# Patient Record
Sex: FEMALE | Race: WHITE | Employment: FULL TIME | URBAN - METROPOLITAN AREA
[De-identification: names, ages, dates, MRNs, and addresses within clinical notes are randomized per-mention and may not be internally consistent; named-entity substitution may affect disease eponyms.]

---

## 2017-01-01 DIAGNOSIS — I10 ESSENTIAL HYPERTENSION: ICD-10-CM

## 2017-01-03 DIAGNOSIS — F41.1 GENERALIZED ANXIETY DISORDER: ICD-10-CM

## 2017-01-03 DIAGNOSIS — F41.8 DEPRESSION WITH ANXIETY: ICD-10-CM

## 2017-01-03 RX ORDER — ALPRAZOLAM 1 MG/1
TABLET ORAL
Qty: 60 TABLET | Refills: 0 | Status: SHIPPED | OUTPATIENT
Start: 2017-01-03 | End: 2017-02-27

## 2017-01-03 RX ORDER — LISINOPRIL 5 MG/1
TABLET ORAL
Qty: 30 TABLET | Refills: 5 | Status: SHIPPED | OUTPATIENT
Start: 2017-01-03 | End: 2017-02-27 | Stop reason: SDUPTHER

## 2017-02-24 ENCOUNTER — TELEPHONE (OUTPATIENT)
Dept: INTERNAL MEDICINE CLINIC | Age: 52
End: 2017-02-24

## 2017-02-27 ENCOUNTER — OFFICE VISIT (OUTPATIENT)
Dept: INTERNAL MEDICINE CLINIC | Age: 52
End: 2017-02-27

## 2017-02-27 VITALS
WEIGHT: 180.4 LBS | HEART RATE: 81 BPM | HEIGHT: 68 IN | DIASTOLIC BLOOD PRESSURE: 84 MMHG | BODY MASS INDEX: 27.34 KG/M2 | SYSTOLIC BLOOD PRESSURE: 138 MMHG

## 2017-02-27 DIAGNOSIS — Z11.59 NEED FOR HEPATITIS C SCREENING TEST: ICD-10-CM

## 2017-02-27 DIAGNOSIS — Z11.4 SCREENING FOR HIV (HUMAN IMMUNODEFICIENCY VIRUS): ICD-10-CM

## 2017-02-27 DIAGNOSIS — E78.5 HYPERLIPIDEMIA, UNSPECIFIED HYPERLIPIDEMIA TYPE: Chronic | ICD-10-CM

## 2017-02-27 DIAGNOSIS — I10 ESSENTIAL HYPERTENSION: ICD-10-CM

## 2017-02-27 DIAGNOSIS — F33.9 MAJOR DEPRESSIVE DISORDER, RECURRENT EPISODE WITH ANXIOUS DISTRESS (HCC): ICD-10-CM

## 2017-02-27 DIAGNOSIS — E11.49 DIABETES MELLITUS TYPE 2 WITH NEUROLOGICAL MANIFESTATIONS (HCC): Primary | ICD-10-CM

## 2017-02-27 DIAGNOSIS — F17.200 TOBACCO DEPENDENCE: ICD-10-CM

## 2017-02-27 DIAGNOSIS — F41.1 GENERALIZED ANXIETY DISORDER: ICD-10-CM

## 2017-02-27 DIAGNOSIS — K21.9 GASTROESOPHAGEAL REFLUX DISEASE, ESOPHAGITIS PRESENCE NOT SPECIFIED: Chronic | ICD-10-CM

## 2017-02-27 DIAGNOSIS — E78.2 MIXED HYPERLIPIDEMIA: ICD-10-CM

## 2017-02-27 DIAGNOSIS — I25.709 CORONARY ARTERY DISEASE INVOLVING CORONARY BYPASS GRAFT OF NATIVE HEART WITH ANGINA PECTORIS (HCC): ICD-10-CM

## 2017-02-27 DIAGNOSIS — E66.3 OVERWEIGHT (BMI 25.0-29.9): ICD-10-CM

## 2017-02-27 DIAGNOSIS — I25.10 CORONARY ARTERY DISEASE INVOLVING NATIVE CORONARY ARTERY OF NATIVE HEART WITHOUT ANGINA PECTORIS: ICD-10-CM

## 2017-02-27 LAB — HBA1C MFR BLD: 6.3 %

## 2017-02-27 PROCEDURE — 83036 HEMOGLOBIN GLYCOSYLATED A1C: CPT | Performed by: INTERNAL MEDICINE

## 2017-02-27 PROCEDURE — 99215 OFFICE O/P EST HI 40 MIN: CPT | Performed by: INTERNAL MEDICINE

## 2017-02-27 RX ORDER — ATORVASTATIN CALCIUM 40 MG/1
40 TABLET, FILM COATED ORAL DAILY
Qty: 90 TABLET | Refills: 1 | Status: SHIPPED | OUTPATIENT
Start: 2017-02-27 | End: 2017-08-10 | Stop reason: SDUPTHER

## 2017-02-27 RX ORDER — OXYCODONE HYDROCHLORIDE AND ACETAMINOPHEN 5; 325 MG/1; MG/1
1 TABLET ORAL EVERY 4 HOURS PRN
Status: CANCELLED | OUTPATIENT
Start: 2017-02-27

## 2017-02-27 RX ORDER — RANOLAZINE 1000 MG/1
1000 TABLET, EXTENDED RELEASE ORAL 2 TIMES DAILY
Qty: 180 TABLET | Refills: 0 | Status: CANCELLED | OUTPATIENT
Start: 2017-02-27

## 2017-02-27 RX ORDER — RANITIDINE 150 MG/1
150 TABLET ORAL 2 TIMES DAILY
Qty: 180 TABLET | Refills: 1 | Status: SHIPPED | OUTPATIENT
Start: 2017-02-27 | End: 2017-09-09 | Stop reason: SDUPTHER

## 2017-02-27 RX ORDER — VENLAFAXINE HYDROCHLORIDE 150 MG/1
150 CAPSULE, EXTENDED RELEASE ORAL DAILY
Qty: 90 CAPSULE | Refills: 1 | Status: SHIPPED | OUTPATIENT
Start: 2017-02-27 | End: 2017-08-10 | Stop reason: SDUPTHER

## 2017-02-27 RX ORDER — LISINOPRIL 5 MG/1
TABLET ORAL
Qty: 90 TABLET | Refills: 1 | Status: SHIPPED | OUTPATIENT
Start: 2017-02-27 | End: 2017-08-10 | Stop reason: SDUPTHER

## 2017-02-27 RX ORDER — VENLAFAXINE HYDROCHLORIDE 75 MG/1
CAPSULE, EXTENDED RELEASE ORAL
Qty: 90 CAPSULE | Refills: 0 | Status: CANCELLED | OUTPATIENT
Start: 2017-02-27

## 2017-02-27 RX ORDER — CARVEDILOL 25 MG/1
25 TABLET ORAL 2 TIMES DAILY WITH MEALS
COMMUNITY
End: 2017-12-12 | Stop reason: SDUPTHER

## 2017-02-27 RX ORDER — CARVEDILOL 12.5 MG/1
12.5 TABLET ORAL 2 TIMES DAILY WITH MEALS
Qty: 180 TABLET | Refills: 3 | Status: CANCELLED | OUTPATIENT
Start: 2017-02-27

## 2017-02-27 RX ORDER — ALPRAZOLAM 1 MG/1
TABLET ORAL
Qty: 60 TABLET | Refills: 0 | Status: CANCELLED | OUTPATIENT
Start: 2017-02-27

## 2017-02-27 RX ORDER — HYDROXYZINE HYDROCHLORIDE 25 MG/1
25 TABLET, FILM COATED ORAL 3 TIMES DAILY PRN
Qty: 90 TABLET | Refills: 1 | Status: SHIPPED | OUTPATIENT
Start: 2017-02-27 | End: 2017-03-29

## 2017-02-27 RX ORDER — ASPIRIN 81 MG/1
81 TABLET, CHEWABLE ORAL DAILY
Qty: 30 TABLET | Refills: 1 | Status: CANCELLED | OUTPATIENT
Start: 2017-02-27

## 2017-02-27 ASSESSMENT — ENCOUNTER SYMPTOMS
BACK PAIN: 1
NAUSEA: 1
EYE PAIN: 0
VOMITING: 1
WHEEZING: 0
ABDOMINAL PAIN: 1
CONSTIPATION: 1
SHORTNESS OF BREATH: 0
DIARRHEA: 1
SORE THROAT: 0
COUGH: 0

## 2017-03-06 ENCOUNTER — HOSPITAL ENCOUNTER (OUTPATIENT)
Dept: GENERAL RADIOLOGY | Age: 52
Discharge: HOME | End: 2017-03-06
Attending: PAIN MEDICINE | Admitting: PAIN MEDICINE

## 2017-03-06 DIAGNOSIS — M54.16 LUMBAR RADICULOPATHY: ICD-10-CM

## 2017-03-14 ENCOUNTER — TELEPHONE (OUTPATIENT)
Dept: PSYCHOLOGY | Age: 52
End: 2017-03-14

## 2017-03-15 ENCOUNTER — TELEPHONE (OUTPATIENT)
Dept: INTERNAL MEDICINE CLINIC | Age: 52
End: 2017-03-15

## 2017-03-20 ENCOUNTER — TELEPHONE (OUTPATIENT)
Dept: INTERNAL MEDICINE CLINIC | Age: 52
End: 2017-03-20

## 2017-03-20 DIAGNOSIS — I25.10 CORONARY ARTERY DISEASE INVOLVING NATIVE CORONARY ARTERY OF NATIVE HEART WITHOUT ANGINA PECTORIS: ICD-10-CM

## 2017-03-20 RX ORDER — RANOLAZINE 1000 MG/1
1000 TABLET, EXTENDED RELEASE ORAL 2 TIMES DAILY
Qty: 180 TABLET | Refills: 0 | Status: SHIPPED | OUTPATIENT
Start: 2017-03-20 | End: 2017-03-29 | Stop reason: SDUPTHER

## 2017-03-23 ENCOUNTER — HOSPITAL ENCOUNTER (OUTPATIENT)
Dept: GENERAL RADIOLOGY | Age: 52
Discharge: HOME | End: 2017-03-31
Attending: INTERNAL MEDICINE | Admitting: INTERNAL MEDICINE

## 2017-03-24 LAB
ALBUMIN SERPL-MCNC: 4.6 GM/DL (ref 3.4–5)
ALP BLD-CCNC: 73 IU/L (ref 40–128)
ALT SERPL-CCNC: 22 U/L (ref 10–40)
ANION GAP SERPL CALCULATED.3IONS-SCNC: 18 MMOL/L (ref 4–16)
AST SERPL-CCNC: 18 IU/L (ref 15–37)
BASOPHILS ABSOLUTE: 0.1 K/CU MM
BASOPHILS RELATIVE PERCENT: 0.8 % (ref 0–1)
BILIRUB SERPL-MCNC: 0.3 MG/DL (ref 0–1)
BUN BLDV-MCNC: 13 MG/DL (ref 6–23)
CALCIUM SERPL-MCNC: 9.7 MG/DL (ref 8.3–10.6)
CHLORIDE BLD-SCNC: 100 MMOL/L (ref 99–110)
CHOLESTEROL: 159 MG/DL
CO2: 19 MMOL/L (ref 21–32)
CREAT SERPL-MCNC: 0.7 MG/DL (ref 0.6–1.1)
CREATININE URINE: 134.4 MG/DL (ref 28–217)
DIFFERENTIAL TYPE: ABNORMAL
EOSINOPHILS ABSOLUTE: 0.1 K/CU MM
EOSINOPHILS RELATIVE PERCENT: 0.8 % (ref 0–3)
ESTIMATED AVERAGE GLUCOSE: 134 MG/DL
GFR AFRICAN AMERICAN: >60 ML/MIN/1.73M2
GFR NON-AFRICAN AMERICAN: >60 ML/MIN/1.73M2
GLUCOSE BLD-MCNC: 138 MG/DL (ref 70–140)
HBA1C MFR BLD: 6.3 % (ref 4.2–6.3)
HCT VFR BLD CALC: 41.4 % (ref 37–47)
HDLC SERPL-MCNC: 50 MG/DL
HEMOGLOBIN: 13.5 GM/DL (ref 12.5–16)
HEPATITIS C ANTIBODY: NON REACTIVE
IMMATURE NEUTROPHIL %: 0.7 % (ref 0–0.43)
LDL CHOLESTEROL CALCULATED: 58 MG/DL
LYMPHOCYTES ABSOLUTE: 2.3 K/CU MM
LYMPHOCYTES RELATIVE PERCENT: 27.1 % (ref 24–44)
MCH RBC QN AUTO: 32.4 PG (ref 27–31)
MCHC RBC AUTO-ENTMCNC: 32.6 % (ref 32–36)
MCV RBC AUTO: 99.3 FL (ref 78–100)
MICROALBUMIN/CREAT 24H UR: 1.3 MG/DL
MICROALBUMIN/CREAT UR-RTO: 9.7 MG/G CREAT (ref 0–30)
MONOCYTES ABSOLUTE: 0.7 K/CU MM
MONOCYTES RELATIVE PERCENT: 8.4 % (ref 0–4)
NUCLEATED RBC %: 0 %
PDW BLD-RTO: 12 % (ref 11.7–14.9)
PLATELET # BLD: 257 K/CU MM (ref 140–440)
PMV BLD AUTO: 9.9 FL (ref 7.5–11.1)
POTASSIUM SERPL-SCNC: 5.1 MMOL/L (ref 3.5–5.1)
RBC # BLD: 4.17 M/CU MM (ref 4.2–5.4)
SEGMENTED NEUTROPHILS ABSOLUTE COUNT: 5.2 K/CU MM
SEGMENTED NEUTROPHILS RELATIVE PERCENT: 62.2 % (ref 36–66)
SODIUM BLD-SCNC: 137 MMOL/L (ref 135–145)
TOTAL IMMATURE NEUTOROPHIL: 0.06 K/CU MM
TOTAL NUCLEATED RBC: 0 K/CU MM
TOTAL PROTEIN: 7.1 GM/DL (ref 6.4–8.2)
TRIGL SERPL-MCNC: 254 MG/DL
TSH HIGH SENSITIVITY: 0.84 UIU/ML (ref 0.27–4.2)
WBC # BLD: 8.4 K/CU MM (ref 4–10.5)

## 2017-03-25 LAB — HIV SCREEN: NON REACTIVE

## 2017-03-27 ENCOUNTER — TELEPHONE (OUTPATIENT)
Dept: INTERNAL MEDICINE CLINIC | Age: 52
End: 2017-03-27

## 2017-03-29 ENCOUNTER — OFFICE VISIT (OUTPATIENT)
Dept: INTERNAL MEDICINE CLINIC | Age: 52
End: 2017-03-29

## 2017-03-29 VITALS
SYSTOLIC BLOOD PRESSURE: 110 MMHG | BODY MASS INDEX: 29.44 KG/M2 | HEART RATE: 90 BPM | DIASTOLIC BLOOD PRESSURE: 80 MMHG | WEIGHT: 182.4 LBS

## 2017-03-29 DIAGNOSIS — I10 ESSENTIAL HYPERTENSION: Chronic | ICD-10-CM

## 2017-03-29 DIAGNOSIS — I25.10 CORONARY ARTERY DISEASE INVOLVING NATIVE CORONARY ARTERY OF NATIVE HEART WITHOUT ANGINA PECTORIS: ICD-10-CM

## 2017-03-29 DIAGNOSIS — M54.16 LUMBAR RADICULOPATHY, CHRONIC: ICD-10-CM

## 2017-03-29 DIAGNOSIS — E78.5 HYPERLIPIDEMIA, UNSPECIFIED HYPERLIPIDEMIA TYPE: Chronic | ICD-10-CM

## 2017-03-29 DIAGNOSIS — E11.49 DIABETES MELLITUS TYPE 2 WITH NEUROLOGICAL MANIFESTATIONS (HCC): Primary | ICD-10-CM

## 2017-03-29 DIAGNOSIS — F17.200 TOBACCO DEPENDENCE: ICD-10-CM

## 2017-03-29 DIAGNOSIS — Z95.1 S/P CABG X 3: ICD-10-CM

## 2017-03-29 PROCEDURE — 99214 OFFICE O/P EST MOD 30 MIN: CPT | Performed by: INTERNAL MEDICINE

## 2017-03-29 RX ORDER — RANOLAZINE 1000 MG/1
1000 TABLET, EXTENDED RELEASE ORAL 2 TIMES DAILY
Qty: 180 TABLET | Refills: 1 | Status: SHIPPED | OUTPATIENT
Start: 2017-03-29 | End: 2017-12-12 | Stop reason: SDUPTHER

## 2017-03-29 RX ORDER — BUSPIRONE HYDROCHLORIDE 5 MG/1
5 TABLET ORAL 3 TIMES DAILY
Qty: 90 TABLET | Refills: 1 | Status: SHIPPED | OUTPATIENT
Start: 2017-03-29 | End: 2017-05-10

## 2017-03-29 ASSESSMENT — ENCOUNTER SYMPTOMS
WHEEZING: 0
CONSTIPATION: 0
SHORTNESS OF BREATH: 0
SORE THROAT: 0
ABDOMINAL PAIN: 0
EYE PAIN: 0
DIARRHEA: 0
BACK PAIN: 0
COUGH: 0

## 2017-03-30 DIAGNOSIS — E11.49 DIABETES MELLITUS TYPE 2 WITH NEUROLOGICAL MANIFESTATIONS (HCC): ICD-10-CM

## 2017-04-01 ENCOUNTER — HOSPITAL ENCOUNTER (OUTPATIENT)
Dept: GENERAL RADIOLOGY | Age: 52
Discharge: HOME | End: 2017-04-30
Attending: INTERNAL MEDICINE | Admitting: INTERNAL MEDICINE

## 2017-04-07 ENCOUNTER — OFFICE VISIT (OUTPATIENT)
Dept: PHYSICAL MEDICINE AND REHAB | Age: 52
End: 2017-04-07

## 2017-04-07 DIAGNOSIS — M54.17 LUMBOSACRAL RADICULOPATHY AT S1: ICD-10-CM

## 2017-04-07 DIAGNOSIS — G57.31 PERONEAL NEUROPATHY, RIGHT: Primary | ICD-10-CM

## 2017-04-07 DIAGNOSIS — R20.2 PARESTHESIA OF BILATERAL LEGS: ICD-10-CM

## 2017-04-07 PROCEDURE — 95886 MUSC TEST DONE W/N TEST COMP: CPT | Performed by: PHYSICAL MEDICINE & REHABILITATION

## 2017-04-07 PROCEDURE — 95910 NRV CNDJ TEST 7-8 STUDIES: CPT | Performed by: PHYSICAL MEDICINE & REHABILITATION

## 2017-04-10 ENCOUNTER — OFFICE VISIT (OUTPATIENT)
Dept: INTERNAL MEDICINE CLINIC | Age: 52
End: 2017-04-10

## 2017-04-10 VITALS
SYSTOLIC BLOOD PRESSURE: 100 MMHG | BODY MASS INDEX: 28.7 KG/M2 | WEIGHT: 177.8 LBS | HEART RATE: 83 BPM | DIASTOLIC BLOOD PRESSURE: 70 MMHG

## 2017-04-10 DIAGNOSIS — F33.9 MAJOR DEPRESSIVE DISORDER, RECURRENT EPISODE WITH ANXIOUS DISTRESS (HCC): ICD-10-CM

## 2017-04-10 DIAGNOSIS — R42 DIZZINESS: ICD-10-CM

## 2017-04-10 DIAGNOSIS — E11.49 DIABETES MELLITUS TYPE 2 WITH NEUROLOGICAL MANIFESTATIONS (HCC): ICD-10-CM

## 2017-04-10 DIAGNOSIS — F17.200 TOBACCO DEPENDENCE: ICD-10-CM

## 2017-04-10 DIAGNOSIS — M54.16 LUMBAR RADICULOPATHY, CHRONIC: Primary | ICD-10-CM

## 2017-04-10 PROCEDURE — 99213 OFFICE O/P EST LOW 20 MIN: CPT | Performed by: INTERNAL MEDICINE

## 2017-04-10 ASSESSMENT — ENCOUNTER SYMPTOMS
ABDOMINAL PAIN: 0
COUGH: 0
SORE THROAT: 0
BACK PAIN: 0
DIARRHEA: 0
SHORTNESS OF BREATH: 0
CONSTIPATION: 0
EYE PAIN: 0
WHEEZING: 0

## 2017-04-20 ENCOUNTER — HOSPITAL ENCOUNTER (OUTPATIENT)
Dept: MRI IMAGING | Age: 52
Discharge: HOME | End: 2017-05-19
Attending: INTERNAL MEDICINE | Admitting: INTERNAL MEDICINE

## 2017-04-26 ENCOUNTER — OFFICE VISIT (OUTPATIENT)
Dept: PSYCHOLOGY | Age: 52
End: 2017-04-26

## 2017-04-26 DIAGNOSIS — F43.21 COMPLICATED BEREAVEMENT: ICD-10-CM

## 2017-04-26 DIAGNOSIS — F32.A DEPRESSIVE DISORDER: Primary | ICD-10-CM

## 2017-04-26 PROCEDURE — 90791 PSYCH DIAGNOSTIC EVALUATION: CPT | Performed by: PSYCHOLOGIST

## 2017-04-26 ASSESSMENT — PATIENT HEALTH QUESTIONNAIRE - PHQ9
1. LITTLE INTEREST OR PLEASURE IN DOING THINGS: 2
3. TROUBLE FALLING OR STAYING ASLEEP: 2
10. IF YOU CHECKED OFF ANY PROBLEMS, HOW DIFFICULT HAVE THESE PROBLEMS MADE IT FOR YOU TO DO YOUR WORK, TAKE CARE OF THINGS AT HOME, OR GET ALONG WITH OTHER PEOPLE: 1
7. TROUBLE CONCENTRATING ON THINGS, SUCH AS READING THE NEWSPAPER OR WATCHING TELEVISION: 1
9. THOUGHTS THAT YOU WOULD BE BETTER OFF DEAD, OR OF HURTING YOURSELF: 0
5. POOR APPETITE OR OVEREATING: 3
8. MOVING OR SPEAKING SO SLOWLY THAT OTHER PEOPLE COULD HAVE NOTICED. OR THE OPPOSITE, BEING SO FIGETY OR RESTLESS THAT YOU HAVE BEEN MOVING AROUND A LOT MORE THAN USUAL: 2
6. FEELING BAD ABOUT YOURSELF - OR THAT YOU ARE A FAILURE OR HAVE LET YOURSELF OR YOUR FAMILY DOWN: 3
2. FEELING DOWN, DEPRESSED OR HOPELESS: 3
SUM OF ALL RESPONSES TO PHQ QUESTIONS 1-9: 18
4. FEELING TIRED OR HAVING LITTLE ENERGY: 2
SUM OF ALL RESPONSES TO PHQ9 QUESTIONS 1 & 2: 5

## 2017-05-05 ENCOUNTER — TELEPHONE (OUTPATIENT)
Dept: INTERNAL MEDICINE CLINIC | Age: 52
End: 2017-05-05

## 2017-05-09 ENCOUNTER — TELEPHONE (OUTPATIENT)
Dept: INTERNAL MEDICINE CLINIC | Age: 52
End: 2017-05-09

## 2017-05-09 ENCOUNTER — TELEPHONE (OUTPATIENT)
Dept: PSYCHOLOGY | Age: 52
End: 2017-05-09

## 2017-05-10 ENCOUNTER — OFFICE VISIT (OUTPATIENT)
Dept: PSYCHOLOGY | Age: 52
End: 2017-05-10

## 2017-05-10 ENCOUNTER — OFFICE VISIT (OUTPATIENT)
Dept: INTERNAL MEDICINE CLINIC | Age: 52
End: 2017-05-10

## 2017-05-10 VITALS
SYSTOLIC BLOOD PRESSURE: 114 MMHG | DIASTOLIC BLOOD PRESSURE: 68 MMHG | WEIGHT: 183 LBS | HEART RATE: 85 BPM | BODY MASS INDEX: 29.54 KG/M2

## 2017-05-10 DIAGNOSIS — F33.9 MAJOR DEPRESSIVE DISORDER, RECURRENT EPISODE WITH ANXIOUS DISTRESS (HCC): ICD-10-CM

## 2017-05-10 DIAGNOSIS — M54.16 LUMBAR RADICULOPATHY: ICD-10-CM

## 2017-05-10 DIAGNOSIS — E11.42 TYPE 2 DIABETES MELLITUS WITH DIABETIC POLYNEUROPATHY, WITHOUT LONG-TERM CURRENT USE OF INSULIN (HCC): Primary | Chronic | ICD-10-CM

## 2017-05-10 DIAGNOSIS — F43.21 COMPLICATED BEREAVEMENT: ICD-10-CM

## 2017-05-10 DIAGNOSIS — I10 ESSENTIAL HYPERTENSION: Chronic | ICD-10-CM

## 2017-05-10 DIAGNOSIS — F32.A DEPRESSIVE DISORDER: Primary | ICD-10-CM

## 2017-05-10 DIAGNOSIS — I25.10 CORONARY ARTERY DISEASE INVOLVING NATIVE CORONARY ARTERY OF NATIVE HEART WITHOUT ANGINA PECTORIS: Chronic | ICD-10-CM

## 2017-05-10 LAB — HBA1C MFR BLD: 7.1 %

## 2017-05-10 PROCEDURE — 83036 HEMOGLOBIN GLYCOSYLATED A1C: CPT | Performed by: INTERNAL MEDICINE

## 2017-05-10 PROCEDURE — 99213 OFFICE O/P EST LOW 20 MIN: CPT | Performed by: INTERNAL MEDICINE

## 2017-05-10 PROCEDURE — 90832 PSYTX W PT 30 MINUTES: CPT | Performed by: PSYCHOLOGIST

## 2017-05-10 RX ORDER — BUSPIRONE HYDROCHLORIDE 5 MG/1
5 TABLET ORAL 3 TIMES DAILY
Qty: 90 TABLET | Refills: 1 | Status: SHIPPED | OUTPATIENT
Start: 2017-05-10 | End: 2017-08-10 | Stop reason: SDUPTHER

## 2017-05-10 RX ORDER — PREGABALIN 50 MG/1
50 CAPSULE ORAL DAILY
COMMUNITY
End: 2017-08-10

## 2017-05-10 ASSESSMENT — PATIENT HEALTH QUESTIONNAIRE - PHQ9
8. MOVING OR SPEAKING SO SLOWLY THAT OTHER PEOPLE COULD HAVE NOTICED. OR THE OPPOSITE, BEING SO FIGETY OR RESTLESS THAT YOU HAVE BEEN MOVING AROUND A LOT MORE THAN USUAL: 2
5. POOR APPETITE OR OVEREATING: 3
SUM OF ALL RESPONSES TO PHQ QUESTIONS 1-9: 12
SUM OF ALL RESPONSES TO PHQ9 QUESTIONS 1 & 2: 2
10. IF YOU CHECKED OFF ANY PROBLEMS, HOW DIFFICULT HAVE THESE PROBLEMS MADE IT FOR YOU TO DO YOUR WORK, TAKE CARE OF THINGS AT HOME, OR GET ALONG WITH OTHER PEOPLE: 1
3. TROUBLE FALLING OR STAYING ASLEEP: 1
4. FEELING TIRED OR HAVING LITTLE ENERGY: 1
1. LITTLE INTEREST OR PLEASURE IN DOING THINGS: 1
2. FEELING DOWN, DEPRESSED OR HOPELESS: 1
6. FEELING BAD ABOUT YOURSELF - OR THAT YOU ARE A FAILURE OR HAVE LET YOURSELF OR YOUR FAMILY DOWN: 2
9. THOUGHTS THAT YOU WOULD BE BETTER OFF DEAD, OR OF HURTING YOURSELF: 0
7. TROUBLE CONCENTRATING ON THINGS, SUCH AS READING THE NEWSPAPER OR WATCHING TELEVISION: 1

## 2017-05-10 ASSESSMENT — ENCOUNTER SYMPTOMS
CONSTIPATION: 0
SHORTNESS OF BREATH: 0
WHEEZING: 0
BACK PAIN: 0
COUGH: 0
EYE PAIN: 0
SORE THROAT: 0
ABDOMINAL PAIN: 0
DIARRHEA: 0

## 2017-05-15 DIAGNOSIS — M54.16 LUMBAR RADICULOPATHY, CHRONIC: ICD-10-CM

## 2017-05-16 ENCOUNTER — TELEPHONE (OUTPATIENT)
Dept: INTERNAL MEDICINE CLINIC | Age: 52
End: 2017-05-16

## 2017-05-16 RX ORDER — ALBUTEROL SULFATE 90 UG/1
2 AEROSOL, METERED RESPIRATORY (INHALATION) EVERY 6 HOURS PRN
Qty: 1 INHALER | Refills: 3 | Status: SHIPPED | OUTPATIENT
Start: 2017-05-16 | End: 2017-05-19 | Stop reason: SDUPTHER

## 2017-05-18 ENCOUNTER — TELEPHONE (OUTPATIENT)
Dept: INTERNAL MEDICINE CLINIC | Age: 52
End: 2017-05-18

## 2017-05-19 RX ORDER — ALBUTEROL SULFATE 90 UG/1
2 AEROSOL, METERED RESPIRATORY (INHALATION) EVERY 6 HOURS PRN
Qty: 1 INHALER | Refills: 5 | Status: SHIPPED | OUTPATIENT
Start: 2017-05-19 | End: 2018-01-29 | Stop reason: SDUPTHER

## 2017-05-30 ENCOUNTER — TELEPHONE (OUTPATIENT)
Dept: PSYCHOLOGY | Age: 52
End: 2017-05-30

## 2017-06-27 ENCOUNTER — TELEPHONE (OUTPATIENT)
Dept: INTERNAL MEDICINE CLINIC | Age: 52
End: 2017-06-27

## 2017-08-09 ENCOUNTER — TELEPHONE (OUTPATIENT)
Dept: INTERNAL MEDICINE CLINIC | Age: 52
End: 2017-08-09

## 2017-08-10 ENCOUNTER — OFFICE VISIT (OUTPATIENT)
Dept: INTERNAL MEDICINE CLINIC | Age: 52
End: 2017-08-10

## 2017-08-10 VITALS
WEIGHT: 187 LBS | DIASTOLIC BLOOD PRESSURE: 68 MMHG | HEART RATE: 80 BPM | SYSTOLIC BLOOD PRESSURE: 110 MMHG | BODY MASS INDEX: 30.18 KG/M2

## 2017-08-10 DIAGNOSIS — E11.49 DIABETES MELLITUS TYPE 2 WITH NEUROLOGICAL MANIFESTATIONS (HCC): Primary | ICD-10-CM

## 2017-08-10 DIAGNOSIS — I10 ESSENTIAL HYPERTENSION: ICD-10-CM

## 2017-08-10 DIAGNOSIS — E78.5 HYPERLIPIDEMIA, UNSPECIFIED HYPERLIPIDEMIA TYPE: Chronic | ICD-10-CM

## 2017-08-10 DIAGNOSIS — K21.9 GASTROESOPHAGEAL REFLUX DISEASE, ESOPHAGITIS PRESENCE NOT SPECIFIED: Chronic | ICD-10-CM

## 2017-08-10 DIAGNOSIS — F17.200 TOBACCO DEPENDENCE: ICD-10-CM

## 2017-08-10 DIAGNOSIS — F33.9 MAJOR DEPRESSIVE DISORDER, RECURRENT EPISODE WITH ANXIOUS DISTRESS (HCC): ICD-10-CM

## 2017-08-10 LAB — HBA1C MFR BLD: 6.9 %

## 2017-08-10 PROCEDURE — 99214 OFFICE O/P EST MOD 30 MIN: CPT | Performed by: INTERNAL MEDICINE

## 2017-08-10 PROCEDURE — 83036 HEMOGLOBIN GLYCOSYLATED A1C: CPT | Performed by: INTERNAL MEDICINE

## 2017-08-10 RX ORDER — LISINOPRIL 5 MG/1
TABLET ORAL
Qty: 90 TABLET | Refills: 1 | Status: SHIPPED | OUTPATIENT
Start: 2017-08-10 | End: 2017-12-12 | Stop reason: SDUPTHER

## 2017-08-10 RX ORDER — ATORVASTATIN CALCIUM 40 MG/1
40 TABLET, FILM COATED ORAL DAILY
Qty: 90 TABLET | Refills: 1 | Status: SHIPPED | OUTPATIENT
Start: 2017-08-10 | End: 2017-08-14 | Stop reason: SDUPTHER

## 2017-08-10 RX ORDER — VENLAFAXINE HYDROCHLORIDE 150 MG/1
150 CAPSULE, EXTENDED RELEASE ORAL DAILY
Qty: 90 CAPSULE | Refills: 1 | Status: SHIPPED | OUTPATIENT
Start: 2017-08-10 | End: 2017-12-12 | Stop reason: SDUPTHER

## 2017-08-10 RX ORDER — BUSPIRONE HYDROCHLORIDE 5 MG/1
5 TABLET ORAL 3 TIMES DAILY
Qty: 90 TABLET | Refills: 3 | Status: SHIPPED | OUTPATIENT
Start: 2017-08-10 | End: 2017-09-09 | Stop reason: SDUPTHER

## 2017-08-10 ASSESSMENT — ENCOUNTER SYMPTOMS
CONSTIPATION: 0
BACK PAIN: 0
WHEEZING: 0
SHORTNESS OF BREATH: 0
COUGH: 0
DIARRHEA: 0
EYE PAIN: 0
SORE THROAT: 0
ABDOMINAL PAIN: 0

## 2017-08-12 ENCOUNTER — PATIENT MESSAGE (OUTPATIENT)
Dept: INTERNAL MEDICINE CLINIC | Age: 52
End: 2017-08-12

## 2017-08-15 RX ORDER — ATORVASTATIN CALCIUM 40 MG/1
40 TABLET, FILM COATED ORAL DAILY
Qty: 90 TABLET | Refills: 1 | Status: SHIPPED | OUTPATIENT
Start: 2017-08-15 | End: 2017-08-16

## 2017-08-16 RX ORDER — ATORVASTATIN CALCIUM 80 MG/1
80 TABLET, FILM COATED ORAL DAILY
Qty: 90 TABLET | Refills: 1 | Status: SHIPPED | OUTPATIENT
Start: 2017-08-16 | End: 2017-12-12 | Stop reason: SDUPTHER

## 2017-09-05 ENCOUNTER — OFFICE VISIT (OUTPATIENT)
Dept: INTERNAL MEDICINE CLINIC | Age: 52
End: 2017-09-05

## 2017-09-05 VITALS
DIASTOLIC BLOOD PRESSURE: 72 MMHG | RESPIRATION RATE: 16 BRPM | OXYGEN SATURATION: 98 % | SYSTOLIC BLOOD PRESSURE: 132 MMHG | HEART RATE: 84 BPM | WEIGHT: 187 LBS | BODY MASS INDEX: 30.18 KG/M2

## 2017-09-05 DIAGNOSIS — E11.42 TYPE 2 DIABETES MELLITUS WITH DIABETIC POLYNEUROPATHY, WITHOUT LONG-TERM CURRENT USE OF INSULIN (HCC): Chronic | ICD-10-CM

## 2017-09-05 DIAGNOSIS — F17.200 TOBACCO DEPENDENCE: ICD-10-CM

## 2017-09-05 DIAGNOSIS — I10 ESSENTIAL HYPERTENSION: Chronic | ICD-10-CM

## 2017-09-05 DIAGNOSIS — Z01.818 PRE-OP TESTING: Primary | ICD-10-CM

## 2017-09-05 DIAGNOSIS — F33.9 MAJOR DEPRESSIVE DISORDER, RECURRENT EPISODE WITH ANXIOUS DISTRESS (HCC): ICD-10-CM

## 2017-09-05 DIAGNOSIS — I25.10 CORONARY ARTERY DISEASE INVOLVING NATIVE CORONARY ARTERY OF NATIVE HEART WITHOUT ANGINA PECTORIS: Chronic | ICD-10-CM

## 2017-09-05 PROCEDURE — 99244 OFF/OP CNSLTJ NEW/EST MOD 40: CPT | Performed by: INTERNAL MEDICINE

## 2017-09-05 PROCEDURE — 93000 ELECTROCARDIOGRAM COMPLETE: CPT | Performed by: INTERNAL MEDICINE

## 2017-09-05 RX ORDER — CLOTRIMAZOLE 1 %
CREAM (GRAM) TOPICAL
Qty: 24 G | Refills: 1 | Status: SHIPPED | OUTPATIENT
Start: 2017-09-05 | End: 2017-09-12

## 2017-09-05 RX ORDER — CYCLOBENZAPRINE HCL 10 MG
10 TABLET ORAL 3 TIMES DAILY PRN
COMMUNITY
End: 2017-12-12 | Stop reason: SDUPTHER

## 2017-09-09 DIAGNOSIS — K21.9 GASTROESOPHAGEAL REFLUX DISEASE, ESOPHAGITIS PRESENCE NOT SPECIFIED: Chronic | ICD-10-CM

## 2017-09-11 RX ORDER — BUSPIRONE HYDROCHLORIDE 5 MG/1
TABLET ORAL
Qty: 90 TABLET | Refills: 1 | Status: SHIPPED | OUTPATIENT
Start: 2017-09-11 | End: 2017-11-27 | Stop reason: SDUPTHER

## 2017-09-11 RX ORDER — HYDROXYZINE HYDROCHLORIDE 25 MG/1
TABLET, FILM COATED ORAL
Qty: 90 TABLET | Refills: 1 | Status: SHIPPED | OUTPATIENT
Start: 2017-09-11 | End: 2017-12-12 | Stop reason: SDUPTHER

## 2017-09-11 RX ORDER — RANITIDINE 150 MG/1
TABLET ORAL
Qty: 180 TABLET | Refills: 1 | Status: SHIPPED | OUTPATIENT
Start: 2017-09-11 | End: 2017-12-12 | Stop reason: SDUPTHER

## 2017-09-18 ENCOUNTER — NURSE ONLY (OUTPATIENT)
Dept: INTERNAL MEDICINE CLINIC | Age: 52
End: 2017-09-18

## 2017-09-18 ENCOUNTER — TELEPHONE (OUTPATIENT)
Dept: INTERNAL MEDICINE CLINIC | Age: 52
End: 2017-09-18

## 2017-09-18 DIAGNOSIS — Z01.818 PRE-OP TESTING: ICD-10-CM

## 2017-09-18 DIAGNOSIS — Z01.818 PREOP EXAMINATION: Primary | ICD-10-CM

## 2017-09-18 LAB
ANION GAP SERPL CALCULATED.3IONS-SCNC: 16 MMOL/L (ref 3–16)
BASOPHILS ABSOLUTE: 0 K/UL (ref 0–0.2)
BASOPHILS RELATIVE PERCENT: 0.6 %
BUN BLDV-MCNC: 10 MG/DL (ref 7–20)
CALCIUM SERPL-MCNC: 9.7 MG/DL (ref 8.3–10.6)
CHLORIDE BLD-SCNC: 98 MMOL/L (ref 99–110)
CO2: 24 MMOL/L (ref 21–32)
CREAT SERPL-MCNC: 0.7 MG/DL (ref 0.6–1.1)
EOSINOPHILS ABSOLUTE: 0.1 K/UL (ref 0–0.6)
EOSINOPHILS RELATIVE PERCENT: 1.6 %
GFR AFRICAN AMERICAN: >60
GFR NON-AFRICAN AMERICAN: >60
GLUCOSE BLD-MCNC: 177 MG/DL (ref 70–99)
HCT VFR BLD CALC: 38.9 % (ref 36–48)
HEMOGLOBIN: 13.2 G/DL (ref 12–16)
LYMPHOCYTES ABSOLUTE: 2 K/UL (ref 1–5.1)
LYMPHOCYTES RELATIVE PERCENT: 25.3 %
MCH RBC QN AUTO: 32.7 PG (ref 26–34)
MCHC RBC AUTO-ENTMCNC: 33.9 G/DL (ref 31–36)
MCV RBC AUTO: 96.4 FL (ref 80–100)
MONOCYTES ABSOLUTE: 0.6 K/UL (ref 0–1.3)
MONOCYTES RELATIVE PERCENT: 7.9 %
NEUTROPHILS ABSOLUTE: 5.2 K/UL (ref 1.7–7.7)
NEUTROPHILS RELATIVE PERCENT: 64.6 %
PDW BLD-RTO: 13.3 % (ref 12.4–15.4)
PLATELET # BLD: 239 K/UL (ref 135–450)
PMV BLD AUTO: 8.6 FL (ref 5–10.5)
POTASSIUM SERPL-SCNC: 4.8 MMOL/L (ref 3.5–5.1)
RBC # BLD: 4.03 M/UL (ref 4–5.2)
SODIUM BLD-SCNC: 138 MMOL/L (ref 136–145)
WBC # BLD: 8 K/UL (ref 4–11)

## 2017-09-19 ENCOUNTER — HOSPITAL ENCOUNTER (OUTPATIENT)
Dept: GENERAL RADIOLOGY | Age: 52
Discharge: HOME | End: 2017-09-19
Attending: INTERNAL MEDICINE | Admitting: INTERNAL MEDICINE

## 2017-09-19 ENCOUNTER — TELEPHONE (OUTPATIENT)
Dept: INTERNAL MEDICINE CLINIC | Age: 52
End: 2017-09-19

## 2017-09-19 DIAGNOSIS — Z01.818 PREOP EXAMINATION: Primary | ICD-10-CM

## 2017-09-19 DIAGNOSIS — Z01.818 PREOP EXAMINATION: ICD-10-CM

## 2017-09-19 LAB — MRSA SCREEN RT-PCR: NORMAL

## 2017-10-05 ENCOUNTER — TELEPHONE (OUTPATIENT)
Dept: INTERNAL MEDICINE CLINIC | Age: 52
End: 2017-10-05

## 2017-10-06 ENCOUNTER — CARE COORDINATION (OUTPATIENT)
Dept: CARE COORDINATION | Age: 52
End: 2017-10-06

## 2017-10-06 ENCOUNTER — OFFICE VISIT (OUTPATIENT)
Dept: INTERNAL MEDICINE CLINIC | Age: 52
End: 2017-10-06

## 2017-10-06 VITALS
DIASTOLIC BLOOD PRESSURE: 70 MMHG | HEART RATE: 87 BPM | BODY MASS INDEX: 30.96 KG/M2 | SYSTOLIC BLOOD PRESSURE: 120 MMHG | WEIGHT: 191.8 LBS

## 2017-10-06 DIAGNOSIS — R21 RASH OF HANDS: ICD-10-CM

## 2017-10-06 DIAGNOSIS — F17.200 TOBACCO DEPENDENCE: ICD-10-CM

## 2017-10-06 DIAGNOSIS — Z13.31 POSITIVE DEPRESSION SCREENING: ICD-10-CM

## 2017-10-06 DIAGNOSIS — I10 ESSENTIAL HYPERTENSION: Chronic | ICD-10-CM

## 2017-10-06 DIAGNOSIS — E78.5 HYPERLIPIDEMIA, UNSPECIFIED HYPERLIPIDEMIA TYPE: Chronic | ICD-10-CM

## 2017-10-06 DIAGNOSIS — F33.9 MAJOR DEPRESSIVE DISORDER, RECURRENT EPISODE WITH ANXIOUS DISTRESS (HCC): ICD-10-CM

## 2017-10-06 DIAGNOSIS — G89.29 CHRONIC MIDLINE LOW BACK PAIN WITH RIGHT-SIDED SCIATICA: Primary | ICD-10-CM

## 2017-10-06 DIAGNOSIS — M54.41 CHRONIC MIDLINE LOW BACK PAIN WITH RIGHT-SIDED SCIATICA: Primary | ICD-10-CM

## 2017-10-06 PROCEDURE — G8431 POS CLIN DEPRES SCRN F/U DOC: HCPCS | Performed by: INTERNAL MEDICINE

## 2017-10-06 PROCEDURE — 99214 OFFICE O/P EST MOD 30 MIN: CPT | Performed by: INTERNAL MEDICINE

## 2017-10-06 ASSESSMENT — ENCOUNTER SYMPTOMS
SORE THROAT: 0
ABDOMINAL PAIN: 0
SHORTNESS OF BREATH: 0
BACK PAIN: 1
DIARRHEA: 0
CONSTIPATION: 0
COUGH: 0
WHEEZING: 0
EYE PAIN: 0

## 2017-10-06 NOTE — PROGRESS NOTES
Jw Pore   45 y.o.  female  [id number]      Chief Complaint   Patient presents with    Follow-up    Back Pain     remove staples    Depression    Rash        Subjective:  45 y. o.female is here for a follow up. She has the following chronic/acute medical problems:    TOS (thoracic outlet syndrome)    Coronary artery disease    Major depressive disorder, recurrent episode with anxious distress ([address]  She says her mood is stable. There are ups and downs. Especially with the recent procedure and no improvement in pain.  Diabetes mellitus ([address]    GERD (gastroesophageal reflux disease)    Hyperlipidemia  She has been taking her statin cholesterol medication regularly without side effects such as myalgias or upper abdominal pain, nausea or jaundice.  Essential hypertension  The patient is taking hypertensive medications compliantly without side effects. Denies chest pain, dyspnea, edema, or TIA's.  Tobacco dependence  Says she quit smoking about 1 week before the surgery.  Chronic midline low back pain with right-sided sciatica     Patient is status post L4 5 laminectomy, facetectomy, posterior lateral and interbody fusion, placement of intravertebral biomechanical device at L4 5, segmental pedicle screw instrumentation. She also had L5-S1 laminectomy and facetectomy redone. Here to have staples removed. Patient says she does not feel any improvement in her pain yet. She has an appointment with neurosurgery in 2 weeks. Patient would also like a dermatology referral for the rash in her hand. She says it's improved somewhat but she would like it evaluated by dermatologist.   says she hasn't been compliant with the steroid cream.      Review of Systems   Constitutional: Negative for chills and fever. HENT: Negative for congestion and sore throat. Eyes: Negative for pain and visual disturbance. Respiratory: Negative for cough, shortness of breath and wheezing. 09/18/2017    GFRAA >60 09/18/2017     Lab Results   Component Value Date    CHOL 159 03/24/2017    CHOL 268 (H) 08/29/2015    CHOL 162 01/12/2015     Lab Results   Component Value Date    TRIG 254 (H) 03/24/2017    TRIG 614 (H) 08/29/2015    TRIG 203 (H) 01/12/2015     Lab Results   Component Value Date    HDL 50 03/24/2017    HDL 26 (L) 08/29/2015    HDL 46 01/12/2015     Lab Results   Component Value Date    LDLCALC 58 03/24/2017    LDLCALC 70 05/05/2014     Lab Results   Component Value Date    LABA1C 6.9 08/10/2017     Lab Results   Component Value Date    TSHHS 0.836 03/24/2017         ASSESSMENT:      1. Chronic midline low back pain with right-sided sciatica    2. Rash of hands    3. Essential hypertension    4. Hyperlipidemia, unspecified hyperlipidemia type    5. Major depressive disorder, recurrent episode with anxious distress ([address]    6. Tobacco dependence      On the basis of positive PHQ-9 screening ( ), the following plan was implemented: Continue current medications for now. Patient will follow-up in 4 week(s) with PCP. PLAN:  1. Staples removed. Difficult procedure as patient was very sensitive. Signs of wound dehiscence. 2.  Strongly encouraged patient to not soak the area water. She can rinse her body. 3.  Referral to dermatology. 4.  Patient quit smoking one week prior to the surgery. Continue to support patient through this process. 5.  Follow-up as scheduled. Orders Placed This Encounter   Procedures    External Referral To Dermatology       Care discussed with patient. Questions answered. Patient verbalizes understanding and agrees with plan. After visit summary provided. Advised to call for any problems, questions, or concerns. This note was partially completed with a verbal recognition program and it was checked for errors. It is possible that there are still dictated errors within this office note.  Any errors should be brought immediately to my attention for

## 2017-10-25 ENCOUNTER — CARE COORDINATION (OUTPATIENT)
Dept: CARE COORDINATION | Age: 52
End: 2017-10-25

## 2017-10-31 ENCOUNTER — TELEPHONE (OUTPATIENT)
Dept: INTERNAL MEDICINE CLINIC | Age: 52
End: 2017-10-31

## 2017-11-03 ENCOUNTER — CARE COORDINATION (OUTPATIENT)
Dept: CARE COORDINATION | Age: 52
End: 2017-11-03

## 2017-11-10 ENCOUNTER — CARE COORDINATION (OUTPATIENT)
Dept: CARE COORDINATION | Age: 52
End: 2017-11-10

## 2017-11-27 RX ORDER — BUSPIRONE HYDROCHLORIDE 5 MG/1
5 TABLET ORAL 3 TIMES DAILY
Qty: 90 TABLET | Refills: 1 | Status: SHIPPED | OUTPATIENT
Start: 2017-11-27 | End: 2017-12-12

## 2017-11-27 NOTE — TELEPHONE ENCOUNTER
From: Ania Munguia  Sent: 11/26/2017 8:32 PM EST  Subject: Medication Renewal Request    Waleska Perea would like a refill of the following medications:  busPIRone (BUSPAR) 5 MG tablet Janeth Phalen, MD]    Preferred pharmacy: [address], [address] [place] [address] [phone] - F [phone]    Comment:

## 2017-11-28 ENCOUNTER — PATIENT MESSAGE (OUTPATIENT)
Dept: INTERNAL MEDICINE CLINIC | Age: 52
End: 2017-11-28

## 2017-11-28 NOTE — TELEPHONE ENCOUNTER
From: Soraya Kelley  To: Nataly Bautista MD  Sent: 11/28/2017 12:25 PM EST  Subject: Prescription Question    Can you please refill my Buspar prescription.

## 2017-12-11 ENCOUNTER — TELEPHONE (OUTPATIENT)
Dept: INTERNAL MEDICINE CLINIC | Age: 52
End: 2017-12-11

## 2017-12-12 ENCOUNTER — CARE COORDINATOR VISIT (OUTPATIENT)
Dept: CARE COORDINATION | Age: 52
End: 2017-12-12

## 2017-12-12 ENCOUNTER — OFFICE VISIT (OUTPATIENT)
Dept: INTERNAL MEDICINE CLINIC | Age: 52
End: 2017-12-12

## 2017-12-12 VITALS
OXYGEN SATURATION: 98 % | WEIGHT: 190 LBS | HEART RATE: 82 BPM | DIASTOLIC BLOOD PRESSURE: 74 MMHG | RESPIRATION RATE: 16 BRPM | SYSTOLIC BLOOD PRESSURE: 110 MMHG | BODY MASS INDEX: 30.67 KG/M2

## 2017-12-12 DIAGNOSIS — K21.9 GASTROESOPHAGEAL REFLUX DISEASE, ESOPHAGITIS PRESENCE NOT SPECIFIED: Chronic | ICD-10-CM

## 2017-12-12 DIAGNOSIS — F17.200 TOBACCO DEPENDENCE: ICD-10-CM

## 2017-12-12 DIAGNOSIS — E11.42 TYPE 2 DIABETES MELLITUS WITH DIABETIC POLYNEUROPATHY, WITHOUT LONG-TERM CURRENT USE OF INSULIN (HCC): Primary | Chronic | ICD-10-CM

## 2017-12-12 DIAGNOSIS — I10 ESSENTIAL HYPERTENSION: Chronic | ICD-10-CM

## 2017-12-12 DIAGNOSIS — I25.10 CORONARY ARTERY DISEASE INVOLVING NATIVE CORONARY ARTERY OF NATIVE HEART WITHOUT ANGINA PECTORIS: ICD-10-CM

## 2017-12-12 DIAGNOSIS — G89.29 CHRONIC MIDLINE LOW BACK PAIN WITH RIGHT-SIDED SCIATICA: ICD-10-CM

## 2017-12-12 DIAGNOSIS — M54.41 CHRONIC MIDLINE LOW BACK PAIN WITH RIGHT-SIDED SCIATICA: ICD-10-CM

## 2017-12-12 DIAGNOSIS — E78.5 HYPERLIPIDEMIA, UNSPECIFIED HYPERLIPIDEMIA TYPE: Chronic | ICD-10-CM

## 2017-12-12 DIAGNOSIS — F33.9 MAJOR DEPRESSIVE DISORDER, RECURRENT EPISODE WITH ANXIOUS DISTRESS (HCC): ICD-10-CM

## 2017-12-12 LAB — HBA1C MFR BLD: 8 %

## 2017-12-12 PROCEDURE — 99214 OFFICE O/P EST MOD 30 MIN: CPT | Performed by: INTERNAL MEDICINE

## 2017-12-12 PROCEDURE — 83036 HEMOGLOBIN GLYCOSYLATED A1C: CPT | Performed by: INTERNAL MEDICINE

## 2017-12-12 RX ORDER — CYCLOBENZAPRINE HCL 10 MG
10 TABLET ORAL 3 TIMES DAILY PRN
Qty: 90 TABLET | Refills: 1 | Status: SHIPPED | OUTPATIENT
Start: 2017-12-12 | End: 2018-01-29 | Stop reason: SDUPTHER

## 2017-12-12 RX ORDER — HYDROXYZINE HYDROCHLORIDE 25 MG/1
TABLET, FILM COATED ORAL
Qty: 90 TABLET | Refills: 1 | Status: SHIPPED | OUTPATIENT
Start: 2017-12-12 | End: 2018-01-29 | Stop reason: SDUPTHER

## 2017-12-12 RX ORDER — CARVEDILOL 25 MG/1
25 TABLET ORAL 2 TIMES DAILY WITH MEALS
Qty: 180 TABLET | Refills: 1 | Status: SHIPPED | OUTPATIENT
Start: 2017-12-12 | End: 2018-01-29 | Stop reason: SDUPTHER

## 2017-12-12 RX ORDER — BUSPIRONE HYDROCHLORIDE 10 MG/1
10 TABLET ORAL 3 TIMES DAILY
Qty: 270 TABLET | Refills: 0 | Status: SHIPPED | OUTPATIENT
Start: 2017-12-12 | End: 2018-01-29 | Stop reason: SDUPTHER

## 2017-12-12 RX ORDER — ATORVASTATIN CALCIUM 80 MG/1
80 TABLET, FILM COATED ORAL DAILY
Qty: 90 TABLET | Refills: 1 | Status: SHIPPED | OUTPATIENT
Start: 2017-12-12 | End: 2018-01-29 | Stop reason: SDUPTHER

## 2017-12-12 RX ORDER — VENLAFAXINE HYDROCHLORIDE 150 MG/1
150 CAPSULE, EXTENDED RELEASE ORAL DAILY
Qty: 90 CAPSULE | Refills: 1 | Status: SHIPPED | OUTPATIENT
Start: 2017-12-12 | End: 2018-01-29 | Stop reason: SDUPTHER

## 2017-12-12 RX ORDER — RANITIDINE 150 MG/1
TABLET ORAL
Qty: 180 TABLET | Refills: 1 | Status: SHIPPED | OUTPATIENT
Start: 2017-12-12 | End: 2018-01-29 | Stop reason: SDUPTHER

## 2017-12-12 RX ORDER — BUSPIRONE HYDROCHLORIDE 5 MG/1
5 TABLET ORAL 3 TIMES DAILY
Qty: 90 TABLET | Refills: 1 | Status: CANCELLED | OUTPATIENT
Start: 2017-12-12

## 2017-12-12 RX ORDER — VARENICLINE TARTRATE 1 MG/1
1 TABLET, FILM COATED ORAL
Status: ON HOLD | COMMUNITY
End: 2018-06-13 | Stop reason: HOSPADM

## 2017-12-12 RX ORDER — RANOLAZINE 1000 MG/1
1000 TABLET, EXTENDED RELEASE ORAL 2 TIMES DAILY
Qty: 180 TABLET | Refills: 1 | Status: ON HOLD | OUTPATIENT
Start: 2017-12-12 | End: 2020-02-28

## 2017-12-12 RX ORDER — LISINOPRIL 5 MG/1
TABLET ORAL
Qty: 90 TABLET | Refills: 1 | Status: SHIPPED | OUTPATIENT
Start: 2017-12-12 | End: 2018-01-29 | Stop reason: SDUPTHER

## 2017-12-12 RX ORDER — DIAPER,BRIEF,INFANT-TODD,DISP
EACH MISCELLANEOUS
Qty: 1 TUBE | Refills: 2 | Status: SHIPPED | OUTPATIENT
Start: 2017-12-12 | End: 2017-12-19

## 2017-12-12 ASSESSMENT — ENCOUNTER SYMPTOMS
CONSTIPATION: 0
ABDOMINAL PAIN: 0
DIARRHEA: 0
COUGH: 0
SHORTNESS OF BREATH: 0
WHEEZING: 0
EYE PAIN: 0
SORE THROAT: 0
BACK PAIN: 1

## 2017-12-12 NOTE — PROGRESS NOTES
Laxmi Patel   47 y.o.  female  [id number]      Chief Complaint   Patient presents with    Follow-up    Diabetes    Back Pain    Depression    Hypertension    Nicotine Dependence        Subjective:  47 y. o.female is here for a follow up. She has the following chronic/acute medical problems:    Diabetes mellitus type 2 with neurological manifestations  No polyuria, polydipsia, blurry vision, chest pain, dyspnea or claudication. No foot burning, numbness or pain. Taking medication compliantly without noted sided effects. Follows diet fairly well. Home glucose monitoring in the range of 120-150.  Coronary artery disease  On ASA, Brilinta, BB and Statin.  Major depressive disorder, recurrent episode with anxious distress (HCC)  Buspar 5 mg is no longer helping. Agreeable to reestablishing with psychology.  GERD (gastroesophageal reflux disease)  Stable on H2 blocker.  Hyperlipidemia  She has been taking her statin cholesterol medication regularly without side effects such as myalgias or upper abdominal pain, nausea or jaundice.  Essential hypertension  The patient is taking hypertensive medications compliantly without side effects. Denies chest pain, dyspnea, edema, or TIA's.  Tobacco dependence  Hasn't smokes since her back surgery.  Overweight (BMI 25.0-29. 9)    Chronic midline low back pain with right-sided sciatica  Back pain minimally improved after surgery. Following with Dr. Mukund Foster. Never got to see Dermatology, says she never got the call. Rash of the left hand improved. Review of Systems   Constitutional: Negative for chills and fever. HENT: Negative for congestion and sore throat. Eyes: Negative for pain and visual disturbance. Respiratory: Negative for cough, shortness of breath and wheezing. Cardiovascular: Negative for chest pain, palpitations and leg swelling. Gastrointestinal: Negative for abdominal pain, constipation and diarrhea. Genitourinary: Negative for dysuria and hematuria. Musculoskeletal: Positive for back pain. Negative for neck pain. Skin: Positive for rash. Neurological: Negative for dizziness, weakness, numbness and headaches. Psychiatric/Behavioral: Positive for dysphoric mood. Negative for sleep disturbance. The patient is nervous/anxious. Current Outpatient Prescriptions   Medication Sig Dispense Refill    hydrOXYzine (ATARAX) 25 MG tablet TAKE 1 TABLET THREE TIMES A DAY AS NEEDED FOR ITCHING 90 tablet 1    ranitidine (ZANTAC) 150 MG tablet TAKE 1 TABLET TWICE A  tablet 1    cyclobenzaprine (FLEXERIL) 10 MG tablet Take 1 tablet by mouth 3 times daily as needed for Muscle spasms 90 tablet 1    atorvastatin (LIPITOR) 80 MG tablet Take 1 tablet by mouth daily 90 tablet 1    venlafaxine (EFFEXOR XR) 150 MG extended release capsule Take 1 capsule by mouth daily 90 capsule 1    lisinopril (PRINIVIL;ZESTRIL) 5 MG tablet TAKE ONE TABLET BY MOUTH DAILY 90 tablet 1    SITagliptin-metFORMIN (JANUMET XR)  MG TB24 per extended release tablet TAKE ONE TABLET BY MOUTH TWICE A  tablet 1    ranolazine (RANEXA) 1000 MG extended release tablet Take 1 tablet by mouth 2 times daily 180 tablet 1    carvedilol (COREG) 25 MG tablet Take 1 tablet by mouth 2 times daily (with meals) 180 tablet 1    glucose blood VI test strips (ASCENSIA AUTODISC VI;ONE TOUCH ULTRA TEST VI) strip 1 each by In Vitro route 2 times daily As needed. 100 each 5    ticagrelor (BRILINTA) 90 MG TABS tablet Take 1 tablet by mouth 2 times daily 180 tablet 1    busPIRone (BUSPAR) 10 MG tablet Take 1 tablet by mouth 3 times daily 270 tablet 0    hydrocortisone 1 % cream Apply topically 2 times daily.  1 Tube 2    albuterol sulfate HFA (PROAIR HFA) 108 (90 BASE) MCG/ACT inhaler Inhale 2 puffs into the lungs every 6 hours as needed for Wheezing 1 Inhaler 5    oxyCODONE-acetaminophen (PERCOCET) 5-325 MG per tablet Take 1 tablet by by mouth daily     Dispense:  90 tablet     Refill:  1    venlafaxine (EFFEXOR XR) 150 MG extended release capsule     Sig: Take 1 capsule by mouth daily     Dispense:  90 capsule     Refill:  1    lisinopril (PRINIVIL;ZESTRIL) 5 MG tablet     Sig: TAKE ONE TABLET BY MOUTH DAILY     Dispense:  90 tablet     Refill:  1    SITagliptin-metFORMIN (JANUMET XR)  MG TB24 per extended release tablet     Sig: TAKE ONE TABLET BY MOUTH TWICE A DAY     Dispense:  180 tablet     Refill:  1    ranolazine (RANEXA) 1000 MG extended release tablet     Sig: Take 1 tablet by mouth 2 times daily     Dispense:  180 tablet     Refill:  1    carvedilol (COREG) 25 MG tablet     Sig: Take 1 tablet by mouth 2 times daily (with meals)     Dispense:  180 tablet     Refill:  1    glucose blood VI test strips (ASCENSIA AUTODISC VI;ONE TOUCH ULTRA TEST VI) strip     Si each by In Vitro route 2 times daily As needed. Dispense:  100 each     Refill:  5     Patient has onetouch Mini    ticagrelor (BRILINTA) 90 MG TABS tablet     Sig: Take 1 tablet by mouth 2 times daily     Dispense:  180 tablet     Refill:  1    busPIRone (BUSPAR) 10 MG tablet     Sig: Take 1 tablet by mouth 3 times daily     Dispense:  270 tablet     Refill:  0    hydrocortisone 1 % cream     Sig: Apply topically 2 times daily. Dispense:  1 Tube     Refill:  2     Orders Placed This Encounter   Procedures    POCT glycosylated hemoglobin (Hb A1C)       Care discussed with patient. Questions answered. Patient verbalizes understanding and agrees with plan. After visit summary provided. Advised to call for any problems, questions, or concerns. Return in about 2 months (around 2018) for Depression, Diabetes. This note was partially completed with a verbal recognition program and it was checked for errors. It is possible that there are still dictated errors within this office note.  Any errors should be brought immediately to my attention for correction. All efforts were made to ensure that this office note is accurate.        Signed:  Dylon Bond MD  12/12/17  3:09 PM

## 2017-12-20 ENCOUNTER — PATIENT MESSAGE (OUTPATIENT)
Dept: INTERNAL MEDICINE CLINIC | Age: 52
End: 2017-12-20

## 2017-12-20 RX ORDER — VARENICLINE TARTRATE 1 MG/1
1 TABLET, FILM COATED ORAL 2 TIMES DAILY
Qty: 60 TABLET | Refills: 3 | Status: SHIPPED | OUTPATIENT
Start: 2017-12-20 | End: 2018-10-31

## 2017-12-20 NOTE — TELEPHONE ENCOUNTER
From: Humble Crystal  To: Gloria Arce MD  Sent: 12/20/2017 3:03 PM EST  Subject: Prescription Question    Could you please call the pharmacy [facility] a prescription for the continued pack of Chantix?

## 2017-12-27 ENCOUNTER — OFFICE VISIT (OUTPATIENT)
Dept: PSYCHOLOGY | Age: 52
End: 2017-12-27

## 2017-12-27 DIAGNOSIS — F43.21 COMPLICATED BEREAVEMENT: ICD-10-CM

## 2017-12-27 DIAGNOSIS — F32.A DEPRESSIVE DISORDER: Primary | ICD-10-CM

## 2017-12-27 PROCEDURE — 90832 PSYTX W PT 30 MINUTES: CPT | Performed by: PSYCHOLOGIST

## 2017-12-27 ASSESSMENT — PATIENT HEALTH QUESTIONNAIRE - PHQ9
7. TROUBLE CONCENTRATING ON THINGS, SUCH AS READING THE NEWSPAPER OR WATCHING TELEVISION: 0
4. FEELING TIRED OR HAVING LITTLE ENERGY: 2
5. POOR APPETITE OR OVEREATING: 3
SUM OF ALL RESPONSES TO PHQ9 QUESTIONS 1 & 2: 4
9. THOUGHTS THAT YOU WOULD BE BETTER OFF DEAD, OR OF HURTING YOURSELF: 0
3. TROUBLE FALLING OR STAYING ASLEEP: 3
1. LITTLE INTEREST OR PLEASURE IN DOING THINGS: 2
6. FEELING BAD ABOUT YOURSELF - OR THAT YOU ARE A FAILURE OR HAVE LET YOURSELF OR YOUR FAMILY DOWN: 0
8. MOVING OR SPEAKING SO SLOWLY THAT OTHER PEOPLE COULD HAVE NOTICED. OR THE OPPOSITE, BEING SO FIGETY OR RESTLESS THAT YOU HAVE BEEN MOVING AROUND A LOT MORE THAN USUAL: 2
2. FEELING DOWN, DEPRESSED OR HOPELESS: 2
SUM OF ALL RESPONSES TO PHQ QUESTIONS 1-9: 14

## 2018-01-03 RX ORDER — BUSPIRONE HYDROCHLORIDE 10 MG/1
10 TABLET ORAL 3 TIMES DAILY
Qty: 270 TABLET | Refills: 0 | OUTPATIENT
Start: 2018-01-03

## 2018-01-15 ENCOUNTER — OFFICE VISIT (OUTPATIENT)
Dept: INTERNAL MEDICINE CLINIC | Age: 53
End: 2018-01-15

## 2018-01-15 ENCOUNTER — CARE COORDINATION (OUTPATIENT)
Dept: CARE COORDINATION | Age: 53
End: 2018-01-15

## 2018-01-15 VITALS
SYSTOLIC BLOOD PRESSURE: 124 MMHG | HEART RATE: 101 BPM | WEIGHT: 194 LBS | RESPIRATION RATE: 16 BRPM | BODY MASS INDEX: 31.31 KG/M2 | OXYGEN SATURATION: 98 % | DIASTOLIC BLOOD PRESSURE: 72 MMHG
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
## 2018-01-15 DIAGNOSIS — M54.42 ACUTE LEFT-SIDED LOW BACK PAIN WITH LEFT-SIDED SCIATICA: Primary | ICD-10-CM

## 2018-01-15 PROCEDURE — G0444 DEPRESSION SCREEN ANNUAL: HCPCS | Performed by: FAMILY MEDICINE

## 2018-01-15 PROCEDURE — 99213 OFFICE O/P EST LOW 20 MIN: CPT | Performed by: FAMILY MEDICINE

## 2018-01-15 RX ORDER — TIZANIDINE 4 MG/1
TABLET ORAL
Refills: 0 | COMMUNITY
Start: 2018-01-04 | End: 2018-08-21

## 2018-01-15 ASSESSMENT — PATIENT HEALTH QUESTIONNAIRE - PHQ9
SUM OF ALL RESPONSES TO PHQ QUESTIONS 1-9: 13
3. TROUBLE FALLING OR STAYING ASLEEP: 3
SUM OF ALL RESPONSES TO PHQ9 QUESTIONS 1 & 2: 3
5. POOR APPETITE OR OVEREATING: 1
4. FEELING TIRED OR HAVING LITTLE ENERGY: 3
10. IF YOU CHECKED OFF ANY PROBLEMS, HOW DIFFICULT HAVE THESE PROBLEMS MADE IT FOR YOU TO DO YOUR WORK, TAKE CARE OF THINGS AT HOME, OR GET ALONG WITH OTHER PEOPLE: 1
7. TROUBLE CONCENTRATING ON THINGS, SUCH AS READING THE NEWSPAPER OR WATCHING TELEVISION: 0
6. FEELING BAD ABOUT YOURSELF - OR THAT YOU ARE A FAILURE OR HAVE LET YOURSELF OR YOUR FAMILY DOWN: 1
9. THOUGHTS THAT YOU WOULD BE BETTER OFF DEAD, OR OF HURTING YOURSELF: 0
2. FEELING DOWN, DEPRESSED OR HOPELESS: 2
1. LITTLE INTEREST OR PLEASURE IN DOING THINGS: 1
8. MOVING OR SPEAKING SO SLOWLY THAT OTHER PEOPLE COULD HAVE NOTICED. OR THE OPPOSITE, BEING SO FIGETY OR RESTLESS THAT YOU HAVE BEEN MOVING AROUND A LOT MORE THAN USUAL: 2

## 2018-01-15 NOTE — PATIENT INSTRUCTIONS
extra support. · Try a kneeling chair, which helps tilt your hips forward. This takes pressure off your lower back. · Try sitting on an exercise ball. It can rock from side to side, which helps keep your back loose. · When driving, keep your knees nearly level with your hips. Sit straight, and drive with both hands on the steering wheel. Your arms should be in a slightly bent position. Reduce stress on your back through careful lifting  · Squat down, bending at the hips and knees only. If you need to, put one knee to the floor and extend your other knee in front of you, bent at a right angle (half kneeling). · Press your chest straight forward. This helps keep your upper back straight while keeping a slight arch in your low back. · Hold the load as close to your body as possible, at the level of your belly button (navel). · Use your feet to change direction, taking small steps. · Lead with your hips as you change direction. Keep your shoulders in line with your hips as you move. · Set down your load carefully, squatting with your knees and hips only. Exercise and stretch your back  · Do some exercise on most days of the week, if your doctor says it is okay. You can walk, run, swim, or cycle. · Stretch your back muscles. Here are a few exercises to try:  Keeley Bill on your back, and gently pull one bent knee to your chest. Put that foot back on the floor, and then pull the other knee to your chest.  ¨ Do pelvic tilts. Lie on your back with your knees bent. Tighten your stomach muscles. Pull your belly button (navel) in and up toward your ribs. You should feel like your back is pressing to the floor and your hips and pelvis are slightly lifting off the floor. Hold for 6 seconds while breathing smoothly. ¨ Sit with your back flat against a wall. · Keep your core muscles strong. The muscles of your back, belly (abdomen), and buttocks support your spine.   ¨ Pull in your belly and imagine pulling your navel toward your spine. Hold this for 6 seconds, then relax. Remember to keep breathing normally as you tense your muscles. ¨ Do curl-ups. Always do them with your knees bent. Keep your low back on the floor, and curl your shoulders toward your knees using a smooth, slow motion. Keep your arms folded across your chest. If this bothers your neck, try putting your hands behind your neck (not your head), with your elbows spread apart. ¨ Lie on your back with your knees bent and your feet flat on the floor. Tighten your belly muscles, and then push with your feet and raise your buttocks up a few inches. Hold this position 6 seconds as you continue to breathe normally, then lower yourself slowly to the floor. Repeat 8 to 12 times. ¨ If you like group exercise, try Pilates or yoga. These classes have poses that strengthen the core muscles. Lead a healthy lifestyle  · Stay at a healthy weight to avoid strain on your back. · Do not smoke. Smoking increases the risk of osteoporosis, which weakens the spine. If you need help quitting, talk to your doctor about stop-smoking programs and medicines. These can increase your chances of quitting for good. Where can you learn more? Go to https://[employer]apt[employer]. org and sign in to your [employer] account. Enter L315 in the [employer] box to learn more about \"Learning About How to Have a Healthy Back. \"     If you do not have an account, please click on the \"Sign Up Now\" link. Current as of: March 21, 2017  Content Version: 11.5  © 1165-7641 Healthwise, Incorporated. Care instructions adapted under license by [facility] ([facility]). If you have questions about a medical condition or this instruction, always ask your healthcare professional. [place] [address] any warranty or liability for your use of this information.

## 2018-01-20 ASSESSMENT — ENCOUNTER SYMPTOMS
NAUSEA: 0
DIARRHEA: 0
BACK PAIN: 1
BLOOD IN STOOL: 0
EYE DISCHARGE: 0
SHORTNESS OF BREATH: 0
COUGH: 0
ABDOMINAL PAIN: 0
SORE THROAT: 0
SINUS PRESSURE: 0
VOMITING: 0

## 2018-01-23 ENCOUNTER — HOSPITAL ENCOUNTER (OUTPATIENT)
Dept: CT IMAGING | Age: 53
Discharge: HOME | End: 2018-01-23
Attending: FAMILY MEDICINE | Admitting: FAMILY MEDICINE

## 2018-01-23 DIAGNOSIS — M54.42 LOW BACK PAIN WITH LEFT-SIDED SCIATICA: ICD-10-CM
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
## 2018-01-28 DIAGNOSIS — K21.9 GASTROESOPHAGEAL REFLUX DISEASE, ESOPHAGITIS PRESENCE NOT SPECIFIED: Chronic | ICD-10-CM

## 2018-01-29 RX ORDER — BUSPIRONE HYDROCHLORIDE 10 MG/1
10 TABLET ORAL 3 TIMES DAILY
Qty: 270 TABLET | Refills: 0 | Status: SHIPPED | OUTPATIENT
Start: 2018-01-29 | End: 2018-03-13 | Stop reason: SDUPTHER

## 2018-01-29 RX ORDER — ASPIRIN 81 MG/1
81 TABLET, CHEWABLE ORAL DAILY
Qty: 90 TABLET | Refills: 0 | Status: ON HOLD | OUTPATIENT
Start: 2018-01-29 | End: 2018-06-13

## 2018-01-29 RX ORDER — HYDROXYZINE HYDROCHLORIDE 25 MG/1
TABLET, FILM COATED ORAL
Qty: 90 TABLET | Refills: 0 | Status: SHIPPED | OUTPATIENT
Start: 2018-01-29 | End: 2018-03-13 | Stop reason: SDUPTHER

## 2018-01-29 RX ORDER — ATORVASTATIN CALCIUM 80 MG/1
80 TABLET, FILM COATED ORAL DAILY
Qty: 90 TABLET | Refills: 0 | Status: SHIPPED | OUTPATIENT
Start: 2018-01-29 | End: 2018-05-03 | Stop reason: SDUPTHER

## 2018-01-29 RX ORDER — LISINOPRIL 5 MG/1
TABLET ORAL
Qty: 90 TABLET | Refills: 0 | Status: ON HOLD | OUTPATIENT
Start: 2018-01-29 | End: 2018-06-13

## 2018-01-29 RX ORDER — VENLAFAXINE HYDROCHLORIDE 150 MG/1
150 CAPSULE, EXTENDED RELEASE ORAL DAILY
Qty: 90 CAPSULE | Refills: 0 | Status: SHIPPED | OUTPATIENT
Start: 2018-01-29 | End: 2018-06-14 | Stop reason: SDUPTHER

## 2018-01-29 RX ORDER — CYCLOBENZAPRINE HCL 10 MG
10 TABLET ORAL 3 TIMES DAILY PRN
Qty: 90 TABLET | Refills: 0 | Status: SHIPPED | OUTPATIENT
Start: 2018-01-29 | End: 2018-03-13 | Stop reason: SDUPTHER

## 2018-01-29 RX ORDER — RANITIDINE 150 MG/1
TABLET ORAL
Qty: 180 TABLET | Refills: 0 | Status: SHIPPED | OUTPATIENT
Start: 2018-01-29 | End: 2018-06-01 | Stop reason: SDUPTHER

## 2018-01-29 RX ORDER — CARVEDILOL 25 MG/1
25 TABLET ORAL 2 TIMES DAILY WITH MEALS
Qty: 180 TABLET | Refills: 1 | Status: ON HOLD | OUTPATIENT
Start: 2018-01-29 | End: 2018-06-13

## 2018-01-29 RX ORDER — ALBUTEROL SULFATE 90 UG/1
2 AEROSOL, METERED RESPIRATORY (INHALATION) EVERY 6 HOURS PRN
Qty: 2 INHALER | Refills: 0 | Status: ON HOLD | OUTPATIENT
Start: 2018-01-29 | End: 2022-04-15 | Stop reason: SDUPTHER

## 2018-01-31 RX ORDER — RANITIDINE 150 MG/1
TABLET ORAL
Qty: 180 TABLET | Refills: 1 | Status: SHIPPED | OUTPATIENT
Start: 2018-01-31 | End: 2018-03-13 | Stop reason: SDUPTHER

## 2018-03-13 DIAGNOSIS — K21.9 GASTROESOPHAGEAL REFLUX DISEASE, ESOPHAGITIS PRESENCE NOT SPECIFIED: Chronic | ICD-10-CM

## 2018-03-13 RX ORDER — CYCLOBENZAPRINE HCL 10 MG
10 TABLET ORAL 3 TIMES DAILY PRN
Qty: 90 TABLET | Refills: 0 | Status: SHIPPED | OUTPATIENT
Start: 2018-03-13 | End: 2018-06-14 | Stop reason: SDUPTHER

## 2018-03-13 RX ORDER — HYDROXYZINE HYDROCHLORIDE 25 MG/1
TABLET, FILM COATED ORAL
Qty: 90 TABLET | Refills: 0 | Status: ON HOLD | OUTPATIENT
Start: 2018-03-13 | End: 2018-06-29 | Stop reason: HOSPADM

## 2018-03-13 RX ORDER — BUSPIRONE HYDROCHLORIDE 10 MG/1
10 TABLET ORAL 3 TIMES DAILY
Qty: 270 TABLET | Refills: 0 | Status: SHIPPED | OUTPATIENT
Start: 2018-03-13 | End: 2018-06-01 | Stop reason: SDUPTHER

## 2018-03-13 RX ORDER — RANITIDINE 150 MG/1
TABLET ORAL
Qty: 180 TABLET | Refills: 1 | Status: SHIPPED | OUTPATIENT
Start: 2018-03-13 | End: 2018-06-20 | Stop reason: SDUPTHER

## 2018-03-13 NOTE — TELEPHONE ENCOUNTER
Please advise patient she will need an appointment for additional refills, since her last appointment did not address any of the medical conditions for which she is getting refills today.

## 2018-03-26 ENCOUNTER — CARE COORDINATION (OUTPATIENT)
Dept: CARE COORDINATION | Age: 53
End: 2018-03-26

## 2018-05-03 DIAGNOSIS — E78.5 HYPERLIPIDEMIA, UNSPECIFIED HYPERLIPIDEMIA TYPE: Primary | ICD-10-CM

## 2018-05-03 RX ORDER — ATORVASTATIN CALCIUM 80 MG/1
TABLET, FILM COATED ORAL
Qty: 90 TABLET | Refills: 0 | Status: SHIPPED | OUTPATIENT
Start: 2018-05-03 | End: 2018-06-01 | Stop reason: SDUPTHER

## 2018-05-24 ENCOUNTER — CARE COORDINATION (OUTPATIENT)
Dept: CARE COORDINATION | Age: 53
End: 2018-05-24

## 2018-05-31 ENCOUNTER — HOSPITAL ENCOUNTER (OUTPATIENT)
Dept: MRI IMAGING | Age: 53
Discharge: HOME | End: 2018-05-31
Attending: NEUROLOGICAL SURGERY | Admitting: NEUROLOGICAL SURGERY

## 2018-05-31 DIAGNOSIS — M48.062 SPINAL STENOSIS, LUMBAR REGION, WITH NEUROGENIC CLAUDICATION: ICD-10-CM

## 2018-06-01 ENCOUNTER — OFFICE VISIT (OUTPATIENT)
Dept: INTERNAL MEDICINE CLINIC | Age: 53
End: 2018-06-01

## 2018-06-01 VITALS
OXYGEN SATURATION: 98 % | WEIGHT: 189 LBS | HEART RATE: 87 BPM | SYSTOLIC BLOOD PRESSURE: 130 MMHG | DIASTOLIC BLOOD PRESSURE: 68 MMHG | RESPIRATION RATE: 14 BRPM | BODY MASS INDEX: 30.51 KG/M2

## 2018-06-01 DIAGNOSIS — E11.49 DIABETES MELLITUS TYPE 2 WITH NEUROLOGICAL MANIFESTATIONS (HCC): Primary | ICD-10-CM

## 2018-06-01 DIAGNOSIS — I10 ESSENTIAL HYPERTENSION: Chronic | ICD-10-CM

## 2018-06-01 DIAGNOSIS — Z13.31 POSITIVE DEPRESSION SCREENING: ICD-10-CM

## 2018-06-01 DIAGNOSIS — E78.2 MIXED HYPERLIPIDEMIA: Chronic | ICD-10-CM

## 2018-06-01 DIAGNOSIS — F33.9 MAJOR DEPRESSIVE DISORDER, RECURRENT EPISODE WITH ANXIOUS DISTRESS (HCC): ICD-10-CM

## 2018-06-01 LAB — HBA1C MFR BLD: 11.8 %

## 2018-06-01 PROCEDURE — 83036 HEMOGLOBIN GLYCOSYLATED A1C: CPT | Performed by: FAMILY MEDICINE

## 2018-06-01 PROCEDURE — 99214 OFFICE O/P EST MOD 30 MIN: CPT | Performed by: FAMILY MEDICINE

## 2018-06-01 PROCEDURE — G8431 POS CLIN DEPRES SCRN F/U DOC: HCPCS | Performed by: FAMILY MEDICINE

## 2018-06-01 RX ORDER — BUSPIRONE HYDROCHLORIDE 10 MG/1
10 TABLET ORAL 3 TIMES DAILY
Qty: 270 TABLET | Refills: 0 | Status: SHIPPED | OUTPATIENT
Start: 2018-06-01 | End: 2018-07-10

## 2018-06-01 RX ORDER — ATORVASTATIN CALCIUM 80 MG/1
TABLET, FILM COATED ORAL
Qty: 90 TABLET | Refills: 3 | Status: ON HOLD | OUTPATIENT
Start: 2018-06-01 | End: 2018-06-13

## 2018-06-01 RX ORDER — PEN NEEDLE, DIABETIC 30 GX5/16"
1 NEEDLE, DISPOSABLE MISCELLANEOUS DAILY
Qty: 100 EACH | Refills: 3 | Status: ON HOLD | OUTPATIENT
Start: 2018-06-01 | End: 2019-05-12 | Stop reason: HOSPADM

## 2018-06-01 ASSESSMENT — ENCOUNTER SYMPTOMS
SORE THROAT: 0
NAUSEA: 0
BLURRED VISION: 0
DIARRHEA: 0
VISUAL CHANGE: 0
COUGH: 0
VOMITING: 0
BACK PAIN: 0
SINUS PRESSURE: 0
EYE DISCHARGE: 0
SHORTNESS OF BREATH: 0
BLOOD IN STOOL: 0

## 2018-06-10 PROBLEM — I25.110 UNSTABLE ANGINA PECTORIS DUE TO CORONARY ARTERIOSCLEROSIS (HCC): Status: ACTIVE | Noted: 2018-06-10

## 2018-06-14 RX ORDER — CYCLOBENZAPRINE HCL 10 MG
10 TABLET ORAL 3 TIMES DAILY PRN
Qty: 90 TABLET | Refills: 0 | Status: ON HOLD | OUTPATIENT
Start: 2018-06-14 | End: 2018-06-29 | Stop reason: HOSPADM

## 2018-06-14 RX ORDER — VENLAFAXINE HYDROCHLORIDE 150 MG/1
150 CAPSULE, EXTENDED RELEASE ORAL DAILY
Qty: 90 CAPSULE | Refills: 0 | Status: SHIPPED | OUTPATIENT
Start: 2018-06-14 | End: 2018-09-04 | Stop reason: SDUPTHER

## 2018-06-20 ENCOUNTER — TELEPHONE (OUTPATIENT)
Dept: INTERNAL MEDICINE CLINIC | Age: 53
End: 2018-06-20

## 2018-06-20 DIAGNOSIS — K21.9 GASTROESOPHAGEAL REFLUX DISEASE, ESOPHAGITIS PRESENCE NOT SPECIFIED: Chronic | ICD-10-CM

## 2018-06-20 RX ORDER — RANITIDINE 150 MG/1
TABLET ORAL
Qty: 180 TABLET | Refills: 1 | Status: SHIPPED | OUTPATIENT
Start: 2018-06-20 | End: 2019-05-07 | Stop reason: DRUGHIGH

## 2018-06-28 ENCOUNTER — HOSPITAL ENCOUNTER (OUTPATIENT)
Dept: OTHER | Age: 53
Discharge: HOME | End: 2018-08-23
Attending: INTERNAL MEDICINE | Admitting: INTERNAL MEDICINE

## 2018-06-28 PROBLEM — R07.9 CHEST PAIN: Status: ACTIVE | Noted: 2018-06-28

## 2018-07-10 RX ORDER — BUSPIRONE HYDROCHLORIDE 10 MG/1
TABLET ORAL
Qty: 270 TABLET | Refills: 0 | Status: SHIPPED | OUTPATIENT
Start: 2018-07-10 | End: 2018-10-02 | Stop reason: SDUPTHER

## 2018-08-21 ENCOUNTER — OFFICE VISIT (OUTPATIENT)
Dept: INTERNAL MEDICINE CLINIC | Age: 53
End: 2018-08-21

## 2018-08-21 VITALS
RESPIRATION RATE: 16 BRPM | OXYGEN SATURATION: 97 % | BODY MASS INDEX: 30.51 KG/M2 | HEART RATE: 90 BPM | WEIGHT: 189 LBS | DIASTOLIC BLOOD PRESSURE: 76 MMHG | SYSTOLIC BLOOD PRESSURE: 130 MMHG

## 2018-08-21 DIAGNOSIS — I10 ESSENTIAL HYPERTENSION: Chronic | ICD-10-CM

## 2018-08-21 DIAGNOSIS — E11.49 DIABETES MELLITUS TYPE 2 WITH NEUROLOGICAL MANIFESTATIONS (HCC): Primary | ICD-10-CM

## 2018-08-21 DIAGNOSIS — M54.41 CHRONIC MIDLINE LOW BACK PAIN WITH RIGHT-SIDED SCIATICA: ICD-10-CM

## 2018-08-21 DIAGNOSIS — Z12.31 ENCOUNTER FOR SCREENING MAMMOGRAM FOR BREAST CANCER: ICD-10-CM

## 2018-08-21 DIAGNOSIS — G89.29 CHRONIC MIDLINE LOW BACK PAIN WITH RIGHT-SIDED SCIATICA: ICD-10-CM

## 2018-08-21 LAB — HBA1C MFR BLD: 8.2 %

## 2018-08-21 PROCEDURE — 99213 OFFICE O/P EST LOW 20 MIN: CPT | Performed by: FAMILY MEDICINE

## 2018-08-21 PROCEDURE — 83036 HEMOGLOBIN GLYCOSYLATED A1C: CPT | Performed by: FAMILY MEDICINE

## 2018-08-21 ASSESSMENT — ENCOUNTER SYMPTOMS
SINUS PRESSURE: 0
NAUSEA: 0
SORE THROAT: 0
SHORTNESS OF BREATH: 0
VOMITING: 0
DIARRHEA: 0
EYE DISCHARGE: 0
BACK PAIN: 1
COUGH: 0
BLOOD IN STOOL: 0

## 2018-08-21 NOTE — ASSESSMENT & PLAN NOTE
Patient will continue with her insulin her blood sugars are starting to come down. Just take a little more time of her being on the medication. We'll recheck her in a couple months here.

## 2018-08-21 NOTE — PROGRESS NOTES
tablet 5 mg (Completed)    aspirin chewable tablet 324 mg (Completed)    oxyCODONE-acetaminophen (PERCOCET) 5-325 MG per tablet 1 tablet (Completed)    aspirin chewable tablet 162 mg (Completed)    morphine (PF) injection 4 mg (Completed)    morphine (PF) injection 4 mg (Completed)    diazepam (VALIUM) injection 5 mg (Completed)    morphine (PF) injection 4 mg (Completed)    aspirin tablet 325 mg (Completed)    oxyCODONE-acetaminophen (PERCOCET) 5-325 MG per tablet 1 tablet (Completed)    morphine (PF) injection 5 mg (Completed)    acetaminophen (TYLENOL) tablet 1,000 mg (Completed)    HYDROmorphone HCl PF (DILAUDID) injection 1 mg (Completed)    aspirin tablet 325 mg (Completed)    diazepam (VALIUM) tablet 5 mg (Completed)    aspirin chewable tablet 324 mg (Completed)    aspirin chewable tablet 324 mg (Completed)    HYDROcodone-acetaminophen (NORCO) 5-325 MG per tablet 2 tablet (Completed)    HYDROmorphone (DILAUDID) injection 1 mg (Completed)    aspirin chewable tablet 324 mg (Completed)    oxyCODONE-acetaminophen (PERCOCET) 5-325 MG per tablet 1 tablet (Completed)    LORazepam (ATIVAN) injection 1 mg (Completed)    HYDROcodone-acetaminophen (NORCO) 5-325 MG per tablet 1 tablet (Completed)    HYDROcodone-acetaminophen (NORCO) 5-325 MG per tablet 1 tablet (Completed)    morphine (PF) injection 6 mg (Completed)    oxyCODONE (ROXICODONE) immediate release tablet 5 mg (Completed)    oxyCODONE-acetaminophen (PERCOCET) 5-325 MG per tablet    acetaminophen (TYLENOL) tablet 1,000 mg (Completed)    LORazepam (ATIVAN) injection 1 mg (Completed)    ibuprofen (ADVIL;MOTRIN) tablet 600 mg (Completed)    morphine (PF) injection 4 mg (Completed)    HYDROmorphone (DILAUDID) injection 1 mg (Completed)    HYDROmorphone (DILAUDID) injection 1 mg (Completed)    HYDROmorphone (DILAUDID) injection 1 mg (Completed)    oxyCODONE-acetaminophen (PERCOCET) 5-325 MG per tablet 1 tablet (Completed)    aspirin tablet 325 mg (Completed) (Completed)    nitroglycerin (NITRO-BID) 2 % ointment 0.5 inch (Completed)    nitroGLYCERIN (NITROSTAT) SL tablet 0.4 mg (Completed)    enoxaparin (LOVENOX) injection 80 mg (Completed)    aspirin tablet 325 mg (Completed)    enoxaparin (LOVENOX) injection 80 mg (Completed)    ranolazine (RANEXA) 1000 MG extended release tablet    aspirin tablet 325 mg (Completed)    isosorbide mononitrate (IMDUR) 30 MG extended release tablet    atorvastatin (LIPITOR) 80 MG tablet    aspirin 81 MG chewable tablet    aspirin tablet 325 mg (Completed)    lisinopril (PRINIVIL;ZESTRIL) 2.5 MG tablet    carvedilol (COREG) 12.5 MG tablet            Orders Placed This Encounter   Procedures    TYSON Digital Screen Bilateral [[id number]]     Standing Status:   Future     Standing Expiration Date:   8/21/2019     Order Specific Question:   Reason for exam:     Answer:   Screening for breast cancer.     POCT glycosylated hemoglobin (Hb A1C)       Return in about 2 months (around 10/21/2018), or if symptoms worsen or fail to improve, for Diabetes Recheck, Diabetic Foot Exam.

## 2018-09-04 DIAGNOSIS — F41.1 GENERALIZED ANXIETY DISORDER: Primary | ICD-10-CM

## 2018-09-04 DIAGNOSIS — E11.49 DIABETES MELLITUS TYPE 2 WITH NEUROLOGICAL MANIFESTATIONS (HCC): ICD-10-CM

## 2018-09-04 RX ORDER — VENLAFAXINE HYDROCHLORIDE 150 MG/1
150 CAPSULE, EXTENDED RELEASE ORAL DAILY
Qty: 90 CAPSULE | Refills: 1 | Status: SHIPPED | OUTPATIENT
Start: 2018-09-04 | End: 2019-05-07

## 2018-09-04 NOTE — TELEPHONE ENCOUNTER
From: Miguel Lee  Sent: 9/1/2018 6:34 PM EDT  Subject: Medication Renewal Request    Waleska Timmons would like a refill of the following medications:      SITagliptin-metFORMIN (JANUMET XR)  MG TB24 per extended release tablet Audrey Hill MD]    [phone] - F [phone]    Comment:

## 2018-09-04 NOTE — TELEPHONE ENCOUNTER
From: Margarito White  Sent: 9/1/2018 12:57 PM EDT  Subject: Medication Renewal Request    Waleska Ta would like a refill of the following medications:     venlafaxine (EFFEXOR XR) 150 MG extended release capsule Danii Gabriel MD]    Preferred pharmacy: [address] [place] [address] [phone] - F [phone]    Comment:

## 2018-09-06 ENCOUNTER — HOSPITAL ENCOUNTER (OUTPATIENT)
Dept: WOMENS IMAGING | Age: 53
Discharge: HOME | End: 2018-09-06
Attending: FAMILY MEDICINE | Admitting: FAMILY MEDICINE

## 2018-09-06 DIAGNOSIS — Z12.31 ENCOUNTER FOR SCREENING MAMMOGRAM FOR BREAST CANCER: ICD-10-CM

## 2018-10-02 RX ORDER — BUSPIRONE HYDROCHLORIDE 10 MG/1
10 TABLET ORAL 3 TIMES DAILY
Qty: 270 TABLET | Refills: 0 | Status: SHIPPED | OUTPATIENT
Start: 2018-10-02 | End: 2018-11-13 | Stop reason: DRUGHIGH

## 2018-10-31 ENCOUNTER — OFFICE VISIT (OUTPATIENT)
Dept: INTERNAL MEDICINE CLINIC | Age: 53
End: 2018-10-31
Payer: COMMERCIAL

## 2018-10-31 VITALS
RESPIRATION RATE: 18 BRPM | HEART RATE: 84 BPM | OXYGEN SATURATION: 98 % | DIASTOLIC BLOOD PRESSURE: 80 MMHG | BODY MASS INDEX: 30.51 KG/M2 | SYSTOLIC BLOOD PRESSURE: 136 MMHG | WEIGHT: 189 LBS

## 2018-10-31 DIAGNOSIS — D22.9 CHANGE IN MOLE: ICD-10-CM

## 2018-10-31 PROCEDURE — 99213 OFFICE O/P EST LOW 20 MIN: CPT | Performed by: FAMILY MEDICINE

## 2018-10-31 RX ORDER — FUROSEMIDE 20 MG/1
20 TABLET ORAL DAILY
Status: ON HOLD | COMMUNITY
End: 2019-05-12 | Stop reason: HOSPADM

## 2018-10-31 ASSESSMENT — ENCOUNTER SYMPTOMS
BLOOD IN STOOL: 0
COLOR CHANGE: 1
DIARRHEA: 0
NAUSEA: 0
EYE DISCHARGE: 0
SORE THROAT: 0
COUGH: 0
SINUS PRESSURE: 0
BACK PAIN: 0
SHORTNESS OF BREATH: 0
VOMITING: 0

## 2018-11-13 ENCOUNTER — OFFICE VISIT (OUTPATIENT)
Dept: INTERNAL MEDICINE CLINIC | Age: 53
End: 2018-11-13
Payer: COMMERCIAL

## 2018-11-13 VITALS
DIASTOLIC BLOOD PRESSURE: 82 MMHG | RESPIRATION RATE: 18 BRPM | OXYGEN SATURATION: 97 % | SYSTOLIC BLOOD PRESSURE: 131 MMHG | BODY MASS INDEX: 30.57 KG/M2 | WEIGHT: 189.4 LBS | HEART RATE: 87 BPM

## 2018-11-13 DIAGNOSIS — F33.9 MAJOR DEPRESSIVE DISORDER, RECURRENT EPISODE WITH ANXIOUS DISTRESS (HCC): Primary | ICD-10-CM

## 2018-11-13 DIAGNOSIS — L30.9 ECZEMA OF HAND: ICD-10-CM

## 2018-11-13 PROCEDURE — 99214 OFFICE O/P EST MOD 30 MIN: CPT | Performed by: FAMILY MEDICINE

## 2018-11-13 RX ORDER — BUSPIRONE HYDROCHLORIDE 15 MG/1
15 TABLET ORAL 3 TIMES DAILY
Qty: 90 TABLET | Refills: 1 | Status: SHIPPED | OUTPATIENT
Start: 2018-11-13

## 2018-11-13 RX ORDER — LIDOCAINE 50 MG/G
OINTMENT TOPICAL
Qty: 30 G | Refills: 0 | Status: SHIPPED | OUTPATIENT
Start: 2018-11-13 | End: 2019-05-22

## 2018-11-13 RX ORDER — DESONIDE 0.5 MG/G
CREAM TOPICAL
Qty: 60 G | Refills: 0 | Status: SHIPPED | OUTPATIENT
Start: 2018-11-13 | End: 2019-05-22

## 2018-11-13 ASSESSMENT — ENCOUNTER SYMPTOMS
COUGH: 0
BLOOD IN STOOL: 0
EYE DISCHARGE: 0
SHORTNESS OF BREATH: 0
SINUS PRESSURE: 0
SORE THROAT: 0
DIARRHEA: 1
NAUSEA: 0
VOMITING: 0
BACK PAIN: 0

## 2018-11-13 NOTE — PROGRESS NOTES
Kwesi Fernández  1965  46 y.o. SUBJECT WAYNE:    Chief Complaint   Patient presents with    Rash     bilateral hands, intermittent  pt states usually happens when weather is cold        HPI  Patient in with complaints of recurrent rash on her fingers of her hand, Her Thumb on her right ad index finger on her left. Patient was given a steroid cream and lidocaine cream by previous provider that was helpful. Patient then added that she's having problems with her anxiety, diarrhea pain in her left upper biceps. Patient has a history of anxiety and hypertension diabetes GERD. Patient that she started a new job that she feels like she is not doing well at that increased anxiety and symptoms as a result. No chest pain or sob. No fever or chills. No suicidal or homicidal ideation. Review of Systems   Constitutional: Negative for chills, fatigue and fever. HENT: Negative for congestion, ear pain, sinus pressure and sore throat. Eyes: Negative for discharge and visual disturbance. Respiratory: Negative for cough and shortness of breath. Cardiovascular: Negative for chest pain and leg swelling. Gastrointestinal: Positive for diarrhea (Loose stools intermittently for 3 week). Negative for blood in stool, nausea and vomiting. Endocrine: Negative for polydipsia. Genitourinary: Negative for dysuria, frequency and hematuria. Musculoskeletal: Negative for back pain and gait problem. Skin: Positive for rash (Thickened inflamed skin on her right thumb and left index finger on the flexor surface. ). Allergic/Immunologic: Negative for environmental allergies and food allergies. Neurological: Negative for dizziness and headaches. Psychiatric/Behavioral: Negative for behavioral problems, sleep disturbance and suicidal ideas. The patient is nervous/anxious. Past Medical, Family, and Social History have been reviewed today.     OBJECTIVE:     /82   Pulse 87   Resp 18   Wt 189 lb 6.4 oz (85.9 kg)   SpO2 97%   BMI 30.57 kg/m²   BP Readings from Last 3 Encounters:   11/13/18 131/82   10/31/18 136/80   08/21/18 130/76       Wt Readings from Last 3 Encounters:   11/13/18 189 lb 6.4 oz (85.9 kg)   10/31/18 189 lb (85.7 kg)   08/21/18 189 lb (85.7 kg)       Lab Results   Component Value Date    CHOL 170 06/11/2018    CHOL 159 03/24/2017    CHOL 268 (H) 08/29/2015     Lab Results   Component Value Date    TRIG 524 (H) 06/11/2018    TRIG 254 (H) 03/24/2017    TRIG 614 (H) 08/29/2015     Lab Results   Component Value Date    HDL 32 (L) 06/11/2018    HDL 50 03/24/2017    HDL 26 (L) 08/29/2015     Lab Results   Component Value Date    LDLCALC 58 03/24/2017    LDLCALC 70 05/05/2014     No results found for: LABVLDL, VLDL        Lab Results   Component Value Date    LABA1C 8.2 08/21/2018    LABA1C 8.5 (H) 06/28/2018    LABA1C 11.8 06/01/2018       Physical Exam   Constitutional: She is oriented to person, place, and time. She appears well-developed and well-nourished. HENT:   Head: Normocephalic and atraumatic. Right Ear: External ear normal.   Left Ear: External ear normal.   Eyes: Pupils are equal, round, and reactive to light. Conjunctivae and EOM are normal. No scleral icterus. Neck: Normal range of motion. Neck supple. Cardiovascular: Normal rate, regular rhythm, normal heart sounds and intact distal pulses. Exam reveals no gallop and no friction rub. No murmur heard. Pulmonary/Chest: Effort normal and breath sounds normal. No respiratory distress. She has no wheezes. She has no rales. Musculoskeletal: Normal range of motion. Neurological: She is alert and oriented to person, place, and time. Skin: Skin is warm and dry. Rash (Right thumb flexor service and left index finger flexor surface hyperkeratotic inflamed rash) noted. Psychiatric: Her behavior is normal. Judgment and thought content normal. Her mood appears anxious.        ASSESSMENT/PLAN:    Problem List     Eczema of

## 2019-03-01 ENCOUNTER — APPOINTMENT (OUTPATIENT)
Dept: GENERAL RADIOLOGY | Age: 54
End: 2019-03-01
Payer: COMMERCIAL

## 2019-03-01 ENCOUNTER — HOSPITAL ENCOUNTER (EMERGENCY)
Age: 54
Discharge: HOME | End: 2019-03-01
Attending: EMERGENCY MEDICINE
Payer: COMMERCIAL

## 2019-03-01 VITALS
DIASTOLIC BLOOD PRESSURE: 57 MMHG | RESPIRATION RATE: 17 BRPM | BODY MASS INDEX: 28.12 KG/M2 | TEMPERATURE: 98.2 F | SYSTOLIC BLOOD PRESSURE: 98 MMHG | WEIGHT: 175 LBS | OXYGEN SATURATION: 94 % | HEART RATE: 72 BPM | HEIGHT: 66 IN

## 2019-03-01 DIAGNOSIS — R07.9 CHEST PAIN, UNSPECIFIED TYPE: Primary | ICD-10-CM

## 2019-03-01 LAB
ADENOVIRUS DETECTION BY PCR: NOT DETECTED
ALBUMIN SERPL-MCNC: 4 GM/DL (ref 3.4–5)
ALP BLD-CCNC: 54 IU/L (ref 40–129)
ALT SERPL-CCNC: 12 U/L (ref 10–40)
ANION GAP SERPL CALCULATED.3IONS-SCNC: 12 MMOL/L (ref 4–16)
APTT: 28.4 SECONDS (ref 21.2–33)
AST SERPL-CCNC: 12 IU/L (ref 15–37)
BASOPHILS ABSOLUTE: 0.1 K/CU MM
BASOPHILS RELATIVE PERCENT: 0.8 % (ref 0–1)
BILIRUB SERPL-MCNC: 0.3 MG/DL (ref 0–1)
BORDETELLA PERTUSSIS PCR: NOT DETECTED
BUN BLDV-MCNC: 9 MG/DL (ref 6–23)
CALCIUM SERPL-MCNC: 8.4 MG/DL (ref 8.3–10.6)
CHLAMYDOPHILA PNEUMONIA PCR: NOT DETECTED
CHLORIDE BLD-SCNC: 103 MMOL/L (ref 99–110)
CO2: 25 MMOL/L (ref 21–32)
CORONAVIRUS 229E PCR: NOT DETECTED
CORONAVIRUS HKU1 PCR: NOT DETECTED
CORONAVIRUS NL63 PCR: NOT DETECTED
CORONAVIRUS OC43 PCR: NOT DETECTED
CREAT SERPL-MCNC: 0.9 MG/DL (ref 0.6–1.1)
DIFFERENTIAL TYPE: ABNORMAL
EKG ATRIAL RATE: 66 BPM
EKG ATRIAL RATE: 73 BPM
EKG DIAGNOSIS: NORMAL
EKG DIAGNOSIS: NORMAL
EKG P AXIS: 49 DEGREES
EKG P AXIS: 50 DEGREES
EKG P-R INTERVAL: 180 MS
EKG P-R INTERVAL: 186 MS
EKG Q-T INTERVAL: 366 MS
EKG Q-T INTERVAL: 428 MS
EKG QRS DURATION: 82 MS
EKG QRS DURATION: 84 MS
EKG QTC CALCULATION (BAZETT): 403 MS
EKG QTC CALCULATION (BAZETT): 448 MS
EKG R AXIS: 50 DEGREES
EKG R AXIS: 53 DEGREES
EKG T AXIS: 245 DEGREES
EKG T AXIS: 30 DEGREES
EKG VENTRICULAR RATE: 66 BPM
EKG VENTRICULAR RATE: 73 BPM
EOSINOPHILS ABSOLUTE: 0.1 K/CU MM
EOSINOPHILS RELATIVE PERCENT: 1.4 % (ref 0–3)
GFR AFRICAN AMERICAN: >60 ML/MIN/1.73M2
GFR NON-AFRICAN AMERICAN: >60 ML/MIN/1.73M2
GLUCOSE BLD-MCNC: 91 MG/DL (ref 70–99)
HCT VFR BLD CALC: 34.1 % (ref 37–47)
HEMOGLOBIN: 11.2 GM/DL (ref 12.5–16)
HUMAN METAPNEUMOVIRUS PCR: NOT DETECTED
IMMATURE NEUTROPHIL %: 0.3 % (ref 0–0.43)
INFLUENZA A BY PCR: NOT DETECTED
INFLUENZA A H1 (2009) PCR: NOT DETECTED
INFLUENZA A H1 PANDEMIC PCR: NOT DETECTED
INFLUENZA A H3 PCR: NOT DETECTED
INFLUENZA B BY PCR: NOT DETECTED
INR BLD: 1.06 INDEX
LYMPHOCYTES ABSOLUTE: 2.1 K/CU MM
LYMPHOCYTES RELATIVE PERCENT: 33.2 % (ref 24–44)
MCH RBC QN AUTO: 31.8 PG (ref 27–31)
MCHC RBC AUTO-ENTMCNC: 32.8 % (ref 32–36)
MCV RBC AUTO: 96.9 FL (ref 78–100)
MONOCYTES ABSOLUTE: 0.6 K/CU MM
MONOCYTES RELATIVE PERCENT: 9.5 % (ref 0–4)
MYCOPLASMA PNEUMONIAE PCR: NOT DETECTED
NUCLEATED RBC %: 0 %
PARAINFLUENZA 1 PCR: NOT DETECTED
PARAINFLUENZA 2 PCR: NOT DETECTED
PARAINFLUENZA 3 PCR: NOT DETECTED
PARAINFLUENZA 4 PCR: NOT DETECTED
PDW BLD-RTO: 12.7 % (ref 11.7–14.9)
PLATELET # BLD: 217 K/CU MM (ref 140–440)
PMV BLD AUTO: 10 FL (ref 7.5–11.1)
POTASSIUM SERPL-SCNC: 3.9 MMOL/L (ref 3.5–5.1)
PRO-BNP: 38.33 PG/ML
PROTHROMBIN TIME: 12.1 SECONDS (ref 9.12–12.5)
RBC # BLD: 3.52 M/CU MM (ref 4.2–5.4)
RHINOVIRUS ENTEROVIRUS PCR: NOT DETECTED
RSV PCR: NOT DETECTED
SEGMENTED NEUTROPHILS ABSOLUTE COUNT: 3.5 K/CU MM
SEGMENTED NEUTROPHILS RELATIVE PERCENT: 54.8 % (ref 36–66)
SODIUM BLD-SCNC: 140 MMOL/L (ref 135–145)
TOTAL IMMATURE NEUTOROPHIL: 0.02 K/CU MM
TOTAL NUCLEATED RBC: 0 K/CU MM
TOTAL PROTEIN: 6.3 GM/DL (ref 6.4–8.2)
TROPONIN T: <0.01 NG/ML
TROPONIN T: <0.01 NG/ML
WBC # BLD: 6.4 K/CU MM (ref 4–10.5)

## 2019-03-01 PROCEDURE — 93005 ELECTROCARDIOGRAM TRACING: CPT | Performed by: EMERGENCY MEDICINE

## 2019-03-01 PROCEDURE — 84484 ASSAY OF TROPONIN QUANT: CPT

## 2019-03-01 PROCEDURE — 93010 ELECTROCARDIOGRAM REPORT: CPT | Performed by: INTERNAL MEDICINE

## 2019-03-01 PROCEDURE — 85025 COMPLETE CBC W/AUTO DIFF WBC: CPT

## 2019-03-01 PROCEDURE — 96372 THER/PROPH/DIAG INJ SC/IM: CPT

## 2019-03-01 PROCEDURE — 99285 EMERGENCY DEPT VISIT HI MDM: CPT

## 2019-03-01 PROCEDURE — 87798 DETECT AGENT NOS DNA AMP: CPT

## 2019-03-01 PROCEDURE — 6370000000 HC RX 637 (ALT 250 FOR IP): Performed by: EMERGENCY MEDICINE

## 2019-03-01 PROCEDURE — 36569 INSJ PICC 5 YR+ W/O IMAGING: CPT

## 2019-03-01 PROCEDURE — 83880 ASSAY OF NATRIURETIC PEPTIDE: CPT

## 2019-03-01 PROCEDURE — C1751 CATH, INF, PER/CENT/MIDLINE: HCPCS

## 2019-03-01 PROCEDURE — 85730 THROMBOPLASTIN TIME PARTIAL: CPT

## 2019-03-01 PROCEDURE — 87486 CHLMYD PNEUM DNA AMP PROBE: CPT

## 2019-03-01 PROCEDURE — 6360000002 HC RX W HCPCS: Performed by: EMERGENCY MEDICINE

## 2019-03-01 PROCEDURE — 87581 M.PNEUMON DNA AMP PROBE: CPT

## 2019-03-01 PROCEDURE — 85610 PROTHROMBIN TIME: CPT

## 2019-03-01 PROCEDURE — 71045 X-RAY EXAM CHEST 1 VIEW: CPT

## 2019-03-01 PROCEDURE — 80053 COMPREHEN METABOLIC PANEL: CPT

## 2019-03-01 PROCEDURE — 76937 US GUIDE VASCULAR ACCESS: CPT

## 2019-03-01 RX ORDER — NITROGLYCERIN 40 MG/1
1 PATCH TRANSDERMAL DAILY
Qty: 30 PATCH | Refills: 0 | Status: ON HOLD | OUTPATIENT
Start: 2019-03-01 | End: 2019-05-12 | Stop reason: HOSPADM

## 2019-03-01 RX ORDER — PROMETHAZINE HYDROCHLORIDE 25 MG/ML
25 INJECTION, SOLUTION INTRAMUSCULAR; INTRAVENOUS ONCE
Status: COMPLETED | OUTPATIENT
Start: 2019-03-01 | End: 2019-03-01

## 2019-03-01 RX ORDER — ASPIRIN 81 MG/1
162 TABLET, CHEWABLE ORAL ONCE
Status: COMPLETED | OUTPATIENT
Start: 2019-03-01 | End: 2019-03-01

## 2019-03-01 RX ADMIN — PROMETHAZINE HYDROCHLORIDE 25 MG: 25 INJECTION INTRAMUSCULAR; INTRAVENOUS at 11:16

## 2019-03-01 RX ADMIN — NITROGLYCERIN 1 INCH: 20 OINTMENT TOPICAL at 11:16

## 2019-03-01 RX ADMIN — ASPIRIN 81 MG 162 MG: 81 TABLET ORAL at 11:16

## 2019-03-01 ASSESSMENT — PAIN DESCRIPTION - DESCRIPTORS: DESCRIPTORS: PRESSURE

## 2019-03-01 ASSESSMENT — PAIN DESCRIPTION - LOCATION: LOCATION: CHEST

## 2019-03-01 ASSESSMENT — PAIN DESCRIPTION - PAIN TYPE: TYPE: ACUTE PAIN

## 2019-03-01 ASSESSMENT — PAIN SCALES - GENERAL: PAINLEVEL_OUTOF10: 9

## 2019-05-07 ENCOUNTER — APPOINTMENT (OUTPATIENT)
Dept: GENERAL RADIOLOGY | Age: 54
End: 2019-05-07
Payer: COMMERCIAL

## 2019-05-07 ENCOUNTER — HOSPITAL ENCOUNTER (EMERGENCY)
Age: 54
Discharge: HOME | End: 2019-05-07
Attending: EMERGENCY MEDICINE
Payer: COMMERCIAL

## 2019-05-07 VITALS
TEMPERATURE: 97.5 F | WEIGHT: 174 LBS | HEIGHT: 66 IN | BODY MASS INDEX: 27.97 KG/M2 | HEART RATE: 77 BPM | RESPIRATION RATE: 17 BRPM | SYSTOLIC BLOOD PRESSURE: 118 MMHG | DIASTOLIC BLOOD PRESSURE: 67 MMHG | OXYGEN SATURATION: 97 %

## 2019-05-07 DIAGNOSIS — R07.9 CHEST PAIN, UNSPECIFIED TYPE: Primary | ICD-10-CM

## 2019-05-07 DIAGNOSIS — D72.829 LEUKOCYTOSIS, UNSPECIFIED TYPE: ICD-10-CM

## 2019-05-07 LAB
ALBUMIN SERPL-MCNC: 4.4 GM/DL (ref 3.4–5)
ALP BLD-CCNC: 68 IU/L (ref 40–129)
ALT SERPL-CCNC: 18 U/L (ref 10–40)
ANION GAP SERPL CALCULATED.3IONS-SCNC: 15 MMOL/L (ref 4–16)
AST SERPL-CCNC: 13 IU/L (ref 15–37)
BASOPHILS ABSOLUTE: 0.1 K/CU MM
BASOPHILS RELATIVE PERCENT: 0.6 % (ref 0–1)
BILIRUB SERPL-MCNC: 0.3 MG/DL (ref 0–1)
BUN BLDV-MCNC: 15 MG/DL (ref 6–23)
CALCIUM SERPL-MCNC: 9.9 MG/DL (ref 8.3–10.6)
CHLORIDE BLD-SCNC: 104 MMOL/L (ref 99–110)
CO2: 19 MMOL/L (ref 21–32)
CREAT SERPL-MCNC: 0.9 MG/DL (ref 0.6–1.1)
DIFFERENTIAL TYPE: ABNORMAL
EOSINOPHILS ABSOLUTE: 0.2 K/CU MM
EOSINOPHILS RELATIVE PERCENT: 0.8 % (ref 0–3)
GFR AFRICAN AMERICAN: >60 ML/MIN/1.73M2
GFR NON-AFRICAN AMERICAN: >60 ML/MIN/1.73M2
GLUCOSE BLD-MCNC: 102 MG/DL (ref 70–99)
HCT VFR BLD CALC: 47.2 % (ref 37–47)
HEMOGLOBIN: 14.9 GM/DL (ref 12.5–16)
IMMATURE NEUTROPHIL %: 0.5 % (ref 0–0.43)
LYMPHOCYTES ABSOLUTE: 3.1 K/CU MM
LYMPHOCYTES RELATIVE PERCENT: 16.5 % (ref 24–44)
MCH RBC QN AUTO: 31.7 PG (ref 27–31)
MCHC RBC AUTO-ENTMCNC: 31.6 % (ref 32–36)
MCV RBC AUTO: 100.4 FL (ref 78–100)
MONOCYTES ABSOLUTE: 1.5 K/CU MM
MONOCYTES RELATIVE PERCENT: 7.7 % (ref 0–4)
NUCLEATED RBC %: 0 %
PDW BLD-RTO: 12 % (ref 11.7–14.9)
PLATELET # BLD: 307 K/CU MM (ref 140–440)
PMV BLD AUTO: 9.9 FL (ref 7.5–11.1)
POTASSIUM SERPL-SCNC: 4.2 MMOL/L (ref 3.5–5.1)
RBC # BLD: 4.7 M/CU MM (ref 4.2–5.4)
SEGMENTED NEUTROPHILS ABSOLUTE COUNT: 14 K/CU MM
SEGMENTED NEUTROPHILS RELATIVE PERCENT: 73.9 % (ref 36–66)
SODIUM BLD-SCNC: 138 MMOL/L (ref 135–145)
TOTAL IMMATURE NEUTOROPHIL: 0.09 K/CU MM
TOTAL NUCLEATED RBC: 0 K/CU MM
TOTAL PROTEIN: 7.6 GM/DL (ref 6.4–8.2)
TROPONIN T: <0.01 NG/ML
TROPONIN T: <0.01 NG/ML
WBC # BLD: 19 K/CU MM (ref 4–10.5)

## 2019-05-07 PROCEDURE — 99285 EMERGENCY DEPT VISIT HI MDM: CPT

## 2019-05-07 PROCEDURE — 36415 COLL VENOUS BLD VENIPUNCTURE: CPT

## 2019-05-07 PROCEDURE — 84484 ASSAY OF TROPONIN QUANT: CPT

## 2019-05-07 PROCEDURE — 2580000003 HC RX 258: Performed by: PHYSICIAN ASSISTANT

## 2019-05-07 PROCEDURE — 96374 THER/PROPH/DIAG INJ IV PUSH: CPT

## 2019-05-07 PROCEDURE — 93005 ELECTROCARDIOGRAM TRACING: CPT | Performed by: EMERGENCY MEDICINE

## 2019-05-07 PROCEDURE — 80053 COMPREHEN METABOLIC PANEL: CPT

## 2019-05-07 PROCEDURE — 6370000000 HC RX 637 (ALT 250 FOR IP): Performed by: PHYSICIAN ASSISTANT

## 2019-05-07 PROCEDURE — 71046 X-RAY EXAM CHEST 2 VIEWS: CPT

## 2019-05-07 PROCEDURE — 6360000002 HC RX W HCPCS: Performed by: PHYSICIAN ASSISTANT

## 2019-05-07 PROCEDURE — 85025 COMPLETE CBC W/AUTO DIFF WBC: CPT

## 2019-05-07 RX ORDER — ATORVASTATIN CALCIUM 80 MG/1
80 TABLET, FILM COATED ORAL DAILY
COMMUNITY
End: 2022-04-06

## 2019-05-07 RX ORDER — AMLODIPINE BESYLATE 2.5 MG/1
2.5 TABLET ORAL DAILY
Qty: 30 TABLET | Refills: 0 | Status: ON HOLD | OUTPATIENT
Start: 2019-05-07 | End: 2019-05-12 | Stop reason: HOSPADM

## 2019-05-07 RX ORDER — 0.9 % SODIUM CHLORIDE 0.9 %
1000 INTRAVENOUS SOLUTION INTRAVENOUS ONCE
Status: COMPLETED | OUTPATIENT
Start: 2019-05-07 | End: 2019-05-07

## 2019-05-07 RX ORDER — PROMETHAZINE HYDROCHLORIDE 25 MG/1
25 TABLET ORAL ONCE
Status: COMPLETED | OUTPATIENT
Start: 2019-05-07 | End: 2019-05-07

## 2019-05-07 RX ORDER — ONDANSETRON 2 MG/ML
4 INJECTION INTRAMUSCULAR; INTRAVENOUS ONCE
Status: DISCONTINUED | OUTPATIENT
Start: 2019-05-07 | End: 2019-05-07 | Stop reason: HOSPADM

## 2019-05-07 RX ORDER — LISINOPRIL 5 MG/1
5 TABLET ORAL DAILY
COMMUNITY
End: 2019-09-18 | Stop reason: ALTCHOICE

## 2019-05-07 RX ORDER — HYDROCODONE BITARTRATE AND ACETAMINOPHEN 5; 325 MG/1; MG/1
1 TABLET ORAL ONCE
Status: COMPLETED | OUTPATIENT
Start: 2019-05-07 | End: 2019-05-07

## 2019-05-07 RX ORDER — HYDROMORPHONE HCL 110MG/55ML
0.5 PATIENT CONTROLLED ANALGESIA SYRINGE INTRAVENOUS ONCE
Status: COMPLETED | OUTPATIENT
Start: 2019-05-07 | End: 2019-05-07

## 2019-05-07 RX ADMIN — PROMETHAZINE HYDROCHLORIDE 25 MG: 25 TABLET ORAL at 11:38

## 2019-05-07 RX ADMIN — HYDROMORPHONE HYDROCHLORIDE 0.5 MG: 2 INJECTION, SOLUTION INTRAMUSCULAR; INTRAVENOUS; SUBCUTANEOUS at 11:21

## 2019-05-07 RX ADMIN — SODIUM CHLORIDE 1000 ML: 9 INJECTION, SOLUTION INTRAVENOUS at 11:21

## 2019-05-07 RX ADMIN — HYDROCODONE BITARTRATE AND ACETAMINOPHEN 1 TABLET: 5; 325 TABLET ORAL at 13:19

## 2019-05-07 ASSESSMENT — PAIN DESCRIPTION - PAIN TYPE: TYPE: ACUTE PAIN

## 2019-05-07 ASSESSMENT — PAIN DESCRIPTION - FREQUENCY: FREQUENCY: INTERMITTENT

## 2019-05-07 ASSESSMENT — PAIN DESCRIPTION - LOCATION: LOCATION: CHEST

## 2019-05-07 ASSESSMENT — PAIN SCALES - GENERAL
PAINLEVEL_OUTOF10: 8

## 2019-05-07 ASSESSMENT — PAIN DESCRIPTION - ORIENTATION: ORIENTATION: MID;LEFT

## 2019-05-07 NOTE — ED NOTES
Pt placed onto telemetry at this time showing sinus rhythm rate 736 [facility]  05/07/19 [id number]

## 2019-05-07 NOTE — ED NOTES
Discharge education reviewed. Questions answered. Medications clarified. Belongings gathered. Discharge instructions signed. All belongings accounted for. Patient discharged to home via POV.       Venu Blue RN  05/07/19 [id number]

## 2019-05-07 NOTE — CONSULTS
AT HOME:  She is on lisinopril 5 mg a day, Lipitor 40 mg a  day, ranitidine, Lasix, Janumet, Coreg, Imdur, Ranexa, Brilinta and  aspirin. PHYSICAL EXAMINATION:  GENERAL:  The patient is awake, alert, answering questions, not in acute  distress. VITAL SIGNS:  Temperature afebrile, pulse is 77, blood pressure 118/67. HEENT:  Head is normocephalic, atraumatic. Pupils are equal and  reactive. CHEST:  Equal expansion. LUNGS:  Clear to auscultation. No wheezing or rhonchi present. HEART:  Regular rhythm. ABDOMEN:  Soft, nontender. Bowel sounds are present. No  hepatosplenomegaly or guarding appreciated. EXTREMITIES:  No cyanosis or clubbing noted. NEUROLOGIC:  Cranial nerves II through XII are grossly intact. LABORATORY DATA:  BUN is 15, creatinine 0.9. Troponins are negative. LFTs are normal.  CBC within normal range. IMPRESSION AND PLAN:  This is a 50-year-old female patient with a  history of having coronary artery disease, status post CABG done and  angioplasty done in 06/2018 and now comes in with a chest pain. She has  diabetes also present. At this time, because of ongoing symptoms, I will go ahead and add  Norvasc 2.5 mg once a day. Her pressure is kind of low, but I think we  will try to see how she does with that and also add Xarelto 2.5 b.i.d.  and we will stop her aspirin and keep her on the Brilinta. I think I  will plan for a stress test as an outpatient. Her last stress test was  positive for ischemia and _____ with heart cath.         Cori Davis MD    D: 05/07/2019 13:57:22       T: 05/07/2019 14:40:14     [email or web address]  Job#: [id number]     Doc#: [id number]    CC:

## 2019-05-07 NOTE — ED PROVIDER NOTES
I independently examined and evaluated [facility]. In brief, Patient presenting with chest pain has a history of this has had multiple stents and cardiac caths. Focused exam revealed Resting comfortably, respirations nonlabored. ED course: Workup initiated, troponin negative EKG shows a sinus rhythm rate of 89 normal MN and QRS intervals no ST elevation no old EKG available for comparison. We spoke with her cardiologist did a repeat troponin and it was negative patient was seen at bedside by cardiology medications will be adjusted and they will be discharged to follow-up as an outpatient they understand and agree return warnings given. All diagnostic, treatment, and disposition decisions were made by myself in conjunction with the advanced practice provider. For all further details of the patient's emergency department visit, please see the advanced practice provider's documentation. Comment: Please note this report has been produced using speech recognition software and may contain errors related to that system including errors in grammar, punctuation, and spelling, as well as words and phrases that may be inappropriate. If there are any questions or concerns please feel free to contact the dictating provider for clarification.        Kendall Marr MD  05/07/19 1952

## 2019-05-07 NOTE — ED PROVIDER NOTES
eMERGENCY dEPARTMENT eNCOUnter        PCP: MD Marylin Ascencio    Chief Complaint   Patient presents with    Chest Pain     mid to L chest pain, SOB, started at 0900 this morning, Pt took ASA 324mg, placed a Nitro patch to L upper arm. Pt reports a little relief. describes pain as \"someone sitting on chest\"       HPI    Jim Jones is a 46 y.o. female with past medical history of CAD status post CABG, diabetes mellitus, hypertension, hyperlipidemia who presents with chest pain. Pain began this morning while at work, approximately 9 AM located and substernal to left side of chest. She describes this as a pressure with radiation into her left shoulder. She put on a nitro patch and took full dose of aspirin which seemed to improve symptoms, however now pain is gradually returning. She endorses associated nausea, did have an episode of diaphoresis and associated shortness of breath the pain. Current pain severity 8 out of 10. Patient follows with Dr. Alba Castillo. Last heart catheterization was in June 2018. No history of DVT/PE, unilateral leg swelling, recent trauma or immobility, exogenous hormone use, personal history of cancer. Currently on Brilinta. REVIEW OF SYSTEMS    Constitutional:  Denies fever, chills. HENT:  Denies sore throat or ear pain   Cardiovascular:  See HPI.    Denies palpitations,  Denies syncope   Respiratory:    See HPI.  + shortness of breath, no hemoptysis    GI:  Denies abdominal pain, +nausea, no vomiting, or diarrhea  :  Denies any urinary symptoms   Musculoskeletal:   Denies back pain,   Skin:  Denies rash  Neurologic:  Denies lightheadedness, dizziness, headache, focal weakness or sensory changes   Endocrine:  Denies polyuria or polydypsia   Lymphatic:  Denies swollen glands     All other review of systems are negative  See HPI and nursing notes for additional information     PAST MEDICAL & SURGICAL HISTORY    Past Medical History:   Diagnosis Date    Acid reflux     CAD (coronary artery disease)     Sees Dr. Miguel Bullock    Depression     Diabetes mellitus [facility]) Dx 2001    Eczema of hand     Hx of migraines     Hyperlipidemia     Hypertension     Kidney stones 2000's    \"Passed One Kidney Stone\"    Wears glasses      Past Surgical History:   Procedure Laterality Date    APPENDECTOMY  1988    Done During [facility]    BACK SURGERY  Last Done 9-11    \"4 Lower Back Surgeries, 2 Rods, 4 Pins At L5, S1 Put In During Last Surgery\"    BONE RESECTION, RIB      BREAST BIOPSY Bilateral 1990's-2000's    Benign    CARDIAC SURGERY  5-4-12    CABG (3 Bypasses)   Flor Mcfadden   [facility] 24      6/28/2018 total of 9    DENTAL SURGERY  2000's    \"Dental Implants Upper And Lower\"    HYSTERECTOMY, TOTAL ABDOMINAL  1988    Appendectomy Also Done    OTHER SURGICAL HISTORY Left 4/4/2013    Left transaxillary 1st rib resection    TONSILLECTOMY AND ADENOIDECTOMY  1971       CURRENT MEDICATIONS    Current Outpatient Rx   Medication Sig Dispense Refill    lisinopril (PRINIVIL;ZESTRIL) 5 MG tablet Take 5 mg by mouth daily      atorvastatin (LIPITOR) 40 MG tablet Take 40 mg by mouth daily      raNITIdine HCl (RANITIDINE 75 PO) Take 75 mg by mouth daily      dapagliflozin (FARXIGA) 5 MG tablet Take 5 mg by mouth every morning      amLODIPine (NORVASC) 2.5 MG tablet Take 1 tablet by mouth daily 30 tablet 0    rivaroxaban (XARELTO) 2.5 MG TABS tablet Take 1 tablet by mouth 2 times daily 60 tablet 0    nitroGLYCERIN (MINITRAN) 0.2 MG/HR Place 1 patch onto the skin daily 30 patch 0    desonide (DESOWEN) 0.05 % cream Apply topically 2 times daily. 60 g 0    lidocaine (XYLOCAINE) 5 % ointment Apply topically as needed.  30 g 0    busPIRone (BUSPAR) 15 MG tablet Take 15 mg by mouth 3 times daily 90 tablet 1    furosemide (LASIX) 20 MG tablet Take 20 mg by mouth daily      SITagliptin-metFORMIN (JANUMET XR)  MG TB24 per extended release tablet TAKE ONE TABLET BY MOUTH TWICE A  tablet 1    insulin glargine (BASAGLAR KWIKPEN) 100 UNIT/ML injection pen Inject 25 Units into the skin nightly 5 pen 0    carvedilol (COREG) 12.5 MG tablet Take 1 tablet by mouth 2 times daily (with meals) 60 tablet 0    isosorbide mononitrate (IMDUR) 30 MG extended release tablet Take 1 tablet by mouth daily 30 tablet 0    ticagrelor (BRILINTA) 90 MG TABS tablet Take 1 tablet by mouth 2 times daily 60 tablet 0    albuterol sulfate HFA (PROAIR HFA) 108 (90 Base) MCG/ACT inhaler Inhale 2 puffs into the lungs every 6 hours as needed for Wheezing 2 Inhaler 0    ranolazine (RANEXA) 1000 MG extended release tablet Take 1 tablet by mouth 2 times daily 180 tablet 1    oxyCODONE-acetaminophen (PERCOCET) 5-325 MG per tablet Take 1 tablet by mouth every 4 hours as needed for Pain      Insulin Pen Needle (PEN NEEDLES 3/16\") 31G X 5 MM MISC 1 each by Does not apply route daily 100 each 3    glucose blood VI test strips (ASCENSIA AUTODISC VI;ONE TOUCH ULTRA TEST VI) strip 1 each by In Vitro route 2 times daily As needed.  100 each 5       ALLERGIES    Allergies   Allergen Reactions    Erythromycin Hives and Nausea And Vomiting    Lyrica [Pregabalin] Swelling    Codeine Palpitations    Imitrex [Sumatriptan] Anxiety     \"Makes Me Hyper\"    Ketorolac Tromethamine Other (See Comments)     \"Rapid Heart Rate\"    Prochlorperazine Edisylate Nausea And Vomiting    Tape Theola [place] Tape] Itching     Blisters    Ultram [Tramadol] Itching    Zofran [Ondansetron Hcl] Nausea Only       SOCIAL & FAMILY HISTORY    Social History     Socioeconomic History    Marital status:      Spouse name: None    Number of children: None    Years of education: None    Highest education level: None   Occupational History    None   Social Needs    Financial resource strain: None    Food insecurity:     Worry: None     Inability: None    Transportation needs: Medical: None     Non-medical: None   Tobacco Use    Smoking status: Current Every Day Smoker     Packs/day: 0.50     Years: 35.00     Pack years: 17.50     Types: Cigarettes    Smokeless tobacco: Never Used   Substance and Sexual Activity    Alcohol use: No     Alcohol/week: 0.0 oz    Drug use: No    Sexual activity: Yes     Partners: Male   Lifestyle    Physical activity:     Days per week: None     Minutes per session: None    Stress: None   Relationships    Social connections:     Talks on phone: None     Gets together: None     Attends Zoroastrian service: None     Active member of club or organization: None     Attends meetings of clubs or organizations: None     Relationship status: None    Intimate partner violence:     Fear of current or ex partner: None     Emotionally abused: None     Physically abused: None     Forced sexual activity: None   Other Topics Concern    None   Social History Narrative    None     Family History   Problem Relation Age of Onset    Diabetes Mother     High Blood Pressure Mother     Asthma Mother     High Cholesterol Mother     High Blood Pressure Father     High Cholesterol Father     Asthma Father     Asthma Brother     Mental Illness Son     Early Death Son 24        \"Suicide\"    Mental Illness Daughter         \"Bipolar\"       PHYSICAL EXAM    VITAL SIGNS: /67   Pulse 77   Temp 97.5 °F (36.4 °C) (Oral)   Resp 17   Ht 5' 6\" (1.676 m)   Wt 174 lb (78.9 kg)   SpO2 97%   BMI 28.08 kg/m²    Constitutional:  Well developed, well nourished, no acute distress   HENT:  Atraumatic, moist mucus membranes  Neck/Lymphatics: supple, no JVD, no swollen nodes  Respiratory:  Lungs Clear, no retractions   Cardiovascular:  Rate regular, regular Rhythm,  no murmurs/rubs/gallops  No carotid bruits or murmurs heard in carotids.   Vascular: Radial pulses 2+ equal bilaterally  GI:  Soft, nontender, normal bowel sounds  Musculoskeletal:   No edema, no deformities equal bilaterally, nontender to palpation. No palpable cords. Integument:  Skin warm and dry, no petechiae   Neurologic:  Alert & oriented, normal speech  Psych: Pleasant affect, no hallucinations      EKG Interpretation  Please see ED physician's note for EKG interpretation    RADIOLOGY/PROCEDURES    CXR:   XR CHEST STANDARD (2 VW)   Final Result   No evidence of acute process.                LABS:  Results for orders placed or performed during the hospital encounter of 05/07/19   CBC Auto Differential   Result Value Ref Range    WBC 19.0 (H) 4.0 - 10.5 K/CU MM    RBC 4.70 4.2 - 5.4 M/CU MM    Hemoglobin 14.9 12.5 - 16.0 GM/DL    Hematocrit 47.2 (H) 37 - 47 %    .4 (H) 78 - 100 FL    MCH 31.7 (H) 27 - 31 PG    MCHC 31.6 (L) 32.0 - 36.0 %    RDW 12.0 11.7 - 14.9 %    Platelets 379 091 - 035 K/CU MM    MPV 9.9 7.5 - 11.1 FL    Differential Type AUTOMATED DIFFERENTIAL     Segs Relative 73.9 (H) 36 - 66 %    Lymphocytes % 16.5 (L) 24 - 44 %    Monocytes % 7.7 (H) 0 - 4 %    Eosinophils % 0.8 0 - 3 %    Basophils % 0.6 0 - 1 %    Segs Absolute 14.0 K/CU MM    Lymphocytes # 3.1 K/CU MM    Monocytes # 1.5 K/CU MM    Eosinophils # 0.2 K/CU MM    Basophils # 0.1 K/CU MM    Nucleated RBC % 0.0 %    Total Nucleated RBC 0.0 K/CU MM    Total Immature Neutrophil 0.09 K/CU MM    Immature Neutrophil % 0.5 (H) 0 - 0.43 %   Comprehensive Metabolic Panel   Result Value Ref Range    Sodium 138 135 - 145 MMOL/L    Potassium 4.2 3.5 - 5.1 MMOL/L    Chloride 104 99 - 110 mMol/L    CO2 19 (L) 21 - 32 MMOL/L    BUN 15 6 - 23 MG/DL    CREATININE 0.9 0.6 - 1.1 MG/DL    Glucose 102 (H) 70 - 99 MG/DL    Calcium 9.9 8.3 - 10.6 MG/DL    Alb 4.4 3.4 - 5.0 GM/DL    Total Protein 7.6 6.4 - 8.2 GM/DL    Total Bilirubin 0.3 0.0 - 1.0 MG/DL    ALT 18 10 - 40 U/L    AST 13 (L) 15 - 37 IU/L    Alkaline Phosphatase 68 40 - 129 IU/L    GFR Non-African American >60 >60 mL/min/1.73m2    GFR African American >60 >60 mL/min/1.73m2    Anion Gap 15 4 - 16 Troponin   Result Value Ref Range    Troponin T <0.010 <0.01 NG/ML   Troponin   Result Value Ref Range    Troponin T <0.010 <0.01 NG/ML   EKG 12 Lead   Result Value Ref Range    Ventricular Rate 89 BPM    Atrial Rate 89 BPM    P-R Interval 162 ms    QRS Duration 82 ms    Q-T Interval 384 ms    QTc Calculation (Bazett) 467 ms    P Axis 61 degrees    R Axis 75 degrees    T Axis -16 degrees    Diagnosis       Normal sinus rhythm  Nonspecific ST abnormality  Abnormal QRS-T angle, consider primary T wave abnormality  Abnormal ECG  When compared with ECG of 01-MAR-2019 14:34,  Inverted T waves have replaced nonspecific T wave abnormality in Inferior leads             ED COURSE & MEDICAL DECISION MAKING     Vital signs and nursing notes reviewed during ED course. Patient care and presentation staffed with supervising MD.  All pertinent Lab data and radiographic results reviewed with patient at bedside. Patient presents as above with chest pain which began over one hour prior to arrival, has been constant since onset. She has had aspirin and nitro with improvement of symptoms. On arrival, she is hypotensive with blood pressure 88/55. She is placed on telemetry. This improves on repeat blood pressures without intervention to 135/76. She is not tachycardic, afebrile, oxygenating at 98% on room air. Heart score of 3 due to age and risk factors, story concerning increasing score to >3. EKG obtained and interpreted by supervising physician. Lab work significant for leukocytosis of unclear etiology. Remaining lab work without evidence of emergent process, troponin is negative. Chest x-ray without acute process. Discussed with her cardiologist, Dr. Bruno King. He is very familiar with patient, follows closely with patient and recommends obtaining a delta troponin and that he will see her as an outpatient. He does evaluate patient in the emergency department and has made medication adjustments.  We'll discontinue aspirin and start Norvasc 2.5 and Xarelto 2.5 twice a day. Delta troponin is also negative. On reexamination, patient is resting comfortably, she remains hemodynamically stable. She is comfortable with this above treatment plan and discharge at this time. We discussed that she will need close follow-up with cardiology for further evaluation and that she should return with any acutely worsening symptoms. At this time, lower suspicion for PE as etiology of symptoms given patient's history and exam today. No mediastinal widening or pulse discrepancy concerning for dissection. No evidence of infectious etiology given history or chest x-ray, patient does have a leukocytosis however. At this time, likelihood further emergent etiologies is insufficient to justify any further testing to rule this out. The patient and/or the family were informed of the results of any tests/labs/imaging, the treatment plan, and time was allotted to answer questions. See chart for details of medications given during the ED stay. Clinical  IMPRESSION    1. Chest pain, unspecified type    2. Leukocytosis, unspecified type          Diagnosis and plan discussed in detail with patient who understands and agrees. Patient agrees to return emergency department if symptoms worsen or any new symptoms develop. Comment: Please note this report has been produced using speech recognition software and may contain errors related to that system including errors in grammar, punctuation, and spelling, as well as words and phrases that may be inappropriate. If there are any questions or concerns please feel free to contact the dictating provider for clarification.         ADAMARIS Kasper  05/07/19 1400

## 2019-05-07 NOTE — LETTER
[facility] Emergency Department  [address] [id number]  Phone: [phone]  Fax: [phone]             May 7, 2019    Patient: Randy Rodriguez   YOB: 1965   Date of Visit: 5/7/2019       To Whom It May Concern:    Alicia Archuleta was seen and treated in our emergency department on 5/7/2019. She may return to work on 5/9/19.       Sincerely,     Jerzy Mcdermott RN        Signature:__________________________________

## 2019-05-07 NOTE — ED NOTES
Pt medicated per MAR at this time. Pt denies further needs at this time. VSS. Call light within reach.       Sergey Rodriguez RN  05/07/19 [id number]

## 2019-05-08 PROCEDURE — 93010 ELECTROCARDIOGRAM REPORT: CPT | Performed by: INTERNAL MEDICINE

## 2019-05-11 ENCOUNTER — APPOINTMENT (OUTPATIENT)
Dept: GENERAL RADIOLOGY | Age: 54
End: 2019-05-11
Payer: COMMERCIAL

## 2019-05-11 ENCOUNTER — HOSPITAL ENCOUNTER (OUTPATIENT)
Age: 54
Setting detail: OBSERVATION
LOS: 1 days | Discharge: HOME | End: 2019-05-12
Attending: EMERGENCY MEDICINE | Admitting: INTERNAL MEDICINE
Payer: COMMERCIAL

## 2019-05-11 DIAGNOSIS — R07.9 CHEST PAIN, UNSPECIFIED TYPE: Primary | ICD-10-CM

## 2019-05-11 LAB
ACTIVATED CLOTTING TIME, LOW RANGE: 303 SEC
ALBUMIN SERPL-MCNC: 4.1 GM/DL (ref 3.4–5)
ALP BLD-CCNC: 57 IU/L (ref 40–129)
ALT SERPL-CCNC: 16 U/L (ref 10–40)
ANION GAP SERPL CALCULATED.3IONS-SCNC: 11 MMOL/L (ref 4–16)
AST SERPL-CCNC: 18 IU/L (ref 15–37)
BASOPHILS ABSOLUTE: 0.1 K/CU MM
BASOPHILS RELATIVE PERCENT: 0.6 % (ref 0–1)
BILIRUB SERPL-MCNC: 0.2 MG/DL (ref 0–1)
BUN BLDV-MCNC: 17 MG/DL (ref 6–23)
CALCIUM SERPL-MCNC: 9.4 MG/DL (ref 8.3–10.6)
CHLORIDE BLD-SCNC: 105 MMOL/L (ref 99–110)
CO2: 24 MMOL/L (ref 21–32)
CREAT SERPL-MCNC: 0.5 MG/DL (ref 0.6–1.1)
DIFFERENTIAL TYPE: ABNORMAL
EOSINOPHILS ABSOLUTE: 0.2 K/CU MM
EOSINOPHILS RELATIVE PERCENT: 1.7 % (ref 0–3)
GFR AFRICAN AMERICAN: >60 ML/MIN/1.73M2
GFR NON-AFRICAN AMERICAN: >60 ML/MIN/1.73M2
GLUCOSE BLD-MCNC: 114 MG/DL (ref 70–99)
GLUCOSE BLD-MCNC: 125 MG/DL (ref 70–99)
GLUCOSE BLD-MCNC: 91 MG/DL (ref 70–99)
GLUCOSE BLD-MCNC: 96 MG/DL (ref 70–99)
GLUCOSE BLD-MCNC: 98 MG/DL (ref 70–99)
HCT VFR BLD CALC: 39.2 % (ref 37–47)
HEMOGLOBIN: 12.6 GM/DL (ref 12.5–16)
IMMATURE NEUTROPHIL %: 0.4 % (ref 0–0.43)
LYMPHOCYTES ABSOLUTE: 2.4 K/CU MM
LYMPHOCYTES RELATIVE PERCENT: 24.9 % (ref 24–44)
MCH RBC QN AUTO: 31.8 PG (ref 27–31)
MCHC RBC AUTO-ENTMCNC: 32.1 % (ref 32–36)
MCV RBC AUTO: 99 FL (ref 78–100)
MONOCYTES ABSOLUTE: 0.8 K/CU MM
MONOCYTES RELATIVE PERCENT: 8.6 % (ref 0–4)
NUCLEATED RBC %: 0 %
PDW BLD-RTO: 11.9 % (ref 11.7–14.9)
PLATELET # BLD: 249 K/CU MM (ref 140–440)
PMV BLD AUTO: 10.2 FL (ref 7.5–11.1)
POTASSIUM SERPL-SCNC: 4.5 MMOL/L (ref 3.5–5.1)
PRO-BNP: 26.85 PG/ML
RBC # BLD: 3.96 M/CU MM (ref 4.2–5.4)
SEGMENTED NEUTROPHILS ABSOLUTE COUNT: 6.2 K/CU MM
SEGMENTED NEUTROPHILS RELATIVE PERCENT: 63.8 % (ref 36–66)
SODIUM BLD-SCNC: 140 MMOL/L (ref 135–145)
TOTAL IMMATURE NEUTOROPHIL: 0.04 K/CU MM
TOTAL NUCLEATED RBC: 0 K/CU MM
TOTAL PROTEIN: 7 GM/DL (ref 6.4–8.2)
TROPONIN T: <0.01 NG/ML
TROPONIN T: <0.01 NG/ML
WBC # BLD: 9.6 K/CU MM (ref 4–10.5)

## 2019-05-11 PROCEDURE — 94761 N-INVAS EAR/PLS OXIMETRY MLT: CPT

## 2019-05-11 PROCEDURE — 2580000003 HC RX 258: Performed by: PHYSICIAN ASSISTANT

## 2019-05-11 PROCEDURE — 6360000002 HC RX W HCPCS

## 2019-05-11 PROCEDURE — 84484 ASSAY OF TROPONIN QUANT: CPT

## 2019-05-11 PROCEDURE — 2580000003 HC RX 258: Performed by: INTERNAL MEDICINE

## 2019-05-11 PROCEDURE — 85025 COMPLETE CBC W/AUTO DIFF WBC: CPT

## 2019-05-11 PROCEDURE — 93459 L HRT ART/GRFT ANGIO: CPT

## 2019-05-11 PROCEDURE — 80053 COMPREHEN METABOLIC PANEL: CPT

## 2019-05-11 PROCEDURE — C1769 GUIDE WIRE: HCPCS

## 2019-05-11 PROCEDURE — 6370000000 HC RX 637 (ALT 250 FOR IP): Performed by: EMERGENCY MEDICINE

## 2019-05-11 PROCEDURE — 82962 GLUCOSE BLOOD TEST: CPT

## 2019-05-11 PROCEDURE — 83880 ASSAY OF NATRIURETIC PEPTIDE: CPT

## 2019-05-11 PROCEDURE — G0378 HOSPITAL OBSERVATION PER HR: HCPCS

## 2019-05-11 PROCEDURE — 6370000000 HC RX 637 (ALT 250 FOR IP): Performed by: INTERNAL MEDICINE

## 2019-05-11 PROCEDURE — 92937 PRQ TRLUML REVSC CAB GRF 1: CPT

## 2019-05-11 PROCEDURE — 2500000003 HC RX 250 WO HCPCS

## 2019-05-11 PROCEDURE — 6360000004 HC RX CONTRAST MEDICATION

## 2019-05-11 PROCEDURE — 6370000000 HC RX 637 (ALT 250 FOR IP)

## 2019-05-11 PROCEDURE — 6360000002 HC RX W HCPCS: Performed by: EMERGENCY MEDICINE

## 2019-05-11 PROCEDURE — C1894 INTRO/SHEATH, NON-LASER: HCPCS

## 2019-05-11 PROCEDURE — 96374 THER/PROPH/DIAG INJ IV PUSH: CPT

## 2019-05-11 PROCEDURE — C1760 CLOSURE DEV, VASC: HCPCS

## 2019-05-11 PROCEDURE — 71045 X-RAY EXAM CHEST 1 VIEW: CPT

## 2019-05-11 PROCEDURE — 36415 COLL VENOUS BLD VENIPUNCTURE: CPT

## 2019-05-11 PROCEDURE — 85347 COAGULATION TIME ACTIVATED: CPT

## 2019-05-11 PROCEDURE — 93005 ELECTROCARDIOGRAM TRACING: CPT | Performed by: PHYSICIAN ASSISTANT

## 2019-05-11 PROCEDURE — 99285 EMERGENCY DEPT VISIT HI MDM: CPT

## 2019-05-11 PROCEDURE — C1874 STENT, COATED/COV W/DEL SYS: HCPCS

## 2019-05-11 PROCEDURE — 93010 ELECTROCARDIOGRAM REPORT: CPT | Performed by: INTERNAL MEDICINE

## 2019-05-11 PROCEDURE — 6370000000 HC RX 637 (ALT 250 FOR IP): Performed by: PHYSICIAN ASSISTANT

## 2019-05-11 PROCEDURE — C1887 CATHETER, GUIDING: HCPCS

## 2019-05-11 PROCEDURE — 2709999900 HC NON-CHARGEABLE SUPPLY

## 2019-05-11 RX ORDER — CARVEDILOL 12.5 MG/1
12.5 TABLET ORAL 2 TIMES DAILY WITH MEALS
Status: DISCONTINUED | OUTPATIENT
Start: 2019-05-11 | End: 2019-05-12 | Stop reason: HOSPADM

## 2019-05-11 RX ORDER — ONDANSETRON 2 MG/ML
4 INJECTION INTRAMUSCULAR; INTRAVENOUS EVERY 6 HOURS PRN
Status: DISCONTINUED | OUTPATIENT
Start: 2019-05-11 | End: 2019-05-12 | Stop reason: HOSPADM

## 2019-05-11 RX ORDER — DEXTROSE MONOHYDRATE 25 G/50ML
12.5 INJECTION, SOLUTION INTRAVENOUS PRN
Status: DISCONTINUED | OUTPATIENT
Start: 2019-05-11 | End: 2019-05-12 | Stop reason: HOSPADM

## 2019-05-11 RX ORDER — SODIUM CHLORIDE 0.9 % (FLUSH) 0.9 %
10 SYRINGE (ML) INJECTION EVERY 12 HOURS SCHEDULED
Status: DISCONTINUED | OUTPATIENT
Start: 2019-05-11 | End: 2019-05-11 | Stop reason: SDUPTHER

## 2019-05-11 RX ORDER — METFORMIN HYDROCHLORIDE 500 MG/1
500 TABLET, EXTENDED RELEASE ORAL 2 TIMES DAILY WITH MEALS
Status: DISCONTINUED | OUTPATIENT
Start: 2019-05-11 | End: 2019-05-12 | Stop reason: HOSPADM

## 2019-05-11 RX ORDER — ISOSORBIDE MONONITRATE 30 MG/1
30 TABLET, EXTENDED RELEASE ORAL DAILY
Status: DISCONTINUED | OUTPATIENT
Start: 2019-05-11 | End: 2019-05-12 | Stop reason: HOSPADM

## 2019-05-11 RX ORDER — OXYCODONE HYDROCHLORIDE 5 MG/1
5 TABLET ORAL EVERY 6 HOURS PRN
Status: DISCONTINUED | OUTPATIENT
Start: 2019-05-11 | End: 2019-05-12 | Stop reason: HOSPADM

## 2019-05-11 RX ORDER — ASPIRIN 81 MG/1
81 TABLET, CHEWABLE ORAL DAILY
Status: DISCONTINUED | OUTPATIENT
Start: 2019-05-12 | End: 2019-05-12

## 2019-05-11 RX ORDER — OXYCODONE HYDROCHLORIDE AND ACETAMINOPHEN 5; 325 MG/1; MG/1
1 TABLET ORAL EVERY 6 HOURS PRN
Status: DISCONTINUED | OUTPATIENT
Start: 2019-05-11 | End: 2019-05-11

## 2019-05-11 RX ORDER — LIDOCAINE 50 MG/G
OINTMENT TOPICAL 4 TIMES DAILY PRN
Status: DISCONTINUED | OUTPATIENT
Start: 2019-05-11 | End: 2019-05-12 | Stop reason: HOSPADM

## 2019-05-11 RX ORDER — ACETAMINOPHEN 325 MG/1
650 TABLET ORAL EVERY 4 HOURS PRN
Status: DISCONTINUED | OUTPATIENT
Start: 2019-05-11 | End: 2019-05-12 | Stop reason: HOSPADM

## 2019-05-11 RX ORDER — NITROGLYCERIN 40 MG/1
1 PATCH TRANSDERMAL DAILY
Status: DISCONTINUED | OUTPATIENT
Start: 2019-05-11 | End: 2019-05-11

## 2019-05-11 RX ORDER — SODIUM CHLORIDE 0.9 % (FLUSH) 0.9 %
10 SYRINGE (ML) INJECTION PRN
Status: DISCONTINUED | OUTPATIENT
Start: 2019-05-11 | End: 2019-05-12 | Stop reason: HOSPADM

## 2019-05-11 RX ORDER — ASPIRIN 81 MG/1
324 TABLET, CHEWABLE ORAL ONCE
Status: COMPLETED | OUTPATIENT
Start: 2019-05-11 | End: 2019-05-11

## 2019-05-11 RX ORDER — ALBUTEROL SULFATE 90 UG/1
2 AEROSOL, METERED RESPIRATORY (INHALATION) EVERY 6 HOURS PRN
Status: DISCONTINUED | OUTPATIENT
Start: 2019-05-11 | End: 2019-05-12 | Stop reason: HOSPADM

## 2019-05-11 RX ORDER — RANOLAZINE 500 MG/1
1000 TABLET, EXTENDED RELEASE ORAL 2 TIMES DAILY
Status: DISCONTINUED | OUTPATIENT
Start: 2019-05-11 | End: 2019-05-12 | Stop reason: HOSPADM

## 2019-05-11 RX ORDER — ASPIRIN 81 MG/1
81 TABLET, CHEWABLE ORAL DAILY
Status: DISCONTINUED | OUTPATIENT
Start: 2019-05-12 | End: 2019-05-12 | Stop reason: HOSPADM

## 2019-05-11 RX ORDER — OXYCODONE HYDROCHLORIDE 5 MG/1
5 TABLET ORAL ONCE
Status: COMPLETED | OUTPATIENT
Start: 2019-05-11 | End: 2019-05-11

## 2019-05-11 RX ORDER — NICOTINE POLACRILEX 4 MG
15 LOZENGE BUCCAL PRN
Status: DISCONTINUED | OUTPATIENT
Start: 2019-05-11 | End: 2019-05-12 | Stop reason: HOSPADM

## 2019-05-11 RX ORDER — ATORVASTATIN CALCIUM 40 MG/1
40 TABLET, FILM COATED ORAL NIGHTLY
Status: DISCONTINUED | OUTPATIENT
Start: 2019-05-11 | End: 2019-05-12 | Stop reason: HOSPADM

## 2019-05-11 RX ORDER — SODIUM CHLORIDE 0.9 % (FLUSH) 0.9 %
10 SYRINGE (ML) INJECTION EVERY 12 HOURS SCHEDULED
Status: DISCONTINUED | OUTPATIENT
Start: 2019-05-11 | End: 2019-05-12 | Stop reason: HOSPADM

## 2019-05-11 RX ORDER — SODIUM CHLORIDE 9 MG/ML
INJECTION, SOLUTION INTRAVENOUS CONTINUOUS
Status: DISCONTINUED | OUTPATIENT
Start: 2019-05-11 | End: 2019-05-12

## 2019-05-11 RX ORDER — FUROSEMIDE 20 MG/1
20 TABLET ORAL DAILY
Status: DISCONTINUED | OUTPATIENT
Start: 2019-05-11 | End: 2019-05-12 | Stop reason: HOSPADM

## 2019-05-11 RX ORDER — DEXTROSE MONOHYDRATE 50 MG/ML
100 INJECTION, SOLUTION INTRAVENOUS PRN
Status: DISCONTINUED | OUTPATIENT
Start: 2019-05-11 | End: 2019-05-12 | Stop reason: HOSPADM

## 2019-05-11 RX ORDER — NITROGLYCERIN 0.4 MG/1
0.4 TABLET SUBLINGUAL EVERY 5 MIN PRN
Status: DISCONTINUED | OUTPATIENT
Start: 2019-05-11 | End: 2019-05-12 | Stop reason: HOSPADM

## 2019-05-11 RX ORDER — LISINOPRIL 5 MG/1
5 TABLET ORAL DAILY
Status: DISCONTINUED | OUTPATIENT
Start: 2019-05-11 | End: 2019-05-12 | Stop reason: HOSPADM

## 2019-05-11 RX ORDER — ATROPINE SULFATE 0.4 MG/ML
0.5 AMPUL (ML) INJECTION
Status: ACTIVE | OUTPATIENT
Start: 2019-05-11 | End: 2019-05-11

## 2019-05-11 RX ORDER — AMLODIPINE BESYLATE 2.5 MG/1
2.5 TABLET ORAL DAILY
Status: DISCONTINUED | OUTPATIENT
Start: 2019-05-11 | End: 2019-05-12

## 2019-05-11 RX ORDER — OXYCODONE HYDROCHLORIDE AND ACETAMINOPHEN 5; 325 MG/1; MG/1
2 TABLET ORAL ONCE
Status: COMPLETED | OUTPATIENT
Start: 2019-05-11 | End: 2019-05-11

## 2019-05-11 RX ORDER — ATORVASTATIN CALCIUM 40 MG/1
40 TABLET, FILM COATED ORAL DAILY
Status: DISCONTINUED | OUTPATIENT
Start: 2019-05-11 | End: 2019-05-11

## 2019-05-11 RX ORDER — INSULIN GLARGINE 100 [IU]/ML
25 INJECTION, SOLUTION SUBCUTANEOUS NIGHTLY
Status: DISCONTINUED | OUTPATIENT
Start: 2019-05-11 | End: 2019-05-12 | Stop reason: HOSPADM

## 2019-05-11 RX ORDER — BUSPIRONE HYDROCHLORIDE 15 MG/1
15 TABLET ORAL 3 TIMES DAILY
Status: DISCONTINUED | OUTPATIENT
Start: 2019-05-11 | End: 2019-05-12 | Stop reason: HOSPADM

## 2019-05-11 RX ORDER — HYDROMORPHONE HCL 110MG/55ML
1 PATIENT CONTROLLED ANALGESIA SYRINGE INTRAVENOUS ONCE
Status: COMPLETED | OUTPATIENT
Start: 2019-05-11 | End: 2019-05-11

## 2019-05-11 RX ORDER — SODIUM CHLORIDE 0.9 % (FLUSH) 0.9 %
10 SYRINGE (ML) INJECTION PRN
Status: DISCONTINUED | OUTPATIENT
Start: 2019-05-11 | End: 2019-05-11 | Stop reason: SDUPTHER

## 2019-05-11 RX ORDER — DIAPER,BRIEF,INFANT-TODD,DISP
EACH MISCELLANEOUS 2 TIMES DAILY
Status: DISCONTINUED | OUTPATIENT
Start: 2019-05-11 | End: 2019-05-12 | Stop reason: HOSPADM

## 2019-05-11 RX ADMIN — BUSPIRONE HYDROCHLORIDE 15 MG: 15 TABLET ORAL at 16:43

## 2019-05-11 RX ADMIN — BUSPIRONE HYDROCHLORIDE 15 MG: 15 TABLET ORAL at 22:13

## 2019-05-11 RX ADMIN — TICAGRELOR 90 MG: 90 TABLET ORAL at 21:59

## 2019-05-11 RX ADMIN — HYDROMORPHONE HYDROCHLORIDE 1 MG: 2 INJECTION, SOLUTION INTRAMUSCULAR; INTRAVENOUS; SUBCUTANEOUS at 04:57

## 2019-05-11 RX ADMIN — OXYCODONE HYDROCHLORIDE 5 MG: 5 TABLET ORAL at 21:59

## 2019-05-11 RX ADMIN — ISOSORBIDE MONONITRATE 30 MG: 30 TABLET, EXTENDED RELEASE ORAL at 14:25

## 2019-05-11 RX ADMIN — ACETAMINOPHEN 650 MG: 325 TABLET ORAL at 04:58

## 2019-05-11 RX ADMIN — CARVEDILOL 12.5 MG: 12.5 TABLET, FILM COATED ORAL at 16:43

## 2019-05-11 RX ADMIN — SODIUM CHLORIDE, PRESERVATIVE FREE 10 ML: 5 INJECTION INTRAVENOUS at 21:59

## 2019-05-11 RX ADMIN — OXYCODONE HYDROCHLORIDE 5 MG: 5 TABLET ORAL at 14:25

## 2019-05-11 RX ADMIN — ATORVASTATIN CALCIUM 40 MG: 40 TABLET, FILM COATED ORAL at 21:59

## 2019-05-11 RX ADMIN — INSULIN GLARGINE 25 UNITS: 100 INJECTION, SOLUTION SUBCUTANEOUS at 22:00

## 2019-05-11 RX ADMIN — OXYCODONE AND ACETAMINOPHEN 2 TABLET: 5; 325 TABLET ORAL at 03:56

## 2019-05-11 RX ADMIN — ASPIRIN 81 MG 324 MG: 81 TABLET ORAL at 04:58

## 2019-05-11 RX ADMIN — SODIUM CHLORIDE: 9 INJECTION, SOLUTION INTRAVENOUS at 19:17

## 2019-05-11 RX ADMIN — RIVAROXABAN 2.5 MG: 2.5 TABLET, FILM COATED ORAL at 22:14

## 2019-05-11 RX ADMIN — LISINOPRIL 5 MG: 5 TABLET ORAL at 14:25

## 2019-05-11 RX ADMIN — SODIUM CHLORIDE, PRESERVATIVE FREE 10 ML: 5 INJECTION INTRAVENOUS at 09:21

## 2019-05-11 RX ADMIN — OXYCODONE HYDROCHLORIDE 5 MG: 5 TABLET ORAL at 18:08

## 2019-05-11 RX ADMIN — RANOLAZINE 1000 MG: 500 TABLET, FILM COATED, EXTENDED RELEASE ORAL at 21:59

## 2019-05-11 RX ADMIN — SODIUM CHLORIDE: 9 INJECTION, SOLUTION INTRAVENOUS at 09:21

## 2019-05-11 ASSESSMENT — PAIN DESCRIPTION - FREQUENCY
FREQUENCY: CONTINUOUS
FREQUENCY: INTERMITTENT

## 2019-05-11 ASSESSMENT — PAIN DESCRIPTION - PROGRESSION
CLINICAL_PROGRESSION: NOT CHANGED

## 2019-05-11 ASSESSMENT — PAIN SCALES - GENERAL
PAINLEVEL_OUTOF10: 8
PAINLEVEL_OUTOF10: 6
PAINLEVEL_OUTOF10: 8
PAINLEVEL_OUTOF10: 3

## 2019-05-11 ASSESSMENT — PAIN DESCRIPTION - DESCRIPTORS
DESCRIPTORS: OTHER (COMMENT)
DESCRIPTORS: OTHER (COMMENT)
DESCRIPTORS: TIGHTNESS;PRESSURE
DESCRIPTORS: PRESSURE

## 2019-05-11 ASSESSMENT — PAIN DESCRIPTION - ORIENTATION
ORIENTATION: LEFT;UPPER
ORIENTATION: LEFT
ORIENTATION: LEFT

## 2019-05-11 ASSESSMENT — PAIN DESCRIPTION - ONSET
ONSET: ON-GOING
ONSET: ON-GOING

## 2019-05-11 ASSESSMENT — PAIN DESCRIPTION - LOCATION
LOCATION: CHEST
LOCATION: SHOULDER;CHEST
LOCATION: CHEST
LOCATION: CHEST;SHOULDER

## 2019-05-11 ASSESSMENT — PAIN DESCRIPTION - PAIN TYPE
TYPE: ACUTE PAIN

## 2019-05-11 ASSESSMENT — HEART SCORE: ECG: 1

## 2019-05-11 NOTE — ED PROVIDER NOTES
patent. Neck is supple. Heart sounds have a regular rate and rhythm. Lungs are clear to auscultation bilaterally. The patient's abdomen is soft, non-tender and non-distended with no peritoneal signs. Extremities are warm, pink, and well perfused. EKG shows a normal sinus rhythm with chronic ST depression as above. There is no ST elevation. Labs are obtained and there are no clinically significant lab abnormalities. Chest x-rays negative. The patient was treated with aspirin and Percocet but continued to complain of pain. She was then given Dilaudid with improvement. We recommended admission to the hospital for further cardiac evaluation and the patient was agreeable. UNRULY spoke with the patient's primary care physician, Dr. Yakov Weeks, who will admit the patient. .     All diagnostic, treatment, and disposition decisions were made by myself in conjunction with the advanced practice provider. For all further details of the patient's emergency department visit, please see the advanced practice provider's documentation. Comment: Please note this report has been produced using speech recognition software and may contain errors related to that system including errors in grammar, punctuation, and spelling, as well as words and phrases that may be inappropriate. If there are any questions or concerns please feel free to contact the dictating provider for clarification.         Ned Barr MD  05/11/19 9502

## 2019-05-11 NOTE — ED TRIAGE NOTES
Patient presents to ED with chest pain that started around 0100. Patient states pain and SOB woke her out of sleep.

## 2019-05-11 NOTE — CONSULTS
[id number]  Cath today
acute  distress. VITAL SIGNS:  Temperature is afebrile, pulse is 70, blood pressure  107/52. HEENT:  Head is normocephalic, atraumatic. Pupils are equal and  reactive. CHEST:  Equal expansion. LUNGS:  Clear to auscultation. No wheezing or rhonchi. HEART:  Regular rhythm. ABDOMEN:  Soft, nontender. Bowel sounds are present. No  hepatosplenomegaly or guarding appreciated. EXTREMITIES:  No cyanosis or clubbing noted. NEUROLOGIC:  Cranial nerves II through XII are grossly intact. LABORATORY DATA:  Her labs are within normal range. Creatinine is  normal and troponin is negative. CBC is normal.    IMPRESSION AND PLAN:  This is a 48-year-old female patient with  recurrent ER visits and admission with chest pain. In light of ongoing  symptoms and known coronary artery disease, we will plan for heart cath  today. Further recommendations based on the hospital course.         Andreea Oliva MD    D: 05/11/2019 9:09:50       T: 05/11/2019 12:20:59     [id number]  Job#: [id number]     Doc#: [id number]    CC:

## 2019-05-11 NOTE — ED PROVIDER NOTES
eMERGENCY dEPARTMENT eNCOUnter        [address] [facility]    Chief Complaint   Patient presents with    Chest Pain       HPI    Patrick Plascencia is a 46 y.o. female who presents with chest pain. Onset was 4 days ago. Intermittent since onset. Localized to the left chest.  Characterized as pressure. Aggravating factors: none. Alleviating factors: none. Severity is an 8 on a scale of 0-10. Patient reports associated shortness of breath. Denies fever, chills, diaphoresis, cough/congestion, abd pain, n/v/d, leg pain or swelling. She was here when pain first began and Dr. Elmo Rush saw patient in the ED--added Norvasc and Xarelto to her medications. Was advised she would need a heart cath if pain continued. She returned tonight after pain woke her from sleep about 0100. States pain was more intense than it had been previously. She has HTN, DM, + 1 ppd smoker, + history of CAD with triple bypass and multiple stents. REVIEW OF SYSTEMS    Constitutional:  Denies fever, denies diaphoresis. HENT:  Denies headache, denies dizziness/lightheadedness. Respiratory:  + shortness of breath, denies cough. Cardiovascular:  + chest pain. GI:  Denies abdominal pain, denies nausea/vomiting. :  Denies dysuria, frequency, urgency, urinary incontinence. Musculoskeletal:  Denies extremity swelling/pain. Integument:  Denies rashes, lesions. Neurologic:  Denies numbness/tingling. All other systems reviewed and negative.     PAST MEDICAL & SURGICAL HISTORY    Past Medical History:   Diagnosis Date    Acid reflux     CAD (coronary artery disease)     Sees Dr. Zhanna Smith Depression     Diabetes mellitus [facility]) Dx 2001    Eczema of hand     Hx of migraines     Hyperlipidemia     Hypertension     Kidney stones 2000's    \"Passed One Kidney Stone\"    Wears glasses      Past Surgical History:   Procedure Laterality Date    APPENDECTOMY  80    Done During [facility]    BACK SURGERY  Last Done 9-11    \"4 Lower Back Surgeries, 2 Rods, 4 Pins At L5, S1 Put In During Last Surgery\"    BONE RESECTION, RIB      BREAST BIOPSY Bilateral 1990's-2000's    Benign    CARDIAC SURGERY  5-4-12    CABG (3 Bypasses)    CHOLECYSTECTOMY      COLONOSCOPY      Dr. Shannan Sylvester   [facility] 24      6/28/2018 total of 9    DENTAL SURGERY  2000's    \"Dental Implants Upper And Lower\"    HYSTERECTOMY, TOTAL ABDOMINAL  1988    Appendectomy Also Done    OTHER SURGICAL HISTORY Left 4/4/2013    Left transaxillary 1st rib resection    TONSILLECTOMY AND ADENOIDECTOMY  1971       CURRENT MEDICATIONS    Current Outpatient Rx   Medication Sig Dispense Refill    lisinopril (PRINIVIL;ZESTRIL) 5 MG tablet Take 5 mg by mouth daily      atorvastatin (LIPITOR) 40 MG tablet Take 40 mg by mouth daily      raNITIdine HCl (RANITIDINE 75 PO) Take 75 mg by mouth daily      dapagliflozin (FARXIGA) 5 MG tablet Take 5 mg by mouth every morning      amLODIPine (NORVASC) 2.5 MG tablet Take 1 tablet by mouth daily 30 tablet 0    rivaroxaban (XARELTO) 2.5 MG TABS tablet Take 1 tablet by mouth 2 times daily 60 tablet 0    nitroGLYCERIN (MINITRAN) 0.2 MG/HR Place 1 patch onto the skin daily 30 patch 0    desonide (DESOWEN) 0.05 % cream Apply topically 2 times daily. 60 g 0    lidocaine (XYLOCAINE) 5 % ointment Apply topically as needed.  30 g 0    busPIRone (BUSPAR) 15 MG tablet Take 15 mg by mouth 3 times daily 90 tablet 1    furosemide (LASIX) 20 MG tablet Take 20 mg by mouth daily      SITagliptin-metFORMIN (JANUMET XR)  MG TB24 per extended release tablet TAKE ONE TABLET BY MOUTH TWICE A  tablet 1    insulin glargine (BASAGLAR KWIKPEN) 100 UNIT/ML injection pen Inject 25 Units into the skin nightly 5 pen 0    carvedilol (COREG) 12.5 MG tablet Take 1 tablet by mouth 2 times daily (with meals) 60 tablet 0    isosorbide mononitrate (IMDUR) 30 MG extended release tablet Take 1 tablet by mouth daily 30 tablet 0    ticagrelor (BRILINTA) 90 MG TABS tablet Take 1 tablet by mouth 2 times daily 60 tablet 0    Insulin Pen Needle (PEN NEEDLES 3/16\") 31G X 5 MM MISC 1 each by Does not apply route daily 100 each 3    albuterol sulfate HFA (PROAIR HFA) 108 (90 Base) MCG/ACT inhaler Inhale 2 puffs into the lungs every 6 hours as needed for Wheezing 2 Inhaler 0    ranolazine (RANEXA) 1000 MG extended release tablet Take 1 tablet by mouth 2 times daily 180 tablet 1    glucose blood VI test strips (ASCENSIA AUTODISC VI;ONE TOUCH ULTRA TEST VI) strip 1 each by In Vitro route 2 times daily As needed.  100 each 5    oxyCODONE-acetaminophen (PERCOCET) 5-325 MG per tablet Take 1 tablet by mouth every 4 hours as needed for Pain         ALLERGIES    Allergies   Allergen Reactions    Erythromycin Hives and Nausea And Vomiting    Lyrica [Pregabalin] Swelling    Codeine Palpitations    Imitrex [Sumatriptan] Anxiety     \"Makes Me Hyper\"    Ketorolac Tromethamine Other (See Comments)     \"Rapid Heart Rate\"    Prochlorperazine Edisylate Nausea And Vomiting    Tape Ceclia Ishihara Tape] Itching     Blisters    Ultram [Tramadol] Itching    Zofran [Ondansetron Hcl] Nausea Only       SOCIAL & FAMILY HISTORY    Social History     Socioeconomic History    Marital status:      Spouse name: None    Number of children: None    Years of education: None    Highest education level: None   Occupational History    None   Social Needs    Financial resource strain: None    Food insecurity:     Worry: None     Inability: None    Transportation needs:     Medical: None     Non-medical: None   Tobacco Use    Smoking status: Current Every Day Smoker     Packs/day: 0.50     Years: 35.00     Pack years: 17.50     Types: Cigarettes    Smokeless tobacco: Never Used   Substance and Sexual Activity    Alcohol use: No     Alcohol/week: 0.0 oz    Drug use: No    Sexual activity: Yes     Partners: Male   Lifestyle    Physical activity:     Days per week: None Minutes per session: None    Stress: None   Relationships    Social connections:     Talks on phone: None     Gets together: None     Attends Druze service: None     Active member of club or organization: None     Attends meetings of clubs or organizations: None     Relationship status: None    Intimate partner violence:     Fear of current or ex partner: None     Emotionally abused: None     Physically abused: None     Forced sexual activity: None   Other Topics Concern    None   Social History Narrative    None     Family History   Problem Relation Age of Onset    Diabetes Mother     High Blood Pressure Mother     Asthma Mother     High Cholesterol Mother     High Blood Pressure Father     High Cholesterol Father     Asthma Father     Asthma Brother     Mental Illness Son     Early Death Son 25        \"Suicide\"    Mental Illness Daughter         \"Bipolar\"       PHYSICAL EXAM    VITAL SIGNS: BP (!) 116/58   Pulse 78   Temp 97.6 °F (36.4 °C) (Oral)   Resp 18   Ht 5' 6\" (1.676 m)   Wt 172 lb (78 kg)   SpO2 99%   BMI 27.76 kg/m²    Constitutional:  Well developed, well nourished, no acute distress   HENT:  NC/AT. Ears, nose, mouth normal.  Neck:  Supple. Respiratory:  Lungs CTAB. Cardiovascular:  RRR. Vascular:  Distal pulses intact. GI:  Abdomen soft, non-tender. Bowel sounds active. Musculoskeletal:  No edema, no calf tenderness. Integument:  Warm and dry. No petechiae. Neurologic:  Alert & oriented. Psych: Pleasant affect. EKG    Please see Dr. Siria Burnett note for interpretation.     RADIOLOGY/PROCEDURES    Labs Reviewed   CBC WITH AUTO DIFFERENTIAL - Abnormal; Notable for the following components:       Result Value    RBC 3.96 (*)     MCH 31.8 (*)     Monocytes % 8.6 (*)     All other components within normal limits   COMPREHENSIVE METABOLIC PANEL W/ REFLEX TO MG FOR LOW K - Abnormal; Notable for the following components:    CREATININE 0.5 (*)     Glucose 125 (*)     All other components within normal limits   TROPONIN   BRAIN NATRIURETIC PEPTIDE       Xr Chest Standard (2 Vw)    Result Date: 5/7/2019  EXAMINATION: TWO VIEWS OF THE CHEST 5/7/2019 10:33 am COMPARISON: 03/01/2019 HISTORY: ORDERING SYSTEM PROVIDED HISTORY: Chest pain TECHNOLOGIST PROVIDED HISTORY: Reason for exam:->Chest pain Ordering Physician Provided Reason for Exam: chest pain, dizzy Acuity: Acute Type of Exam: Initial FINDINGS: Postsurgical changes from previous CABG. Multiple coronary stents. Normal heart size and pulmonary vasculature. No focal consolidations, pleural effusions, or pneumothorax. No evidence of acute process. Xr Chest Portable    No acute findings. ED COURSE & MEDICAL DECISION MAKING    Pertinent Labs & Imaging studies reviewed and interpreted. (See chart for details)  -  Patient seen and evaluated in the emergency department. -  Triage and nursing notes reviewed and incorporated. -  Old chart records reviewed and incorporated. -  Patient case discussed with attending physician, Dr. Roderick Neal. They saw and examined patient. -  Differential diagnosis includes: ACS, CHF, MI, PE, thoracic dissection, pericarditis, pericardial effusion, pneumonia, pneumothorax, GI cause, and others. -  Work-up included: see above  -  Patient treated with aspirin, Percocet, Dilaudid in the ED.  -  Results discussed with patient. CXR clear. Troponin is negative. No acute EKG findings. I discussed the case with Dr. Goldie Gudino, patient's PCP, who will admit for further work-up. FINAL IMPRESSION    1.  Chest pain, unspecified type              (Please note that this note was completed with a voice recognition program.  Every attempt was made to edit the dictations, but inevitably there remain words that are mis-transcribed.)        Amilcar Bermudez PA-C  05/11/19 [id number]

## 2019-05-11 NOTE — OP NOTE
DICTATED --[id number]  LEFT SYSTEM NOT INJECTED KNOW TO BE OCCLUDED  RCA % OCCLUDED  LIMA TO LAD PATENT FILLS COLLATERAL TO RCA  SVG TO OM PATENT  SVG TO DIAGONAL OSTIAL 80% TO 0% EMI XIENCE 2.5X18MM STENT   LVEDP 10  ASA AND BRILIANTA AND HEPARIN   ANGIOSEAL PLACED

## 2019-05-11 NOTE — PLAN OF CARE
Problem: Pain:  Goal: Pain level will decrease  Description  Pain level will decrease  Outcome: Ongoing  Goal: Control of acute pain  Description  Control of acute pain  Outcome: Ongoing  Goal: Control of chronic pain  Description  Control of chronic pain  Outcome: Ongoing     Problem: Discharge Planning:  Goal: Discharged to appropriate level of care  Description  Discharged to appropriate level of care  Outcome: Ongoing     Problem: Cardiac Output - Decreased:  Goal: Hemodynamic stability will improve  Description  Hemodynamic stability will improve  Outcome: Ongoing     Problem: Serum Glucose Level - Abnormal:  Goal: Ability to maintain appropriate glucose levels will improve  Description  Ability to maintain appropriate glucose levels will improve  Outcome: Ongoing     Problem: Pain:  Goal: Control of acute pain  Description  Control of acute pain  Outcome: Ongoing  Goal: Control of chronic pain  Description  Control of chronic pain  Outcome: Ongoing     Problem: Tissue Perfusion - Cardiopulmonary, Altered:  Goal: Absence of angina  Description  Absence of angina  Outcome: Ongoing  Goal: Circulatory function within specified parameters  Description  Circulatory function within specified parameters  Outcome: Ongoing  Goal: Hemodynamic stability will improve  Description  Hemodynamic stability will improve  Outcome: Ongoing     Problem: Tobacco Use:  Goal: Will participate in inpatient tobacco-use cessation counseling  Description  Will participate in inpatient tobacco-use cessation counseling  Outcome: Ongoing

## 2019-05-11 NOTE — H&P
History and Physical        CHIEF COMPLAINT:  Chest pain      HISTORY OF PRESENT ILLNESS:      The patient is a 46 y.o. female with significant past medical history of CAD, DM-II, HTN, Depression, Hyperlipedemia and recurrent admissions with chest pain presented to ER last with chest pain. She came back again last night with having chest pressure, heaviness, felt like a dog was sitting on her. She had associated sweating, shortness of breath, nausea and palpitations.           Past Medical History:        Diagnosis Date    Acid reflux     CAD (coronary artery disease)     Sees Dr. Jasmine Servant Depression     Diabetes mellitus [facility]) Dx 2001    Eczema of hand     Hx of migraines     Hyperlipidemia     Hypertension     Kidney stones 2000's    \"Passed One Kidney Stone\"    Wears glasses      Past Surgical History:        Procedure Laterality Date    APPENDECTOMY  1988    [facility]    BACK SURGERY  Last Done 9-11    \"4 Lower Back Surgeries, 2 Rods, 4 Pins At L5, S1 Put In During Last Surgery\"    BONE RESECTION, RIB      BREAST BIOPSY Bilateral 1990's-2000's    Benign    CARDIAC SURGERY  5-4-12    CABG (3 Bypasses)   [facility] Suarez    [place] [id number]      6/28/2018 total of 9    DENTAL SURGERY  2000's    \"Dental Implants Upper And Lower\"    HYSTERECTOMY, TOTAL ABDOMINAL  1988    Appendectomy Also Done    OTHER SURGICAL HISTORY Left 4/4/2013    Left transaxillary 1st rib resection    TONSILLECTOMY AND ADENOIDECTOMY  1971       Medications Prior to Admission:   Medications Prior to Admission: lisinopril (PRINIVIL;ZESTRIL) 5 MG tablet, Take 5 mg by mouth daily  atorvastatin (LIPITOR) 40 MG tablet, Take 40 mg by mouth daily  raNITIdine HCl (RANITIDINE 75 PO), Take 75 mg by mouth daily  dapagliflozin (FARXIGA) 5 MG tablet, Take 5 mg by mouth every morning  amLODIPine (NORVASC) 2.5 MG tablet, Take 1 tablet by mouth daily  rivaroxaban (XARELTO) 2.5 MG TABS tablet, Take 1 tablet by mouth 2 times daily  nitroGLYCERIN (MINITRAN) 0.2 MG/HR, Place 1 patch onto the skin daily  busPIRone (BUSPAR) 15 MG tablet, Take 15 mg by mouth 3 times daily  furosemide (LASIX) 20 MG tablet, Take 20 mg by mouth daily  SITagliptin-metFORMIN (JANUMET XR)  MG TB24 per extended release tablet, TAKE ONE TABLET BY MOUTH TWICE A DAY  insulin glargine (BASAGLAR KWIKPEN) 100 UNIT/ML injection pen, Inject 25 Units into the skin nightly  carvedilol (COREG) 12.5 MG tablet, Take 1 tablet by mouth 2 times daily (with meals)  isosorbide mononitrate (IMDUR) 30 MG extended release tablet, Take 1 tablet by mouth daily  Insulin Pen Needle (PEN NEEDLES 3/16\") 31G X 5 MM MISC, 1 each by Does not apply route daily  albuterol sulfate HFA (PROAIR HFA) 108 (90 Base) MCG/ACT inhaler, Inhale 2 puffs into the lungs every 6 hours as needed for Wheezing  ranolazine (RANEXA) 1000 MG extended release tablet, Take 1 tablet by mouth 2 times daily  glucose blood VI test strips (ASCENSIA AUTODISC VI;ONE TOUCH ULTRA TEST VI) strip, 1 each by In Vitro route 2 times daily As needed. oxyCODONE-acetaminophen (PERCOCET) 5-325 MG per tablet, Take 1 tablet by mouth every 4 hours as needed for Pain  desonide (DESOWEN) 0.05 % cream, Apply topically 2 times daily. lidocaine (XYLOCAINE) 5 % ointment, Apply topically as needed. ticagrelor (BRILINTA) 90 MG TABS tablet, Take 1 tablet by mouth 2 times daily    Allergies:  Erythromycin; Lyrica [pregabalin]; Codeine; Imitrex [sumatriptan]; Ketorolac tromethamine; Prochlorperazine edisylate; Tape [adhesive tape]; Ultram [tramadol]; and Zofran [ondansetron hcl]    Social History:   TOBACCO:   reports that she has been smoking cigarettes. She has a 17.50 pack-year smoking history. She has never used smokeless tobacco.  ETOH:   reports that she does not drink alcohol. DRUGS:   reports that she does not use drugs.     Family History:       Problem Relation Age of Onset    Diabetes Mother     High Blood Pressure Mother     Asthma Mother     High Cholesterol Mother     High Blood Pressure Father     High Cholesterol Father     Asthma Father     Asthma Brother     Mental Illness Son     Early Death Son 25        \"Suicide\"    Mental Illness Daughter         \"Bipolar\"     REVIEW OF SYSTEMS:  CONSTITUTIONAL:  negative  EYES:  negative  HEENT:  negative  RESPIRATORY:  negative  CARDIOVASCULAR:  See HPI  GASTROINTESTINAL:  negative  HEMATOLOGIC/LYMPHATIC:  negative  ALLERGIC/IMMUNOLOGIC:  negative  ENDOCRINE:  negative  MUSCULOSKELETAL:  negative  NEUROLOGICAL:  negative  BEHAVIOR/PSYCH:  negative  PHYSICAL EXAM:    Vitals:  /62   Pulse 70   Temp 99.2 °F (37.3 °C) (Oral)   Resp 14   Ht 5' 6\" (1.676 m)   Wt 172 lb 12.8 oz (78.4 kg)   SpO2 91%   BMI 27.89 kg/m²     CONSTITUTIONAL:  awake, alert, cooperative, no apparent distress, and appears stated age  EYES:  Lids and lashes normal, pupils equal, round and reactive to light, extra ocular muscles intact, sclera clear, conjunctiva normal  ENT:  Normocephalic, without obvious abnormality, atraumatic, sinuses nontender on palpation, external ears without lesions, oral pharynx with moist mucus membranes, tonsils without erythema or exudates, gums normal and good dentition.   NECK:  Supple, symmetrical, trachea midline, no adenopathy, thyroid symmetric, not enlarged and no tenderness, skin normal  HEMATOLOGIC/LYMPHATICS:  no cervical lymphadenopathy  BACK:  Symmetric, no curvature, spinous processes are non-tender on palpation, paraspinous muscles are non-tender on palpation, no costal vertebral tenderness  LUNGS:  No increased work of breathing, good air exchange, clear to auscultation bilaterally, no crackles or wheezing  CARDIOVASCULAR:  Normal apical impulse, regular rate and rhythm, normal S1 and S2, no S3 or S4, and no murmur noted  ABDOMEN:  No scars, normal bowel sounds, soft, non-distended, non-tender, no masses palpated, no hepatosplenomegally  MUSCULOSKELETAL:  There is no redness, warmth, or swelling of the joints. Full range of motion noted. Motor strength is 5 out of 5 all extremities bilaterally. Tone is normal.  NEUROLOGIC:  Awake, alert, oriented to name, place and time. Cranial nerves II-XII are grossly intact. Motor is 5 out of 5 bilaterally. Cerebellar finger to nose, heel to shin intact. Sensory is intact.   Babinski down going, Romberg negative, and gait is normal.  SKIN:  no bruising or bleeding    DATA:  CBC with Differential:    Lab Results   Component Value Date    WBC 9.6 05/11/2019    RBC 3.96 05/11/2019    HGB 12.6 05/11/2019    HCT 39.2 05/11/2019     05/11/2019    MCV 99.0 05/11/2019    MCH 31.8 05/11/2019    MCHC 32.1 05/11/2019    RDW 11.9 05/11/2019    SEGSPCT 63.8 05/11/2019    LYMPHOPCT 24.9 05/11/2019    MONOPCT 8.6 05/11/2019    EOSPCT 0.7 05/04/2012    BASOPCT 0.6 05/11/2019    MONOSABS 0.8 05/11/2019    LYMPHSABS 2.4 05/11/2019    EOSABS 0.2 05/11/2019    BASOSABS 0.1 05/11/2019    DIFFTYPE AUTOMATED DIFFERENTIAL 05/11/2019     CMP:    Lab Results   Component Value Date     05/11/2019    K 4.5 05/11/2019     05/11/2019    CO2 24 05/11/2019    BUN 17 05/11/2019    CREATININE 0.5 05/11/2019    GFRAA >60 05/11/2019    LABGLOM >60 05/11/2019    GLUCOSE 125 05/11/2019    PROT 7.0 05/11/2019    PROT 7.8 02/08/2013    LABALBU 4.1 05/11/2019    CALCIUM 9.4 05/11/2019    BILITOT 0.2 05/11/2019    ALKPHOS 57 05/11/2019    AST 18 05/11/2019    ALT 16 05/11/2019     PT/INR:    Lab Results   Component Value Date    PROTIME 12.1 03/01/2019    PROTIME 11.8 05/09/2012    INR 1.06 03/01/2019     Last 3 Troponin:    Lab Results   Component Value Date    TROPONINI <0.006 06/11/2014    TROPONINI <0.006 05/16/2014    TROPONINI 0.008 05/15/2014    TROPONINI 0.119 05/31/2012    TROPONINI <0.006 05/05/2012    TROPONINI <0.006 05/04/2012     U/A:    Lab Results   Component Value Date

## 2019-05-11 NOTE — PROCEDURES
[address] [place] [address], [address] [place] [address]                            CARDIAC CATHETERIZATION    PATIENT NAME: Regan Ghosh                     :        1965  MED REC NO:   [id number]                          ROOM:       3120  ACCOUNT NO:   [de-identified]                           ADMIT DATE: 2019  PROVIDER:     Tosin Machado MD    DATE OF PROCEDURE:  2019    INDICATION:  Unstable angina. DESCRIPTION OF PROCEDURE:  This is a 50-year-old female patient, brought  to the cath lab today. Informed consent was obtained from the patient. The patient was prepped and draped in a sterile fashion. The patient  was injected with 20 mL of 2% lidocaine in the right femoral artery  region. Using a micropuncture needle, the right femoral artery was  canalized and a 4-[place] Island[place] sheath was placed in the right femoral artery. The entire procedure was done using guidewire. The sheath was flushed  in between the procedures. Using a pigtail catheter, LVEDP was measured. EDP was around 10 mmHg  present. On the pullback, there was no gradient present across the  aortic valve. Using a LIMA catheter, LIMA angiogram was performed. LIMA to LAD is  widely patent and it fills the collateral circulation to the distal  right coronary artery, the septales, and the apical LAD. DONNY-3 flow  was present. Using a LIMA catheter, native right coronary angiogram was performed. Right coronary angiography revealed right coronary artery is mid 100%  occluded. Left system was not engaged because it was known to be  occluded. Using an AL1 catheter, SVG graft to diagonal was performed and has an  ostial 80% stenosis noted. The dampening of pressure noted. It fills  the medium-sized diagonal branch. The rest of the stents in the _____  is patent. Using an AL1 catheter, SVG graft to OM was performed.   SVG graft to OM  is widely patent, DONNY-3 flow is present. IMPRESSION:  1. LVEDP was around 10 mmHg present. 2.  Native right coronary artery is a mid 100% occluded. 3.  Left system is mildly injected. 4. LIMA to LAD is patent. There is collateral circulation to distal  right coronary artery. 5.  SVG to OM is widely patent. The stents are patent. 6.  SVG to diagonal ostial 80% stenosis noted. EDP was normal.    The plan is to proceed with intervention of the SVG graft to diagonal.    A 6-Beninese sheath was placed. The patient was anticoagulated with  heparin. ACT was kept greater than 250. The patient received Brilinta  180 mg post procedure and aspirin 325 mg post procedure. Then, using an AL1 guide and using [employer] Elite wire, lesion was crossed  into SVG graft and lesion was stented with a drug-eluting stent, Xience  2.5 x 18 mm stent. Stent was deployed at high pressure into the aorta. It was flared into aorta also to almost 3 mm. IMPRESSION:  Successful angioplasty of the SVG to the diagonal branch. Lesion decreased from 80% to 0% ostial lesion and DONNY-3 flow was noted. The patient tolerated the procedure well. No complication noted. AngioSeal closure device was placed at the end of the procedure. The patient will need to be on lifelong Brilinta, aspirin, and also  anticoagulants.         Tessa Castellanos MD    D: 05/11/2019 13:01:25       T: 05/11/2019 13:43:53     [id number]  Job#: [id number]     Doc#: [id number]    CC:

## 2019-05-12 ENCOUNTER — APPOINTMENT (OUTPATIENT)
Dept: ULTRASOUND IMAGING | Age: 54
End: 2019-05-12
Payer: COMMERCIAL

## 2019-05-12 VITALS
BODY MASS INDEX: 27.77 KG/M2 | HEART RATE: 80 BPM | OXYGEN SATURATION: 96 % | TEMPERATURE: 98.1 F | HEIGHT: 66 IN | WEIGHT: 172.8 LBS | DIASTOLIC BLOOD PRESSURE: 81 MMHG | SYSTOLIC BLOOD PRESSURE: 107 MMHG | RESPIRATION RATE: 19 BRPM

## 2019-05-12 LAB
ANION GAP SERPL CALCULATED.3IONS-SCNC: 9 MMOL/L (ref 4–16)
BUN BLDV-MCNC: 13 MG/DL (ref 6–23)
CALCIUM SERPL-MCNC: 9 MG/DL (ref 8.3–10.6)
CHLORIDE BLD-SCNC: 107 MMOL/L (ref 99–110)
CHOLESTEROL: 122 MG/DL
CO2: 27 MMOL/L (ref 21–32)
CREAT SERPL-MCNC: 0.8 MG/DL (ref 0.6–1.1)
ESTIMATED AVERAGE GLUCOSE: 108 MG/DL
GFR AFRICAN AMERICAN: >60 ML/MIN/1.73M2
GFR NON-AFRICAN AMERICAN: >60 ML/MIN/1.73M2
GLUCOSE BLD-MCNC: 159 MG/DL (ref 70–99)
GLUCOSE BLD-MCNC: 81 MG/DL (ref 70–99)
GLUCOSE BLD-MCNC: 86 MG/DL (ref 70–99)
GLUCOSE BLD-MCNC: 86 MG/DL (ref 70–99)
HBA1C MFR BLD: 5.4 % (ref 4.2–6.3)
HCT VFR BLD CALC: 36.2 % (ref 37–47)
HDLC SERPL-MCNC: 37 MG/DL
HEMOGLOBIN: 11.4 GM/DL (ref 12.5–16)
LDL CHOLESTEROL DIRECT: 47 MG/DL
MCH RBC QN AUTO: 31.8 PG (ref 27–31)
MCHC RBC AUTO-ENTMCNC: 31.5 % (ref 32–36)
MCV RBC AUTO: 101.1 FL (ref 78–100)
PDW BLD-RTO: 11.9 % (ref 11.7–14.9)
PLATELET # BLD: 206 K/CU MM (ref 140–440)
PMV BLD AUTO: 10 FL (ref 7.5–11.1)
POTASSIUM SERPL-SCNC: 3.9 MMOL/L (ref 3.5–5.1)
RBC # BLD: 3.58 M/CU MM (ref 4.2–5.4)
SODIUM BLD-SCNC: 143 MMOL/L (ref 135–145)
TRIGL SERPL-MCNC: 300 MG/DL
WBC # BLD: 8.1 K/CU MM (ref 4–10.5)

## 2019-05-12 PROCEDURE — 80061 LIPID PANEL: CPT

## 2019-05-12 PROCEDURE — 83721 ASSAY OF BLOOD LIPOPROTEIN: CPT

## 2019-05-12 PROCEDURE — 85027 COMPLETE CBC AUTOMATED: CPT

## 2019-05-12 PROCEDURE — 80048 BASIC METABOLIC PNL TOTAL CA: CPT

## 2019-05-12 PROCEDURE — 2580000003 HC RX 258: Performed by: INTERNAL MEDICINE

## 2019-05-12 PROCEDURE — 6370000000 HC RX 637 (ALT 250 FOR IP): Performed by: INTERNAL MEDICINE

## 2019-05-12 PROCEDURE — G0378 HOSPITAL OBSERVATION PER HR: HCPCS

## 2019-05-12 PROCEDURE — 82962 GLUCOSE BLOOD TEST: CPT

## 2019-05-12 PROCEDURE — 6360000002 HC RX W HCPCS: Performed by: INTERNAL MEDICINE

## 2019-05-12 PROCEDURE — 93971 EXTREMITY STUDY: CPT

## 2019-05-12 PROCEDURE — 36415 COLL VENOUS BLD VENIPUNCTURE: CPT

## 2019-05-12 PROCEDURE — 83036 HEMOGLOBIN GLYCOSYLATED A1C: CPT

## 2019-05-12 RX ORDER — MORPHINE SULFATE 4 MG/ML
INJECTION, SOLUTION INTRAMUSCULAR; INTRAVENOUS
Status: DISCONTINUED
Start: 2019-05-12 | End: 2019-05-12 | Stop reason: HOSPADM

## 2019-05-12 RX ORDER — NITROGLYCERIN 0.4 MG/1
TABLET SUBLINGUAL
Qty: 25 TABLET | Refills: 3 | Status: SHIPPED | OUTPATIENT
Start: 2019-05-12

## 2019-05-12 RX ORDER — MORPHINE SULFATE 4 MG/ML
2 INJECTION, SOLUTION INTRAMUSCULAR; INTRAVENOUS ONCE
Status: COMPLETED | OUTPATIENT
Start: 2019-05-12 | End: 2019-05-12

## 2019-05-12 RX ORDER — MORPHINE SULFATE 4 MG/ML
2 INJECTION, SOLUTION INTRAMUSCULAR; INTRAVENOUS EVERY 4 HOURS PRN
Status: DISCONTINUED | OUTPATIENT
Start: 2019-05-12 | End: 2019-05-12 | Stop reason: HOSPADM

## 2019-05-12 RX ORDER — PROMETHAZINE HYDROCHLORIDE 25 MG/ML
12.5 INJECTION, SOLUTION INTRAMUSCULAR; INTRAVENOUS EVERY 6 HOURS PRN
Status: DISCONTINUED | OUTPATIENT
Start: 2019-05-12 | End: 2019-05-12 | Stop reason: HOSPADM

## 2019-05-12 RX ADMIN — LINAGLIPTIN 5 MG: 5 TABLET, FILM COATED ORAL at 09:45

## 2019-05-12 RX ADMIN — ISOSORBIDE MONONITRATE 30 MG: 30 TABLET, EXTENDED RELEASE ORAL at 09:44

## 2019-05-12 RX ADMIN — AMLODIPINE BESYLATE 2.5 MG: 2.5 TABLET ORAL at 09:54

## 2019-05-12 RX ADMIN — SODIUM CHLORIDE: 9 INJECTION, SOLUTION INTRAVENOUS at 09:49

## 2019-05-12 RX ADMIN — RIVAROXABAN 2.5 MG: 2.5 TABLET, FILM COATED ORAL at 09:53

## 2019-05-12 RX ADMIN — SODIUM CHLORIDE, PRESERVATIVE FREE 10 ML: 5 INJECTION INTRAVENOUS at 09:49

## 2019-05-12 RX ADMIN — OXYCODONE HYDROCHLORIDE 5 MG: 5 TABLET ORAL at 18:14

## 2019-05-12 RX ADMIN — BUSPIRONE HYDROCHLORIDE 15 MG: 15 TABLET ORAL at 14:37

## 2019-05-12 RX ADMIN — LISINOPRIL 5 MG: 5 TABLET ORAL at 09:45

## 2019-05-12 RX ADMIN — MORPHINE SULFATE 2 MG: 4 INJECTION INTRAVENOUS at 15:44

## 2019-05-12 RX ADMIN — TICAGRELOR 90 MG: 90 TABLET ORAL at 09:45

## 2019-05-12 RX ADMIN — PROMETHAZINE HYDROCHLORIDE 12.5 MG: 25 INJECTION INTRAMUSCULAR; INTRAVENOUS at 12:48

## 2019-05-12 RX ADMIN — OXYCODONE HYDROCHLORIDE 5 MG: 5 TABLET ORAL at 09:45

## 2019-05-12 RX ADMIN — FUROSEMIDE 20 MG: 20 TABLET ORAL at 09:44

## 2019-05-12 RX ADMIN — INSULIN LISPRO 2 UNITS: 100 INJECTION, SOLUTION INTRAVENOUS; SUBCUTANEOUS at 14:38

## 2019-05-12 RX ADMIN — CARVEDILOL 12.5 MG: 12.5 TABLET, FILM COATED ORAL at 18:14

## 2019-05-12 RX ADMIN — CARVEDILOL 12.5 MG: 12.5 TABLET, FILM COATED ORAL at 09:44

## 2019-05-12 RX ADMIN — RANOLAZINE 1000 MG: 500 TABLET, FILM COATED, EXTENDED RELEASE ORAL at 09:44

## 2019-05-12 RX ADMIN — ASPIRIN 81 MG 81 MG: 81 TABLET ORAL at 09:45

## 2019-05-12 RX ADMIN — PROMETHAZINE HYDROCHLORIDE 12.5 MG: 25 INJECTION INTRAMUSCULAR; INTRAVENOUS at 00:56

## 2019-05-12 RX ADMIN — ACETAMINOPHEN 650 MG: 325 TABLET ORAL at 00:25

## 2019-05-12 RX ADMIN — BUSPIRONE HYDROCHLORIDE 15 MG: 15 TABLET ORAL at 09:56

## 2019-05-12 RX ADMIN — MORPHINE SULFATE 2 MG: 4 INJECTION INTRAVENOUS at 12:18

## 2019-05-12 ASSESSMENT — PAIN DESCRIPTION - ORIENTATION: ORIENTATION: ANTERIOR;LEFT

## 2019-05-12 ASSESSMENT — PAIN SCALES - GENERAL
PAINLEVEL_OUTOF10: 8
PAINLEVEL_OUTOF10: 5
PAINLEVEL_OUTOF10: 8
PAINLEVEL_OUTOF10: 3
PAINLEVEL_OUTOF10: 5
PAINLEVEL_OUTOF10: 0

## 2019-05-12 ASSESSMENT — PAIN DESCRIPTION - PROGRESSION
CLINICAL_PROGRESSION: NOT CHANGED

## 2019-05-12 ASSESSMENT — PAIN DESCRIPTION - LOCATION: LOCATION: CHEST

## 2019-05-12 ASSESSMENT — PAIN DESCRIPTION - ONSET: ONSET: GRADUAL

## 2019-05-12 ASSESSMENT — PAIN DESCRIPTION - FREQUENCY: FREQUENCY: INTERMITTENT

## 2019-05-12 ASSESSMENT — PAIN DESCRIPTION - PAIN TYPE: TYPE: ACUTE PAIN

## 2019-05-12 NOTE — PROGRESS NOTES
Daily Progress Note     patient is awake alert feeling better  Unstable angina s/p PCI of SVG to diagonal  Will keep her on all ASA /briillianta and RUSTR [facility]  Home today   Will check her A1C and lipids  She needs to quit smoking   Long term prognosis is guarded   F/u in office in ten days  DM/HTN/HLP      DICTATED --[id number]  LEFT SYSTEM NOT INJECTED KNOW TO BE OCCLUDED  RCA % OCCLUDED  LIMA TO LAD PATENT FILLS COLLATERAL TO RCA  SVG TO OM PATENT  SVG TO DIAGONAL OSTIAL 80% TO 0% EMI XIENCE 2.5X18MM STENT   LVEDP 10  ASA AND BRILIANTA AND HEPARIN   ANGIOSEAL PLACED        Objective:   BP (!) 110/53   Pulse 69   Temp 98.3 °F (36.8 °C) (Oral)   Resp 16   Ht 5' 6\" (1.676 m)   Wt 172 lb 12.8 oz (78.4 kg)   SpO2 93%   BMI 27.89 kg/m²       Intake/Output Summary (Last 24 hours) at 5/12/2019 1155  Last data filed at 5/12/2019 0500  Gross per 24 hour   Intake 1692.5 ml   Output 450 ml   Net 1242.5 ml       Medications:   Scheduled Meds:   sodium chloride flush  10 mL Intravenous 2 times per day    hydrocortisone   Topical BID    amLODIPine  2.5 mg Oral Daily    busPIRone  15 mg Oral TID    carvedilol  12.5 mg Oral BID WC    dapagliflozin  5 mg Oral QAM    furosemide  20 mg Oral Daily    insulin glargine  25 Units Subcutaneous Nightly    isosorbide mononitrate  30 mg Oral Daily    lisinopril  5 mg Oral Daily    ranolazine  1,000 mg Oral BID    rivaroxaban  2.5 mg Oral BID    ticagrelor  90 mg Oral BID    insulin lispro  0-12 Units Subcutaneous TID WC    insulin lispro  0-6 Units Subcutaneous Nightly    atorvastatin  40 mg Oral Nightly    metFORMIN  500 mg Oral BID WC    linagliptin  5 mg Oral Daily    sodium chloride flush  10 mL Intravenous 2 times per day    aspirin  81 mg Oral Daily      Infusions:   dextrose        PRN Meds:  promethazine, sodium chloride flush, acetaminophen, lidocaine, albuterol sulfate HFA, magnesium hydroxide, ondansetron, nitroGLYCERIN, glucose, dextrose, glucagon (rDNA), dextrose, oxyCODONE, sodium chloride flush, acetaminophen       Physical Exam:  Vitals:    05/12/19 0930   BP: (!) 110/53   Pulse: 69   Resp: 16   Temp: 98.3 °F (36.8 °C)   SpO2:         General: awake alert  Chest: Nontender  Cardiac: sinus   Lungs:Clear to auscultation and percussion. Abdomen: Soft, NT, ND, +BS  Extremities: no edema   Vascular:  Equal 2+ peripheral pulses. Lab Data:  CBC:   Recent Labs     05/11/19 0345 05/12/19 0412   WBC 9.6 8.1   HGB 12.6 11.4*   HCT 39.2 36.2*   MCV 99.0 101.1*    206     BMP:   Recent Labs     05/11/19 0345 05/12/19 0412    143   K 4.5 3.9    107   CO2 24 27   BUN 17 13   CREATININE 0.5* 0.8     LIVER PROFILE:   Recent Labs     05/11/19 0345   AST 18   ALT 16   BILITOT 0.2   ALKPHOS 57     PT/INR: No results for input(s): PROTIME, INR in the last 72 hours. APTT: No results for input(s): APTT in the last 72 hours. BNP:  No results for input(s): BNP in the last 72 hours.       Assessment:  Patient Active Problem List    Diagnosis Date Noted    TOS (thoracic outlet syndrome) 04/04/2013     Priority: High     Class: Chronic    Chest pain 06/28/2018     Priority: Low    Unstable angina pectoris due to coronary arteriosclerosis ([facility] 06/10/2018     Priority: Low    Acute left-sided low back pain with left-sided sciatica 01/15/2018     Priority: Low    Chronic midline low back pain with right-sided sciatica 10/06/2017     Priority: Low    Tobacco dependence 02/27/2017     Priority: Low    Overweight (BMI 25.0-29.9) 02/27/2017     Priority: Low    Hyperlipidemia 02/09/2016     Priority: Low    Essential hypertension 02/09/2016     Priority: Low    Diabetes mellitus ([facility] 12/08/2013     Priority: Low    GERD (gastroesophageal reflux disease) 12/08/2013     Priority: Low    Diabetes mellitus type 2 with neurological manifestations 10/08/2013     Priority: Low    Coronary artery disease 10/08/2013     Priority: Low    Major depressive disorder, recurrent episode with anxious distress ([facility] 10/08/2013     Priority: Low    Eczema of hand     Change in mole 10/31/2018       Neil Banda MD 5/12/2019 11:55 AM

## 2019-05-12 NOTE — PLAN OF CARE
Problem: Pain:  Goal: Pain level will decrease  Description  Pain level will decrease  5/12/2019 0112 by Logan Jason RN  Outcome: Ongoing  5/11/2019 1227 by Jossue Carter RN  Outcome: Ongoing  Goal: Control of acute pain  Description  Control of acute pain  5/12/2019 0112 by Logan Jason RN  Outcome: Ongoing  5/11/2019 1227 by Jossue Carter RN  Outcome: Ongoing  Goal: Control of chronic pain  Description  Control of chronic pain  5/12/2019 0112 by Logan Jason RN  Outcome: Ongoing  5/11/2019 1227 by Jossue Carter RN  Outcome: Ongoing     Problem: Discharge Planning:  Goal: Discharged to appropriate level of care  Description  Discharged to appropriate level of care  5/12/2019 0112 by Logan Jason RN  Outcome: Ongoing  5/11/2019 1227 by Jossue Carter RN  Outcome: Ongoing     Problem: Cardiac Output - Decreased:  Goal: Hemodynamic stability will improve  Description  Hemodynamic stability will improve  5/12/2019 0112 by Logan Jason RN  Outcome: Ongoing  5/11/2019 1227 by Jossue Carter RN  Outcome: Ongoing     Problem: Serum Glucose Level - Abnormal:  Goal: Ability to maintain appropriate glucose levels will improve  Description  Ability to maintain appropriate glucose levels will improve  5/12/2019 0112 by Logan Jason RN  Outcome: Ongoing  5/11/2019 1227 by Jossue Carter RN  Outcome: Ongoing     Problem: Pain:  Goal: Control of acute pain  Description  Control of acute pain  5/12/2019 0112 by Logan Jason RN  Outcome: Ongoing  5/11/2019 1227 by Jossue Carter RN  Outcome: Ongoing  Goal: Control of chronic pain  Description  Control of chronic pain  5/12/2019 0112 by Logan Jason RN  Outcome: Ongoing  5/11/2019 1227 by Jossue Carter RN  Outcome: Ongoing     Problem: Tissue Perfusion - Cardiopulmonary, Altered:  Goal: Absence of angina  Description  Absence of angina  5/12/2019 0112 by Dhara Pinto Doug Dotson RN  Outcome: Ongoing  5/11/2019 1227 by Sameer Ye RN  Outcome: Ongoing  Goal: Circulatory function within specified parameters  Description  Circulatory function within specified parameters  5/12/2019 0112 by Elroy Allred RN  Outcome: Ongoing  5/11/2019 1227 by Sameer Ye RN  Outcome: Ongoing  Goal: Hemodynamic stability will improve  Description  Hemodynamic stability will improve  5/12/2019 0112 by Elroy Allred RN  Outcome: Ongoing  5/11/2019 1227 by Sameer Ye RN  Outcome: Ongoing     Problem: Tobacco Use:  Goal: Will participate in inpatient tobacco-use cessation counseling  Description  Will participate in inpatient tobacco-use cessation counseling  5/12/2019 0112 by Elroy Allred RN  Outcome: Ongoing  5/11/2019 1227 by Sameer Ye RN  Outcome: Ongoing

## 2019-05-12 NOTE — PROGRESS NOTES
RR note  Responded to RR call. Patient had some pain in her right groin area 10/10, had cardiac cath yesterday by Dr. Martina Toro. Vitals are stable. No signs of any CVA can move all 4. U/S of right groin area ordered by cardiology. Meanwhile MS 2 mg iv stat and may repeat once if needed. RN to inform PCP.  by her side explained.   Joel Hyman MD

## 2019-05-14 LAB
EKG ATRIAL RATE: 82 BPM
EKG DIAGNOSIS: NORMAL
EKG P AXIS: 66 DEGREES
EKG P-R INTERVAL: 174 MS
EKG Q-T INTERVAL: 366 MS
EKG QRS DURATION: 82 MS
EKG QTC CALCULATION (BAZETT): 427 MS
EKG R AXIS: 67 DEGREES
EKG T AXIS: 1 DEGREES
EKG VENTRICULAR RATE: 82 BPM

## 2019-05-15 LAB
EKG ATRIAL RATE: 89 BPM
EKG DIAGNOSIS: NORMAL
EKG P AXIS: 61 DEGREES
EKG P-R INTERVAL: 162 MS
EKG Q-T INTERVAL: 384 MS
EKG QRS DURATION: 82 MS
EKG QTC CALCULATION (BAZETT): 467 MS
EKG R AXIS: 75 DEGREES
EKG T AXIS: -16 DEGREES
EKG VENTRICULAR RATE: 89 BPM

## 2019-05-15 NOTE — DISCHARGE SUMMARY
Physician Discharge Summary     Patient ID:  Autumn Hannah  [id number]  46 y.o.  1965    Admit date: 5/11/2019    Discharge date and time: 5/12/2019  6:46 PM     Admitting Physician: Raphael Joiner MD     Discharge Physician: Curt Shields      Admission Diagnoses: Chest pain [R07.9]  Chest pain, unspecified type [R07.9]  Chest pain [R07.9]    Discharge Diagnoses:  Chest pain  Unstaable angina. Coronary artery disease    Diabetes mellitus type 2 with neurological manifestations    Major depressive disorder, recurrent episode with anxious distress (HCC)    Diabetes mellitus ([facility]    GERD (gastroesophageal reflux disease)    Hyperlipidemia    Essential hypertension      Hospital Course: Patient was admitted to hospital for chest pain and ischemic changes on EKG. Cardiology was consulted. She had cardiac cath by Dr. Cummings Degree -  1. LVEDP was around 10 mmHg present. 2.  Native right coronary artery is a mid 100% occluded. 3.  Left system is mildly injected. 4. LIMA to LAD is patent. There is collateral circulation to distal  right coronary artery. 5.  SVG to OM is widely patent. The stents are patent. 6.  SVG to diagonal ostial 80% stenosis noted. EDP was normal.   She underwent angioplasty of the SVG to the diagonal branch. Lesion decreased from 80% to 0% ostial lesion and DONNY-3 flow was noted  The patient was recommended to be on lifelong Brilinta, aspirin, and also  Anticoagulants. Per cardiology  She had some groin pain and US was ordered by Dr. Yohan Gu which was negative for DVT and seen for rapid response by Dr. Clark Friedman  She was discharged home in stable condition after cardiology ok with instructions to follow up as outpatient.       Discharged Condition: good    Consults: cardiology    Significant Diagnostic Studies: angiography: See above    Treatments: Brilinta, aspirin    Disposition: home    Patient Instructions:   Discharge Medication List as of 5/12/2019  6:17 PM      START taking every 4 hours as needed for Pain         STOP taking these medications       amLODIPine (NORVASC) 2.5 MG tablet Comments:   Reason for Stopping:         nitroGLYCERIN (MINITRAN) 0.2 MG/HR Comments:   Reason for Stopping:         furosemide (LASIX) 20 MG tablet Comments:   Reason for Stopping:         Insulin Pen Needle (PEN NEEDLES 3/16\") 31G X 5 MM MISC Comments:   Reason for Stopping:         glucose blood VI test strips (ASCENSIA AUTODISC VI;ONE TOUCH ULTRA TEST VI) strip Comments:   Reason for Stopping:             Activity: activity as tolerated  Diet: cardiac diet      Follow-up with Dr. Alfred Donnelly in 1 week    Signed:  Merna Posada    5/15/2019  10:29 AM

## 2019-05-22 ENCOUNTER — HOSPITAL ENCOUNTER (INPATIENT)
Age: 54
LOS: 2 days | Discharge: HOME | DRG: 272 | End: 2019-05-24
Attending: EMERGENCY MEDICINE | Admitting: INTERNAL MEDICINE
Payer: COMMERCIAL

## 2019-05-22 ENCOUNTER — APPOINTMENT (OUTPATIENT)
Dept: CT IMAGING | Age: 54
DRG: 272 | End: 2019-05-22
Payer: COMMERCIAL

## 2019-05-22 DIAGNOSIS — I70.90 ARTERIAL OCCLUSION: ICD-10-CM

## 2019-05-22 DIAGNOSIS — M79.604 RIGHT LEG PAIN: Primary | ICD-10-CM

## 2019-05-22 LAB
ABO/RH: NORMAL
ALBUMIN SERPL-MCNC: 4.7 GM/DL (ref 3.4–5)
ALP BLD-CCNC: 76 IU/L (ref 40–129)
ALT SERPL-CCNC: 24 U/L (ref 10–40)
ANION GAP SERPL CALCULATED.3IONS-SCNC: 13 MMOL/L (ref 4–16)
ANTIBODY SCREEN: NEGATIVE
AST SERPL-CCNC: 18 IU/L (ref 15–37)
BASOPHILS ABSOLUTE: 0.1 K/CU MM
BASOPHILS RELATIVE PERCENT: 0.8 % (ref 0–1)
BILIRUB SERPL-MCNC: 0.3 MG/DL (ref 0–1)
BUN BLDV-MCNC: 10 MG/DL (ref 6–23)
CALCIUM SERPL-MCNC: 9.6 MG/DL (ref 8.3–10.6)
CHLORIDE BLD-SCNC: 105 MMOL/L (ref 99–110)
CO2: 25 MMOL/L (ref 21–32)
CREAT SERPL-MCNC: 0.8 MG/DL (ref 0.6–1.1)
DIFFERENTIAL TYPE: ABNORMAL
EOSINOPHILS ABSOLUTE: 0.2 K/CU MM
EOSINOPHILS RELATIVE PERCENT: 1.8 % (ref 0–3)
GFR AFRICAN AMERICAN: >60 ML/MIN/1.73M2
GFR NON-AFRICAN AMERICAN: >60 ML/MIN/1.73M2
GLUCOSE BLD-MCNC: 101 MG/DL (ref 70–99)
HCT VFR BLD CALC: 42.1 % (ref 37–47)
HEMOGLOBIN: 13.6 GM/DL (ref 12.5–16)
IMMATURE NEUTROPHIL %: 0.5 % (ref 0–0.43)
INR BLD: 1.39 INDEX
LYMPHOCYTES ABSOLUTE: 2 K/CU MM
LYMPHOCYTES RELATIVE PERCENT: 24.2 % (ref 24–44)
MCH RBC QN AUTO: 31.6 PG (ref 27–31)
MCHC RBC AUTO-ENTMCNC: 32.3 % (ref 32–36)
MCV RBC AUTO: 97.7 FL (ref 78–100)
MONOCYTES ABSOLUTE: 0.6 K/CU MM
MONOCYTES RELATIVE PERCENT: 6.7 % (ref 0–4)
NUCLEATED RBC %: 0 %
PDW BLD-RTO: 12.1 % (ref 11.7–14.9)
PLATELET # BLD: 283 K/CU MM (ref 140–440)
PMV BLD AUTO: 10.2 FL (ref 7.5–11.1)
POTASSIUM SERPL-SCNC: 3.8 MMOL/L (ref 3.5–5.1)
PROTHROMBIN TIME: 15.8 SECONDS (ref 9.12–12.5)
RBC # BLD: 4.31 M/CU MM (ref 4.2–5.4)
SEGMENTED NEUTROPHILS ABSOLUTE COUNT: 5.5 K/CU MM
SEGMENTED NEUTROPHILS RELATIVE PERCENT: 66 % (ref 36–66)
SODIUM BLD-SCNC: 143 MMOL/L (ref 135–145)
TOTAL IMMATURE NEUTOROPHIL: 0.04 K/CU MM
TOTAL NUCLEATED RBC: 0 K/CU MM
TOTAL PROTEIN: 7.8 GM/DL (ref 6.4–8.2)
WBC # BLD: 8.3 K/CU MM (ref 4–10.5)

## 2019-05-22 PROCEDURE — 96375 TX/PRO/DX INJ NEW DRUG ADDON: CPT

## 2019-05-22 PROCEDURE — 96374 THER/PROPH/DIAG INJ IV PUSH: CPT

## 2019-05-22 PROCEDURE — 6360000004 HC RX CONTRAST MEDICATION: Performed by: PHYSICIAN ASSISTANT

## 2019-05-22 PROCEDURE — 86901 BLOOD TYPING SEROLOGIC RH(D): CPT

## 2019-05-22 PROCEDURE — 2140000000 HC CCU INTERMEDIATE R&B

## 2019-05-22 PROCEDURE — 6360000002 HC RX W HCPCS: Performed by: INTERNAL MEDICINE

## 2019-05-22 PROCEDURE — 85610 PROTHROMBIN TIME: CPT

## 2019-05-22 PROCEDURE — 75635 CT ANGIO ABDOMINAL ARTERIES: CPT

## 2019-05-22 PROCEDURE — 94761 N-INVAS EAR/PLS OXIMETRY MLT: CPT

## 2019-05-22 PROCEDURE — 6360000002 HC RX W HCPCS: Performed by: PHYSICIAN ASSISTANT

## 2019-05-22 PROCEDURE — 85025 COMPLETE CBC W/AUTO DIFF WBC: CPT

## 2019-05-22 PROCEDURE — 2580000003 HC RX 258: Performed by: INTERNAL MEDICINE

## 2019-05-22 PROCEDURE — 80053 COMPREHEN METABOLIC PANEL: CPT

## 2019-05-22 PROCEDURE — 99284 EMERGENCY DEPT VISIT MOD MDM: CPT

## 2019-05-22 PROCEDURE — 6370000000 HC RX 637 (ALT 250 FOR IP): Performed by: PHYSICIAN ASSISTANT

## 2019-05-22 PROCEDURE — 2580000003 HC RX 258: Performed by: PHYSICIAN ASSISTANT

## 2019-05-22 PROCEDURE — 86850 RBC ANTIBODY SCREEN: CPT

## 2019-05-22 PROCEDURE — 86900 BLOOD TYPING SEROLOGIC ABO: CPT

## 2019-05-22 RX ORDER — FENTANYL CITRATE 50 UG/ML
25 INJECTION, SOLUTION INTRAMUSCULAR; INTRAVENOUS
Status: DISCONTINUED | OUTPATIENT
Start: 2019-05-22 | End: 2019-05-23

## 2019-05-22 RX ORDER — MORPHINE SULFATE 4 MG/ML
4 INJECTION, SOLUTION INTRAMUSCULAR; INTRAVENOUS
Status: DISCONTINUED | OUTPATIENT
Start: 2019-05-22 | End: 2019-05-23

## 2019-05-22 RX ORDER — CITALOPRAM 40 MG/1
20 TABLET ORAL DAILY
Refills: 2 | COMMUNITY
Start: 2019-05-14

## 2019-05-22 RX ORDER — ASPIRIN 81 MG/1
81 TABLET ORAL DAILY
Status: ON HOLD | COMMUNITY
End: 2021-07-26 | Stop reason: HOSPADM

## 2019-05-22 RX ORDER — SODIUM CHLORIDE 0.9 % (FLUSH) 0.9 %
10 SYRINGE (ML) INJECTION PRN
Status: DISCONTINUED | OUTPATIENT
Start: 2019-05-22 | End: 2019-05-24 | Stop reason: HOSPADM

## 2019-05-22 RX ORDER — PROMETHAZINE HYDROCHLORIDE 12.5 MG/1
25 TABLET ORAL ONCE
Status: COMPLETED | OUTPATIENT
Start: 2019-05-22 | End: 2019-05-22

## 2019-05-22 RX ORDER — CYCLOBENZAPRINE HCL 10 MG
10 TABLET ORAL 3 TIMES DAILY PRN
Status: ON HOLD | COMMUNITY
Start: 2019-05-22 | End: 2022-04-08 | Stop reason: HOSPADM

## 2019-05-22 RX ORDER — ACETAMINOPHEN 325 MG/1
650 TABLET ORAL EVERY 4 HOURS PRN
Status: DISCONTINUED | OUTPATIENT
Start: 2019-05-22 | End: 2019-05-24 | Stop reason: HOSPADM

## 2019-05-22 RX ORDER — FENTANYL CITRATE 50 UG/ML
25 INJECTION, SOLUTION INTRAMUSCULAR; INTRAVENOUS EVERY 30 MIN PRN
Status: DISCONTINUED | OUTPATIENT
Start: 2019-05-22 | End: 2019-05-23

## 2019-05-22 RX ORDER — FUROSEMIDE 20 MG/1
20 TABLET ORAL DAILY
COMMUNITY
End: 2019-09-18 | Stop reason: ALTCHOICE

## 2019-05-22 RX ORDER — SODIUM CHLORIDE 9 MG/ML
INJECTION, SOLUTION INTRAVENOUS CONTINUOUS
Status: DISCONTINUED | OUTPATIENT
Start: 2019-05-22 | End: 2019-05-23

## 2019-05-22 RX ORDER — FENTANYL CITRATE 50 UG/ML
50 INJECTION, SOLUTION INTRAMUSCULAR; INTRAVENOUS
Status: DISCONTINUED | OUTPATIENT
Start: 2019-05-22 | End: 2019-05-23

## 2019-05-22 RX ADMIN — Medication 10 ML: at 12:25

## 2019-05-22 RX ADMIN — IOPAMIDOL 100 ML: 755 INJECTION, SOLUTION INTRAVENOUS at 12:25

## 2019-05-22 RX ADMIN — SODIUM CHLORIDE: 9 INJECTION, SOLUTION INTRAVENOUS at 11:59

## 2019-05-22 RX ADMIN — MORPHINE SULFATE 4 MG: 4 INJECTION, SOLUTION INTRAMUSCULAR; INTRAVENOUS at 20:48

## 2019-05-22 RX ADMIN — FENTANYL CITRATE 25 MCG: 50 INJECTION, SOLUTION INTRAMUSCULAR; INTRAVENOUS at 15:07

## 2019-05-22 RX ADMIN — MORPHINE SULFATE 4 MG: 4 INJECTION, SOLUTION INTRAMUSCULAR; INTRAVENOUS at 11:59

## 2019-05-22 RX ADMIN — PROMETHAZINE HYDROCHLORIDE 25 MG: 12.5 TABLET ORAL at 13:24

## 2019-05-22 RX ADMIN — SODIUM CHLORIDE: 9 INJECTION, SOLUTION INTRAVENOUS at 20:40

## 2019-05-22 RX ADMIN — ENOXAPARIN SODIUM 40 MG: 40 INJECTION SUBCUTANEOUS at 16:55

## 2019-05-22 ASSESSMENT — PAIN DESCRIPTION - PROGRESSION
CLINICAL_PROGRESSION: NOT CHANGED

## 2019-05-22 ASSESSMENT — PAIN SCALES - GENERAL
PAINLEVEL_OUTOF10: 8
PAINLEVEL_OUTOF10: 8
PAINLEVEL_OUTOF10: 7
PAINLEVEL_OUTOF10: 8

## 2019-05-22 ASSESSMENT — PAIN DESCRIPTION - LOCATION: LOCATION: LEG

## 2019-05-22 ASSESSMENT — PAIN DESCRIPTION - PAIN TYPE: TYPE: ACUTE PAIN

## 2019-05-22 NOTE — ED PROVIDER NOTES
mouth 2 times daily 180 tablet 1    oxyCODONE-acetaminophen (PERCOCET) 5-325 MG per tablet Take 1 tablet by mouth every 4 hours as needed for Pain         ALLERGIES    Allergies   Allergen Reactions    Erythromycin Hives and Nausea And Vomiting    Lyrica [Pregabalin] Swelling    Codeine Palpitations    Imitrex [Sumatriptan] Anxiety     \"Makes Me Hyper\"    Ketorolac Tromethamine Other (See Comments)     \"Rapid Heart Rate\"    Prochlorperazine Edisylate Nausea And Vomiting    Tape Cj Hartman Tape] Itching     Blisters    Ultram [Tramadol] Itching    Zofran [Ondansetron Hcl] Nausea Only       SOCIAL AND FAMILY HISTORY    Social History     Socioeconomic History    Marital status:      Spouse name: None    Number of children: None    Years of education: None    Highest education level: None   Occupational History    None   Social Needs    Financial resource strain: None    Food insecurity:     Worry: None     Inability: None    Transportation needs:     Medical: None     Non-medical: None   Tobacco Use    Smoking status: Current Every Day Smoker     Packs/day: 0.50     Years: 35.00     Pack years: 17.50     Types: Cigarettes    Smokeless tobacco: Never Used   Substance and Sexual Activity    Alcohol use: No     Alcohol/week: 0.0 oz    Drug use: No    Sexual activity: Yes     Partners: Male   Lifestyle    Physical activity:     Days per week: None     Minutes per session: None    Stress: None   Relationships    Social connections:     Talks on phone: None     Gets together: None     Attends Jew service: None     Active member of club or organization: None     Attends meetings of clubs or organizations: None     Relationship status: None    Intimate partner violence:     Fear of current or ex partner: None     Emotionally abused: None     Physically abused: None     Forced sexual activity: None   Other Topics Concern    None   Social History Narrative    None     Family History

## 2019-05-22 NOTE — PROGRESS NOTES
CT reviewed  Plan for PTA right CFA tomorrow  No ischemic changes noted on exam  Has good distal flow  Keep on antiplatelets  No chest pain

## 2019-05-22 NOTE — PROGRESS NOTES
PA   busPIRone (BUSPAR) 15 MG tablet Take 15 mg by mouth 3 times daily 11/13/18  Yes Lizeth Wells MD   SITagliptin-metFORMIN (JANUMET XR)  MG TB24 per extended release tablet TAKE ONE TABLET BY MOUTH TWICE A DAY 9/4/18  Yes Lizeth Wells MD   insulin glargine (BASAGLAR KWIKPEN) 100 UNIT/ML injection pen Inject 25 Units into the skin nightly  Patient taking differently: Inject 30 Units into the skin nightly  6/29/18  Yes Shirley Garcia MD   carvedilol (COREG) 12.5 MG tablet Take 1 tablet by mouth 2 times daily (with meals) 6/29/18  Yes Shirley Garcia MD   isosorbide mononitrate (IMDUR) 30 MG extended release tablet Take 1 tablet by mouth daily 6/14/18  Yes Blaise Sanchez MD   ticagrelor (BRILINTA) 90 MG TABS tablet Take 1 tablet by mouth 2 times daily 6/13/18  Yes Blaise Sanchez MD   albuterol sulfate HFA (PROAIR HFA) 108 (90 Base) MCG/ACT inhaler Inhale 2 puffs into the lungs every 6 hours as needed for Wheezing 1/29/18  Yes Lizeth Wells MD   ranolazine (RANEXA) 1000 MG extended release tablet Take 1 tablet by mouth 2 times daily 12/12/17  Yes Juice Wiggins MD   oxyCODONE-acetaminophen (PERCOCET)  MG per tablet Take 1 tablet by mouth every 4 hours as needed for Pain. Yes Historical Provider, MD     Medications added or changed (ex. new medication, dosage change, interval change, formulation change): [place] dosage change from 5 mg to 10 mg   Basaglar dosage change from 25 units to 30 units at HS  Percocet dosage change from 5/325 to 10/325 mg   Ranitidine frequency change from daily to BID  Citalopram (added)  Cyclobenzaprine (added)  Furosemide (added)  Aspirin (added)    Medications removed from list (include reason, ex. noncompliance, medication cost, therapy complete etc.):   Desonide cream patient not using  Lidocaine oint patient not using    Other Comments:  Medication list reviewed with patient and insurance claims verified.   Patient states she has taken first doses of medications

## 2019-05-22 NOTE — CARE COORDINATION
CM review of pt chart for readmission risk, last admission 5/11-5/12/19 heart catherization through right femoral artery. Chief complaint today right leg pain at cath site. Imaging positive for severe stenosis of rt common femoral artery at sight of closure device. Consult to Chuy Velasquez MD Cardiology. Jj Reyes, ER provider, no alternative to admission at this time.  SURYA,RN/CM

## 2019-05-23 PROBLEM — I73.9 PVD (PERIPHERAL VASCULAR DISEASE) (HCC): Status: ACTIVE | Noted: 2019-05-23

## 2019-05-23 LAB
ACTIVATED CLOTTING TIME, LOW RANGE: 247 SEC
ACTIVATED CLOTTING TIME, LOW RANGE: 262 SEC
GLUCOSE BLD-MCNC: 100 MG/DL (ref 70–99)
GLUCOSE BLD-MCNC: 102 MG/DL (ref 70–99)
GLUCOSE BLD-MCNC: 102 MG/DL (ref 70–99)
GLUCOSE BLD-MCNC: 125 MG/DL (ref 70–99)
HCT VFR BLD CALC: 32.6 % (ref 37–47)
HEMOGLOBIN: 10.1 GM/DL (ref 12.5–16)
MCH RBC QN AUTO: 31.3 PG (ref 27–31)
MCHC RBC AUTO-ENTMCNC: 31 % (ref 32–36)
MCV RBC AUTO: 100.9 FL (ref 78–100)
PDW BLD-RTO: 12.1 % (ref 11.7–14.9)
PLATELET # BLD: 173 K/CU MM (ref 140–440)
PMV BLD AUTO: 10.2 FL (ref 7.5–11.1)
RBC # BLD: 3.23 M/CU MM (ref 4.2–5.4)
WBC # BLD: 7.3 K/CU MM (ref 4–10.5)

## 2019-05-23 PROCEDURE — 2500000003 HC RX 250 WO HCPCS

## 2019-05-23 PROCEDURE — 1200000000 HC SEMI PRIVATE

## 2019-05-23 PROCEDURE — 6360000002 HC RX W HCPCS: Performed by: INTERNAL MEDICINE

## 2019-05-23 PROCEDURE — C1894 INTRO/SHEATH, NON-LASER: HCPCS

## 2019-05-23 PROCEDURE — 85027 COMPLETE CBC AUTOMATED: CPT

## 2019-05-23 PROCEDURE — 94761 N-INVAS EAR/PLS OXIMETRY MLT: CPT

## 2019-05-23 PROCEDURE — 6370000000 HC RX 637 (ALT 250 FOR IP)

## 2019-05-23 PROCEDURE — 2709999900 HC NON-CHARGEABLE SUPPLY

## 2019-05-23 PROCEDURE — 047K3ZZ DILATION OF RIGHT FEMORAL ARTERY, PERCUTANEOUS APPROACH: ICD-10-PCS | Performed by: INTERNAL MEDICINE

## 2019-05-23 PROCEDURE — 85347 COAGULATION TIME ACTIVATED: CPT

## 2019-05-23 PROCEDURE — 6370000000 HC RX 637 (ALT 250 FOR IP): Performed by: INTERNAL MEDICINE

## 2019-05-23 PROCEDURE — C1725 CATH, TRANSLUMIN NON-LASER: HCPCS

## 2019-05-23 PROCEDURE — C1769 GUIDE WIRE: HCPCS

## 2019-05-23 PROCEDURE — C1884 EMBOLIZATION PROTECT SYST: HCPCS

## 2019-05-23 PROCEDURE — 2580000003 HC RX 258

## 2019-05-23 PROCEDURE — 82962 GLUCOSE BLOOD TEST: CPT

## 2019-05-23 PROCEDURE — 6360000002 HC RX W HCPCS

## 2019-05-23 PROCEDURE — 04CK3ZZ EXTIRPATION OF MATTER FROM RIGHT FEMORAL ARTERY, PERCUTANEOUS APPROACH: ICD-10-PCS | Performed by: INTERNAL MEDICINE

## 2019-05-23 PROCEDURE — 6360000004 HC RX CONTRAST MEDICATION

## 2019-05-23 PROCEDURE — 51798 US URINE CAPACITY MEASURE: CPT

## 2019-05-23 PROCEDURE — 93458 L HRT ARTERY/VENTRICLE ANGIO: CPT

## 2019-05-23 PROCEDURE — C1714 CATH, TRANS ATHERECTOMY, DIR: HCPCS

## 2019-05-23 PROCEDURE — 2580000003 HC RX 258: Performed by: INTERNAL MEDICINE

## 2019-05-23 RX ORDER — 0.9 % SODIUM CHLORIDE 0.9 %
250 INTRAVENOUS SOLUTION INTRAVENOUS ONCE
Status: COMPLETED | OUTPATIENT
Start: 2019-05-23 | End: 2019-05-24

## 2019-05-23 RX ORDER — OXYCODONE HYDROCHLORIDE 5 MG/1
5 TABLET ORAL EVERY 4 HOURS PRN
Status: DISCONTINUED | OUTPATIENT
Start: 2019-05-23 | End: 2019-05-24 | Stop reason: HOSPADM

## 2019-05-23 RX ORDER — ISOSORBIDE MONONITRATE 30 MG/1
30 TABLET, EXTENDED RELEASE ORAL DAILY
Status: DISCONTINUED | OUTPATIENT
Start: 2019-05-23 | End: 2019-05-23

## 2019-05-23 RX ORDER — METFORMIN HYDROCHLORIDE 500 MG/1
500 TABLET, EXTENDED RELEASE ORAL 2 TIMES DAILY WITH MEALS
Status: DISCONTINUED | OUTPATIENT
Start: 2019-05-24 | End: 2019-05-23

## 2019-05-23 RX ORDER — SODIUM CHLORIDE 9 MG/ML
INJECTION, SOLUTION INTRAVENOUS CONTINUOUS
Status: DISCONTINUED | OUTPATIENT
Start: 2019-05-23 | End: 2019-05-23

## 2019-05-23 RX ORDER — NICOTINE POLACRILEX 4 MG
15 LOZENGE BUCCAL PRN
Status: DISCONTINUED | OUTPATIENT
Start: 2019-05-23 | End: 2019-05-24 | Stop reason: HOSPADM

## 2019-05-23 RX ORDER — ATROPINE SULFATE 0.4 MG/ML
0.5 AMPUL (ML) INJECTION
Status: DISCONTINUED | OUTPATIENT
Start: 2019-05-23 | End: 2019-05-23

## 2019-05-23 RX ORDER — SODIUM CHLORIDE 9 MG/ML
INJECTION, SOLUTION INTRAVENOUS CONTINUOUS
Status: DISCONTINUED | OUTPATIENT
Start: 2019-05-23 | End: 2019-05-24 | Stop reason: HOSPADM

## 2019-05-23 RX ORDER — CARVEDILOL 12.5 MG/1
12.5 TABLET ORAL 2 TIMES DAILY WITH MEALS
Status: DISCONTINUED | OUTPATIENT
Start: 2019-05-23 | End: 2019-05-23

## 2019-05-23 RX ORDER — ATORVASTATIN CALCIUM 40 MG/1
40 TABLET, FILM COATED ORAL DAILY
Status: DISCONTINUED | OUTPATIENT
Start: 2019-05-23 | End: 2019-05-24 | Stop reason: HOSPADM

## 2019-05-23 RX ORDER — DEXTROSE MONOHYDRATE 50 MG/ML
100 INJECTION, SOLUTION INTRAVENOUS PRN
Status: DISCONTINUED | OUTPATIENT
Start: 2019-05-23 | End: 2019-05-24 | Stop reason: HOSPADM

## 2019-05-23 RX ORDER — LISINOPRIL 5 MG/1
5 TABLET ORAL DAILY
Status: DISCONTINUED | OUTPATIENT
Start: 2019-05-23 | End: 2019-05-23

## 2019-05-23 RX ORDER — ATROPINE SULFATE 0.1 MG/ML
0.5 INJECTION INTRAVENOUS
Status: ACTIVE | OUTPATIENT
Start: 2019-05-23 | End: 2019-05-23

## 2019-05-23 RX ORDER — INSULIN GLARGINE 100 [IU]/ML
30 INJECTION, SOLUTION SUBCUTANEOUS NIGHTLY
Status: DISCONTINUED | OUTPATIENT
Start: 2019-05-23 | End: 2019-05-24 | Stop reason: HOSPADM

## 2019-05-23 RX ORDER — HYDROMORPHONE HCL 110MG/55ML
1 PATIENT CONTROLLED ANALGESIA SYRINGE INTRAVENOUS ONCE
Status: COMPLETED | OUTPATIENT
Start: 2019-05-23 | End: 2019-05-23

## 2019-05-23 RX ORDER — ASPIRIN 81 MG/1
81 TABLET ORAL DAILY
Status: DISCONTINUED | OUTPATIENT
Start: 2019-05-23 | End: 2019-05-24 | Stop reason: HOSPADM

## 2019-05-23 RX ORDER — ACETAMINOPHEN 325 MG/1
650 TABLET ORAL EVERY 4 HOURS PRN
Status: DISCONTINUED | OUTPATIENT
Start: 2019-05-23 | End: 2019-05-24 | Stop reason: HOSPADM

## 2019-05-23 RX ORDER — CYCLOBENZAPRINE HCL 10 MG
10 TABLET ORAL 3 TIMES DAILY PRN
Status: DISCONTINUED | OUTPATIENT
Start: 2019-05-23 | End: 2019-05-24 | Stop reason: HOSPADM

## 2019-05-23 RX ORDER — DEXTROSE MONOHYDRATE 25 G/50ML
12.5 INJECTION, SOLUTION INTRAVENOUS PRN
Status: DISCONTINUED | OUTPATIENT
Start: 2019-05-23 | End: 2019-05-24 | Stop reason: HOSPADM

## 2019-05-23 RX ORDER — ALBUTEROL SULFATE 90 UG/1
2 AEROSOL, METERED RESPIRATORY (INHALATION) EVERY 6 HOURS PRN
Status: DISCONTINUED | OUTPATIENT
Start: 2019-05-23 | End: 2019-05-24 | Stop reason: HOSPADM

## 2019-05-23 RX ORDER — BUSPIRONE HYDROCHLORIDE 15 MG/1
15 TABLET ORAL 3 TIMES DAILY
Status: DISCONTINUED | OUTPATIENT
Start: 2019-05-23 | End: 2019-05-24 | Stop reason: HOSPADM

## 2019-05-23 RX ORDER — SODIUM CHLORIDE 0.9 % (FLUSH) 0.9 %
10 SYRINGE (ML) INJECTION EVERY 12 HOURS SCHEDULED
Status: DISCONTINUED | OUTPATIENT
Start: 2019-05-23 | End: 2019-05-24 | Stop reason: HOSPADM

## 2019-05-23 RX ORDER — SODIUM CHLORIDE 0.9 % (FLUSH) 0.9 %
10 SYRINGE (ML) INJECTION PRN
Status: DISCONTINUED | OUTPATIENT
Start: 2019-05-23 | End: 2019-05-24 | Stop reason: HOSPADM

## 2019-05-23 RX ORDER — CITALOPRAM 40 MG/1
40 TABLET ORAL DAILY
Status: DISCONTINUED | OUTPATIENT
Start: 2019-05-23 | End: 2019-05-24 | Stop reason: HOSPADM

## 2019-05-23 RX ORDER — RANOLAZINE 500 MG/1
1000 TABLET, EXTENDED RELEASE ORAL 2 TIMES DAILY
Status: DISCONTINUED | OUTPATIENT
Start: 2019-05-23 | End: 2019-05-24 | Stop reason: HOSPADM

## 2019-05-23 RX ORDER — FUROSEMIDE 20 MG/1
20 TABLET ORAL DAILY
Status: DISCONTINUED | OUTPATIENT
Start: 2019-05-23 | End: 2019-05-23

## 2019-05-23 RX ORDER — OXYCODONE AND ACETAMINOPHEN 10; 325 MG/1; MG/1
1 TABLET ORAL EVERY 4 HOURS PRN
Status: DISCONTINUED | OUTPATIENT
Start: 2019-05-23 | End: 2019-05-23 | Stop reason: CLARIF

## 2019-05-23 RX ORDER — NITROGLYCERIN 0.4 MG/1
0.4 TABLET SUBLINGUAL EVERY 5 MIN PRN
Status: DISCONTINUED | OUTPATIENT
Start: 2019-05-23 | End: 2019-05-24 | Stop reason: HOSPADM

## 2019-05-23 RX ORDER — ATROPINE SULFATE 0.1 MG/ML
INJECTION INTRAVENOUS
Status: DISCONTINUED
Start: 2019-05-23 | End: 2019-05-23 | Stop reason: WASHOUT

## 2019-05-23 RX ORDER — OXYCODONE HYDROCHLORIDE AND ACETAMINOPHEN 5; 325 MG/1; MG/1
1 TABLET ORAL EVERY 4 HOURS PRN
Status: DISCONTINUED | OUTPATIENT
Start: 2019-05-23 | End: 2019-05-24 | Stop reason: HOSPADM

## 2019-05-23 RX ADMIN — ATORVASTATIN CALCIUM 40 MG: 40 TABLET, FILM COATED ORAL at 10:01

## 2019-05-23 RX ADMIN — RIVAROXABAN 2.5 MG: 2.5 TABLET, FILM COATED ORAL at 22:10

## 2019-05-23 RX ADMIN — HYDROMORPHONE HYDROCHLORIDE 1 MG: 2 INJECTION, SOLUTION INTRAMUSCULAR; INTRAVENOUS; SUBCUTANEOUS at 11:53

## 2019-05-23 RX ADMIN — OXYCODONE AND ACETAMINOPHEN 1 TABLET: 5; 325 TABLET ORAL at 15:34

## 2019-05-23 RX ADMIN — OXYCODONE HYDROCHLORIDE 5 MG: 5 TABLET ORAL at 10:02

## 2019-05-23 RX ADMIN — HYDROMORPHONE HYDROCHLORIDE 1 MG: 2 INJECTION, SOLUTION INTRAMUSCULAR; INTRAVENOUS; SUBCUTANEOUS at 18:39

## 2019-05-23 RX ADMIN — BUSPIRONE HYDROCHLORIDE 15 MG: 7.5 TABLET ORAL at 22:05

## 2019-05-23 RX ADMIN — TICAGRELOR 90 MG: 90 TABLET ORAL at 22:04

## 2019-05-23 RX ADMIN — ISOSORBIDE MONONITRATE 30 MG: 30 TABLET, EXTENDED RELEASE ORAL at 10:01

## 2019-05-23 RX ADMIN — SODIUM CHLORIDE 250 ML: 9 INJECTION, SOLUTION INTRAVENOUS at 22:30

## 2019-05-23 RX ADMIN — FUROSEMIDE 20 MG: 20 TABLET ORAL at 18:00

## 2019-05-23 RX ADMIN — OXYCODONE AND ACETAMINOPHEN 1 TABLET: 5; 325 TABLET ORAL at 01:17

## 2019-05-23 RX ADMIN — LISINOPRIL 5 MG: 5 TABLET ORAL at 10:01

## 2019-05-23 RX ADMIN — INSULIN GLARGINE 30 UNITS: 100 INJECTION, SOLUTION SUBCUTANEOUS at 22:09

## 2019-05-23 RX ADMIN — RANOLAZINE 1000 MG: 500 TABLET, FILM COATED, EXTENDED RELEASE ORAL at 10:02

## 2019-05-23 RX ADMIN — RANOLAZINE 1000 MG: 500 TABLET, FILM COATED, EXTENDED RELEASE ORAL at 22:04

## 2019-05-23 RX ADMIN — BUSPIRONE HYDROCHLORIDE 15 MG: 7.5 TABLET ORAL at 15:34

## 2019-05-23 RX ADMIN — BUSPIRONE HYDROCHLORIDE 15 MG: 7.5 TABLET ORAL at 10:01

## 2019-05-23 RX ADMIN — CITALOPRAM HYDROBROMIDE 40 MG: 40 TABLET ORAL at 10:01

## 2019-05-23 RX ADMIN — CARVEDILOL 12.5 MG: 12.5 TABLET, FILM COATED ORAL at 18:00

## 2019-05-23 RX ADMIN — OXYCODONE HYDROCHLORIDE 5 MG: 5 TABLET ORAL at 15:34

## 2019-05-23 RX ADMIN — CARVEDILOL 12.5 MG: 12.5 TABLET, FILM COATED ORAL at 10:02

## 2019-05-23 RX ADMIN — OXYCODONE AND ACETAMINOPHEN 1 TABLET: 5; 325 TABLET ORAL at 10:02

## 2019-05-23 RX ADMIN — SODIUM CHLORIDE: 9 INJECTION, SOLUTION INTRAVENOUS at 15:34

## 2019-05-23 RX ADMIN — LINAGLIPTIN 5 MG: 5 TABLET, FILM COATED ORAL at 10:01

## 2019-05-23 RX ADMIN — SODIUM CHLORIDE: 9 INJECTION, SOLUTION INTRAVENOUS at 05:49

## 2019-05-23 RX ADMIN — SODIUM CHLORIDE, PRESERVATIVE FREE 10 ML: 5 INJECTION INTRAVENOUS at 10:02

## 2019-05-23 ASSESSMENT — PAIN SCALES - GENERAL
PAINLEVEL_OUTOF10: 8
PAINLEVEL_OUTOF10: 8
PAINLEVEL_OUTOF10: 6
PAINLEVEL_OUTOF10: 7
PAINLEVEL_OUTOF10: 8
PAINLEVEL_OUTOF10: 8
PAINLEVEL_OUTOF10: 2

## 2019-05-23 ASSESSMENT — PAIN DESCRIPTION - DESCRIPTORS: DESCRIPTORS: CONSTANT;SORE

## 2019-05-23 ASSESSMENT — PAIN DESCRIPTION - PROGRESSION
CLINICAL_PROGRESSION: NOT CHANGED

## 2019-05-23 ASSESSMENT — PAIN DESCRIPTION - ORIENTATION: ORIENTATION: LEFT;LOWER

## 2019-05-23 ASSESSMENT — PAIN DESCRIPTION - LOCATION: LOCATION: BACK

## 2019-05-23 ASSESSMENT — PAIN DESCRIPTION - ONSET: ONSET: ON-GOING

## 2019-05-23 ASSESSMENT — PAIN DESCRIPTION - PAIN TYPE: TYPE: ACUTE PAIN

## 2019-05-23 ASSESSMENT — PAIN DESCRIPTION - FREQUENCY: FREQUENCY: CONTINUOUS

## 2019-05-23 NOTE — PROGRESS NOTES
Daily Progress Note    I have seen ,spoken to  and examined this patient personally, independently of the Physician assistant . I have reviewed the hospital care given to date and reviewed all pertinent labs and imaging. The plan was developed mutually at the time of the visit with the patient,  PA  and myself. I have spoken with patient, nursing staff and provided written and verbal instructions . The above note has been reviewed and I agree with the assessment, diagnosis, and treatment plan with changes made by me as follows     CARDIOLOGY ATTENDING ADDENDUM    HPI:  I have reviewed the above HPI  And agree with above   [facility] is a 46 y. o.year old who and presents with had concerns including Leg Pain (pt had a US  that she has a disection og rt femoral artery). Chief Complaint   Patient presents with    Leg Pain     pt had a US  that she has a disection og rt femoral artery     Interval history:  S/p POBA and athrectomy of right CFA  Hopefully home tomorrow if stable  CAD and PAD      Physical Exam:  General:  Awake alert   Head:normal  Eye:normal  Neck:  No JVD   Chest:  Clear to auscultation, respiration easy  Cardiovascular:  Sinus   Abdomen:   nontender  Extremities:  No edema     Pulses; palpable  Neuro: grossly normal      MEDICAL DECISION MAKING;    I agree with the above plan, which was planned by myself and discussed with PA. Chary Mccray Electronically signed by Price Santizo MD [facility] on 5/23/2019 at 8:25 PM      Pt. Awake, alert and feeling ok  HR and BP stable, NSR  Legs are still hurting-will reevaluate later. Acute arterial occlusion    Had dissection of Rt. CFA following [facility] 5/10/19    Peripheral angio today- reopened Rt. CFA    Sheath still in place in Lt. Groin-watch closely    Med. Tx. For now-DAPT    Hopefully home tomorrow     CAD s/p PCI    PCI on 5/10/19    Doing well    Cont. Med. Tx.    Will cont.  To follow    RIGHT CFA ATHRECTOMY AND BALLOON PTA  LESION DECREASED FROM 90% TO 10%  NO COMPLICATIONS  ASA AND BRILANATA AND HEPARIN   LEFT GROIN ACCESS  HOME TOMORROW IF STABLE    PAST MEDICAL HISTORY:  History of coronary artery disease, status post  CABG done, history of angioplasty done, last heart catheterization was  done in 05/2019. History of hypertension, hyperlipidemia, diabetes,  continues to smoke, depression and anxiety present.     PAST SURGICAL HISTORY:  Previous bypass surgery; LIMA to LAD, SVG to OM,  SVG to diagonal branch. History of thoracic outlet syndrome, for which  her first rib was removed, gallbladder surgery, tonsillectomy, and back  surgery.     SOCIAL HISTORY:  She smokes half pack a day. She works at [employer]  in [address]:  Multiple; ERYTHROMYCIN, LYRICA, TAPE, ULTRAM, CODEINE,  IMITREX, KETOROLAC, and [address]:  She is on nitroglycerin, lisinopril, Lipitor,  ranitidine, Farxiga, Xarelto, metformin, Coreg, Brilinta, and aspirin.         Objective:   /76   Pulse 75   Temp 97.6 °F (36.4 °C) (Oral)   Resp 13   Ht 5' 6\" (1.676 m)   Wt 176 lb 14.4 oz (80.2 kg)   SpO2 96%   BMI 28.55 kg/m²   No intake or output data in the 24 hours ending 05/23/19 1039    Medications:   Scheduled Meds:   aspirin  81 mg Oral Daily    atorvastatin  40 mg Oral Daily    busPIRone  15 mg Oral TID    carvedilol  12.5 mg Oral BID WC    citalopram  40 mg Oral Daily    dapagliflozin  10 mg Oral QAM    furosemide  20 mg Oral Daily    insulin glargine  30 Units Subcutaneous Nightly    isosorbide mononitrate  30 mg Oral Daily    lisinopril  5 mg Oral Daily    ranolazine  1,000 mg Oral BID    rivaroxaban  2.5 mg Oral BID    ticagrelor  90 mg Oral BID    insulin lispro  0-12 Units Subcutaneous TID WC    insulin lispro  0-6 Units Subcutaneous Nightly    linagliptin  5 mg Oral Daily    sodium chloride flush  10 mL Intravenous 2 times per day      Infusions:   dextrose      sodium chloride 75 mL/hr at 05/23/19 1004      PRN disease) 12/08/2013     Priority: Low    Diabetes mellitus type 2 with neurological manifestations 10/08/2013     Priority: Low    Coronary artery disease 10/08/2013     Priority: Low    Major depressive disorder, recurrent episode with anxious distress ([facility] 10/08/2013     Priority: Low    PVD (peripheral vascular disease) ([facility] 05/23/2019    Arterial occlusion 05/22/2019    Eczema of hand     Change in mole 10/31/2018       Electronically signed by Kieran Skaggs PA-C on 5/23/2019 at 10:39 AM

## 2019-05-23 NOTE — CARE COORDINATION
.CM has reviewed pt's chart for needs. CM screening shows that pt has PCP, insurance and is independent PTA. If needs arise please contact [facility].   TE

## 2019-05-23 NOTE — PROGRESS NOTES
TROPONINI 0.008 05/15/2014    TROPONINI 0.119 05/31/2012    TROPONINI <0.006 05/05/2012    TROPONINI <0.006 05/04/2012     ABG:    Lab Results   Component Value Date    PO2ART 300 05/05/2012    BEART 2 05/05/2012     TSH:  No results found for: TSH  VITAMIN B12: No components found for: B12  FOLATE:  No results found for: FOLATE  IRON:  No results found for: IRON  Iron Saturation:  No components found for: PERCENTFE  TIBC:  No results found for: TIBC  FERRITIN:  No results found for: FERRITIN    Assessment:     Principal Problem:    Arterial occlusion  Active Problems:    Coronary artery disease    Diabetes mellitus type 2 with neurological manifestations    Diabetes mellitus (HCC)    GERD (gastroesophageal reflux disease)    Hyperlipidemia    Essential hypertension    Chronic midline low back pain with right-sided sciatica    PVD (peripheral vascular disease) ([facility]  Resolved Problems:    * No resolved hospital problems.  *      Plan:   Dilaudid IV for pain  Continue current medications  Possible home tomorrow      Marco VALENTIN M.D.  5/23/2019

## 2019-05-23 NOTE — PROCEDURES
[address] [place] [address], [address] [place] [address]                            CARDIAC CATHETERIZATION    PATIENT NAME: Jameel Dear                     :        1965  MED REC NO:   [id number]                          ROOM:       3107  ACCOUNT NO:   [de-identified]                           ADMIT DATE: 2019  PROVIDER:     Venkata Hancock MD    DATE OF PROCEDURE:  2019    PERIPHERAL ANGIOGRAM REPORT    INDICATION:  Right leg common femoral artery occlusion. This is a 71-year-old female patient who had undergone heart  catheterization from right coronary access. She started having right  leg pain present. Ultrasound was performed and found to have occlusion  of the right common femoral artery. CT suggestive of occlusion also  present with rest of the common femoral artery, SFA and profunda artery  were patent. The patient was brought to the cath lab today. Informed consent was  obtained from the patient. The patient was prepped and draped in a  sterile fashion. The patient was injected with 20 mL of 2% lidocaine in  the left femoral artery region. Using a micropuncture needle, the left  femoral artery was canalized and a 6-Gabonese sheath was placed in the  left femoral artery. Then using a RIM catheter and _____ it was crossed over. Angiogram  performed which showed right common femoral artery has a significant  filling defect noted. The rest of the common femoral artery, profunda,  and SFA were patent. Then a 6 x 45 cm sheath was placed and then a FilterWire was placed  through the catheter. Spider 5 mm device was placed. Then directional  atherectomy [facility] device was used. Atherectomy was performed, debulking  was performed with a right common femoral artery. There was a balloon  PTA performed with a 5 x 40 mm balloon was used.   At the end of the  procedure, there is significant debulking of the plaque was

## 2019-05-24 VITALS
WEIGHT: 175.7 LBS | HEART RATE: 79 BPM | OXYGEN SATURATION: 99 % | BODY MASS INDEX: 28.24 KG/M2 | DIASTOLIC BLOOD PRESSURE: 70 MMHG | RESPIRATION RATE: 22 BRPM | TEMPERATURE: 97.5 F | HEIGHT: 66 IN | SYSTOLIC BLOOD PRESSURE: 127 MMHG

## 2019-05-24 LAB
ANION GAP SERPL CALCULATED.3IONS-SCNC: 9 MMOL/L (ref 4–16)
BUN BLDV-MCNC: 10 MG/DL (ref 6–23)
CALCIUM SERPL-MCNC: 8.3 MG/DL (ref 8.3–10.6)
CHLORIDE BLD-SCNC: 111 MMOL/L (ref 99–110)
CO2: 23 MMOL/L (ref 21–32)
CREAT SERPL-MCNC: 0.8 MG/DL (ref 0.6–1.1)
GFR AFRICAN AMERICAN: >60 ML/MIN/1.73M2
GFR NON-AFRICAN AMERICAN: >60 ML/MIN/1.73M2
GLUCOSE BLD-MCNC: 134 MG/DL (ref 70–99)
GLUCOSE BLD-MCNC: 81 MG/DL (ref 70–99)
GLUCOSE BLD-MCNC: 84 MG/DL (ref 70–99)
GLUCOSE BLD-MCNC: 98 MG/DL (ref 70–99)
HCT VFR BLD CALC: 33.6 % (ref 37–47)
HEMOGLOBIN: 10.1 GM/DL (ref 12.5–16)
MCH RBC QN AUTO: 31.4 PG (ref 27–31)
MCHC RBC AUTO-ENTMCNC: 30.1 % (ref 32–36)
MCV RBC AUTO: 104.3 FL (ref 78–100)
PDW BLD-RTO: 12 % (ref 11.7–14.9)
PLATELET # BLD: 184 K/CU MM (ref 140–440)
PMV BLD AUTO: 10.1 FL (ref 7.5–11.1)
POTASSIUM SERPL-SCNC: 3.6 MMOL/L (ref 3.5–5.1)
RBC # BLD: 3.22 M/CU MM (ref 4.2–5.4)
SODIUM BLD-SCNC: 143 MMOL/L (ref 135–145)
WBC # BLD: 6.6 K/CU MM (ref 4–10.5)

## 2019-05-24 PROCEDURE — 94761 N-INVAS EAR/PLS OXIMETRY MLT: CPT

## 2019-05-24 PROCEDURE — 80048 BASIC METABOLIC PNL TOTAL CA: CPT

## 2019-05-24 PROCEDURE — 6370000000 HC RX 637 (ALT 250 FOR IP): Performed by: INTERNAL MEDICINE

## 2019-05-24 PROCEDURE — 85027 COMPLETE CBC AUTOMATED: CPT

## 2019-05-24 PROCEDURE — 75710 ARTERY X-RAYS ARM/LEG: CPT

## 2019-05-24 PROCEDURE — 36415 COLL VENOUS BLD VENIPUNCTURE: CPT

## 2019-05-24 PROCEDURE — 37225 HC FEM POP TERRITORY ATHERECTOMY: CPT

## 2019-05-24 PROCEDURE — 2580000003 HC RX 258: Performed by: INTERNAL MEDICINE

## 2019-05-24 PROCEDURE — 82962 GLUCOSE BLOOD TEST: CPT

## 2019-05-24 RX ADMIN — CYCLOBENZAPRINE HYDROCHLORIDE 10 MG: 10 TABLET, FILM COATED ORAL at 16:33

## 2019-05-24 RX ADMIN — OXYCODONE HYDROCHLORIDE 5 MG: 5 TABLET ORAL at 14:15

## 2019-05-24 RX ADMIN — CYCLOBENZAPRINE HYDROCHLORIDE 10 MG: 10 TABLET, FILM COATED ORAL at 02:06

## 2019-05-24 RX ADMIN — OXYCODONE AND ACETAMINOPHEN 1 TABLET: 5; 325 TABLET ORAL at 14:15

## 2019-05-24 RX ADMIN — OXYCODONE HYDROCHLORIDE 5 MG: 5 TABLET ORAL at 02:06

## 2019-05-24 RX ADMIN — RIVAROXABAN 2.5 MG: 2.5 TABLET, FILM COATED ORAL at 09:15

## 2019-05-24 RX ADMIN — BUSPIRONE HYDROCHLORIDE 15 MG: 7.5 TABLET ORAL at 10:40

## 2019-05-24 RX ADMIN — OXYCODONE AND ACETAMINOPHEN 1 TABLET: 5; 325 TABLET ORAL at 00:16

## 2019-05-24 RX ADMIN — ASPIRIN 81 MG: 81 TABLET, COATED ORAL at 09:15

## 2019-05-24 RX ADMIN — CITALOPRAM HYDROBROMIDE 40 MG: 40 TABLET ORAL at 09:15

## 2019-05-24 RX ADMIN — TICAGRELOR 90 MG: 90 TABLET ORAL at 09:15

## 2019-05-24 RX ADMIN — OXYCODONE HYDROCHLORIDE 5 MG: 5 TABLET ORAL at 05:09

## 2019-05-24 RX ADMIN — OXYCODONE AND ACETAMINOPHEN 1 TABLET: 5; 325 TABLET ORAL at 05:09

## 2019-05-24 RX ADMIN — RANOLAZINE 1000 MG: 500 TABLET, FILM COATED, EXTENDED RELEASE ORAL at 09:15

## 2019-05-24 RX ADMIN — SODIUM CHLORIDE, PRESERVATIVE FREE 10 ML: 5 INJECTION INTRAVENOUS at 09:16

## 2019-05-24 RX ADMIN — BUSPIRONE HYDROCHLORIDE 15 MG: 7.5 TABLET ORAL at 14:15

## 2019-05-24 RX ADMIN — ATORVASTATIN CALCIUM 40 MG: 40 TABLET, FILM COATED ORAL at 09:15

## 2019-05-24 RX ADMIN — SODIUM CHLORIDE: 9 INJECTION, SOLUTION INTRAVENOUS at 02:08

## 2019-05-24 RX ADMIN — LINAGLIPTIN 5 MG: 5 TABLET, FILM COATED ORAL at 09:15

## 2019-05-24 ASSESSMENT — PAIN DESCRIPTION - PAIN TYPE
TYPE: CHRONIC PAIN
TYPE: ACUTE PAIN

## 2019-05-24 ASSESSMENT — PAIN DESCRIPTION - FREQUENCY
FREQUENCY: CONTINUOUS
FREQUENCY: CONTINUOUS

## 2019-05-24 ASSESSMENT — PAIN DESCRIPTION - DESCRIPTORS
DESCRIPTORS: CONSTANT;SORE
DESCRIPTORS: CONSTANT;SORE

## 2019-05-24 ASSESSMENT — PAIN SCALES - GENERAL
PAINLEVEL_OUTOF10: 8
PAINLEVEL_OUTOF10: 8
PAINLEVEL_OUTOF10: 6
PAINLEVEL_OUTOF10: 7
PAINLEVEL_OUTOF10: 7
PAINLEVEL_OUTOF10: 5
PAINLEVEL_OUTOF10: 6

## 2019-05-24 ASSESSMENT — PAIN DESCRIPTION - PROGRESSION: CLINICAL_PROGRESSION: NOT CHANGED

## 2019-05-24 ASSESSMENT — PAIN DESCRIPTION - ONSET
ONSET: ON-GOING
ONSET: ON-GOING

## 2019-05-24 ASSESSMENT — PAIN DESCRIPTION - LOCATION
LOCATION: BACK
LOCATION: BACK

## 2019-05-24 ASSESSMENT — PAIN DESCRIPTION - ORIENTATION
ORIENTATION: LEFT;LOWER
ORIENTATION: LEFT;LOWER

## 2019-05-24 NOTE — PROGRESS NOTES
05/22/19  1153 05/24/19  0507    143   K 3.8 3.6    111*   CO2 25 23   BUN 10 10   CREATININE 0.8 0.8     LIVER PROFILE:   Recent Labs     05/22/19  1153   AST 18   ALT 24   BILITOT 0.3   ALKPHOS 76     PT/INR:   Recent Labs     05/22/19  1153   PROTIME 15.8*   INR 1.39     APTT: No results for input(s): APTT in the last 72 hours. BNP:  No results for input(s): BNP in the last 72 hours.       Assessment:  Patient Active Problem List    Diagnosis Date Noted    TOS (thoracic outlet syndrome) 04/04/2013     Priority: High     Class: Chronic    Chest pain 06/28/2018     Priority: Low    Unstable angina pectoris due to coronary arteriosclerosis ([facility] 06/10/2018     Priority: Low    Acute left-sided low back pain with left-sided sciatica 01/15/2018     Priority: Low    Chronic midline low back pain with right-sided sciatica 10/06/2017     Priority: Low    Tobacco dependence 02/27/2017     Priority: Low    Overweight (BMI 25.0-29.9) 02/27/2017     Priority: Low    Hyperlipidemia 02/09/2016     Priority: Low    Essential hypertension 02/09/2016     Priority: Low    Diabetes mellitus ([address] 12/08/2013     Priority: Low    GERD (gastroesophageal reflux disease) 12/08/2013     Priority: Low    Diabetes mellitus type 2 with neurological manifestations 10/08/2013     Priority: Low    Coronary artery disease 10/08/2013     Priority: Low    Major depressive disorder, recurrent episode with anxious distress ([address] 10/08/2013     Priority: Low    PVD (peripheral vascular disease) ([facility] 05/23/2019    Arterial occlusion 05/22/2019    Eczema of hand     Change in mole 10/31/2018       Tez Cotton MD 5/24/2019 2:31 PM

## 2019-06-06 ENCOUNTER — HOSPITAL ENCOUNTER (OUTPATIENT)
Dept: CARDIAC CATH/INVASIVE PROCEDURES | Age: 54
Discharge: HOME | End: 2019-06-06
Attending: INTERNAL MEDICINE | Admitting: INTERNAL MEDICINE
Payer: COMMERCIAL

## 2019-06-06 VITALS
HEIGHT: 66 IN | WEIGHT: 167 LBS | SYSTOLIC BLOOD PRESSURE: 112 MMHG | BODY MASS INDEX: 26.84 KG/M2 | HEART RATE: 77 BPM | DIASTOLIC BLOOD PRESSURE: 67 MMHG | OXYGEN SATURATION: 94 % | RESPIRATION RATE: 20 BRPM

## 2019-06-06 LAB
ANION GAP SERPL CALCULATED.3IONS-SCNC: 11 MMOL/L (ref 4–16)
APTT: 32.3 SECONDS (ref 21.2–33)
BUN BLDV-MCNC: 8 MG/DL (ref 6–23)
CALCIUM SERPL-MCNC: 9.5 MG/DL (ref 8.3–10.6)
CHLORIDE BLD-SCNC: 105 MMOL/L (ref 99–110)
CO2: 28 MMOL/L (ref 21–32)
CREAT SERPL-MCNC: 0.9 MG/DL (ref 0.6–1.1)
GFR AFRICAN AMERICAN: >60 ML/MIN/1.73M2
GFR NON-AFRICAN AMERICAN: >60 ML/MIN/1.73M2
GLUCOSE BLD-MCNC: 120 MG/DL (ref 70–99)
HCT VFR BLD CALC: 36.1 % (ref 37–47)
HEMOGLOBIN: 11.7 GM/DL (ref 12.5–16)
INR BLD: 1.24 INDEX
MCH RBC QN AUTO: 31.2 PG (ref 27–31)
MCHC RBC AUTO-ENTMCNC: 32.4 % (ref 32–36)
MCV RBC AUTO: 96.3 FL (ref 78–100)
PDW BLD-RTO: 12.5 % (ref 11.7–14.9)
PLATELET # BLD: 275 K/CU MM (ref 140–440)
PMV BLD AUTO: 10.1 FL (ref 7.5–11.1)
POTASSIUM SERPL-SCNC: 3.7 MMOL/L (ref 3.5–5.1)
PROTHROMBIN TIME: 14.4 SECONDS (ref 9.12–12.5)
RBC # BLD: 3.75 M/CU MM (ref 4.2–5.4)
SODIUM BLD-SCNC: 144 MMOL/L (ref 135–145)
WBC # BLD: 7.8 K/CU MM (ref 4–10.5)

## 2019-06-06 PROCEDURE — C1894 INTRO/SHEATH, NON-LASER: HCPCS

## 2019-06-06 PROCEDURE — 85730 THROMBOPLASTIN TIME PARTIAL: CPT

## 2019-06-06 PROCEDURE — 2580000003 HC RX 258: Performed by: INTERNAL MEDICINE

## 2019-06-06 PROCEDURE — C1769 GUIDE WIRE: HCPCS

## 2019-06-06 PROCEDURE — C1887 CATHETER, GUIDING: HCPCS

## 2019-06-06 PROCEDURE — 36245 INS CATH ABD/L-EXT ART 1ST: CPT

## 2019-06-06 PROCEDURE — 6370000000 HC RX 637 (ALT 250 FOR IP): Performed by: INTERNAL MEDICINE

## 2019-06-06 PROCEDURE — 2500000003 HC RX 250 WO HCPCS

## 2019-06-06 PROCEDURE — 80048 BASIC METABOLIC PNL TOTAL CA: CPT

## 2019-06-06 PROCEDURE — 6360000004 HC RX CONTRAST MEDICATION

## 2019-06-06 PROCEDURE — 75710 ARTERY X-RAYS ARM/LEG: CPT

## 2019-06-06 PROCEDURE — 85027 COMPLETE CBC AUTOMATED: CPT

## 2019-06-06 PROCEDURE — 2709999900 HC NON-CHARGEABLE SUPPLY

## 2019-06-06 PROCEDURE — 6360000002 HC RX W HCPCS

## 2019-06-06 PROCEDURE — 85610 PROTHROMBIN TIME: CPT

## 2019-06-06 RX ORDER — SODIUM CHLORIDE 0.9 % (FLUSH) 0.9 %
10 SYRINGE (ML) INJECTION PRN
Status: DISCONTINUED | OUTPATIENT
Start: 2019-06-06 | End: 2019-06-06 | Stop reason: HOSPADM

## 2019-06-06 RX ORDER — ACETAMINOPHEN 325 MG/1
650 TABLET ORAL EVERY 4 HOURS PRN
Status: DISCONTINUED | OUTPATIENT
Start: 2019-06-06 | End: 2019-06-06 | Stop reason: HOSPADM

## 2019-06-06 RX ORDER — SODIUM CHLORIDE 9 MG/ML
INJECTION, SOLUTION INTRAVENOUS CONTINUOUS
Status: DISCONTINUED | OUTPATIENT
Start: 2019-06-06 | End: 2019-06-06 | Stop reason: HOSPADM

## 2019-06-06 RX ORDER — ATROPINE SULFATE 0.4 MG/ML
0.5 AMPUL (ML) INJECTION
Status: DISCONTINUED | OUTPATIENT
Start: 2019-06-06 | End: 2019-06-06 | Stop reason: HOSPADM

## 2019-06-06 RX ORDER — DIPHENHYDRAMINE HCL 25 MG
50 TABLET ORAL ONCE
Status: DISCONTINUED | OUTPATIENT
Start: 2019-06-06 | End: 2019-06-06

## 2019-06-06 RX ORDER — SODIUM CHLORIDE 0.9 % (FLUSH) 0.9 %
10 SYRINGE (ML) INJECTION EVERY 12 HOURS SCHEDULED
Status: DISCONTINUED | OUTPATIENT
Start: 2019-06-06 | End: 2019-06-06 | Stop reason: HOSPADM

## 2019-06-06 RX ORDER — DIAZEPAM 5 MG/1
5 TABLET ORAL ONCE
Status: COMPLETED | OUTPATIENT
Start: 2019-06-06 | End: 2019-06-06

## 2019-06-06 RX ORDER — DIPHENHYDRAMINE HCL 25 MG
25 TABLET ORAL ONCE
Status: COMPLETED | OUTPATIENT
Start: 2019-06-06 | End: 2019-06-06

## 2019-06-06 RX ADMIN — SODIUM CHLORIDE: 9 INJECTION, SOLUTION INTRAVENOUS at 09:55

## 2019-06-06 RX ADMIN — DIAZEPAM 5 MG: 5 TABLET ORAL at 09:55

## 2019-06-06 RX ADMIN — DIPHENHYDRAMINE HCL 25 MG: 25 TABLET ORAL at 09:54

## 2019-06-06 ASSESSMENT — PAIN DESCRIPTION - LOCATION: LOCATION: FOOT

## 2019-06-06 ASSESSMENT — PAIN SCALES - GENERAL: PAINLEVEL_OUTOF10: 6

## 2019-06-06 NOTE — H&P
[address] [place] [place] [address], [address] [place] [address]                       PREOPERATIVE HISTORY AND PHYSICAL    PATIENT NAME: Dragan Maynard                     :        1965  MED REC NO:   [id number]                          ROOM:  ACCOUNT NO:   [de-identified]                           ADMIT DATE: 2019  PROVIDER:     Wade Donahue MD    INDICATION:  Right leg pain. HISTORY OF PRESENT ILLNESS:  This is a 50-year-old female patient who  underwent a heart catheterization and underwent angioplasty recently on  2019. Then she was here for peripheral angiogram because of the  right leg pain and that was on 2019 and right common femoral  artery had a significant lesion present and atherectomy balloon  angioplasty was performed and the lesion was decreased from 90% down to  10%, it had a good flow present. Then she took a ride to [place]. The  patient came back home this Monday, then she started having similar kind  of pain in the right leg present as before and the foot is slightly cool  to touch also. Therefore the patient is here for angiogram again. It  did helped for about three to four days then it started again with  similar symptoms. The patient denies any chest pain. No shortness of  breath. No other  or GI complaints present. No syncopal episodes  present. History of coronary artery disease status post angioplasty done and  history of bypass surgery done. Her heart catheterization was done  recently on 2019 and the left circumflex was not injected and  known to be occluded, RCA was mid 100% occluded, LIMA to LAD was patent,  fills collaterals via RCA and SVG to OM was patent, SVG to diagonal had  ostial 80% stenosis, which was stented, Xience stent. Right common  femoral artery intervention also.     PAST MEDICAL HISTORY:  Peripheral vascular disease, coronary artery  disease, hypertension, hyperlipidemia, diabetes, continues to smoke,  depression and anxiety present. PAST SURGICAL HISTORY:  Bypass surgery, LIMA to LAD, SVG to OM, SVG to  diagonal branch, history of thoracic outlet syndrome for which she had  _____, gallbladder surgery, tonsillectomy, and back surgery. SOCIAL HISTORY:  She smokes half a pack a day. She works at [employer] in  [facility]. ALLERGIES:  Multiple, see the med list.    PHYSICAL EXAMINATION:  GENERAL:  The patient is awake, alert, and answering questions, not in  acute distress. VITAL SIGNS:  Temperature is afebrile, pulse is 75, blood pressure  134/80. HEENT:  Head is normocephalic and atraumatic. Pupils are equal and  reactive. CHEST:  Equal expansion. LUNGS:  Clear to auscultation. No wheezing or rhonchi. HEART:  Regular rhythm. ABDOMEN:  Soft and nontender. Bowel sounds are present. No  hepatosplenomegaly or guarding appreciated. EXTREMITIES:  No cyanosis or clubbing noted. Right groin has bruit  present. LABORATORY DATA:  Within normal range. IMPRESSION:  A 74-year-old female patient with again recurrent right leg  pain present and the plan at this time is to image angiogram and if she  still has significant disease, I think we will recommend surgery on her. Further recommendations are based on hospital course.         Destini Wu MD    D: 06/06/2019 9:34:22       T: 06/06/2019 12:23:53     [email or web address]  Job#: [id number]     Doc#: [id number]    CC:

## 2019-06-06 NOTE — PROCEDURES
[address] [place] [address], [address] [place] [address]                            CARDIAC CATHETERIZATION    PATIENT NAME: Nighat Han                     :        1965  MED REC NO:   [id number]                          ROOM:  ACCOUNT NO:   [de-identified]                           ADMIT DATE: 2019  PROVIDER:     Alfredo Cheek MD    DATE OF PROCEDURE:  2019    PERIPHERAL ANGIOGRAM    This is a 65-year-old female patient with a history of having coronary  artery disease, underwent a heart catheterization from the right groin. An Angio-Seal was placed at that time and this was on, I believe,  2019. Then, she came back on 2019 with right leg pain  present and she was found to have right common femoral artery with  disease present, possibly secondary to the Angio-Seal device. At that  time, she underwent atherectomy and balloon angioplasty. She did  relatively well with that. Now, she comes back with recurrent pain in  the right groin present and right leg pain present. Therefore, the  patient is here for angiogram.    The patient was brought to the cath lab today. Informed consent was  obtained from the patient. The patient was prepped and draped in usual  sterile fashion. The patient was injected with a 5 mL of 2% lidocaine  in the right radial region. Using a radial needle, right radial artery  was cannulized. A 4/5-[place] sheath was placed in the right radial  artery. The entire procedure was done using guidewire. The sheath was  flushed in between the procedures. Then, using a JR4 catheter and a GA wire, it was crossed in the  abdominal aorta, then the catheter was exchanged for a multipurpose  catheter. It was placed in the right external iliac artery. An  angiogram was performed. Right external iliac artery shows the right  external iliac artery is patent.   Right common femoral artery, there is  a plaque haziness present with the flow still brisk. There is about a  60% stenosis present right at the edge of the proximal portion of the  right common femoral artery. Right profunda is patent and then the  selective angiogram of the right leg was performed. Right SFA has a  brisk flow, right profunda is patent and right popliteal artery was also  patent. Then, the catheter was placed into the popliteal artery region  and an angiogram was performed below the knee and shows there is a  three-vessel runoff present, anterior tibial artery, posterior tibial  artery. Two-vessel runoff noted in the foot and peroneal arteries also  present in the mid segment. IMPRESSION:  Right SFA, profunda, right popliteal artery below the knee,  there is a three-vessel runoff present and two-vessel runoff to the  right foot present. Right common femoral artery, the proximal edge has  a plaque present still and has a haziness present. Possibly, this was  contributing to her symptoms. We will talk with the CV team.  She may  be a candidate for an endarterectomy performed of the right common  femoral artery. We will discuss with them to make a decision about timing of the  procedure. The patient tolerated the procedure well. The sheath was  removed in the cath lab.         Barbera Brunner, MD    D: 06/06/2019 10:47:27       T: 06/06/2019 14:16:16     [email or web address]  Job#: [id number]     Doc#: [id number]    CC:

## 2019-06-07 ENCOUNTER — ANESTHESIA EVENT (OUTPATIENT)
Dept: OPERATING ROOM | Age: 54
DRG: 253 | End: 2019-06-07
Payer: COMMERCIAL

## 2019-06-07 ASSESSMENT — LIFESTYLE VARIABLES: SMOKING_STATUS: 1

## 2019-06-07 NOTE — ANESTHESIA PRE PROCEDURE
Department of Anesthesiology  Preprocedure Note       Name:  Hailee Mason   Age:  46 y.o.  :  1965                                          MRN:  [id number]         Date:  2019      Surgeon: Lianne Barron):  Lisandra Chang MD    Procedure: FEMORAL ENDARTERECTOMY ANEURYSM REP ANGIOPLASTY (N/A )    Medications prior to admission:   Prior to Admission medications    Medication Sig Start Date End Date Taking? Authorizing Provider   citalopram (CELEXA) 40 MG tablet Take 40 mg by mouth daily 19   Historical Provider, MD   cyclobenzaprine (FLEXERIL) 10 MG tablet Take 10 mg by mouth 3 times daily as needed 19   Historical Provider, MD   furosemide (LASIX) 20 MG tablet Take 20 mg by mouth daily    Historical Provider, MD   aspirin 81 MG EC tablet Take 81 mg by mouth daily    Historical Provider, MD   nitroGLYCERIN (NITROSTAT) 0.4 MG SL tablet up to max of 3 total doses.  If no relief after 1 dose, call 911. 19   Jamila Angel MD   lisinopril (PRINIVIL;ZESTRIL) 5 MG tablet Take 5 mg by mouth daily    Historical Provider, MD   atorvastatin (LIPITOR) 40 MG tablet Take 40 mg by mouth daily    Historical Provider, MD   raNITIdine HCl (RANITIDINE 75 PO) Take 75 mg by mouth 2 times daily     Historical Provider, MD   dapagliflozin (FARXIGA) 10 MG tablet Take 10 mg by mouth every morning     Historical Provider, MD   rivaroxaban (XARELTO) 2.5 MG TABS tablet Take 1 tablet by mouth 2 times daily 19   ADAMARIS Canales   busPIRone (BUSPAR) 15 MG tablet Take 15 mg by mouth 3 times daily 18   Lizeth Wells MD   SITagliptin-metFORMIN (JANUMET XR)  MG TB24 per extended release tablet TAKE ONE TABLET BY MOUTH TWICE A DAY 18   Lizeth Wells MD   insulin glargine (BASAGLAR KWIKPEN) 100 UNIT/ML injection pen Inject 25 Units into the skin nightly  Patient taking differently: Inject 30 Units into the skin nightly  18   Jennifer Saba MD   carvedilol (COREG) 12.5 MG tablet Take 1 tablet by mouth 2 times daily (with meals) 6/29/18   Rony Smith MD   isosorbide mononitrate (IMDUR) 30 MG extended release tablet Take 1 tablet by mouth daily 6/14/18   June Ramirez MD   ticagrelor (BRILINTA) 90 MG TABS tablet Take 1 tablet by mouth 2 times daily 6/13/18 6/6/19  June Ramirez MD   albuterol sulfate HFA (PROAIR HFA) 108 (90 Base) MCG/ACT inhaler Inhale 2 puffs into the lungs every 6 hours as needed for Wheezing 1/29/18   Bjorn Kingsley MD   ranolazine (RANEXA) 1000 MG extended release tablet Take 1 tablet by mouth 2 times daily 12/12/17   Ankit Sheikh MD   oxyCODONE-acetaminophen (PERCOCET)  MG per tablet Take 1 tablet by mouth every 4 hours as needed for Pain. Historical Provider, MD       Current medications:    No current facility-administered medications for this encounter. Current Outpatient Medications   Medication Sig Dispense Refill    citalopram (CELEXA) 40 MG tablet Take 40 mg by mouth daily  2    cyclobenzaprine (FLEXERIL) 10 MG tablet Take 10 mg by mouth 3 times daily as needed      furosemide (LASIX) 20 MG tablet Take 20 mg by mouth daily      aspirin 81 MG EC tablet Take 81 mg by mouth daily      nitroGLYCERIN (NITROSTAT) 0.4 MG SL tablet up to max of 3 total doses.  If no relief after 1 dose, call 911. 25 tablet 3    lisinopril (PRINIVIL;ZESTRIL) 5 MG tablet Take 5 mg by mouth daily      atorvastatin (LIPITOR) 40 MG tablet Take 40 mg by mouth daily      raNITIdine HCl (RANITIDINE 75 PO) Take 75 mg by mouth 2 times daily       dapagliflozin (FARXIGA) 10 MG tablet Take 10 mg by mouth every morning       rivaroxaban (XARELTO) 2.5 MG TABS tablet Take 1 tablet by mouth 2 times daily 60 tablet 0    busPIRone (BUSPAR) 15 MG tablet Take 15 mg by mouth 3 times daily 90 tablet 1    SITagliptin-metFORMIN (JANUMET XR)  MG TB24 per extended release tablet TAKE ONE TABLET BY MOUTH TWICE A  tablet 1    insulin glargine (BASAGLAR KWIKPEN) 100 UNIT/ML injection pen Inject I25.110    Chest pain R07.9    Change in mole D22.9    Eczema of hand L30.9    Arterial occlusion I70.90    PVD (peripheral vascular disease) ([facility]) I73.9       Past Medical History:        Diagnosis Date    Acid reflux     CAD (coronary artery disease)     Sees Dr. Brennan Lin    Depression     Diabetes mellitus ([facility] [address] Dx 2001    Eczema of hand     Hx of migraines     Hyperlipidemia     Hypertension     Kidney stones 2000's    \"Passed One Kidney Stone\"    Wears glasses        Past Surgical History:        Procedure Laterality Date    APPENDECTOMY  1988    Done During TOSHA    BACK SURGERY  Last Done 9-11    \"4 Lower Back Surgeries, 2 Rods, 4 Pins At L5, S1 Put In During Last Surgery\"    BONE RESECTION, RIB      BREAST BIOPSY Bilateral 1990's-2000's    Benign    CARDIAC SURGERY  5-4-12    CABG (3 Bypasses)   Chrissy Lizama      6/28/2018 total of 9    DENTAL SURGERY  2000's    \"Dental Implants Upper And Lower\"    HYSTERECTOMY, TOTAL ABDOMINAL  1988    Appendectomy Also Done    OTHER SURGICAL HISTORY Left 4/4/2013    Left transaxillary 1st rib resection    TONSILLECTOMY AND ADENOIDECTOMY  1971       Social History:    Social History     Tobacco Use    Smoking status: Current Every Day Smoker     Packs/day: 0.50     Years: 35.00     Pack years: 17.50     Types: Cigarettes    Smokeless tobacco: Never Used   Substance Use Topics    Alcohol use: No     Alcohol/week: 0.0 oz                                Ready to quit: Not Answered  Counseling given: Not Answered      Vital Signs (Current): There were no vitals filed for this visit.                                            BP Readings from Last 3 Encounters:   06/06/19 112/67   05/24/19 127/70   05/12/19 107/81       NPO Status:                                                                                 BMI:   Wt Readings from Last 3 Encounters:   06/06/19 167 lb (75.8 kg)   05/24/19 175 lb 11.2 oz (79.7 kg)   05/11/19 172 lb 12.8 oz (78.4 kg)     There is no height or weight on file to calculate BMI.    CBC:   Lab Results   Component Value Date    WBC 7.8 06/06/2019    RBC 3.75 06/06/2019    HGB 11.7 06/06/2019    HCT 36.1 06/06/2019    MCV 96.3 06/06/2019    RDW 12.5 06/06/2019     06/06/2019       CMP:   Lab Results   Component Value Date     06/06/2019    K 3.7 06/06/2019     06/06/2019    CO2 28 06/06/2019    BUN 8 06/06/2019    CREATININE 0.9 06/06/2019    GFRAA >60 06/06/2019    LABGLOM >60 06/06/2019    GLUCOSE 120 06/06/2019    PROT 7.8 05/22/2019    PROT 7.8 02/08/2013    CALCIUM 9.5 06/06/2019    BILITOT 0.3 05/22/2019    ALKPHOS 76 05/22/2019    AST 18 05/22/2019    ALT 24 05/22/2019       POC Tests: No results for input(s): POCGLU, POCNA, POCK, POCCL, POCBUN, POCHEMO, POCHCT in the last 72 hours.     Coags:   Lab Results   Component Value Date    PROTIME 14.4 06/06/2019    PROTIME 11.8 05/09/2012    INR 1.24 06/06/2019    APTT 32.3 06/06/2019       HCG (If Applicable): No results found for: PREGTESTUR, PREGSERUM, HCG, HCGQUANT     ABGs:   Lab Results   Component Value Date    PO2ART 300 05/05/2012    BEART 2 05/05/2012        Type & Screen (If Applicable):  No results found for: LABABO, [address]    Anesthesia Evaluation  Patient summary reviewed and Nursing notes reviewed  Airway:         Dental:          Pulmonary:   (+) current smoker                           Cardiovascular:    (+) hypertension:, angina:, CAD:, CABG/stent ( Stents):, hyperlipidemia      ECG reviewed      Echocardiogram reviewed         Beta Blocker:  Order written      ROS comment: Hr 82   Normal sinus rhythm   Possible Left atrial enlargement   ST & T wave abnormality, consider inferior ischemia   Abnormal ECG   When compared with ECG of 07-MAY-2019 09:51,   Nonspecific T wave abnormality now evident in Anterior leads   Confirmed by [facility] Kaz WOOD ([id number]) on 5/11/2019 1:44:01 PM     ECHO   Summary   Left ventricular function is normal.   Ejection fraction is visually estimated at 50-55%.   Mild left ventricular hypertrophy noted.   Grade II diastolic dysfunction present.   Trace tricuspid regurgitation visualized. RVSP is 29 mmHg.   No evidence of pericardial effusion.      Signature      ------------------------------------------------------------------   Electronically signed by Ferny Rivera MD (Interpreting   [id number]) on 06/11/2018 at 04:14 PM   ------------------------------------------------------------------     Neuro/Psych:   (+) neuromuscular disease (sciatica):, headaches: migraine headaches, depression/anxiety             GI/Hepatic/Renal:   (+) GERD:,           Endo/Other:    (+) Diabetes, . Abdominal:           Vascular:   + PVD, aortic or cerebral, . Anesthesia Plan      general     ASA 3     (Chart review )  Induction: intravenous. arterial line  MIPS: Postoperative opioids intended and Prophylactic antiemetics administered.                       SARI Travis - CRNA   6/7/2019

## 2019-06-08 NOTE — CONSULTS
02/09/2016    Diabetes mellitus ([facility] 12/08/2013    GERD (gastroesophageal reflux disease) 12/08/2013    Diabetes mellitus type 2 with neurological manifestations 10/08/2013    Coronary artery disease 10/08/2013    Major depressive disorder, recurrent episode with anxious distress ([facility] 10/08/2013     Past Medical History:   Diagnosis Date    Acid reflux     CAD (coronary artery disease)     Sees Dr. Leatha Quiles    Depression     Diabetes mellitus ([facility] Dx 2001    Eczema of hand     Hx of migraines     Hyperlipidemia     Hypertension     Kidney stones 2000's    \"Passed One Kidney Stone\"    Wears glasses       Past Surgical History:   Procedure Laterality Date    APPENDECTOMY  1988    Done During TOSHA    BACK SURGERY  Last Done 9-11    \"4 Lower Back Surgeries, 2 Rods, 4 Pins At L5, S1 Put In During Last Surgery\"    BONE RESECTION, RIB      BREAST BIOPSY Bilateral 1990's-2000's    Benign    CARDIAC SURGERY  5-4-12    CABG (3 Bypasses)   Taj Brantley      6/28/2018 total of 9    DENTAL SURGERY  2000's    \"Dental Implants Upper And Lower\"    HYSTERECTOMY, TOTAL ABDOMINAL  1988    Appendectomy Also Done    OTHER SURGICAL HISTORY Left 4/4/2013    Left transaxillary 1st rib resection    TONSILLECTOMY AND ADENOIDECTOMY  1971      No medications prior to admission.   Allergies   Allergen Reactions    Erythromycin Hives and Nausea And Vomiting    Lyrica [Pregabalin] Swelling    Codeine Palpitations    Imitrex [Sumatriptan] Anxiety     \"Makes Me Hyper\"    Ketorolac Tromethamine Other (See Comments)     \"Rapid Heart Rate\"    Prochlorperazine Edisylate Nausea And Vomiting    Tape Ceclia Ishihara Tape] Itching     Blisters    Ultram [Tramadol] Itching    Zofran [Ondansetron Hcl] Nausea Only      Social History     Tobacco Use    Smoking status: Current Every Day Smoker     Packs/day: 0.50     Years: 35.00     Pack years: 17. 50     Types: Cigarettes    Smokeless tobacco: Never Used   Substance Use Topics    Alcohol use: No     Alcohol/week: 0.0 oz      Family History   Problem Relation Age of Onset    Diabetes Mother     High Blood Pressure Mother     Asthma Mother     High Cholesterol Mother     High Blood Pressure Father     High Cholesterol Father     Asthma Father     Asthma Brother     Mental Illness Son     Early Death Son 25        \"Suicide\"    Mental Illness Daughter         \"Bipolar\"      Review of Systems  10 point review of systems performed with all pertinent positives noted in HPI      Objective:     No data found. CONSTITUTIONAL:  awake, alert, cooperative, no apparent distress, and appears stated age  NECK:  Supple, symmetrical, trachea midline, no adenopathy, thyroid symmetric, not enlarged and no tenderness, skin normal  HEMATOLOGIC/LYMPHATICS:  no cervical lymphadenopathy and no supraclavicular lymphadenopathy  LUNGS:  No increased work of breathing, good air exchange, clear to auscultation bilaterally, no crackles or wheezing  CARDIOVASCULAR:  Normal apical impulse, regular rate and rhythm, normal S1 and S2, no S3 or S4, and no murmur noted  CHEST/BREASTS:  symmetric  ABDOMEN: soft nt nd, no pulsitile masses  MUSCULOSKELETAL:  There is no redness, warmth, or swelling of the joints. Full range of motion noted. Motor strength is 5 out of 5 all extremities bilaterally. Tone is normal.  NEUROLOGIC:  Awake, alert, oriented to name, place and time. Cranial nerves II-XII are grossly intact. Motor is 5 out of 5 bilaterally.      SKIN:  no bruising or bleeding and normal skin color, texture, turgor  VASC: 1+ femoral pulses, biphasic DP PT  CBC:   Lab Results   Component Value Date    WBC 7.8 06/06/2019    RBC 3.75 06/06/2019     BMP:   Lab Results   Component Value Date    GLUCOSE 120 06/06/2019    CO2 28 06/06/2019    BUN 8 06/06/2019    CREATININE 0.9 06/06/2019    CALCIUM 9.5 06/06/2019           Data

## 2019-06-10 ENCOUNTER — HOSPITAL ENCOUNTER (OUTPATIENT)
Dept: PREADMISSION TESTING | Age: 54
Setting detail: SURGERY ADMIT
Discharge: HOME | DRG: 253 | End: 2019-06-14
Payer: COMMERCIAL

## 2019-06-10 VITALS
BODY MASS INDEX: 26.84 KG/M2 | DIASTOLIC BLOOD PRESSURE: 58 MMHG | WEIGHT: 167 LBS | TEMPERATURE: 97.2 F | SYSTOLIC BLOOD PRESSURE: 101 MMHG | RESPIRATION RATE: 20 BRPM | OXYGEN SATURATION: 97 % | HEIGHT: 66 IN | HEART RATE: 82 BPM

## 2019-06-10 LAB
ABO/RH: NORMAL
ANTIBODY SCREEN: NEGATIVE
COMMENT: NORMAL

## 2019-06-10 PROCEDURE — 86901 BLOOD TYPING SEROLOGIC RH(D): CPT

## 2019-06-10 PROCEDURE — 86850 RBC ANTIBODY SCREEN: CPT

## 2019-06-10 PROCEDURE — 36415 COLL VENOUS BLD VENIPUNCTURE: CPT

## 2019-06-10 PROCEDURE — 86900 BLOOD TYPING SEROLOGIC ABO: CPT

## 2019-06-10 RX ORDER — CEFAZOLIN SODIUM 1 G/50ML
1 INJECTION, SOLUTION INTRAVENOUS ONCE
Status: CANCELLED | OUTPATIENT
Start: 2019-06-11

## 2019-06-10 ASSESSMENT — PAIN SCALES - GENERAL: PAINLEVEL_OUTOF10: 7

## 2019-06-10 ASSESSMENT — PAIN DESCRIPTION - ORIENTATION: ORIENTATION: RIGHT

## 2019-06-10 ASSESSMENT — LIFESTYLE VARIABLES: SMOKING_STATUS: 1

## 2019-06-10 ASSESSMENT — PAIN DESCRIPTION - ONSET: ONSET: ON-GOING

## 2019-06-10 ASSESSMENT — PAIN DESCRIPTION - FREQUENCY: FREQUENCY: CONTINUOUS

## 2019-06-10 ASSESSMENT — PAIN DESCRIPTION - PAIN TYPE: TYPE: CHRONIC PAIN

## 2019-06-10 ASSESSMENT — PAIN DESCRIPTION - DESCRIPTORS: DESCRIPTORS: CRUSHING;ACHING

## 2019-06-10 ASSESSMENT — PAIN DESCRIPTION - LOCATION: LOCATION: FOOT;BACK

## 2019-06-10 ASSESSMENT — COPD QUESTIONNAIRES: CAT_SEVERITY: MILD

## 2019-06-10 ASSESSMENT — PAIN DESCRIPTION - PROGRESSION: CLINICAL_PROGRESSION: GRADUALLY WORSENING

## 2019-06-10 NOTE — ANESTHESIA PRE PROCEDURE
Department of Anesthesiology  Preprocedure Note       Name:  Pascale Arias   Age:  46 y.o.  :  1965                                          MRN:  [id number]         Date:  6/10/2019      Surgeon: Adrianne Moses):  Darvin Samson MD    Procedure: FEMORAL ENDARTERECTOMY ANEURYSM REP ANGIOPLASTY (N/A )    Medications prior to admission:   Prior to Admission medications    Medication Sig Start Date End Date Taking? Authorizing Provider   citalopram (CELEXA) 40 MG tablet Take 40 mg by mouth daily 19   Historical Provider, MD   cyclobenzaprine (FLEXERIL) 10 MG tablet Take 10 mg by mouth 3 times daily as needed 19   Historical Provider, MD   furosemide (LASIX) 20 MG tablet Take 20 mg by mouth daily    Historical Provider, MD   aspirin 81 MG EC tablet Take 81 mg by mouth daily    Historical Provider, MD   nitroGLYCERIN (NITROSTAT) 0.4 MG SL tablet up to max of 3 total doses.  If no relief after 1 dose, call 911. 19   Agustin De La Garza MD   lisinopril (PRINIVIL;ZESTRIL) 5 MG tablet Take 5 mg by mouth daily    Historical Provider, MD   atorvastatin (LIPITOR) 40 MG tablet Take 40 mg by mouth daily    Historical Provider, MD   raNITIdine HCl (RANITIDINE 75 PO) Take 75 mg by mouth 2 times daily     Historical Provider, MD   dapagliflozin (FARXIGA) 10 MG tablet Take 10 mg by mouth every morning     Historical Provider, MD   rivaroxaban (XARELTO) 2.5 MG TABS tablet Take 1 tablet by mouth 2 times daily 19   ADAMARIS Silva   busPIRone (BUSPAR) 15 MG tablet Take 15 mg by mouth 3 times daily 18   Lizeth Wells MD   SITagliptin-metFORMIN (JANUMET XR)  MG TB24 per extended release tablet TAKE ONE TABLET BY MOUTH TWICE A DAY 18   Lizeth Wells MD   insulin glargine (BASAGLAR KWIKPEN) 100 UNIT/ML injection pen Inject 25 Units into the skin nightly  Patient taking differently: Inject 30 Units into the skin nightly  18   Luis Isaacs MD   carvedilol (COREG) 12.5 MG tablet Take 1 tablet by mouth 2 times daily (with meals) 6/29/18   Anya Dickerson MD   isosorbide mononitrate (IMDUR) 30 MG extended release tablet Take 1 tablet by mouth daily 6/14/18   Soraya Hyman MD   ticagrelor (BRILINTA) 90 MG TABS tablet Take 1 tablet by mouth 2 times daily 6/13/18 6/6/19  Soraya Hyman MD   albuterol sulfate HFA (PROAIR HFA) 108 (90 Base) MCG/ACT inhaler Inhale 2 puffs into the lungs every 6 hours as needed for Wheezing 1/29/18   Endy Osullivan MD   ranolazine (RANEXA) 1000 MG extended release tablet Take 1 tablet by mouth 2 times daily 12/12/17   Sherie Grullon MD   oxyCODONE-acetaminophen (PERCOCET)  MG per tablet Take 1 tablet by mouth every 4 hours as needed for Pain. Historical Provider, MD       Current medications:    Current Outpatient Medications   Medication Sig Dispense Refill    citalopram (CELEXA) 40 MG tablet Take 40 mg by mouth daily  2    cyclobenzaprine (FLEXERIL) 10 MG tablet Take 10 mg by mouth 3 times daily as needed      furosemide (LASIX) 20 MG tablet Take 20 mg by mouth daily      aspirin 81 MG EC tablet Take 81 mg by mouth daily      nitroGLYCERIN (NITROSTAT) 0.4 MG SL tablet up to max of 3 total doses.  If no relief after 1 dose, call 911. 25 tablet 3    lisinopril (PRINIVIL;ZESTRIL) 5 MG tablet Take 5 mg by mouth daily      atorvastatin (LIPITOR) 40 MG tablet Take 40 mg by mouth daily      raNITIdine HCl (RANITIDINE 75 PO) Take 75 mg by mouth 2 times daily       dapagliflozin (FARXIGA) 10 MG tablet Take 10 mg by mouth every morning       rivaroxaban (XARELTO) 2.5 MG TABS tablet Take 1 tablet by mouth 2 times daily 60 tablet 0    busPIRone (BUSPAR) 15 MG tablet Take 15 mg by mouth 3 times daily 90 tablet 1    SITagliptin-metFORMIN (JANUMET XR)  MG TB24 per extended release tablet TAKE ONE TABLET BY MOUTH TWICE A  tablet 1    insulin glargine (BASAGLAR KWIKPEN) 100 UNIT/ML injection pen Inject 25 Units into the skin nightly (Patient taking differently: Inject 30 Units into the skin nightly ) 5 pen 0    carvedilol (COREG) 12.5 MG tablet Take 1 tablet by mouth 2 times daily (with meals) 60 tablet 0    isosorbide mononitrate (IMDUR) 30 MG extended release tablet Take 1 tablet by mouth daily 30 tablet 0    ticagrelor (BRILINTA) 90 MG TABS tablet Take 1 tablet by mouth 2 times daily 60 tablet 0    albuterol sulfate HFA (PROAIR HFA) 108 (90 Base) MCG/ACT inhaler Inhale 2 puffs into the lungs every 6 hours as needed for Wheezing 2 Inhaler 0    ranolazine (RANEXA) 1000 MG extended release tablet Take 1 tablet by mouth 2 times daily 180 tablet 1    oxyCODONE-acetaminophen (PERCOCET)  MG per tablet Take 1 tablet by mouth every 4 hours as needed for Pain. No current facility-administered medications for this encounter. Allergies: Allergies   Allergen Reactions    Erythromycin Hives and Nausea And Vomiting    Lyrica [Pregabalin] Swelling    Codeine Palpitations    Imitrex [Sumatriptan] Anxiety     \"Makes Me Hyper\"    Ketorolac Tromethamine Other (See Comments)     \"Rapid Heart Rate\"    Prochlorperazine Edisylate Nausea And Vomiting    Tape Russ Friendly Tape] Itching     Blisters    Ultram [Tramadol] Itching    Zofran [Ondansetron Hcl] Nausea Only       Problem List:    Patient Active Problem List   Diagnosis Code    TOS (thoracic outlet syndrome) G54.0    Diabetes mellitus type 2 with neurological manifestations E11.49    Coronary artery disease I25.10    Major depressive disorder, recurrent episode with anxious distress (HCC) F33.9    Diabetes mellitus ([facility] [address] E11.9    GERD (gastroesophageal reflux disease) K21.9    Hyperlipidemia E78.5    Essential hypertension I10    Tobacco dependence F17.200    Overweight (BMI 25.0-29. 9) E66.3    Chronic midline low back pain with right-sided sciatica M54.41, G89.29    Acute left-sided low back pain with left-sided sciatica M54.42    Unstable angina pectoris due to coronary arteriosclerosis ([facility] maxim, 2019 per Dr. John Fraction. On Xarelto, last dose 2019):, hyperlipidemia      ECG reviewed  Rhythm: regular  Rate: normal  Echocardiogram reviewed  Stress test reviewed             ROS comment: Cardiac cath 2019  1. LVEDP was around 10 mmHg present. 2.  Native right coronary artery is a mid 100% occluded. 3.  Left system is mildly injected. 4. LIMA to LAD is patent. There is collateral circulation to distal right coronary artery. 5.  SVG to OM is widely patent. The stents are patent. 6.  SVG to diagonal ostial 80% stenosis noted. EDP was normal.    Stress 2018  %   No EKG changes suggestive of ischemia with Lexiscan infusion.   Normal perfusion uptake in the anterior, septal and inferior wall.  Moderate to severe ischemia noted in the lateral wall       Echo 2018  EF 50-55%   Left ventricular function is normal.   Mild left ventricular hypertrophy noted.   Grade II diastolic dysfunction present.   Trace tricuspid regurgitation visualized. RVSP is 29 mmHg.  No evidence of pericardial effusion.      CARDIOVASCULAR:  Normal apical impulse, regular rate and rhythm, normal S1 and S2, no S3 or S4, and no murmur noted    CP or SOB: NO  Exercise: reports, walks 5,000 steps per day    EK2019   Normal sinus rhythm   Possible Left atrial enlargement   ST & T wave abnormality, consider inferior ischemia   Abnormal ECG   When compared with ECG of 07-MAY-2019 09:51,   Nonspecific T wave abnormality now evident in Anterior leads      Neuro/Psych:   (+) neuromuscular disease (poncho sciatica with CBP. S/p multiple lumbar surgeries with hardware, last  ):, headaches (last early ): migraine headaches, psychiatric history:depression/anxiety             GI/Hepatic/Renal:   (+) GERD:, renal disease (passed kidney stone): kidney stones,          ROS comment: S/p lyle. Endo/Other:    (+) Diabetes (last HbA1c was 5.4, 2019)Type II DM, using insulin, blood dyscrasia (HGB: 11.7, HCT: 36.1.  T&S per surgeon)::., .          Pt had PAT visit. Abdominal:           Vascular:   + PVD, aortic or cerebral, . ROS comment:  right lower extremity pain with ambulation. S/p  atherectomy in PTA of the right common femoral, 5/2019    Peripheral cath 6/2019  RIGHT CFA LESION NOTED 60%   REST OF THE RIGHT LEG AS BRISK FLOW  RIGHT PROFUNDA/SFA/POPLITEAL PATENT  BELOW KNEE 3 V PATENT  2 V TO RIGHT FOOT  NO COMPLICATIONS  RIGHT RADIAL APPROACH  WILL DISCUSS WITH CV TEAM MAY NEED ENDARECTOMY     . Anesthesia Plan      general     ASA 3       Induction: intravenous. arterial line  MIPS: Postoperative opioids intended. Anesthetic plan and risks discussed with patient. Plan discussed with CRNA. Attending anesthesiologist reviewed and agrees with Pre Eval content            Tamia Bernard, SARI - CNP Anesthesia Evaluation will follow by a certified practitioner prior to surgery.   6/10/2019

## 2019-06-11 ENCOUNTER — ANESTHESIA (OUTPATIENT)
Dept: OPERATING ROOM | Age: 54
DRG: 253 | End: 2019-06-11
Payer: COMMERCIAL

## 2019-06-11 ENCOUNTER — HOSPITAL ENCOUNTER (INPATIENT)
Age: 54
LOS: 1 days | Discharge: HOME | DRG: 253 | End: 2019-06-12
Attending: THORACIC SURGERY (CARDIOTHORACIC VASCULAR SURGERY) | Admitting: THORACIC SURGERY (CARDIOTHORACIC VASCULAR SURGERY)
Payer: COMMERCIAL

## 2019-06-11 VITALS
DIASTOLIC BLOOD PRESSURE: 75 MMHG | TEMPERATURE: 96.3 F | OXYGEN SATURATION: 97 % | RESPIRATION RATE: 14 BRPM | SYSTOLIC BLOOD PRESSURE: 162 MMHG

## 2019-06-11 PROBLEM — I70.209 FEMORAL ARTERY OCCLUSION (HCC): Status: ACTIVE | Noted: 2019-06-11

## 2019-06-11 LAB
GLUCOSE BLD-MCNC: 100 MG/DL (ref 70–99)
GLUCOSE BLD-MCNC: 114 MG/DL (ref 70–99)
GLUCOSE BLD-MCNC: 130 MG/DL (ref 70–99)
GLUCOSE BLD-MCNC: 226 MG/DL (ref 70–99)

## 2019-06-11 PROCEDURE — 04CK0ZZ EXTIRPATION OF MATTER FROM RIGHT FEMORAL ARTERY, OPEN APPROACH: ICD-10-PCS | Performed by: THORACIC SURGERY (CARDIOTHORACIC VASCULAR SURGERY)

## 2019-06-11 PROCEDURE — 2500000003 HC RX 250 WO HCPCS: Performed by: NURSE ANESTHETIST, CERTIFIED REGISTERED

## 2019-06-11 PROCEDURE — 6370000000 HC RX 637 (ALT 250 FOR IP): Performed by: NURSE ANESTHETIST, CERTIFIED REGISTERED

## 2019-06-11 PROCEDURE — 6360000002 HC RX W HCPCS: Performed by: THORACIC SURGERY (CARDIOTHORACIC VASCULAR SURGERY)

## 2019-06-11 PROCEDURE — 04UK0JZ SUPPLEMENT RIGHT FEMORAL ARTERY WITH SYNTHETIC SUBSTITUTE, OPEN APPROACH: ICD-10-PCS | Performed by: THORACIC SURGERY (CARDIOTHORACIC VASCULAR SURGERY)

## 2019-06-11 PROCEDURE — 7100000000 HC PACU RECOVERY - FIRST 15 MIN: Performed by: THORACIC SURGERY (CARDIOTHORACIC VASCULAR SURGERY)

## 2019-06-11 PROCEDURE — 6360000002 HC RX W HCPCS: Performed by: ANESTHESIOLOGY

## 2019-06-11 PROCEDURE — 2580000003 HC RX 258: Performed by: PHYSICIAN ASSISTANT

## 2019-06-11 PROCEDURE — 6370000000 HC RX 637 (ALT 250 FOR IP)

## 2019-06-11 PROCEDURE — 36569 INSJ PICC 5 YR+ W/O IMAGING: CPT

## 2019-06-11 PROCEDURE — 7100000001 HC PACU RECOVERY - ADDTL 15 MIN: Performed by: THORACIC SURGERY (CARDIOTHORACIC VASCULAR SURGERY)

## 2019-06-11 PROCEDURE — 2100000000 HC CCU R&B

## 2019-06-11 PROCEDURE — 94761 N-INVAS EAR/PLS OXIMETRY MLT: CPT

## 2019-06-11 PROCEDURE — 6370000000 HC RX 637 (ALT 250 FOR IP): Performed by: THORACIC SURGERY (CARDIOTHORACIC VASCULAR SURGERY)

## 2019-06-11 PROCEDURE — 2580000003 HC RX 258: Performed by: THORACIC SURGERY (CARDIOTHORACIC VASCULAR SURGERY)

## 2019-06-11 PROCEDURE — 3600000004 HC SURGERY LEVEL 4 BASE: Performed by: THORACIC SURGERY (CARDIOTHORACIC VASCULAR SURGERY)

## 2019-06-11 PROCEDURE — 3700000001 HC ADD 15 MINUTES (ANESTHESIA): Performed by: THORACIC SURGERY (CARDIOTHORACIC VASCULAR SURGERY)

## 2019-06-11 PROCEDURE — 2709999900 HC NON-CHARGEABLE SUPPLY: Performed by: THORACIC SURGERY (CARDIOTHORACIC VASCULAR SURGERY)

## 2019-06-11 PROCEDURE — 2580000003 HC RX 258: Performed by: ANESTHESIOLOGY

## 2019-06-11 PROCEDURE — 6360000002 HC RX W HCPCS: Performed by: NURSE ANESTHETIST, CERTIFIED REGISTERED

## 2019-06-11 PROCEDURE — 6370000000 HC RX 637 (ALT 250 FOR IP): Performed by: PHYSICIAN ASSISTANT

## 2019-06-11 PROCEDURE — C1768 GRAFT, VASCULAR: HCPCS | Performed by: THORACIC SURGERY (CARDIOTHORACIC VASCULAR SURGERY)

## 2019-06-11 PROCEDURE — 2780000010 HC IMPLANT OTHER: Performed by: THORACIC SURGERY (CARDIOTHORACIC VASCULAR SURGERY)

## 2019-06-11 PROCEDURE — 82962 GLUCOSE BLOOD TEST: CPT

## 2019-06-11 PROCEDURE — 3600000014 HC SURGERY LEVEL 4 ADDTL 15MIN: Performed by: THORACIC SURGERY (CARDIOTHORACIC VASCULAR SURGERY)

## 2019-06-11 PROCEDURE — 76937 US GUIDE VASCULAR ACCESS: CPT

## 2019-06-11 PROCEDURE — 36592 COLLECT BLOOD FROM PICC: CPT

## 2019-06-11 PROCEDURE — 3700000000 HC ANESTHESIA ATTENDED CARE: Performed by: THORACIC SURGERY (CARDIOTHORACIC VASCULAR SURGERY)

## 2019-06-11 PROCEDURE — 6360000002 HC RX W HCPCS: Performed by: PHYSICIAN ASSISTANT

## 2019-06-11 PROCEDURE — 02HV33Z INSERTION OF INFUSION DEVICE INTO SUPERIOR VENA CAVA, PERCUTANEOUS APPROACH: ICD-10-PCS | Performed by: THORACIC SURGERY (CARDIOTHORACIC VASCULAR SURGERY)

## 2019-06-11 DEVICE — GRAFT VASC W1XL10CM THK6 PLY SYNTH CROSSLINKED ELAS FOR VASC: Type: IMPLANTABLE DEVICE | Site: GROIN | Status: FUNCTIONAL

## 2019-06-11 RX ORDER — VARENICLINE TARTRATE 0.5 MG/1
1 TABLET, FILM COATED ORAL 2 TIMES DAILY
Status: DISCONTINUED | OUTPATIENT
Start: 2019-06-11 | End: 2019-06-12 | Stop reason: HOSPADM

## 2019-06-11 RX ORDER — PROTAMINE SULFATE 10 MG/ML
INJECTION, SOLUTION INTRAVENOUS PRN
Status: DISCONTINUED | OUTPATIENT
Start: 2019-06-11 | End: 2019-06-11 | Stop reason: SDUPTHER

## 2019-06-11 RX ORDER — SODIUM CHLORIDE 450 MG/100ML
INJECTION, SOLUTION INTRAVENOUS CONTINUOUS
Status: DISCONTINUED | OUTPATIENT
Start: 2019-06-11 | End: 2019-06-12

## 2019-06-11 RX ORDER — SODIUM CHLORIDE, SODIUM LACTATE, POTASSIUM CHLORIDE, CALCIUM CHLORIDE 600; 310; 30; 20 MG/100ML; MG/100ML; MG/100ML; MG/100ML
INJECTION, SOLUTION INTRAVENOUS CONTINUOUS
Status: DISCONTINUED | OUTPATIENT
Start: 2019-06-11 | End: 2019-06-11

## 2019-06-11 RX ORDER — BUSPIRONE HYDROCHLORIDE 15 MG/1
15 TABLET ORAL 3 TIMES DAILY
Status: DISCONTINUED | OUTPATIENT
Start: 2019-06-11 | End: 2019-06-12 | Stop reason: HOSPADM

## 2019-06-11 RX ORDER — ALBUTEROL SULFATE 2.5 MG/3ML
2.5 SOLUTION RESPIRATORY (INHALATION) EVERY 6 HOURS PRN
Status: DISCONTINUED | OUTPATIENT
Start: 2019-06-11 | End: 2019-06-12 | Stop reason: HOSPADM

## 2019-06-11 RX ORDER — FENTANYL CITRATE 50 UG/ML
25 INJECTION, SOLUTION INTRAMUSCULAR; INTRAVENOUS EVERY 5 MIN PRN
Status: COMPLETED | OUTPATIENT
Start: 2019-06-11 | End: 2019-06-11

## 2019-06-11 RX ORDER — CEFAZOLIN SODIUM 1 G/50ML
INJECTION, SOLUTION INTRAVENOUS
Status: COMPLETED
Start: 2019-06-11 | End: 2019-06-11

## 2019-06-11 RX ORDER — ACETAMINOPHEN 10 MG/ML
1000 INJECTION, SOLUTION INTRAVENOUS ONCE
Status: COMPLETED | OUTPATIENT
Start: 2019-06-11 | End: 2019-06-11

## 2019-06-11 RX ORDER — ATORVASTATIN CALCIUM 40 MG/1
40 TABLET, FILM COATED ORAL NIGHTLY
Status: DISCONTINUED | OUTPATIENT
Start: 2019-06-11 | End: 2019-06-12 | Stop reason: HOSPADM

## 2019-06-11 RX ORDER — ASPIRIN 81 MG/1
81 TABLET ORAL DAILY
Status: DISCONTINUED | OUTPATIENT
Start: 2019-06-12 | End: 2019-06-12 | Stop reason: HOSPADM

## 2019-06-11 RX ORDER — NICOTINE POLACRILEX 4 MG
15 LOZENGE BUCCAL PRN
Status: DISCONTINUED | OUTPATIENT
Start: 2019-06-11 | End: 2019-06-12 | Stop reason: HOSPADM

## 2019-06-11 RX ORDER — HYDROMORPHONE HCL 110MG/55ML
PATIENT CONTROLLED ANALGESIA SYRINGE INTRAVENOUS PRN
Status: DISCONTINUED | OUTPATIENT
Start: 2019-06-11 | End: 2019-06-11 | Stop reason: SDUPTHER

## 2019-06-11 RX ORDER — CARVEDILOL 12.5 MG/1
12.5 TABLET ORAL 2 TIMES DAILY WITH MEALS
Status: DISCONTINUED | OUTPATIENT
Start: 2019-06-11 | End: 2019-06-12 | Stop reason: HOSPADM

## 2019-06-11 RX ORDER — SODIUM CHLORIDE 0.9 % (FLUSH) 0.9 %
10 SYRINGE (ML) INJECTION PRN
Status: DISCONTINUED | OUTPATIENT
Start: 2019-06-11 | End: 2019-06-12 | Stop reason: HOSPADM

## 2019-06-11 RX ORDER — CITALOPRAM 40 MG/1
40 TABLET ORAL EVERY EVENING
Status: DISCONTINUED | OUTPATIENT
Start: 2019-06-11 | End: 2019-06-12 | Stop reason: HOSPADM

## 2019-06-11 RX ORDER — ACETAMINOPHEN 325 MG/1
650 TABLET ORAL EVERY 4 HOURS PRN
Status: DISCONTINUED | OUTPATIENT
Start: 2019-06-11 | End: 2019-06-12 | Stop reason: HOSPADM

## 2019-06-11 RX ORDER — FENTANYL CITRATE 50 UG/ML
25 INJECTION, SOLUTION INTRAMUSCULAR; INTRAVENOUS EVERY 5 MIN PRN
Status: DISCONTINUED | OUTPATIENT
Start: 2019-06-11 | End: 2019-06-11 | Stop reason: SDUPTHER

## 2019-06-11 RX ORDER — HYDROCODONE BITARTRATE AND ACETAMINOPHEN 5; 325 MG/1; MG/1
2 TABLET ORAL EVERY 4 HOURS PRN
Status: DISCONTINUED | OUTPATIENT
Start: 2019-06-11 | End: 2019-06-12 | Stop reason: ALTCHOICE

## 2019-06-11 RX ORDER — ESMOLOL HYDROCHLORIDE 10 MG/ML
INJECTION INTRAVENOUS PRN
Status: DISCONTINUED | OUTPATIENT
Start: 2019-06-11 | End: 2019-06-11 | Stop reason: SDUPTHER

## 2019-06-11 RX ORDER — VARENICLINE TARTRATE 1 MG/1
1 TABLET, FILM COATED ORAL 2 TIMES DAILY
COMMUNITY
End: 2019-12-05

## 2019-06-11 RX ORDER — HYDROCODONE BITARTRATE AND ACETAMINOPHEN 5; 325 MG/1; MG/1
1 TABLET ORAL EVERY 4 HOURS PRN
Status: DISCONTINUED | OUTPATIENT
Start: 2019-06-11 | End: 2019-06-12 | Stop reason: ALTCHOICE

## 2019-06-11 RX ORDER — DEXTROSE MONOHYDRATE 25 G/50ML
12.5 INJECTION, SOLUTION INTRAVENOUS PRN
Status: DISCONTINUED | OUTPATIENT
Start: 2019-06-11 | End: 2019-06-12 | Stop reason: HOSPADM

## 2019-06-11 RX ORDER — ONDANSETRON 2 MG/ML
4 INJECTION INTRAMUSCULAR; INTRAVENOUS
Status: DISCONTINUED | OUTPATIENT
Start: 2019-06-11 | End: 2019-06-11 | Stop reason: SDUPTHER

## 2019-06-11 RX ORDER — METFORMIN HYDROCHLORIDE 500 MG/1
500 TABLET, EXTENDED RELEASE ORAL 2 TIMES DAILY WITH MEALS
Status: DISCONTINUED | OUTPATIENT
Start: 2019-06-12 | End: 2019-06-12 | Stop reason: HOSPADM

## 2019-06-11 RX ORDER — DEXAMETHASONE SODIUM PHOSPHATE 4 MG/ML
INJECTION, SOLUTION INTRA-ARTICULAR; INTRALESIONAL; INTRAMUSCULAR; INTRAVENOUS; SOFT TISSUE PRN
Status: DISCONTINUED | OUTPATIENT
Start: 2019-06-11 | End: 2019-06-11 | Stop reason: SDUPTHER

## 2019-06-11 RX ORDER — VECURONIUM BROMIDE 1 MG/ML
INJECTION, POWDER, LYOPHILIZED, FOR SOLUTION INTRAVENOUS PRN
Status: DISCONTINUED | OUTPATIENT
Start: 2019-06-11 | End: 2019-06-11 | Stop reason: SDUPTHER

## 2019-06-11 RX ORDER — HYDRALAZINE HYDROCHLORIDE 20 MG/ML
5 INJECTION INTRAMUSCULAR; INTRAVENOUS EVERY 10 MIN PRN
Status: DISCONTINUED | OUTPATIENT
Start: 2019-06-11 | End: 2019-06-11 | Stop reason: HOSPADM

## 2019-06-11 RX ORDER — PHENYLEPHRINE HYDROCHLORIDE 10 MG/ML
INJECTION INTRAVENOUS PRN
Status: DISCONTINUED | OUTPATIENT
Start: 2019-06-11 | End: 2019-06-11 | Stop reason: SDUPTHER

## 2019-06-11 RX ORDER — CEFAZOLIN SODIUM 2 G/100ML
2 INJECTION, SOLUTION INTRAVENOUS EVERY 8 HOURS
Status: COMPLETED | OUTPATIENT
Start: 2019-06-11 | End: 2019-06-12

## 2019-06-11 RX ORDER — LISINOPRIL 5 MG/1
5 TABLET ORAL EVERY EVENING
Status: DISCONTINUED | OUTPATIENT
Start: 2019-06-11 | End: 2019-06-12 | Stop reason: HOSPADM

## 2019-06-11 RX ORDER — PROPOFOL 10 MG/ML
INJECTION, EMULSION INTRAVENOUS CONTINUOUS PRN
Status: DISCONTINUED | OUTPATIENT
Start: 2019-06-11 | End: 2019-06-11 | Stop reason: SDUPTHER

## 2019-06-11 RX ORDER — MORPHINE SULFATE 4 MG/ML
2 INJECTION, SOLUTION INTRAMUSCULAR; INTRAVENOUS ONCE
Status: DISCONTINUED | OUTPATIENT
Start: 2019-06-11 | End: 2019-06-12 | Stop reason: HOSPADM

## 2019-06-11 RX ORDER — ASPIRIN 81 MG/1
81 TABLET ORAL DAILY
Status: DISCONTINUED | OUTPATIENT
Start: 2019-06-11 | End: 2019-06-11 | Stop reason: SDUPTHER

## 2019-06-11 RX ORDER — MORPHINE SULFATE 4 MG/ML
2 INJECTION, SOLUTION INTRAMUSCULAR; INTRAVENOUS
Status: COMPLETED | OUTPATIENT
Start: 2019-06-11 | End: 2019-06-11

## 2019-06-11 RX ORDER — PROMETHAZINE HYDROCHLORIDE 25 MG/ML
INJECTION, SOLUTION INTRAMUSCULAR; INTRAVENOUS PRN
Status: DISCONTINUED | OUTPATIENT
Start: 2019-06-11 | End: 2019-06-11 | Stop reason: SDUPTHER

## 2019-06-11 RX ORDER — ONDANSETRON 2 MG/ML
4 INJECTION INTRAMUSCULAR; INTRAVENOUS EVERY 6 HOURS PRN
Status: DISCONTINUED | OUTPATIENT
Start: 2019-06-11 | End: 2019-06-12 | Stop reason: HOSPADM

## 2019-06-11 RX ORDER — KETAMINE HYDROCHLORIDE 10 MG/ML
INJECTION, SOLUTION INTRAMUSCULAR; INTRAVENOUS PRN
Status: DISCONTINUED | OUTPATIENT
Start: 2019-06-11 | End: 2019-06-11 | Stop reason: SDUPTHER

## 2019-06-11 RX ORDER — HEPARIN SODIUM 1000 [USP'U]/ML
INJECTION, SOLUTION INTRAVENOUS; SUBCUTANEOUS PRN
Status: DISCONTINUED | OUTPATIENT
Start: 2019-06-11 | End: 2019-06-11 | Stop reason: SDUPTHER

## 2019-06-11 RX ORDER — SCOLOPAMINE TRANSDERMAL SYSTEM 1 MG/1
PATCH, EXTENDED RELEASE TRANSDERMAL PRN
Status: DISCONTINUED | OUTPATIENT
Start: 2019-06-11 | End: 2019-06-11 | Stop reason: SDUPTHER

## 2019-06-11 RX ORDER — ISOSORBIDE MONONITRATE 30 MG/1
30 TABLET, EXTENDED RELEASE ORAL DAILY
Status: DISCONTINUED | OUTPATIENT
Start: 2019-06-12 | End: 2019-06-12 | Stop reason: HOSPADM

## 2019-06-11 RX ORDER — SODIUM CHLORIDE 0.9 % (FLUSH) 0.9 %
10 SYRINGE (ML) INJECTION EVERY 12 HOURS SCHEDULED
Status: DISCONTINUED | OUTPATIENT
Start: 2019-06-11 | End: 2019-06-12 | Stop reason: HOSPADM

## 2019-06-11 RX ORDER — PROPOFOL 10 MG/ML
INJECTION, EMULSION INTRAVENOUS PRN
Status: DISCONTINUED | OUTPATIENT
Start: 2019-06-11 | End: 2019-06-11 | Stop reason: SDUPTHER

## 2019-06-11 RX ORDER — SODIUM CHLORIDE, SODIUM LACTATE, POTASSIUM CHLORIDE, CALCIUM CHLORIDE 600; 310; 30; 20 MG/100ML; MG/100ML; MG/100ML; MG/100ML
INJECTION, SOLUTION INTRAVENOUS
Status: COMPLETED
Start: 2019-06-11 | End: 2019-06-11

## 2019-06-11 RX ORDER — HYDRALAZINE HYDROCHLORIDE 20 MG/ML
5 INJECTION INTRAMUSCULAR; INTRAVENOUS EVERY 10 MIN PRN
Status: DISCONTINUED | OUTPATIENT
Start: 2019-06-11 | End: 2019-06-11 | Stop reason: SDUPTHER

## 2019-06-11 RX ORDER — DEXTROSE MONOHYDRATE 50 MG/ML
100 INJECTION, SOLUTION INTRAVENOUS PRN
Status: DISCONTINUED | OUTPATIENT
Start: 2019-06-11 | End: 2019-06-12 | Stop reason: HOSPADM

## 2019-06-11 RX ORDER — CEFAZOLIN SODIUM 1 G/50ML
1 INJECTION, SOLUTION INTRAVENOUS ONCE
Status: COMPLETED | OUTPATIENT
Start: 2019-06-11 | End: 2019-06-11

## 2019-06-11 RX ORDER — ONDANSETRON 2 MG/ML
4 INJECTION INTRAMUSCULAR; INTRAVENOUS
Status: DISCONTINUED | OUTPATIENT
Start: 2019-06-11 | End: 2019-06-11 | Stop reason: HOSPADM

## 2019-06-11 RX ORDER — FUROSEMIDE 20 MG/1
20 TABLET ORAL DAILY
Status: DISCONTINUED | OUTPATIENT
Start: 2019-06-11 | End: 2019-06-12 | Stop reason: HOSPADM

## 2019-06-11 RX ORDER — RANOLAZINE 500 MG/1
1000 TABLET, EXTENDED RELEASE ORAL 2 TIMES DAILY
Status: DISCONTINUED | OUTPATIENT
Start: 2019-06-11 | End: 2019-06-12 | Stop reason: HOSPADM

## 2019-06-11 RX ORDER — LIDOCAINE HYDROCHLORIDE 20 MG/ML
INJECTION, SOLUTION INTRAVENOUS PRN
Status: DISCONTINUED | OUTPATIENT
Start: 2019-06-11 | End: 2019-06-11 | Stop reason: SDUPTHER

## 2019-06-11 RX ADMIN — MORPHINE SULFATE 2 MG: 4 INJECTION, SOLUTION INTRAMUSCULAR; INTRAVENOUS at 23:45

## 2019-06-11 RX ADMIN — HEPARIN SODIUM 6000 UNITS: 1000 INJECTION, SOLUTION INTRAVENOUS; SUBCUTANEOUS at 14:44

## 2019-06-11 RX ADMIN — VECURONIUM BROMIDE FOR INJECTION 2 MG: 1 INJECTION, POWDER, LYOPHILIZED, FOR SOLUTION INTRAVENOUS at 15:09

## 2019-06-11 RX ADMIN — ESMOLOL HYDROCHLORIDE 10 MG: 10 INJECTION, SOLUTION INTRAVENOUS at 14:30

## 2019-06-11 RX ADMIN — SCOPALAMINE 1 PATCH: 1 PATCH, EXTENDED RELEASE TRANSDERMAL at 13:56

## 2019-06-11 RX ADMIN — SODIUM CHLORIDE, POTASSIUM CHLORIDE, SODIUM LACTATE AND CALCIUM CHLORIDE: 600; 310; 30; 20 INJECTION, SOLUTION INTRAVENOUS at 15:10

## 2019-06-11 RX ADMIN — PROPOFOL 50 MG: 10 INJECTION, EMULSION INTRAVENOUS at 14:08

## 2019-06-11 RX ADMIN — CITALOPRAM HYDROBROMIDE 40 MG: 40 TABLET ORAL at 18:06

## 2019-06-11 RX ADMIN — PHENYLEPHRINE HYDROCHLORIDE 100 MCG: 10 INJECTION INTRAVENOUS at 15:22

## 2019-06-11 RX ADMIN — HYDROCODONE BITARTRATE AND ACETAMINOPHEN 2 TABLET: 5; 325 TABLET ORAL at 21:19

## 2019-06-11 RX ADMIN — PHENYLEPHRINE HYDROCHLORIDE 100 MCG: 10 INJECTION INTRAVENOUS at 14:07

## 2019-06-11 RX ADMIN — HYDROMORPHONE HYDROCHLORIDE 1 MG: 2 INJECTION INTRAMUSCULAR; INTRAVENOUS; SUBCUTANEOUS at 14:04

## 2019-06-11 RX ADMIN — SODIUM CHLORIDE, PRESERVATIVE FREE 10 ML: 5 INJECTION INTRAVENOUS at 21:25

## 2019-06-11 RX ADMIN — SUGAMMADEX 150 MG: 100 INJECTION, SOLUTION INTRAVENOUS at 15:57

## 2019-06-11 RX ADMIN — VECURONIUM BROMIDE FOR INJECTION 5 MG: 1 INJECTION, POWDER, LYOPHILIZED, FOR SOLUTION INTRAVENOUS at 14:05

## 2019-06-11 RX ADMIN — KETAMINE HYDROCHLORIDE 10 MG: 10 INJECTION INTRAMUSCULAR; INTRAVENOUS at 14:50

## 2019-06-11 RX ADMIN — PROTAMINE SULFATE 25 MG: 10 INJECTION, SOLUTION INTRAVENOUS at 15:40

## 2019-06-11 RX ADMIN — PHENYLEPHRINE HYDROCHLORIDE 100 MCG: 10 INJECTION INTRAVENOUS at 15:30

## 2019-06-11 RX ADMIN — KETAMINE HYDROCHLORIDE 10 MG: 10 INJECTION INTRAMUSCULAR; INTRAVENOUS at 14:35

## 2019-06-11 RX ADMIN — PROTAMINE SULFATE 25 MG: 10 INJECTION, SOLUTION INTRAVENOUS at 15:27

## 2019-06-11 RX ADMIN — ACETAMINOPHEN 1000 MG: 10 INJECTION, SOLUTION INTRAVENOUS at 14:30

## 2019-06-11 RX ADMIN — PHENYLEPHRINE HYDROCHLORIDE 100 MCG: 10 INJECTION INTRAVENOUS at 15:09

## 2019-06-11 RX ADMIN — LIDOCAINE HYDROCHLORIDE 100 MG: 20 INJECTION, SOLUTION INTRAVENOUS at 14:04

## 2019-06-11 RX ADMIN — INSULIN LISPRO 3 UNITS: 100 INJECTION, SOLUTION INTRAVENOUS; SUBCUTANEOUS at 21:20

## 2019-06-11 RX ADMIN — PROPOFOL 150 MG: 10 INJECTION, EMULSION INTRAVENOUS at 14:04

## 2019-06-11 RX ADMIN — FENTANYL CITRATE 25 MCG: 50 INJECTION INTRAMUSCULAR; INTRAVENOUS at 16:50

## 2019-06-11 RX ADMIN — PHENYLEPHRINE HYDROCHLORIDE 100 MCG: 10 INJECTION INTRAVENOUS at 15:35

## 2019-06-11 RX ADMIN — PHENYLEPHRINE HYDROCHLORIDE 100 MCG: 10 INJECTION INTRAVENOUS at 15:15

## 2019-06-11 RX ADMIN — PHENYLEPHRINE HYDROCHLORIDE 100 MCG: 10 INJECTION INTRAVENOUS at 14:48

## 2019-06-11 RX ADMIN — ESMOLOL HYDROCHLORIDE 40 MG: 10 INJECTION, SOLUTION INTRAVENOUS at 14:53

## 2019-06-11 RX ADMIN — HYDROMORPHONE HYDROCHLORIDE 1 MG: 2 INJECTION INTRAMUSCULAR; INTRAVENOUS; SUBCUTANEOUS at 14:34

## 2019-06-11 RX ADMIN — FENTANYL CITRATE 25 MCG: 50 INJECTION INTRAMUSCULAR; INTRAVENOUS at 16:32

## 2019-06-11 RX ADMIN — VECURONIUM BROMIDE FOR INJECTION 3 MG: 1 INJECTION, POWDER, LYOPHILIZED, FOR SOLUTION INTRAVENOUS at 14:33

## 2019-06-11 RX ADMIN — ESMOLOL HYDROCHLORIDE 30 MG: 10 INJECTION, SOLUTION INTRAVENOUS at 14:16

## 2019-06-11 RX ADMIN — ESMOLOL HYDROCHLORIDE 20 MG: 10 INJECTION, SOLUTION INTRAVENOUS at 14:13

## 2019-06-11 RX ADMIN — PHENYLEPHRINE HYDROCHLORIDE 100 MCG: 10 INJECTION INTRAVENOUS at 15:46

## 2019-06-11 RX ADMIN — SODIUM CHLORIDE, POTASSIUM CHLORIDE, SODIUM LACTATE AND CALCIUM CHLORIDE: 600; 310; 30; 20 INJECTION, SOLUTION INTRAVENOUS at 13:55

## 2019-06-11 RX ADMIN — RANOLAZINE 1000 MG: 500 TABLET, FILM COATED, EXTENDED RELEASE ORAL at 20:01

## 2019-06-11 RX ADMIN — CEFAZOLIN SODIUM 1 G: 1 INJECTION, SOLUTION INTRAVENOUS at 14:15

## 2019-06-11 RX ADMIN — KETAMINE HYDROCHLORIDE 20 MG: 10 INJECTION INTRAMUSCULAR; INTRAVENOUS at 14:25

## 2019-06-11 RX ADMIN — ATORVASTATIN CALCIUM 40 MG: 40 TABLET, FILM COATED ORAL at 20:01

## 2019-06-11 RX ADMIN — PHENYLEPHRINE HYDROCHLORIDE 100 MCG: 10 INJECTION INTRAVENOUS at 15:32

## 2019-06-11 RX ADMIN — HYDROMORPHONE HYDROCHLORIDE 0.4 MG: 2 INJECTION INTRAMUSCULAR; INTRAVENOUS; SUBCUTANEOUS at 14:57

## 2019-06-11 RX ADMIN — SODIUM CHLORIDE: 4.5 INJECTION, SOLUTION INTRAVENOUS at 17:06

## 2019-06-11 RX ADMIN — PHENYLEPHRINE HYDROCHLORIDE 100 MCG: 10 INJECTION INTRAVENOUS at 15:40

## 2019-06-11 RX ADMIN — PHENYLEPHRINE HYDROCHLORIDE 100 MCG: 10 INJECTION INTRAVENOUS at 15:24

## 2019-06-11 RX ADMIN — PROMETHAZINE HYDROCHLORIDE 6.25 MG: 25 INJECTION INTRAMUSCULAR; INTRAVENOUS at 15:53

## 2019-06-11 RX ADMIN — BUSPIRONE HYDROCHLORIDE 15 MG: 15 TABLET ORAL at 21:24

## 2019-06-11 RX ADMIN — MORPHINE SULFATE 2 MG: 4 INJECTION, SOLUTION INTRAMUSCULAR; INTRAVENOUS at 20:01

## 2019-06-11 RX ADMIN — PROPOFOL 50 MCG/KG/MIN: 10 INJECTION, EMULSION INTRAVENOUS at 14:05

## 2019-06-11 RX ADMIN — PHENYLEPHRINE HYDROCHLORIDE 100 MCG: 10 INJECTION INTRAVENOUS at 14:32

## 2019-06-11 RX ADMIN — DEXAMETHASONE SODIUM PHOSPHATE 8 MG: 4 INJECTION, SOLUTION INTRAMUSCULAR; INTRAVENOUS at 14:04

## 2019-06-11 RX ADMIN — FENTANYL CITRATE 25 MCG: 50 INJECTION INTRAMUSCULAR; INTRAVENOUS at 17:01

## 2019-06-11 RX ADMIN — KETAMINE HYDROCHLORIDE 10 MG: 10 INJECTION INTRAMUSCULAR; INTRAVENOUS at 14:45

## 2019-06-11 RX ADMIN — CEFAZOLIN SODIUM 2 G: 2 INJECTION, SOLUTION INTRAVENOUS at 22:18

## 2019-06-11 RX ADMIN — MORPHINE SULFATE 2 MG: 4 INJECTION, SOLUTION INTRAMUSCULAR; INTRAVENOUS at 18:31

## 2019-06-11 RX ADMIN — FENTANYL CITRATE 25 MCG: 50 INJECTION INTRAMUSCULAR; INTRAVENOUS at 16:40

## 2019-06-11 RX ADMIN — VARENICLINE TARTRATE 1 MG: 0.5 TABLET, FILM COATED ORAL at 21:19

## 2019-06-11 ASSESSMENT — PULMONARY FUNCTION TESTS
PIF_VALUE: 21
PIF_VALUE: 22
PIF_VALUE: 21
PIF_VALUE: 1
PIF_VALUE: 21
PIF_VALUE: 22
PIF_VALUE: 20
PIF_VALUE: 21
PIF_VALUE: 23
PIF_VALUE: 23
PIF_VALUE: 26
PIF_VALUE: 0
PIF_VALUE: 15
PIF_VALUE: 21
PIF_VALUE: 22
PIF_VALUE: 21
PIF_VALUE: 20
PIF_VALUE: 22
PIF_VALUE: 19
PIF_VALUE: 23
PIF_VALUE: 22
PIF_VALUE: 21
PIF_VALUE: 21
PIF_VALUE: 22
PIF_VALUE: 21
PIF_VALUE: 21
PIF_VALUE: 6
PIF_VALUE: 19
PIF_VALUE: 32
PIF_VALUE: 23
PIF_VALUE: 27
PIF_VALUE: 22
PIF_VALUE: 28
PIF_VALUE: 23
PIF_VALUE: 21
PIF_VALUE: 2
PIF_VALUE: 21
PIF_VALUE: 22
PIF_VALUE: 22
PIF_VALUE: 28
PIF_VALUE: 15
PIF_VALUE: 22
PIF_VALUE: 23
PIF_VALUE: 24
PIF_VALUE: 0
PIF_VALUE: 22
PIF_VALUE: 23
PIF_VALUE: 21
PIF_VALUE: 21
PIF_VALUE: 0
PIF_VALUE: 22
PIF_VALUE: 15
PIF_VALUE: 22
PIF_VALUE: 27
PIF_VALUE: 23
PIF_VALUE: 22
PIF_VALUE: 0
PIF_VALUE: 23
PIF_VALUE: 22
PIF_VALUE: 23
PIF_VALUE: 21
PIF_VALUE: 22
PIF_VALUE: 19
PIF_VALUE: 22
PIF_VALUE: 23
PIF_VALUE: 22
PIF_VALUE: 1
PIF_VALUE: 22
PIF_VALUE: 23
PIF_VALUE: 23
PIF_VALUE: 22
PIF_VALUE: 22
PIF_VALUE: 0
PIF_VALUE: 22
PIF_VALUE: 21
PIF_VALUE: 22
PIF_VALUE: 23
PIF_VALUE: 15
PIF_VALUE: 24
PIF_VALUE: 22
PIF_VALUE: 23
PIF_VALUE: 0
PIF_VALUE: 22
PIF_VALUE: 23
PIF_VALUE: 23
PIF_VALUE: 24
PIF_VALUE: 6
PIF_VALUE: 23
PIF_VALUE: 21
PIF_VALUE: 0
PIF_VALUE: 29
PIF_VALUE: 21
PIF_VALUE: 23
PIF_VALUE: 22
PIF_VALUE: 0
PIF_VALUE: 5
PIF_VALUE: 23
PIF_VALUE: 20
PIF_VALUE: 22
PIF_VALUE: 22
PIF_VALUE: 23
PIF_VALUE: 21
PIF_VALUE: 22
PIF_VALUE: 22
PIF_VALUE: 21
PIF_VALUE: 22
PIF_VALUE: 21
PIF_VALUE: 22
PIF_VALUE: 22
PIF_VALUE: 28
PIF_VALUE: 22
PIF_VALUE: 20
PIF_VALUE: 22
PIF_VALUE: 22
PIF_VALUE: 21
PIF_VALUE: 23
PIF_VALUE: 4
PIF_VALUE: 22
PIF_VALUE: 23
PIF_VALUE: 7
PIF_VALUE: 22
PIF_VALUE: 22
PIF_VALUE: 0
PIF_VALUE: 22
PIF_VALUE: 16
PIF_VALUE: 22
PIF_VALUE: 1
PIF_VALUE: 0

## 2019-06-11 ASSESSMENT — PAIN DESCRIPTION - LOCATION
LOCATION: GROIN
LOCATION: GROIN;LEG
LOCATION: GROIN;LEG

## 2019-06-11 ASSESSMENT — PAIN DESCRIPTION - FREQUENCY
FREQUENCY: CONTINUOUS

## 2019-06-11 ASSESSMENT — PAIN DESCRIPTION - PAIN TYPE
TYPE: ACUTE PAIN;SURGICAL PAIN
TYPE: SURGICAL PAIN
TYPE: ACUTE PAIN;SURGICAL PAIN

## 2019-06-11 ASSESSMENT — PAIN DESCRIPTION - ORIENTATION
ORIENTATION: RIGHT

## 2019-06-11 ASSESSMENT — PAIN DESCRIPTION - PROGRESSION
CLINICAL_PROGRESSION: GRADUALLY WORSENING
CLINICAL_PROGRESSION: OTHER (COMMENT)
CLINICAL_PROGRESSION: RAPIDLY WORSENING
CLINICAL_PROGRESSION: GRADUALLY WORSENING

## 2019-06-11 ASSESSMENT — PAIN - FUNCTIONAL ASSESSMENT
PAIN_FUNCTIONAL_ASSESSMENT: PREVENTS OR INTERFERES SOME ACTIVE ACTIVITIES AND ADLS
PAIN_FUNCTIONAL_ASSESSMENT: 0-10
PAIN_FUNCTIONAL_ASSESSMENT: PREVENTS OR INTERFERES SOME ACTIVE ACTIVITIES AND ADLS
PAIN_FUNCTIONAL_ASSESSMENT: PREVENTS OR INTERFERES WITH ALL ACTIVE AND SOME PASSIVE ACTIVITIES
PAIN_FUNCTIONAL_ASSESSMENT: PREVENTS OR INTERFERES SOME ACTIVE ACTIVITIES AND ADLS
PAIN_FUNCTIONAL_ASSESSMENT: PREVENTS OR INTERFERES SOME ACTIVE ACTIVITIES AND ADLS

## 2019-06-11 ASSESSMENT — PAIN SCALES - GENERAL
PAINLEVEL_OUTOF10: 6
PAINLEVEL_OUTOF10: 5
PAINLEVEL_OUTOF10: 3
PAINLEVEL_OUTOF10: 4
PAINLEVEL_OUTOF10: 8
PAINLEVEL_OUTOF10: 6
PAINLEVEL_OUTOF10: 4
PAINLEVEL_OUTOF10: 8
PAINLEVEL_OUTOF10: 8
PAINLEVEL_OUTOF10: 6
PAINLEVEL_OUTOF10: 8
PAINLEVEL_OUTOF10: 8

## 2019-06-11 ASSESSMENT — PAIN DESCRIPTION - DESCRIPTORS
DESCRIPTORS: ACHING
DESCRIPTORS: DISCOMFORT
DESCRIPTORS: ACHING
DESCRIPTORS: ACHING
DESCRIPTORS: ACHING;DISCOMFORT
DESCRIPTORS: ACHING
DESCRIPTORS: ACHING;SORE
DESCRIPTORS: ACHING
DESCRIPTORS: DISCOMFORT
DESCRIPTORS: ACHING;SORE

## 2019-06-11 ASSESSMENT — PAIN DESCRIPTION - ONSET
ONSET: ON-GOING

## 2019-06-11 NOTE — ANESTHESIA PROCEDURE NOTES
Arterial Line:    An arterial line was placed using ultrasound guidance, in the OR for the following indication(s): continuous blood pressure monitoring and blood sampling needed. A 20 gauge (size), (length), Arrow (type) catheter was placed, Seldinger technique not used, into the right radial artery, secured by tape and Tegaderm. Anesthesia type: General    Events:  patient tolerated procedure well with no complications.     Additional notes:  SRNA used ultrasound to place successfully  6/11/2019 1:56 PM6/11/2019 1:57 PM  Anesthesiologist: Adrianne Barboza MD  Resident/CRNA: SARI Alatorre CRNA  Preanesthetic Checklist  Completed: patient identified, site marked, pre-op evaluation, timeout performed, IV checked, risks and benefits discussed, monitors and equipment checked, anesthesia consent given, oxygen available and patient being monitored

## 2019-06-11 NOTE — ANESTHESIA POSTPROCEDURE EVALUATION
Department of Anesthesiology  Postprocedure Note    Patient: Maxine Jason  MRN: [id number]  YOB: 1965  Date of evaluation: 6/11/2019  Time:  7:31 PM     Procedure Summary     Date:  06/11/19 Room / Location:  [place] [address] 06 / [place] [address]    Anesthesia Start:  8348 Anesthesia Stop:  2472    Procedure:  RIGHT FEMORAL ENDARTERECTOMY WITH CORMATRIX PATCH (Right ) Diagnosis:  (pvd)    Surgeon:  Herminia George MD Responsible Provider:  Mary Garcia MD    Anesthesia Type:  general ASA Status:  3          Anesthesia Type: general    Jonh Phase I: Jonh Score: 10    Jonh Phase II:      Last vitals: Reviewed and per EMR flowsheets.        Anesthesia Post Evaluation    Patient location during evaluation: PACU  Patient participation: complete - patient participated  Level of consciousness: awake  Airway patency: patent  Nausea & Vomiting: no vomiting and no nausea  Complications: no  Cardiovascular status: blood pressure returned to baseline  Respiratory status: acceptable

## 2019-06-11 NOTE — PROGRESS NOTES
Pt arrive to 2002 from PACU, Placed on CVICU bed and connected to CVICU monitors, assessment completed see documentation, security code given to patient's  who is at bedside. WIll continue to monitor closely.

## 2019-06-11 NOTE — PROGRESS NOTES
Dr. Maricruz Marx at bedside, [facility] for patient to get up now, orders for consult to Dr. Sd Mendosa d/t being an established patient to his service. Will continue to monitor closely.

## 2019-06-11 NOTE — PLAN OF CARE
Problem: Pain:  Goal: Pain level will decrease  Description  Pain level will decrease  Outcome: Ongoing  Goal: Control of acute pain  Description  Control of acute pain  Outcome: Ongoing  Goal: Control of chronic pain  Description  Control of chronic pain  Outcome: Ongoing     Problem:  Bowel/Gastric:  Goal: Ability to prevent future episodes of altered bowel function as condition permits will improve  Description  Ability to prevent future episodes of altered bowel function as condition permits will improve  Outcome: Ongoing     Problem: Cardiac:  Goal: Ability to maintain an adequate cardiac output will improve  Description  Ability to maintain an adequate cardiac output will improve  Outcome: Ongoing     Problem: Coping:  Goal: Level of anxiety will decrease  Description  Level of anxiety will decrease  Outcome: Ongoing     Problem: Fluid Volume:  Goal: Will show no signs or symptoms of fluid imbalance  Description  Will show no signs or symptoms of fluid imbalance  Outcome: Ongoing     Problem: Physical Regulation:  Goal: Complications related to the disease process, condition or treatment will be avoided or minimized  Description  Complications related to the disease process, condition or treatment will be avoided or minimized  Outcome: Ongoing     Problem: Respiratory:  Goal: Ability to maintain adequate ventilation will improve  Outcome: Ongoing     Problem: Safety:  Goal: Ability to remain free from injury will improve  Outcome: Ongoing     Problem: Sensory:  Goal: Pain level will decrease  Description  Pain level will decrease  Outcome: Ongoing     Problem: Skin Integrity:  Goal: Skin integrity will be maintained  Outcome: Ongoing     Problem: Tissue Perfusion:  Goal: Risk of venous thrombosis will decrease  Outcome: Ongoing     Problem: Urinary Elimination:  Goal: Ability to reestablish a normal urinary elimination pattern will improve - after catheter removal  Outcome: Ongoing

## 2019-06-12 VITALS
BODY MASS INDEX: 27.67 KG/M2 | SYSTOLIC BLOOD PRESSURE: 100 MMHG | DIASTOLIC BLOOD PRESSURE: 52 MMHG | TEMPERATURE: 97.7 F | HEIGHT: 66 IN | WEIGHT: 172.18 LBS | RESPIRATION RATE: 16 BRPM | HEART RATE: 76 BPM | OXYGEN SATURATION: 99 %

## 2019-06-12 LAB
ANION GAP SERPL CALCULATED.3IONS-SCNC: 12 MMOL/L (ref 4–16)
BASOPHILS ABSOLUTE: 0 K/CU MM
BASOPHILS RELATIVE PERCENT: 0.2 % (ref 0–1)
BUN BLDV-MCNC: 13 MG/DL (ref 6–23)
CALCIUM SERPL-MCNC: 9.1 MG/DL (ref 8.3–10.6)
CHLORIDE BLD-SCNC: 105 MMOL/L (ref 99–110)
CO2: 22 MMOL/L (ref 21–32)
CREAT SERPL-MCNC: 0.8 MG/DL (ref 0.6–1.1)
DIFFERENTIAL TYPE: ABNORMAL
EOSINOPHILS ABSOLUTE: 0 K/CU MM
EOSINOPHILS RELATIVE PERCENT: 0 % (ref 0–3)
GFR AFRICAN AMERICAN: >60 ML/MIN/1.73M2
GFR NON-AFRICAN AMERICAN: >60 ML/MIN/1.73M2
GLUCOSE BLD-MCNC: 134 MG/DL (ref 70–99)
GLUCOSE BLD-MCNC: 161 MG/DL (ref 70–99)
HCT VFR BLD CALC: 34 % (ref 37–47)
HEMOGLOBIN: 11 GM/DL (ref 12.5–16)
IMMATURE NEUTROPHIL %: 1.1 % (ref 0–0.43)
LYMPHOCYTES ABSOLUTE: 1.2 K/CU MM
LYMPHOCYTES RELATIVE PERCENT: 8.6 % (ref 24–44)
MCH RBC QN AUTO: 31.3 PG (ref 27–31)
MCHC RBC AUTO-ENTMCNC: 32.4 % (ref 32–36)
MCV RBC AUTO: 96.9 FL (ref 78–100)
MONOCYTES ABSOLUTE: 0.8 K/CU MM
MONOCYTES RELATIVE PERCENT: 5.6 % (ref 0–4)
NUCLEATED RBC %: 0 %
PDW BLD-RTO: 12.2 % (ref 11.7–14.9)
PLATELET # BLD: 207 K/CU MM (ref 140–440)
PMV BLD AUTO: 10.7 FL (ref 7.5–11.1)
POTASSIUM SERPL-SCNC: 4.3 MMOL/L (ref 3.5–5.1)
RBC # BLD: 3.51 M/CU MM (ref 4.2–5.4)
SEGMENTED NEUTROPHILS ABSOLUTE COUNT: 12 K/CU MM
SEGMENTED NEUTROPHILS RELATIVE PERCENT: 84.5 % (ref 36–66)
SODIUM BLD-SCNC: 139 MMOL/L (ref 135–145)
TOTAL IMMATURE NEUTOROPHIL: 0.15 K/CU MM
TOTAL NUCLEATED RBC: 0 K/CU MM
WBC # BLD: 14.2 K/CU MM (ref 4–10.5)

## 2019-06-12 PROCEDURE — 82962 GLUCOSE BLOOD TEST: CPT

## 2019-06-12 PROCEDURE — 6360000002 HC RX W HCPCS: Performed by: PHYSICIAN ASSISTANT

## 2019-06-12 PROCEDURE — 80048 BASIC METABOLIC PNL TOTAL CA: CPT

## 2019-06-12 PROCEDURE — 6370000000 HC RX 637 (ALT 250 FOR IP): Performed by: THORACIC SURGERY (CARDIOTHORACIC VASCULAR SURGERY)

## 2019-06-12 PROCEDURE — 85025 COMPLETE CBC W/AUTO DIFF WBC: CPT

## 2019-06-12 PROCEDURE — 6370000000 HC RX 637 (ALT 250 FOR IP): Performed by: SURGERY

## 2019-06-12 PROCEDURE — 6370000000 HC RX 637 (ALT 250 FOR IP): Performed by: PHYSICIAN ASSISTANT

## 2019-06-12 PROCEDURE — 6370000000 HC RX 637 (ALT 250 FOR IP): Performed by: INTERNAL MEDICINE

## 2019-06-12 RX ORDER — HYDROCODONE BITARTRATE AND ACETAMINOPHEN 10; 325 MG/1; MG/1
1 TABLET ORAL EVERY 4 HOURS PRN
Status: DISCONTINUED | OUTPATIENT
Start: 2019-06-12 | End: 2019-06-12 | Stop reason: HOSPADM

## 2019-06-12 RX ORDER — LIDOCAINE 4 G/G
1 PATCH TOPICAL DAILY
Status: DISCONTINUED | OUTPATIENT
Start: 2019-06-12 | End: 2019-06-12 | Stop reason: HOSPADM

## 2019-06-12 RX ADMIN — HYDROCODONE BITARTRATE AND ACETAMINOPHEN 2 TABLET: 5; 325 TABLET ORAL at 01:23

## 2019-06-12 RX ADMIN — CEFAZOLIN SODIUM 2 G: 2 INJECTION, SOLUTION INTRAVENOUS at 06:13

## 2019-06-12 RX ADMIN — TICAGRELOR 90 MG: 90 TABLET ORAL at 08:07

## 2019-06-12 RX ADMIN — ASPIRIN 81 MG: 81 TABLET, COATED ORAL at 08:07

## 2019-06-12 RX ADMIN — ISOSORBIDE MONONITRATE 30 MG: 30 TABLET, EXTENDED RELEASE ORAL at 08:07

## 2019-06-12 RX ADMIN — RANOLAZINE 1000 MG: 500 TABLET, FILM COATED, EXTENDED RELEASE ORAL at 08:07

## 2019-06-12 RX ADMIN — INSULIN LISPRO 3 UNITS: 100 INJECTION, SOLUTION INTRAVENOUS; SUBCUTANEOUS at 08:09

## 2019-06-12 RX ADMIN — LINAGLIPTIN 5 MG: 5 TABLET, FILM COATED ORAL at 08:07

## 2019-06-12 RX ADMIN — HYDROCODONE BITARTRATE AND ACETAMINOPHEN 1 TABLET: 10; 325 TABLET ORAL at 03:41

## 2019-06-12 RX ADMIN — HYDROCODONE BITARTRATE AND ACETAMINOPHEN 1 TABLET: 10; 325 TABLET ORAL at 08:07

## 2019-06-12 RX ADMIN — BUSPIRONE HYDROCHLORIDE 15 MG: 15 TABLET ORAL at 08:21

## 2019-06-12 RX ADMIN — RIVAROXABAN 2.5 MG: 2.5 TABLET, FILM COATED ORAL at 08:21

## 2019-06-12 RX ADMIN — CARVEDILOL 12.5 MG: 12.5 TABLET, FILM COATED ORAL at 08:07

## 2019-06-12 RX ADMIN — VARENICLINE TARTRATE 1 MG: 0.5 TABLET, FILM COATED ORAL at 08:21

## 2019-06-12 RX ADMIN — METFORMIN HYDROCHLORIDE 500 MG: 500 TABLET, EXTENDED RELEASE ORAL at 08:22

## 2019-06-12 ASSESSMENT — PAIN SCALES - GENERAL
PAINLEVEL_OUTOF10: 8

## 2019-06-12 ASSESSMENT — PAIN DESCRIPTION - PAIN TYPE
TYPE: ACUTE PAIN;SURGICAL PAIN
TYPE: SURGICAL PAIN
TYPE: ACUTE PAIN;SURGICAL PAIN

## 2019-06-12 ASSESSMENT — PAIN - FUNCTIONAL ASSESSMENT
PAIN_FUNCTIONAL_ASSESSMENT: PREVENTS OR INTERFERES SOME ACTIVE ACTIVITIES AND ADLS
PAIN_FUNCTIONAL_ASSESSMENT: PREVENTS OR INTERFERES SOME ACTIVE ACTIVITIES AND ADLS

## 2019-06-12 ASSESSMENT — PAIN DESCRIPTION - PROGRESSION
CLINICAL_PROGRESSION: GRADUALLY WORSENING

## 2019-06-12 ASSESSMENT — PAIN DESCRIPTION - FREQUENCY
FREQUENCY: CONTINUOUS

## 2019-06-12 ASSESSMENT — PAIN DESCRIPTION - ONSET
ONSET: ON-GOING

## 2019-06-12 ASSESSMENT — PAIN DESCRIPTION - DESCRIPTORS
DESCRIPTORS: STABBING
DESCRIPTORS: ACHING
DESCRIPTORS: ACHING

## 2019-06-12 ASSESSMENT — PAIN DESCRIPTION - ORIENTATION
ORIENTATION: RIGHT

## 2019-06-12 ASSESSMENT — PAIN DESCRIPTION - LOCATION
LOCATION: GROIN

## 2019-06-12 NOTE — PROGRESS NOTES
Discharge instructions reviewed with patient at this time, all medications and appointments reviewed. Pt denies any further questions. AVS signed and midline removed. Pt discharged with all belongings with family at this time.

## 2019-06-12 NOTE — FLOWSHEET NOTE
Dr. Prasanna Ordaz rounding. Would like to restart home meds Doreen and Perfecto Garg as long as okay with Dr. Blanca Anderson. Rae Bland RN (primary nurse for today) aware of this and will speak with Dr. Blanca Anderson.      Alexis Enrique RN 7:27 AM

## 2019-06-12 NOTE — FLOWSHEET NOTE
Telephone orders received from Dr. Kip long to order Chantix. Patient taking this at home, but it is not listed on her home medications yet, as she states she has been taking it for 3 weeks now.      David Juan RN 8:13 PM

## 2019-06-12 NOTE — FLOWSHEET NOTE
Spoke with Dr. Юлия Kilpatrick via phone. Notified of patient awakened from sleep and is requesting additional pain meds, and she is not due for any pain med PRN @ this time. Order for Morphine has ended, as it was only for 3 doses, and all three doses have been given, last @ [phone]. Patient has also received 2 doses of 2 tabs of Norco this shift. She reports right femoral site pain, rated 8/10,reported as stabbing. RN assessed site, no changed, remains soft and tender to touch. Pedal pulses remain palpable. No visible bleeding or redness. Telephone orders received for ice pack to site PRN and to start patient's home med dose of Norco 10 mg/325 tablets, 1 tablet every 4 hours PRN pain. Aware patient takes this med every 6 hours as needed at home for chronic back pain. BUN, CRE levels reviewed. Aware patient has allergy to Toradol, causes \"rapid heart rate. \" May give first dose 10 mg Norco PO now. Additional telephone orders received for PRN topical Lidoderm patch.     Digna Prieto RN 3:09 AM

## 2019-07-15 ENCOUNTER — HOSPITAL ENCOUNTER (OUTPATIENT)
Dept: ULTRASOUND IMAGING | Age: 54
Discharge: HOME | End: 2019-07-15
Payer: COMMERCIAL

## 2019-07-15 DIAGNOSIS — I73.9 PERIPHERAL VASCULAR DISEASE, UNSPECIFIED (HCC): ICD-10-CM

## 2019-07-15 PROCEDURE — 93926 LOWER EXTREMITY STUDY: CPT

## 2019-12-05 ENCOUNTER — APPOINTMENT (OUTPATIENT)
Dept: GENERAL RADIOLOGY | Age: 54
End: 2019-12-05
Payer: COMMERCIAL

## 2019-12-05 ENCOUNTER — HOSPITAL ENCOUNTER (OUTPATIENT)
Age: 54
Setting detail: OBSERVATION
Discharge: HOME | End: 2019-12-06
Attending: EMERGENCY MEDICINE | Admitting: INTERNAL MEDICINE
Payer: COMMERCIAL

## 2019-12-05 DIAGNOSIS — R07.9 CHEST PAIN, UNSPECIFIED TYPE: Primary | ICD-10-CM

## 2019-12-05 LAB
ALBUMIN SERPL-MCNC: 4 GM/DL (ref 3.4–5)
ALP BLD-CCNC: 44 IU/L (ref 40–129)
ALT SERPL-CCNC: 7 U/L (ref 10–40)
ANION GAP SERPL CALCULATED.3IONS-SCNC: 13 MMOL/L (ref 4–16)
AST SERPL-CCNC: 12 IU/L (ref 15–37)
BASOPHILS ABSOLUTE: 0.1 K/CU MM
BASOPHILS RELATIVE PERCENT: 0.9 % (ref 0–1)
BILIRUB SERPL-MCNC: 0.3 MG/DL (ref 0–1)
BUN BLDV-MCNC: 21 MG/DL (ref 6–23)
CALCIUM SERPL-MCNC: 9.3 MG/DL (ref 8.3–10.6)
CHLORIDE BLD-SCNC: 101 MMOL/L (ref 99–110)
CO2: 23 MMOL/L (ref 21–32)
CREAT SERPL-MCNC: 1.2 MG/DL (ref 0.6–1.1)
DIFFERENTIAL TYPE: ABNORMAL
EKG ATRIAL RATE: 76 BPM
EKG DIAGNOSIS: NORMAL
EKG P AXIS: 60 DEGREES
EKG P-R INTERVAL: 164 MS
EKG Q-T INTERVAL: 396 MS
EKG QRS DURATION: 86 MS
EKG QTC CALCULATION (BAZETT): 445 MS
EKG R AXIS: 69 DEGREES
EKG T AXIS: 20 DEGREES
EKG VENTRICULAR RATE: 76 BPM
EOSINOPHILS ABSOLUTE: 0.2 K/CU MM
EOSINOPHILS RELATIVE PERCENT: 1.8 % (ref 0–3)
GFR AFRICAN AMERICAN: 56 ML/MIN/1.73M2
GFR NON-AFRICAN AMERICAN: 47 ML/MIN/1.73M2
GLUCOSE BLD-MCNC: 110 MG/DL (ref 70–99)
GLUCOSE BLD-MCNC: 72 MG/DL (ref 70–99)
HCT VFR BLD CALC: 39.6 % (ref 37–47)
HEMOGLOBIN: 12.4 GM/DL (ref 12.5–16)
IMMATURE NEUTROPHIL %: 0.4 % (ref 0–0.43)
LYMPHOCYTES ABSOLUTE: 2.4 K/CU MM
LYMPHOCYTES RELATIVE PERCENT: 27.2 % (ref 24–44)
MCH RBC QN AUTO: 31.2 PG (ref 27–31)
MCHC RBC AUTO-ENTMCNC: 31.3 % (ref 32–36)
MCV RBC AUTO: 99.7 FL (ref 78–100)
MONOCYTES ABSOLUTE: 0.7 K/CU MM
MONOCYTES RELATIVE PERCENT: 8.2 % (ref 0–4)
NUCLEATED RBC %: 0 %
PDW BLD-RTO: 13.2 % (ref 11.7–14.9)
PLATELET # BLD: 278 K/CU MM (ref 140–440)
PMV BLD AUTO: 10.6 FL (ref 7.5–11.1)
POTASSIUM SERPL-SCNC: 4.2 MMOL/L (ref 3.5–5.1)
RBC # BLD: 3.97 M/CU MM (ref 4.2–5.4)
SEGMENTED NEUTROPHILS ABSOLUTE COUNT: 5.5 K/CU MM
SEGMENTED NEUTROPHILS RELATIVE PERCENT: 61.5 % (ref 36–66)
SODIUM BLD-SCNC: 137 MMOL/L (ref 135–145)
TOTAL IMMATURE NEUTOROPHIL: 0.04 K/CU MM
TOTAL NUCLEATED RBC: 0 K/CU MM
TOTAL PROTEIN: 7.2 GM/DL (ref 6.4–8.2)
TROPONIN T: <0.01 NG/ML
WBC # BLD: 8.9 K/CU MM (ref 4–10.5)

## 2019-12-05 PROCEDURE — 96372 THER/PROPH/DIAG INJ SC/IM: CPT

## 2019-12-05 PROCEDURE — 6360000002 HC RX W HCPCS

## 2019-12-05 PROCEDURE — 6360000002 HC RX W HCPCS: Performed by: INTERNAL MEDICINE

## 2019-12-05 PROCEDURE — 85025 COMPLETE CBC W/AUTO DIFF WBC: CPT

## 2019-12-05 PROCEDURE — C1769 GUIDE WIRE: HCPCS

## 2019-12-05 PROCEDURE — 2709999900 HC NON-CHARGEABLE SUPPLY

## 2019-12-05 PROCEDURE — 6360000002 HC RX W HCPCS: Performed by: EMERGENCY MEDICINE

## 2019-12-05 PROCEDURE — 80053 COMPREHEN METABOLIC PANEL: CPT

## 2019-12-05 PROCEDURE — G0378 HOSPITAL OBSERVATION PER HR: HCPCS

## 2019-12-05 PROCEDURE — 36415 COLL VENOUS BLD VENIPUNCTURE: CPT

## 2019-12-05 PROCEDURE — 84484 ASSAY OF TROPONIN QUANT: CPT

## 2019-12-05 PROCEDURE — 6370000000 HC RX 637 (ALT 250 FOR IP): Performed by: INTERNAL MEDICINE

## 2019-12-05 PROCEDURE — 71046 X-RAY EXAM CHEST 2 VIEWS: CPT

## 2019-12-05 PROCEDURE — 6360000004 HC RX CONTRAST MEDICATION

## 2019-12-05 PROCEDURE — 2580000003 HC RX 258: Performed by: INTERNAL MEDICINE

## 2019-12-05 PROCEDURE — 2500000003 HC RX 250 WO HCPCS

## 2019-12-05 PROCEDURE — 93459 L HRT ART/GRFT ANGIO: CPT

## 2019-12-05 PROCEDURE — 96376 TX/PRO/DX INJ SAME DRUG ADON: CPT

## 2019-12-05 PROCEDURE — 82962 GLUCOSE BLOOD TEST: CPT

## 2019-12-05 PROCEDURE — C1894 INTRO/SHEATH, NON-LASER: HCPCS

## 2019-12-05 PROCEDURE — 93005 ELECTROCARDIOGRAM TRACING: CPT | Performed by: EMERGENCY MEDICINE

## 2019-12-05 PROCEDURE — 99285 EMERGENCY DEPT VISIT HI MDM: CPT

## 2019-12-05 PROCEDURE — 96374 THER/PROPH/DIAG INJ IV PUSH: CPT

## 2019-12-05 RX ORDER — DEXTROSE MONOHYDRATE 50 MG/ML
100 INJECTION, SOLUTION INTRAVENOUS PRN
Status: DISCONTINUED | OUTPATIENT
Start: 2019-12-05 | End: 2019-12-06 | Stop reason: HOSPADM

## 2019-12-05 RX ORDER — LISINOPRIL 5 MG/1
5 TABLET ORAL DAILY
Status: DISCONTINUED | OUTPATIENT
Start: 2019-12-05 | End: 2019-12-06 | Stop reason: HOSPADM

## 2019-12-05 RX ORDER — CARVEDILOL 12.5 MG/1
12.5 TABLET ORAL 2 TIMES DAILY WITH MEALS
Status: DISCONTINUED | OUTPATIENT
Start: 2019-12-05 | End: 2019-12-06 | Stop reason: HOSPADM

## 2019-12-05 RX ORDER — ONDANSETRON 2 MG/ML
4 INJECTION INTRAMUSCULAR; INTRAVENOUS EVERY 6 HOURS PRN
Status: DISCONTINUED | OUTPATIENT
Start: 2019-12-05 | End: 2019-12-05

## 2019-12-05 RX ORDER — SODIUM CHLORIDE 9 MG/ML
INJECTION, SOLUTION INTRAVENOUS CONTINUOUS
Status: DISCONTINUED | OUTPATIENT
Start: 2019-12-05 | End: 2019-12-06 | Stop reason: HOSPADM

## 2019-12-05 RX ORDER — BUSPIRONE HYDROCHLORIDE 15 MG/1
15 TABLET ORAL 3 TIMES DAILY
Status: DISCONTINUED | OUTPATIENT
Start: 2019-12-05 | End: 2019-12-06 | Stop reason: HOSPADM

## 2019-12-05 RX ORDER — SODIUM CHLORIDE 9 MG/ML
INJECTION, SOLUTION INTRAVENOUS CONTINUOUS
Status: DISCONTINUED | OUTPATIENT
Start: 2019-12-05 | End: 2019-12-05 | Stop reason: SDUPTHER

## 2019-12-05 RX ORDER — ATROPINE SULFATE 0.4 MG/ML
0.5 AMPUL (ML) INJECTION
Status: ACTIVE | OUTPATIENT
Start: 2019-12-05 | End: 2019-12-05

## 2019-12-05 RX ORDER — FUROSEMIDE 20 MG/1
20 TABLET ORAL DAILY
Refills: 11 | Status: ON HOLD | COMMUNITY
Start: 2019-11-25 | End: 2022-04-15 | Stop reason: HOSPADM

## 2019-12-05 RX ORDER — ALBUTEROL SULFATE 90 UG/1
2 AEROSOL, METERED RESPIRATORY (INHALATION) EVERY 6 HOURS PRN
Status: DISCONTINUED | OUTPATIENT
Start: 2019-12-05 | End: 2019-12-06 | Stop reason: HOSPADM

## 2019-12-05 RX ORDER — INSULIN GLARGINE 100 [IU]/ML
25 INJECTION, SOLUTION SUBCUTANEOUS NIGHTLY
Status: DISCONTINUED | OUTPATIENT
Start: 2019-12-05 | End: 2019-12-06 | Stop reason: HOSPADM

## 2019-12-05 RX ORDER — RANOLAZINE 500 MG/1
1000 TABLET, EXTENDED RELEASE ORAL 2 TIMES DAILY
Status: DISCONTINUED | OUTPATIENT
Start: 2019-12-05 | End: 2019-12-06 | Stop reason: HOSPADM

## 2019-12-05 RX ORDER — CITALOPRAM 40 MG/1
40 TABLET ORAL DAILY
Status: DISCONTINUED | OUTPATIENT
Start: 2019-12-05 | End: 2019-12-06 | Stop reason: HOSPADM

## 2019-12-05 RX ORDER — ACETAMINOPHEN 325 MG/1
650 TABLET ORAL EVERY 4 HOURS PRN
Status: DISCONTINUED | OUTPATIENT
Start: 2019-12-05 | End: 2019-12-06 | Stop reason: HOSPADM

## 2019-12-05 RX ORDER — PROMETHAZINE HYDROCHLORIDE 25 MG/ML
25 INJECTION, SOLUTION INTRAMUSCULAR; INTRAVENOUS ONCE
Status: COMPLETED | OUTPATIENT
Start: 2019-12-05 | End: 2019-12-05

## 2019-12-05 RX ORDER — ASPIRIN 81 MG/1
81 TABLET ORAL DAILY
Status: DISCONTINUED | OUTPATIENT
Start: 2019-12-05 | End: 2019-12-06 | Stop reason: HOSPADM

## 2019-12-05 RX ORDER — FAMOTIDINE 20 MG/1
20 TABLET, FILM COATED ORAL DAILY
Status: DISCONTINUED | OUTPATIENT
Start: 2019-12-05 | End: 2019-12-06 | Stop reason: HOSPADM

## 2019-12-05 RX ORDER — LISINOPRIL 5 MG/1
5 TABLET ORAL DAILY
Refills: 5 | Status: ON HOLD | COMMUNITY
Start: 2019-11-23 | End: 2022-04-15 | Stop reason: HOSPADM

## 2019-12-05 RX ORDER — SODIUM CHLORIDE 9 MG/ML
INJECTION, SOLUTION INTRAVENOUS CONTINUOUS
Status: DISCONTINUED | OUTPATIENT
Start: 2019-12-05 | End: 2019-12-05

## 2019-12-05 RX ORDER — SODIUM CHLORIDE 0.9 % (FLUSH) 0.9 %
10 SYRINGE (ML) INJECTION PRN
Status: DISCONTINUED | OUTPATIENT
Start: 2019-12-05 | End: 2019-12-06 | Stop reason: HOSPADM

## 2019-12-05 RX ORDER — MORPHINE SULFATE 4 MG/ML
4 INJECTION, SOLUTION INTRAMUSCULAR; INTRAVENOUS EVERY 30 MIN PRN
Status: DISCONTINUED | OUTPATIENT
Start: 2019-12-05 | End: 2019-12-06

## 2019-12-05 RX ORDER — NICOTINE POLACRILEX 4 MG
15 LOZENGE BUCCAL PRN
Status: DISCONTINUED | OUTPATIENT
Start: 2019-12-05 | End: 2019-12-06 | Stop reason: HOSPADM

## 2019-12-05 RX ORDER — SODIUM CHLORIDE 0.9 % (FLUSH) 0.9 %
10 SYRINGE (ML) INJECTION EVERY 12 HOURS SCHEDULED
Status: DISCONTINUED | OUTPATIENT
Start: 2019-12-05 | End: 2019-12-06 | Stop reason: HOSPADM

## 2019-12-05 RX ORDER — DEXTROSE MONOHYDRATE 25 G/50ML
12.5 INJECTION, SOLUTION INTRAVENOUS PRN
Status: DISCONTINUED | OUTPATIENT
Start: 2019-12-05 | End: 2019-12-06 | Stop reason: HOSPADM

## 2019-12-05 RX ORDER — FUROSEMIDE 20 MG/1
20 TABLET ORAL DAILY
Status: DISCONTINUED | OUTPATIENT
Start: 2019-12-05 | End: 2019-12-06 | Stop reason: HOSPADM

## 2019-12-05 RX ORDER — CYCLOBENZAPRINE HCL 10 MG
10 TABLET ORAL 3 TIMES DAILY PRN
Status: DISCONTINUED | OUTPATIENT
Start: 2019-12-05 | End: 2019-12-06 | Stop reason: HOSPADM

## 2019-12-05 RX ORDER — SITAGLIPTIN AND METFORMIN HYDROCHLORIDE 100; 1000 MG/1; MG/1
1 TABLET, FILM COATED, EXTENDED RELEASE ORAL NIGHTLY
Refills: 0 | COMMUNITY
Start: 2019-11-25 | End: 2022-04-06

## 2019-12-05 RX ORDER — ATORVASTATIN CALCIUM 40 MG/1
80 TABLET, FILM COATED ORAL DAILY
Status: DISCONTINUED | OUTPATIENT
Start: 2019-12-05 | End: 2019-12-06 | Stop reason: HOSPADM

## 2019-12-05 RX ADMIN — ACETAMINOPHEN 650 MG: 325 TABLET ORAL at 18:09

## 2019-12-05 RX ADMIN — BUSPIRONE HYDROCHLORIDE 15 MG: 15 TABLET ORAL at 21:19

## 2019-12-05 RX ADMIN — CYCLOBENZAPRINE 10 MG: 10 TABLET, FILM COATED ORAL at 21:19

## 2019-12-05 RX ADMIN — TICAGRELOR 90 MG: 90 TABLET ORAL at 21:19

## 2019-12-05 RX ADMIN — SODIUM CHLORIDE: 9 INJECTION, SOLUTION INTRAVENOUS at 18:10

## 2019-12-05 RX ADMIN — PROMETHAZINE HYDROCHLORIDE 25 MG: 25 INJECTION INTRAMUSCULAR; INTRAVENOUS at 13:14

## 2019-12-05 RX ADMIN — SODIUM CHLORIDE: 9 INJECTION, SOLUTION INTRAVENOUS at 13:14

## 2019-12-05 RX ADMIN — MORPHINE SULFATE 4 MG: 4 INJECTION, SOLUTION INTRAMUSCULAR; INTRAVENOUS at 13:14

## 2019-12-05 RX ADMIN — MORPHINE SULFATE 4 MG: 4 INJECTION, SOLUTION INTRAMUSCULAR; INTRAVENOUS at 21:19

## 2019-12-05 RX ADMIN — MORPHINE SULFATE 4 MG: 4 INJECTION, SOLUTION INTRAMUSCULAR; INTRAVENOUS at 12:10

## 2019-12-05 RX ADMIN — CARVEDILOL 12.5 MG: 12.5 TABLET, FILM COATED ORAL at 18:10

## 2019-12-05 RX ADMIN — Medication 10 ML: at 21:19

## 2019-12-05 RX ADMIN — RANOLAZINE 1000 MG: 500 TABLET, FILM COATED, EXTENDED RELEASE ORAL at 21:19

## 2019-12-05 RX ADMIN — ENOXAPARIN SODIUM 40 MG: 40 INJECTION SUBCUTANEOUS at 18:09

## 2019-12-05 RX ADMIN — INSULIN GLARGINE 25 UNITS: 100 INJECTION, SOLUTION SUBCUTANEOUS at 21:19

## 2019-12-05 ASSESSMENT — PAIN SCALES - GENERAL
PAINLEVEL_OUTOF10: 8
PAINLEVEL_OUTOF10: 9
PAINLEVEL_OUTOF10: 9
PAINLEVEL_OUTOF10: 5
PAINLEVEL_OUTOF10: 3
PAINLEVEL_OUTOF10: 2

## 2019-12-05 ASSESSMENT — HEART SCORE: ECG: 1

## 2019-12-06 VITALS
SYSTOLIC BLOOD PRESSURE: 112 MMHG | RESPIRATION RATE: 18 BRPM | WEIGHT: 170 LBS | BODY MASS INDEX: 27.32 KG/M2 | DIASTOLIC BLOOD PRESSURE: 72 MMHG | OXYGEN SATURATION: 96 % | HEART RATE: 68 BPM | TEMPERATURE: 97.8 F | HEIGHT: 66 IN

## 2019-12-06 LAB
ANION GAP SERPL CALCULATED.3IONS-SCNC: 10 MMOL/L (ref 4–16)
BUN BLDV-MCNC: 19 MG/DL (ref 6–23)
CALCIUM SERPL-MCNC: 9.4 MG/DL (ref 8.3–10.6)
CHLORIDE BLD-SCNC: 104 MMOL/L (ref 99–110)
CO2: 26 MMOL/L (ref 21–32)
CREAT SERPL-MCNC: 1.1 MG/DL (ref 0.6–1.1)
GFR AFRICAN AMERICAN: >60 ML/MIN/1.73M2
GFR NON-AFRICAN AMERICAN: 52 ML/MIN/1.73M2
GLUCOSE BLD-MCNC: 70 MG/DL (ref 70–99)
GLUCOSE BLD-MCNC: 84 MG/DL (ref 70–99)
POTASSIUM SERPL-SCNC: 4.1 MMOL/L (ref 3.5–5.1)
SODIUM BLD-SCNC: 140 MMOL/L (ref 135–145)

## 2019-12-06 PROCEDURE — 6360000002 HC RX W HCPCS: Performed by: EMERGENCY MEDICINE

## 2019-12-06 PROCEDURE — G0378 HOSPITAL OBSERVATION PER HR: HCPCS

## 2019-12-06 PROCEDURE — 96376 TX/PRO/DX INJ SAME DRUG ADON: CPT

## 2019-12-06 PROCEDURE — 82962 GLUCOSE BLOOD TEST: CPT

## 2019-12-06 PROCEDURE — 36415 COLL VENOUS BLD VENIPUNCTURE: CPT

## 2019-12-06 PROCEDURE — 6370000000 HC RX 637 (ALT 250 FOR IP): Performed by: INTERNAL MEDICINE

## 2019-12-06 PROCEDURE — 80048 BASIC METABOLIC PNL TOTAL CA: CPT

## 2019-12-06 PROCEDURE — 94761 N-INVAS EAR/PLS OXIMETRY MLT: CPT

## 2019-12-06 RX ORDER — OXYCODONE HYDROCHLORIDE AND ACETAMINOPHEN 5; 325 MG/1; MG/1
2 TABLET ORAL EVERY 6 HOURS PRN
Status: DISCONTINUED | OUTPATIENT
Start: 2019-12-06 | End: 2019-12-06 | Stop reason: HOSPADM

## 2019-12-06 RX ADMIN — BUSPIRONE HYDROCHLORIDE 15 MG: 15 TABLET ORAL at 08:23

## 2019-12-06 RX ADMIN — RANOLAZINE 1000 MG: 500 TABLET, FILM COATED, EXTENDED RELEASE ORAL at 08:23

## 2019-12-06 RX ADMIN — CARVEDILOL 12.5 MG: 12.5 TABLET, FILM COATED ORAL at 08:24

## 2019-12-06 RX ADMIN — TICAGRELOR 90 MG: 90 TABLET ORAL at 08:24

## 2019-12-06 RX ADMIN — CYCLOBENZAPRINE 10 MG: 10 TABLET, FILM COATED ORAL at 08:24

## 2019-12-06 RX ADMIN — ASPIRIN 81 MG: 81 TABLET, COATED ORAL at 08:23

## 2019-12-06 RX ADMIN — CITALOPRAM HYDROBROMIDE 40 MG: 40 TABLET ORAL at 08:24

## 2019-12-06 RX ADMIN — LISINOPRIL 5 MG: 5 TABLET ORAL at 08:24

## 2019-12-06 RX ADMIN — MORPHINE SULFATE 4 MG: 4 INJECTION, SOLUTION INTRAMUSCULAR; INTRAVENOUS at 04:00

## 2019-12-06 RX ADMIN — FAMOTIDINE 20 MG: 20 TABLET, FILM COATED ORAL at 08:23

## 2019-12-06 RX ADMIN — ATORVASTATIN CALCIUM 80 MG: 40 TABLET, FILM COATED ORAL at 08:23

## 2019-12-06 RX ADMIN — FUROSEMIDE 20 MG: 20 TABLET ORAL at 08:23

## 2019-12-06 RX ADMIN — OXYCODONE HYDROCHLORIDE AND ACETAMINOPHEN 2 TABLET: 5; 325 TABLET ORAL at 08:24

## 2019-12-06 ASSESSMENT — PAIN DESCRIPTION - ORIENTATION: ORIENTATION: LOWER

## 2019-12-06 ASSESSMENT — PAIN SCALES - GENERAL
PAINLEVEL_OUTOF10: 6
PAINLEVEL_OUTOF10: 8
PAINLEVEL_OUTOF10: 0

## 2019-12-06 ASSESSMENT — PAIN DESCRIPTION - FREQUENCY: FREQUENCY: CONTINUOUS

## 2019-12-06 ASSESSMENT — PAIN DESCRIPTION - LOCATION: LOCATION: BACK

## 2019-12-06 ASSESSMENT — PAIN DESCRIPTION - DIRECTION: RADIATING_TOWARDS: MID BACK

## 2019-12-06 ASSESSMENT — PAIN DESCRIPTION - PAIN TYPE: TYPE: CHRONIC PAIN

## 2019-12-06 ASSESSMENT — PAIN DESCRIPTION - ONSET: ONSET: ON-GOING

## 2019-12-06 ASSESSMENT — PAIN - FUNCTIONAL ASSESSMENT: PAIN_FUNCTIONAL_ASSESSMENT: ACTIVITIES ARE NOT PREVENTED

## 2019-12-06 ASSESSMENT — PAIN DESCRIPTION - DESCRIPTORS: DESCRIPTORS: SHARP;CONSTANT

## 2019-12-06 ASSESSMENT — PAIN DESCRIPTION - PROGRESSION: CLINICAL_PROGRESSION: NOT CHANGED

## 2020-02-25 ENCOUNTER — APPOINTMENT (OUTPATIENT)
Dept: CT IMAGING | Age: 55
DRG: 313 | End: 2020-02-25
Payer: COMMERCIAL

## 2020-02-25 ENCOUNTER — HOSPITAL ENCOUNTER (EMERGENCY)
Age: 55
Discharge: HOME | DRG: 313 | End: 2020-02-25
Attending: EMERGENCY MEDICINE
Payer: COMMERCIAL

## 2020-02-25 ENCOUNTER — APPOINTMENT (OUTPATIENT)
Dept: GENERAL RADIOLOGY | Age: 55
DRG: 313 | End: 2020-02-25
Payer: COMMERCIAL

## 2020-02-25 VITALS
WEIGHT: 169 LBS | SYSTOLIC BLOOD PRESSURE: 119 MMHG | HEIGHT: 66 IN | HEART RATE: 72 BPM | TEMPERATURE: 98.1 F | OXYGEN SATURATION: 95 % | RESPIRATION RATE: 18 BRPM | DIASTOLIC BLOOD PRESSURE: 65 MMHG | BODY MASS INDEX: 27.16 KG/M2

## 2020-02-25 LAB
ALBUMIN SERPL-MCNC: 5 GM/DL (ref 3.4–5)
ALP BLD-CCNC: 45 IU/L (ref 40–129)
ALT SERPL-CCNC: 13 U/L (ref 10–40)
ANION GAP SERPL CALCULATED.3IONS-SCNC: 14 MMOL/L (ref 4–16)
AST SERPL-CCNC: 14 IU/L (ref 15–37)
BASOPHILS ABSOLUTE: 0.1 K/CU MM
BASOPHILS RELATIVE PERCENT: 0.9 % (ref 0–1)
BILIRUB SERPL-MCNC: 0.2 MG/DL (ref 0–1)
BUN BLDV-MCNC: 16 MG/DL (ref 6–23)
CALCIUM SERPL-MCNC: 9.5 MG/DL (ref 8.3–10.6)
CHLORIDE BLD-SCNC: 101 MMOL/L (ref 99–110)
CO2: 26 MMOL/L (ref 21–32)
CREAT SERPL-MCNC: 1.1 MG/DL (ref 0.6–1.1)
DIFFERENTIAL TYPE: ABNORMAL
EOSINOPHILS ABSOLUTE: 0.2 K/CU MM
EOSINOPHILS RELATIVE PERCENT: 2.2 % (ref 0–3)
GFR AFRICAN AMERICAN: >60 ML/MIN/1.73M2
GFR NON-AFRICAN AMERICAN: 52 ML/MIN/1.73M2
GLUCOSE BLD-MCNC: 107 MG/DL (ref 70–99)
GLUCOSE BLD-MCNC: 85 MG/DL (ref 70–99)
HCT VFR BLD CALC: 42.4 % (ref 37–47)
HEMOGLOBIN: 13.5 GM/DL (ref 12.5–16)
IMMATURE NEUTROPHIL %: 0.7 % (ref 0–0.43)
LYMPHOCYTES ABSOLUTE: 2.4 K/CU MM
LYMPHOCYTES RELATIVE PERCENT: 31.5 % (ref 24–44)
MCH RBC QN AUTO: 31 PG (ref 27–31)
MCHC RBC AUTO-ENTMCNC: 31.8 % (ref 32–36)
MCV RBC AUTO: 97.5 FL (ref 78–100)
MONOCYTES ABSOLUTE: 0.7 K/CU MM
MONOCYTES RELATIVE PERCENT: 9.2 % (ref 0–4)
NUCLEATED RBC %: 0 %
PDW BLD-RTO: 13.2 % (ref 11.7–14.9)
PLATELET # BLD: 300 K/CU MM (ref 140–440)
PMV BLD AUTO: 10.3 FL (ref 7.5–11.1)
POTASSIUM SERPL-SCNC: 3.8 MMOL/L (ref 3.5–5.1)
PRO-BNP: 52.36 PG/ML
RBC # BLD: 4.35 M/CU MM (ref 4.2–5.4)
SEGMENTED NEUTROPHILS ABSOLUTE COUNT: 4.2 K/CU MM
SEGMENTED NEUTROPHILS RELATIVE PERCENT: 55.5 % (ref 36–66)
SODIUM BLD-SCNC: 141 MMOL/L (ref 135–145)
TOTAL IMMATURE NEUTOROPHIL: 0.05 K/CU MM
TOTAL NUCLEATED RBC: 0 K/CU MM
TOTAL PROTEIN: 8.3 GM/DL (ref 6.4–8.2)
TROPONIN T: <0.01 NG/ML
WBC # BLD: 7.6 K/CU MM (ref 4–10.5)

## 2020-02-25 PROCEDURE — 72131 CT LUMBAR SPINE W/O DYE: CPT

## 2020-02-25 PROCEDURE — 71045 X-RAY EXAM CHEST 1 VIEW: CPT

## 2020-02-25 PROCEDURE — 72128 CT CHEST SPINE W/O DYE: CPT

## 2020-02-25 PROCEDURE — 84484 ASSAY OF TROPONIN QUANT: CPT

## 2020-02-25 PROCEDURE — 70450 CT HEAD/BRAIN W/O DYE: CPT

## 2020-02-25 PROCEDURE — 99284 EMERGENCY DEPT VISIT MOD MDM: CPT

## 2020-02-25 PROCEDURE — 93010 ELECTROCARDIOGRAM REPORT: CPT | Performed by: INTERNAL MEDICINE

## 2020-02-25 PROCEDURE — 93005 ELECTROCARDIOGRAM TRACING: CPT | Performed by: PHYSICIAN ASSISTANT

## 2020-02-25 PROCEDURE — 96372 THER/PROPH/DIAG INJ SC/IM: CPT

## 2020-02-25 PROCEDURE — 83880 ASSAY OF NATRIURETIC PEPTIDE: CPT

## 2020-02-25 PROCEDURE — 96374 THER/PROPH/DIAG INJ IV PUSH: CPT

## 2020-02-25 PROCEDURE — 80053 COMPREHEN METABOLIC PANEL: CPT

## 2020-02-25 PROCEDURE — 85025 COMPLETE CBC W/AUTO DIFF WBC: CPT

## 2020-02-25 PROCEDURE — 6360000002 HC RX W HCPCS: Performed by: PHYSICIAN ASSISTANT

## 2020-02-25 PROCEDURE — 72125 CT NECK SPINE W/O DYE: CPT

## 2020-02-25 PROCEDURE — 82962 GLUCOSE BLOOD TEST: CPT

## 2020-02-25 RX ORDER — PROMETHAZINE HYDROCHLORIDE 25 MG/ML
25 INJECTION, SOLUTION INTRAMUSCULAR; INTRAVENOUS ONCE
Status: COMPLETED | OUTPATIENT
Start: 2020-02-25 | End: 2020-02-25

## 2020-02-25 RX ORDER — MORPHINE SULFATE 4 MG/ML
4 INJECTION, SOLUTION INTRAMUSCULAR; INTRAVENOUS ONCE
Status: COMPLETED | OUTPATIENT
Start: 2020-02-25 | End: 2020-02-25

## 2020-02-25 RX ORDER — CLONAZEPAM 1 MG/1
1 TABLET ORAL DAILY PRN
Status: ON HOLD | COMMUNITY
Start: 2020-02-10 | End: 2022-04-15 | Stop reason: SDUPTHER

## 2020-02-25 RX ORDER — FENOFIBRATE 145 MG/1
145 TABLET, COATED ORAL NIGHTLY
Refills: 5 | COMMUNITY
Start: 2019-11-23

## 2020-02-25 RX ORDER — GABAPENTIN 100 MG/1
300 CAPSULE ORAL 2 TIMES DAILY
COMMUNITY
Start: 2020-01-29

## 2020-02-25 RX ADMIN — PROMETHAZINE HYDROCHLORIDE 25 MG: 25 INJECTION INTRAMUSCULAR; INTRAVENOUS at 13:36

## 2020-02-25 RX ADMIN — MORPHINE SULFATE 4 MG: 4 INJECTION, SOLUTION INTRAMUSCULAR; INTRAVENOUS at 13:36

## 2020-02-25 ASSESSMENT — PAIN DESCRIPTION - PAIN TYPE: TYPE: ACUTE PAIN

## 2020-02-25 ASSESSMENT — PAIN SCALES - GENERAL
PAINLEVEL_OUTOF10: 9
PAINLEVEL_OUTOF10: 9

## 2020-02-25 ASSESSMENT — PAIN DESCRIPTION - ORIENTATION: ORIENTATION: LOWER;LEFT

## 2020-02-25 ASSESSMENT — PAIN DESCRIPTION - LOCATION: LOCATION: BACK;SHOULDER;LEG

## 2020-02-25 NOTE — ED PROVIDER NOTES
I independently examined and evaluated [facility]. In brief, 47yof was at work, felt lightheaded after urinating, went and sat down for a few minutes. went to stand again and was feeling lightheaded again, passed out. Coworker found her. She has marks on her face from laying on a drain in the floor, not known how long she was out.  also works there, thinks maybe was around five minutes before someone found her. She is a diabetic, had donuts and mountain dew this morning, blood sugar was in 100s for EMS. Having low back pain. No abdominal pain. No chest pain. No shortness of breath. Is on xarelto. Also has history of CAD status post CABG, diabetes, hypertension, hyperlipidemia, chronic back pain issues status post fusion at L5-S1. Has chronic neuropathy issues with her right foot. No weakness or numbness in her extremities otherwise. Focused exam revealed patient no acute distress laying on the cot, regular rate and, nonlabored aspirations. And soft and nondistended. No peripheral edema noted. She does move both lower extremities forming, strength is equal, sensation intact, pulses intact. ED course: Scan CT head cervical thoracic and lumbar spine given the fall and that she was down for an unknown period of time. These are negative for any acute process. Her glucose here was appropriate. Her labs are unremarkable. EKG as below. We have offered admission given the lightheadedness and syncope, she is a cardiac patient, I am concerned for potential for arrhythmia. She understands this as does  and she does not wish to stay in the hospital at this time. States she will follow-up with her primary care team and cardiology. Understands that she does risk of death, disability, chronic pain, does not wish to stay in the hospital and is able to make her own medical decisions.  is at bedside and does not have any concerns about taking her home.   She is discharged at their request, discharged in stable condition. EKG  Normal sinus rhythm with rate of 79 bpm, normal intervals. No ST elevation but nonspecific ST changes in inferior leads. No previous to compare    All diagnostic, treatment, and disposition decisions were made by myself in conjunction with the advanced practice provider. For all further details of the patient's emergency department visit, please see the advanced practice provider's documentation. Comment: Please note this report has been produced using speech recognition software and may contain errors related to that system including errors in grammar, punctuation, and spelling, as well as words and phrases that may be inappropriate. If there are any questions or concerns please feel free to contact the dictating provider for clarification.        Ellis Hong MD  02/25/20 4582

## 2020-02-25 NOTE — ED NOTES
Verbal and written discharge instructions provided with education. Patient verbalized understanding and denies further questions or concerns.      Corinna Grace RN  02/25/20 4228

## 2020-02-25 NOTE — ED NOTES
Verified with Dr. Rula Herr if still wants the pelvis xray, said no.      Di Culver, RN  02/25/20 4202

## 2020-02-25 NOTE — ED PROVIDER NOTES
recent illness. REVIEW OF SYSTEMS    Constitutional:  Denies fever, chills, weight loss or weakness   HENT:  Denies sore throat or ear pain   Cardiovascular:  Denies chest pain, palpitations   Respiratory:  Denies cough or shortness of breath    GI:  Denies abdominal pain, nausea, vomiting, or diarrhea  :  Denies any urinary symptoms or vaginal symptoms.    Musculoskeletal:  Denies back pain  Skin:  Denies rash  Neurologic:  Denies headache, focal weakness or sensory changes   Endocrine:  Denies polyuria or polydypsia   Lymphatic:  Denies swollen glands     All other review of systems are negative  See HPI and nursing notes for additional information     PAST MEDICAL AND SURGICAL HISTORY    Past Medical History:   Diagnosis Date    Acid reflux     CAD (coronary artery disease)     Sees Dr. Tracie Merida    COPD (chronic obstructive pulmonary disease) ([facility] [address]     \"have mild COPD- no pulmonologist\"    Depression     Diabetes mellitus ([facility] [address] Dx 2001    Eczema of hand     Hx of migraines     HX OTHER MEDICAL     \"difficulty IV stick\"    Hyperlipidemia     Hypertension     follows with Dr Marilou Avila Kidney stones 2000's    \"Passed One Kidney Stone\"    PVD (peripheral vascular disease) ([facility] [address]     Wears glasses      Past Surgical History:   Procedure Laterality Date    APPENDECTOMY  79    Done During TOSHA    BACK SURGERY  Last Done 9-11/2013    \"4 Lower Back Surgeries, 2 Rods, 4 Pins At L5, S1 Put In During Last Surgery\"    BONE RESECTION, RIB      \"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- surgery 2013- ok since then\"    BREAST BIOPSY Bilateral 1990's-2000's    Benign    CARDIAC CATHETERIZATION      per pt \"had heart cath 5/10/2019\"   [address]  5-4-12    CABG (3 Bypasses)   Helene    \"took with appendix and hyster    COLONOSCOPY  last one 2014    Dr. Beltre Sessions      6/28/2018 total of 9    DENTAL SURGERY  2000's    \"Dental Implants Upper And Lower\"    FEMORAL ENDARTERECTOMY Right 6/11/2019    RIGHT FEMORAL ENDARTERECTOMY WITH CORMATRIX PATCH performed by Em Michael MD at [facility]    Appendectomy Also Done    OTHER SURGICAL HISTORY Left 4/4/2013    Left transaxillary 1st rib resection    OTHER SURGICAL HISTORY      peripheral angiography 6/6/2019    TONSILLECTOMY AND ADENOIDECTOMY  1971    VASCULAR SURGERY      \"5/22/2019\"they went in and unblocked the femoral artery on right side and then in again 6/6/2019\"put dye in -  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\"       CURRENT MEDICATIONS    Current Outpatient Rx   Medication Sig Dispense Refill    fenofibrate (TRICOR) 145 MG tablet Take 145 mg by mouth nightly  5    gabapentin (NEURONTIN) 100 MG capsule Take 100 mg by mouth nightly.  clonazePAM (KLONOPIN) 1 MG tablet Take 1 mg by mouth daily as needed.       lisinopril (PRINIVIL;ZESTRIL) 5 MG tablet Take 2.5 mg by mouth daily   5    furosemide (LASIX) 20 MG tablet Take 20 mg by mouth daily  11    JANUMET -1000 MG TB24 Take 1 tablet by mouth nightly  0    citalopram (CELEXA) 40 MG tablet Take 40 mg by mouth daily  2    cyclobenzaprine (FLEXERIL) 10 MG tablet Take 10 mg by mouth 3 times daily as needed      aspirin 81 MG EC tablet Take 81 mg by mouth daily      atorvastatin (LIPITOR) 80 MG tablet Take 80 mg by mouth daily       dapagliflozin (FARXIGA) 10 MG tablet Take 10 mg by mouth every morning       rivaroxaban (XARELTO) 2.5 MG TABS tablet Take 1 tablet by mouth 2 times daily 60 tablet 0    busPIRone (BUSPAR) 15 MG tablet Take 15 mg by mouth 3 times daily 90 tablet 1    insulin glargine (BASAGLAR KWIKPEN) 100 UNIT/ML injection pen Inject 25 Units into the skin nightly (Patient taking differently: Inject 30 Units into the skin nightly ) 5 pen 0    carvedilol (COREG) 12.5 MG tablet Take 1 tablet by mouth 2 times daily (with meals) 60 tablet 0    ticagrelor (BRILINTA) 90 MG TABS tablet Take 1 tablet by mouth 2 times daily 60 tablet 0    albuterol sulfate HFA (PROAIR HFA) 108 (90 Base) MCG/ACT inhaler Inhale 2 puffs into the lungs every 6 hours as needed for Wheezing 2 Inhaler 0    ranolazine (RANEXA) 1000 MG extended release tablet Take 1 tablet by mouth 2 times daily 180 tablet 1    oxyCODONE-acetaminophen (PERCOCET)  MG per tablet Take 1 tablet by mouth every 6 hours as needed for Pain.  nitroGLYCERIN (NITROSTAT) 0.4 MG SL tablet up to max of 3 total doses.  If no relief after 1 dose, call 911. 20 tablet 3       ALLERGIES    Allergies   Allergen Reactions    Erythromycin Hives and Nausea And Vomiting    Lyrica [Pregabalin] Swelling    Codeine Palpitations    Imitrex [Sumatriptan] Anxiety     \"Makes Me Hyper\"    Ketorolac Tromethamine Other (See Comments)     \"Rapid Heart Rate\"    Prochlorperazine Edisylate Nausea And Vomiting    Tape Maryelizabeth Alderman Tape] Itching     Blisters    Ultram [Tramadol] Itching    Zofran [Ondansetron Hcl] Nausea Only       SOCIAL AND FAMILY HISTORY    Social History     Socioeconomic History    Marital status:      Spouse name: None    Number of children: None    Years of education: None    Highest education level: None   Occupational History    None   Social Needs    Financial resource strain: None    Food insecurity:     Worry: None     Inability: None    Transportation needs:     Medical: None     Non-medical: None   Tobacco Use    Smoking status: Former Smoker     Packs/day: 0.50     Years: 35.00     Pack years: 17.50     Types: Cigarettes     Last attempt to quit: 2019     Years since quittin.6    Smokeless tobacco: Never Used   Substance and Sexual Activity    Alcohol use: No     Alcohol/week: 0.0 standard drinks     Comment: \"quit drinking in - use to drink 5 days per week- average use to drink 6 beers each time\"    Drug use: No    Sexual activity: Yes     Partners: Male   Lifestyle    Physical activity: paralumbar tenderness to palpation, no step-offs or or bony deformity. No bruising noted. There is no edema, asymmetry, or calf / thigh tenderness bilaterally. No cyanosis. No cool or pale-appearing limb. Distal cap refill and pulses intact bilateral upper and lower extremities  Bilateral upper and lower extremity ROM intact without pain or obvious deficit  Integument:  Warm, Dry    Neurologic:    - Alert & oriented person, place, time, and situation, no speech difficulties or slurring.  - No obvious gross motor deficits  - Cranial nerves 2-12 grossly intact  - Negative meningeal signs.  - Sensation intact to light touch  - Strength 5/5 in upper and lower extremities bilaterally  - Normal finger to nose test bilaterally  - Rapid alternating movements intact  - Normal heel-shin bilaterally  - No pronator drift. - Light touch sensation intact throughout.       Psychiatric:  Affect normal, Mood normal.       Labs:  Results for orders placed or performed during the hospital encounter of 02/25/20   CBC Auto Differential   Result Value Ref Range    WBC 7.6 4.0 - 10.5 K/CU MM    RBC 4.35 4.2 - 5.4 M/CU MM    Hemoglobin 13.5 12.5 - 16.0 GM/DL    Hematocrit 42.4 37 - 47 %    MCV 97.5 78 - 100 FL    MCH 31.0 27 - 31 PG    MCHC 31.8 (L) 32.0 - 36.0 %    RDW 13.2 11.7 - 14.9 %    Platelets 987 589 - 617 K/CU MM    MPV 10.3 7.5 - 11.1 FL    Differential Type AUTOMATED DIFFERENTIAL     Segs Relative 55.5 36 - 66 %    Lymphocytes % 31.5 24 - 44 %    Monocytes % 9.2 (H) 0 - 4 %    Eosinophils % 2.2 0 - 3 %    Basophils % 0.9 0 - 1 %    Segs Absolute 4.2 K/CU MM    Lymphocytes Absolute 2.4 K/CU MM    Monocytes Absolute 0.7 K/CU MM    Eosinophils Absolute 0.2 K/CU MM    Basophils Absolute 0.1 K/CU MM    Nucleated RBC % 0.0 %    Total Nucleated RBC 0.0 K/CU MM    Total Immature Neutrophil 0.05 K/CU MM    Immature Neutrophil % 0.7 (H) 0 - 0.43 %   Comprehensive Metabolic Panel   Result Value Ref Range    Sodium 141 135 - 145 head.         XR CHEST PORTABLE   Final Result   No acute cardiopulmonary process. XR PELVIS (1-2 VIEWS)    (Results Pending)         ED [facility]       Patient presents as above after syncopal episode occurred at work. Patient has been believe that the patient was lying on the ground for around 5 minutes, but possibly as many as 40. Her and her  believe that the syncopal episode may have been related to blood sugar, however she had a sugar 117 in the squad and 107 upon arrival at the emergency department. Furthermore, she has been having dizzy episodes for which she recently had her dose of lisinopril lowered. She presents with normal vital signs, but multiple areas of pain. Physical exam is unremarkable. Routine labs are reassuring, initial troponin and BNP within normal limits. Rest x-ray is negative for acute cardiopulmonary process. CT of the head is negative for acute process, CT of the colon and the thoracic spine are likewise negative. CT lumbar spine shows no acute fracture or listhesis. Postsurgical changes status post posterior lumbar and interbody fusion from L4-S1 without adverse features present. Nonobstructing bilateral nephrolithiasis. I do not know the exact etiology of the patient's dizziness, however I suspect cardiac etiology. The patient's blood sugar unlikely to be low enough to cause symptoms and she is not showing evidence of hypovolemia. For this reason I have offered the patient admission for serial heart enzymes and cardiac work-up. She is refusing admission at this time, and her  agrees with her. She demonstrated to me showed the risks associated with leaving the hospital at this time and understands that I cannot rule out a cardiac event which could result in death or disability. tells me that she \"knows her body\" and that any new or worsening symptoms would prompt immediate return to the emergency department.   She has follow-up with her primary care on Friday and will make that appointment. Also recommend close follow-up with cardiology. Patient to be discharged with  and she is in stable condition. She agrees and understands the plan of care and time was allotted for some questions, return precautions reviewed at length. Patient agrees to return emergency department if symptoms worsen or any new symptoms develop. Vital signs and nursing notes reviewed during ED course. Clinical  IMPRESSION    1. Syncope and collapse          Comment: Please note this report has been produced using speech recognition software and may contain errors related to that system including errors in grammar, punctuation, and spelling, as well as words and phrases that may be inappropriate. If there are any questions or concerns please feel free to contact the dictating provider for clarification.          Darcy Chen  02/25/20 5448

## 2020-02-25 NOTE — PROGRESS NOTES
Medication History  [facility]    Patient Name: Kristy Snow 1965     Medication history has been completed by: Miranda Stover CPhT    Source(s) of information: patient and insurance claims     Primary Care Physician: Traci Louis MD     Pharmacy: [facility]    Allergies as of 02/25/2020 - Review Complete 02/25/2020   Allergen Reaction Noted    Erythromycin Hives and Nausea And Vomiting 05/04/2012    Lyrica [pregabalin] Swelling 08/10/2017    Codeine Palpitations 04/04/2013    Imitrex [sumatriptan] Anxiety 05/04/2012    Ketorolac tromethamine Other (See Comments) 05/04/2012    Prochlorperazine edisylate Nausea And Vomiting 05/04/2012    Tape [adhesive tape] Itching 05/04/2012    Ultram [tramadol] Itching 08/07/2013    Zofran [ondansetron hcl] Nausea Only 03/17/2014        Prior to Admission medications    Medication Sig Start Date End Date Taking? Authorizing Provider   fenofibrate (TRICOR) 145 MG tablet Take 145 mg by mouth nightly 11/23/19  Yes Historical Provider, MD   gabapentin (NEURONTIN) 100 MG capsule Take 100 mg by mouth nightly. 1/29/20  Yes Historical Provider, MD   clonazePAM (KLONOPIN) 1 MG tablet Take 1 mg by mouth daily as needed.  2/10/20  Yes Historical Provider, MD   lisinopril (PRINIVIL;ZESTRIL) 5 MG tablet Take 2.5 mg by mouth daily  11/23/19  Yes Historical Provider, MD   furosemide (LASIX) 20 MG tablet Take 20 mg by mouth daily 11/25/19  Yes Historical Provider, MD   JANUMET -1000 MG TB24 Take 1 tablet by mouth nightly 11/25/19  Yes Historical Provider, MD   citalopram (CELEXA) 40 MG tablet Take 40 mg by mouth daily 5/14/19  Yes Historical Provider, MD   cyclobenzaprine (FLEXERIL) 10 MG tablet Take 10 mg by mouth 3 times daily as needed 5/22/19  Yes Historical Provider, MD   aspirin 81 MG EC tablet Take 81 mg by mouth daily   Yes Historical Provider, MD   atorvastatin (LIPITOR) 80 MG tablet Take 80 mg by mouth daily    Yes Historical Provider, MD   dapagliflozin

## 2020-02-28 ENCOUNTER — APPOINTMENT (OUTPATIENT)
Dept: GENERAL RADIOLOGY | Age: 55
DRG: 313 | End: 2020-02-28
Payer: COMMERCIAL

## 2020-02-28 ENCOUNTER — HOSPITAL ENCOUNTER (INPATIENT)
Age: 55
LOS: 2 days | Discharge: HOME | DRG: 313 | End: 2020-03-01
Attending: EMERGENCY MEDICINE | Admitting: INTERNAL MEDICINE
Payer: COMMERCIAL

## 2020-02-28 LAB
ALBUMIN SERPL-MCNC: 4.6 GM/DL (ref 3.4–5)
ALP BLD-CCNC: 36 IU/L (ref 40–129)
ALT SERPL-CCNC: 14 U/L (ref 10–40)
ANION GAP SERPL CALCULATED.3IONS-SCNC: 13 MMOL/L (ref 4–16)
AST SERPL-CCNC: 22 IU/L (ref 15–37)
BASOPHILS ABSOLUTE: 0.1 K/CU MM
BASOPHILS RELATIVE PERCENT: 0.9 % (ref 0–1)
BILIRUB SERPL-MCNC: 0.3 MG/DL (ref 0–1)
BUN BLDV-MCNC: 19 MG/DL (ref 6–23)
CALCIUM SERPL-MCNC: 9.5 MG/DL (ref 8.3–10.6)
CHLORIDE BLD-SCNC: 100 MMOL/L (ref 99–110)
CO2: 25 MMOL/L (ref 21–32)
CREAT SERPL-MCNC: 1.2 MG/DL (ref 0.6–1.1)
DIFFERENTIAL TYPE: ABNORMAL
EKG ATRIAL RATE: 79 BPM
EKG DIAGNOSIS: NORMAL
EKG P AXIS: 55 DEGREES
EKG P-R INTERVAL: 154 MS
EKG Q-T INTERVAL: 392 MS
EKG QRS DURATION: 88 MS
EKG QTC CALCULATION (BAZETT): 449 MS
EKG R AXIS: 50 DEGREES
EKG T AXIS: 68 DEGREES
EKG VENTRICULAR RATE: 79 BPM
EOSINOPHILS ABSOLUTE: 0.2 K/CU MM
EOSINOPHILS RELATIVE PERCENT: 2.5 % (ref 0–3)
GFR AFRICAN AMERICAN: 56 ML/MIN/1.73M2
GFR NON-AFRICAN AMERICAN: 47 ML/MIN/1.73M2
GLUCOSE BLD-MCNC: 154 MG/DL (ref 70–99)
GLUCOSE BLD-MCNC: 69 MG/DL (ref 70–99)
HCT VFR BLD CALC: 36.6 % (ref 37–47)
HEMOGLOBIN: 11.6 GM/DL (ref 12.5–16)
IMMATURE NEUTROPHIL %: 0.4 % (ref 0–0.43)
LYMPHOCYTES ABSOLUTE: 2.4 K/CU MM
LYMPHOCYTES RELATIVE PERCENT: 32.2 % (ref 24–44)
MCH RBC QN AUTO: 31.1 PG (ref 27–31)
MCHC RBC AUTO-ENTMCNC: 31.7 % (ref 32–36)
MCV RBC AUTO: 98.1 FL (ref 78–100)
MONOCYTES ABSOLUTE: 0.6 K/CU MM
MONOCYTES RELATIVE PERCENT: 8.5 % (ref 0–4)
NUCLEATED RBC %: 0 %
PDW BLD-RTO: 13.3 % (ref 11.7–14.9)
PLATELET # BLD: 281 K/CU MM (ref 140–440)
PMV BLD AUTO: 10.9 FL (ref 7.5–11.1)
POTASSIUM SERPL-SCNC: 4.6 MMOL/L (ref 3.5–5.1)
RBC # BLD: 3.73 M/CU MM (ref 4.2–5.4)
SEGMENTED NEUTROPHILS ABSOLUTE COUNT: 4.2 K/CU MM
SEGMENTED NEUTROPHILS RELATIVE PERCENT: 55.5 % (ref 36–66)
SODIUM BLD-SCNC: 138 MMOL/L (ref 135–145)
TOTAL IMMATURE NEUTOROPHIL: 0.03 K/CU MM
TOTAL NUCLEATED RBC: 0 K/CU MM
TOTAL PROTEIN: 7.5 GM/DL (ref 6.4–8.2)
TROPONIN T: <0.01 NG/ML
TROPONIN T: <0.01 NG/ML
WBC # BLD: 7.5 K/CU MM (ref 4–10.5)

## 2020-02-28 PROCEDURE — 6360000002 HC RX W HCPCS: Performed by: PHYSICIAN ASSISTANT

## 2020-02-28 PROCEDURE — 6370000000 HC RX 637 (ALT 250 FOR IP): Performed by: PHYSICIAN ASSISTANT

## 2020-02-28 PROCEDURE — 2580000003 HC RX 258: Performed by: PHYSICIAN ASSISTANT

## 2020-02-28 PROCEDURE — 94761 N-INVAS EAR/PLS OXIMETRY MLT: CPT

## 2020-02-28 PROCEDURE — 96374 THER/PROPH/DIAG INJ IV PUSH: CPT

## 2020-02-28 PROCEDURE — 84484 ASSAY OF TROPONIN QUANT: CPT

## 2020-02-28 PROCEDURE — 6360000002 HC RX W HCPCS: Performed by: NURSE PRACTITIONER

## 2020-02-28 PROCEDURE — 71045 X-RAY EXAM CHEST 1 VIEW: CPT

## 2020-02-28 PROCEDURE — 99285 EMERGENCY DEPT VISIT HI MDM: CPT

## 2020-02-28 PROCEDURE — 93005 ELECTROCARDIOGRAM TRACING: CPT | Performed by: PHYSICIAN ASSISTANT

## 2020-02-28 PROCEDURE — 85025 COMPLETE CBC W/AUTO DIFF WBC: CPT

## 2020-02-28 PROCEDURE — 6370000000 HC RX 637 (ALT 250 FOR IP): Performed by: INTERNAL MEDICINE

## 2020-02-28 PROCEDURE — 82962 GLUCOSE BLOOD TEST: CPT

## 2020-02-28 PROCEDURE — 2140000000 HC CCU INTERMEDIATE R&B

## 2020-02-28 PROCEDURE — 80053 COMPREHEN METABOLIC PANEL: CPT

## 2020-02-28 RX ORDER — CYCLOBENZAPRINE HCL 10 MG
10 TABLET ORAL 3 TIMES DAILY
Status: DISCONTINUED | OUTPATIENT
Start: 2020-02-28 | End: 2020-03-01 | Stop reason: HOSPADM

## 2020-02-28 RX ORDER — LISINOPRIL 2.5 MG/1
2.5 TABLET ORAL DAILY
Status: DISCONTINUED | OUTPATIENT
Start: 2020-02-29 | End: 2020-03-01 | Stop reason: HOSPADM

## 2020-02-28 RX ORDER — ASPIRIN 81 MG/1
81 TABLET, CHEWABLE ORAL DAILY
Status: DISCONTINUED | OUTPATIENT
Start: 2020-02-29 | End: 2020-02-28

## 2020-02-28 RX ORDER — ATORVASTATIN CALCIUM 40 MG/1
40 TABLET, FILM COATED ORAL
Status: ON HOLD | COMMUNITY
End: 2020-02-28

## 2020-02-28 RX ORDER — ALOGLIPTIN 12.5 MG/1
12.5 TABLET, FILM COATED ORAL
Status: DISCONTINUED | OUTPATIENT
Start: 2020-02-29 | End: 2020-03-01

## 2020-02-28 RX ORDER — 0.9 % SODIUM CHLORIDE 0.9 %
1000 INTRAVENOUS SOLUTION INTRAVENOUS ONCE
Status: COMPLETED | OUTPATIENT
Start: 2020-02-28 | End: 2020-02-28

## 2020-02-28 RX ORDER — CARVEDILOL 25 MG/1
25 TABLET ORAL 2 TIMES DAILY WITH MEALS
Status: DISCONTINUED | OUTPATIENT
Start: 2020-02-29 | End: 2020-02-28

## 2020-02-28 RX ORDER — SODIUM CHLORIDE 0.9 % (FLUSH) 0.9 %
10 SYRINGE (ML) INJECTION PRN
Status: DISCONTINUED | OUTPATIENT
Start: 2020-02-28 | End: 2020-02-29 | Stop reason: SDUPTHER

## 2020-02-28 RX ORDER — INSULIN GLARGINE 100 [IU]/ML
30 INJECTION, SOLUTION SUBCUTANEOUS NIGHTLY
Status: DISCONTINUED | OUTPATIENT
Start: 2020-02-28 | End: 2020-03-01 | Stop reason: HOSPADM

## 2020-02-28 RX ORDER — ALBUTEROL SULFATE 90 UG/1
2 AEROSOL, METERED RESPIRATORY (INHALATION) EVERY 6 HOURS PRN
Status: DISCONTINUED | OUTPATIENT
Start: 2020-02-28 | End: 2020-03-01 | Stop reason: HOSPADM

## 2020-02-28 RX ORDER — ATORVASTATIN CALCIUM 40 MG/1
80 TABLET, FILM COATED ORAL DAILY
Status: DISCONTINUED | OUTPATIENT
Start: 2020-02-29 | End: 2020-03-01 | Stop reason: HOSPADM

## 2020-02-28 RX ORDER — ACETAMINOPHEN 325 MG/1
325 TABLET ORAL EVERY 6 HOURS PRN
Status: DISCONTINUED | OUTPATIENT
Start: 2020-02-28 | End: 2020-03-01 | Stop reason: HOSPADM

## 2020-02-28 RX ORDER — NICOTINE POLACRILEX 4 MG
15 LOZENGE BUCCAL PRN
Status: DISCONTINUED | OUTPATIENT
Start: 2020-02-28 | End: 2020-03-01 | Stop reason: HOSPADM

## 2020-02-28 RX ORDER — ACETAMINOPHEN 80 MG
TABLET,CHEWABLE ORAL
Status: COMPLETED
Start: 2020-02-28 | End: 2020-02-28

## 2020-02-28 RX ORDER — SODIUM CHLORIDE 0.9 % (FLUSH) 0.9 %
10 SYRINGE (ML) INJECTION EVERY 12 HOURS SCHEDULED
Status: DISCONTINUED | OUTPATIENT
Start: 2020-02-28 | End: 2020-02-29 | Stop reason: SDUPTHER

## 2020-02-28 RX ORDER — OXYCODONE HYDROCHLORIDE 10 MG/1
10 TABLET ORAL EVERY 6 HOURS PRN
Status: DISCONTINUED | OUTPATIENT
Start: 2020-02-28 | End: 2020-03-01 | Stop reason: HOSPADM

## 2020-02-28 RX ORDER — POLYETHYLENE GLYCOL 3350 17 G/17G
17 POWDER, FOR SOLUTION ORAL DAILY PRN
Status: DISCONTINUED | OUTPATIENT
Start: 2020-02-28 | End: 2020-03-01 | Stop reason: HOSPADM

## 2020-02-28 RX ORDER — MAGNESIUM SULFATE IN WATER 40 MG/ML
2 INJECTION, SOLUTION INTRAVENOUS PRN
Status: DISCONTINUED | OUTPATIENT
Start: 2020-02-28 | End: 2020-03-01 | Stop reason: HOSPADM

## 2020-02-28 RX ORDER — CARVEDILOL 12.5 MG/1
12.5 TABLET ORAL 2 TIMES DAILY WITH MEALS
Status: DISCONTINUED | OUTPATIENT
Start: 2020-02-29 | End: 2020-03-01 | Stop reason: HOSPADM

## 2020-02-28 RX ORDER — DIPHENHYDRAMINE HYDROCHLORIDE 50 MG/ML
12.5 INJECTION INTRAMUSCULAR; INTRAVENOUS ONCE
Status: COMPLETED | OUTPATIENT
Start: 2020-02-28 | End: 2020-02-28

## 2020-02-28 RX ORDER — ACETAMINOPHEN 325 MG/1
650 TABLET ORAL EVERY 6 HOURS PRN
Status: DISCONTINUED | OUTPATIENT
Start: 2020-02-28 | End: 2020-03-01 | Stop reason: HOSPADM

## 2020-02-28 RX ORDER — MORPHINE SULFATE 4 MG/ML
4 INJECTION, SOLUTION INTRAMUSCULAR; INTRAVENOUS ONCE
Status: COMPLETED | OUTPATIENT
Start: 2020-02-28 | End: 2020-02-28

## 2020-02-28 RX ORDER — ONDANSETRON 2 MG/ML
4 INJECTION INTRAMUSCULAR; INTRAVENOUS ONCE
Status: DISCONTINUED | OUTPATIENT
Start: 2020-02-28 | End: 2020-03-01 | Stop reason: HOSPADM

## 2020-02-28 RX ORDER — ASPIRIN 81 MG/1
81 TABLET, CHEWABLE ORAL DAILY
Status: DISCONTINUED | OUTPATIENT
Start: 2020-02-29 | End: 2020-03-01 | Stop reason: HOSPADM

## 2020-02-28 RX ORDER — FENOFIBRATE 160 MG/1
160 TABLET ORAL DAILY
Status: DISCONTINUED | OUTPATIENT
Start: 2020-02-29 | End: 2020-03-01 | Stop reason: HOSPADM

## 2020-02-28 RX ORDER — BUSPIRONE HYDROCHLORIDE 7.5 MG/1
15 TABLET ORAL 3 TIMES DAILY
Status: DISCONTINUED | OUTPATIENT
Start: 2020-02-28 | End: 2020-03-01 | Stop reason: HOSPADM

## 2020-02-28 RX ORDER — RANOLAZINE 500 MG/1
1000 TABLET, EXTENDED RELEASE ORAL 2 TIMES DAILY
Status: DISCONTINUED | OUTPATIENT
Start: 2020-02-28 | End: 2020-03-01 | Stop reason: HOSPADM

## 2020-02-28 RX ORDER — POTASSIUM CHLORIDE 7.45 MG/ML
10 INJECTION INTRAVENOUS PRN
Status: DISCONTINUED | OUTPATIENT
Start: 2020-02-28 | End: 2020-03-01 | Stop reason: HOSPADM

## 2020-02-28 RX ORDER — FUROSEMIDE 20 MG/1
20 TABLET ORAL DAILY
Status: DISCONTINUED | OUTPATIENT
Start: 2020-02-29 | End: 2020-03-01 | Stop reason: HOSPADM

## 2020-02-28 RX ORDER — DEXTROSE MONOHYDRATE 50 MG/ML
100 INJECTION, SOLUTION INTRAVENOUS PRN
Status: DISCONTINUED | OUTPATIENT
Start: 2020-02-28 | End: 2020-03-01 | Stop reason: HOSPADM

## 2020-02-28 RX ORDER — RANOLAZINE 1000 MG/1
1000 TABLET, EXTENDED RELEASE ORAL 2 TIMES DAILY
COMMUNITY

## 2020-02-28 RX ORDER — METFORMIN HYDROCHLORIDE 500 MG/1
1000 TABLET, EXTENDED RELEASE ORAL
Status: DISCONTINUED | OUTPATIENT
Start: 2020-02-29 | End: 2020-03-01

## 2020-02-28 RX ORDER — NITROGLYCERIN 0.4 MG/1
0.4 TABLET SUBLINGUAL EVERY 5 MIN PRN
Status: COMPLETED | OUTPATIENT
Start: 2020-02-28 | End: 2020-02-29

## 2020-02-28 RX ORDER — SITAGLIPTIN AND METFORMIN HYDROCHLORIDE 50; 500 MG/1; MG/1
TABLET, FILM COATED, EXTENDED RELEASE ORAL
Status: ON HOLD | COMMUNITY
Start: 2020-01-23 | End: 2020-02-28

## 2020-02-28 RX ORDER — BUSPIRONE HYDROCHLORIDE 5 MG/1
15 TABLET ORAL
Status: ON HOLD | COMMUNITY
End: 2020-02-28

## 2020-02-28 RX ORDER — NITROGLYCERIN 0.4 MG/1
0.4 TABLET SUBLINGUAL EVERY 5 MIN PRN
Status: DISCONTINUED | OUTPATIENT
Start: 2020-02-28 | End: 2020-02-28

## 2020-02-28 RX ORDER — OXYCODONE AND ACETAMINOPHEN 10; 325 MG/1; MG/1
1 TABLET ORAL EVERY 6 HOURS PRN
Status: DISCONTINUED | OUTPATIENT
Start: 2020-02-28 | End: 2020-02-28

## 2020-02-28 RX ORDER — CLONAZEPAM 1 MG/1
1 TABLET ORAL DAILY PRN
Status: DISCONTINUED | OUTPATIENT
Start: 2020-02-28 | End: 2020-03-01 | Stop reason: HOSPADM

## 2020-02-28 RX ORDER — PROMETHAZINE HYDROCHLORIDE 25 MG/1
12.5 TABLET ORAL EVERY 6 HOURS PRN
Status: DISCONTINUED | OUTPATIENT
Start: 2020-02-28 | End: 2020-03-01 | Stop reason: HOSPADM

## 2020-02-28 RX ORDER — CARVEDILOL 25 MG/1
25 TABLET ORAL
Status: ON HOLD | COMMUNITY
End: 2020-03-01 | Stop reason: ALTCHOICE

## 2020-02-28 RX ORDER — DEXTROSE MONOHYDRATE 25 G/50ML
12.5 INJECTION, SOLUTION INTRAVENOUS PRN
Status: DISCONTINUED | OUTPATIENT
Start: 2020-02-28 | End: 2020-03-01 | Stop reason: HOSPADM

## 2020-02-28 RX ORDER — CITALOPRAM 40 MG/1
40 TABLET ORAL DAILY
Status: DISCONTINUED | OUTPATIENT
Start: 2020-02-29 | End: 2020-03-01 | Stop reason: HOSPADM

## 2020-02-28 RX ORDER — ATORVASTATIN CALCIUM 40 MG/1
40 TABLET, FILM COATED ORAL NIGHTLY
Status: DISCONTINUED | OUTPATIENT
Start: 2020-02-28 | End: 2020-02-28

## 2020-02-28 RX ORDER — ONDANSETRON 2 MG/ML
4 INJECTION INTRAMUSCULAR; INTRAVENOUS EVERY 6 HOURS PRN
Status: DISCONTINUED | OUTPATIENT
Start: 2020-02-28 | End: 2020-03-01 | Stop reason: HOSPADM

## 2020-02-28 RX ORDER — ACETAMINOPHEN 650 MG/1
650 SUPPOSITORY RECTAL EVERY 6 HOURS PRN
Status: DISCONTINUED | OUTPATIENT
Start: 2020-02-28 | End: 2020-03-01 | Stop reason: HOSPADM

## 2020-02-28 RX ORDER — ASPIRIN 81 MG/1
81 TABLET ORAL DAILY
Status: DISCONTINUED | OUTPATIENT
Start: 2020-02-29 | End: 2020-02-28

## 2020-02-28 RX ADMIN — TICAGRELOR 90 MG: 90 TABLET ORAL at 23:25

## 2020-02-28 RX ADMIN — PROMETHAZINE HYDROCHLORIDE 12.5 MG: 25 TABLET ORAL at 23:20

## 2020-02-28 RX ADMIN — DIPHENHYDRAMINE HYDROCHLORIDE 12.5 MG: 50 INJECTION, SOLUTION INTRAMUSCULAR; INTRAVENOUS at 23:21

## 2020-02-28 RX ADMIN — CYCLOBENZAPRINE 10 MG: 10 TABLET, FILM COATED ORAL at 23:20

## 2020-02-28 RX ADMIN — SODIUM CHLORIDE 1000 ML: 9 INJECTION, SOLUTION INTRAVENOUS at 19:33

## 2020-02-28 RX ADMIN — BUSPIRONE HYDROCHLORIDE 15 MG: 7.5 TABLET ORAL at 23:20

## 2020-02-28 RX ADMIN — OXYCODONE HYDROCHLORIDE 10 MG: 10 TABLET ORAL at 23:21

## 2020-02-28 RX ADMIN — NITROGLYCERIN 0.4 MG: 0.4 TABLET, ORALLY DISINTEGRATING SUBLINGUAL at 17:37

## 2020-02-28 RX ADMIN — MORPHINE SULFATE 4 MG: 4 INJECTION, SOLUTION INTRAMUSCULAR; INTRAVENOUS at 17:44

## 2020-02-28 RX ADMIN — Medication: at 23:27

## 2020-02-28 ASSESSMENT — PAIN SCALES - GENERAL
PAINLEVEL_OUTOF10: 0
PAINLEVEL_OUTOF10: 7
PAINLEVEL_OUTOF10: 9
PAINLEVEL_OUTOF10: 7

## 2020-02-28 NOTE — ED PROVIDER NOTES
I independently examined and evaluated [facility]. In brief, presents with L sided chest pressure that started earlier today. Feels similar to when she required stents and CABG in the past.   Last cath Dec. '19. Slightly increased shortness of breath. Vitals:    02/28/20 1748   BP: 135/63   Pulse: 81   Resp: 16   Temp:    SpO2: 96%     Focused exam revealed patient sitting comfortably bed. Heart regular rate and rhythm, lungs clear to auscultation bilaterally. 2+ radial pulses bilaterally. No lower extremity edema. .    EKG:  Normal sinus rhythm with a rate of 83. WY interval 170, QRS 84, QTc 451. No ST elevations or depressions. Normal T waves. Impression: Normal EKG. When compared to previous EKG from 2/25/2020, there are no significant changes. ED course:  Patient was given nitro and morphine after arrival and her pain slightly improved but then returned. As her blood pressure slightly low no nitro drip was started. EKG is not concerning for acute ischemia. Initial troponin is negative. Basic labs are obtained and are unremarkable. Chest x-ray is no acute cardiopulmonary abnormalities. No mediastinal widening. Equal and intact peripheral pulses. Pain is not tearing in quality or migratory. No neurologic deficits. I do not suspect an aortic dissection. This is similar to previous chest pain requiring acute cardiac intervention will admit her to the hospital for further cardiac rule out. All diagnostic, treatment, and disposition decisions were made by myself in conjunction with the advanced practice provider. For all further details of the patient's emergency department visit, please see the advanced practice provider's documentation. Comment: Please note this report has been produced using speech recognition software and may contain errors related to that system including errors in grammar, punctuation, and spelling, as well as words and phrases that may be inappropriate. If there are any questions or concerns please feel free to contact the dictating provider for clarification.         Zack Holloway MD  03/18/20 6847

## 2020-02-28 NOTE — ED NOTES
Pt states that she had a syncopal episode on Tuesday while going to the bathroom (states that she was just urinating). Pt states that at that time, she hit her head and is having some residual shoulder and knee pain from fall. Pt states that she was seen and evaluated to ED after that and discharged home. Pt was at PCP today for follow up when she developed L sided CP that radiates to her L shoulder. Pt also states that she has been feeling more short of breath lately. Pt is tearful and holding her chest. Pt is A&Ox4. Pt received a total of 324 mg ASA and 2 NL nitro tabs prior to arrival. Pt states that she feels like the nitro relieved the pain slightly.          Jocelyn Martinez RN  02/28/20 4571

## 2020-02-28 NOTE — ED NOTES
Bed: ED-22  Expected date:   Expected time:   Means of arrival:   Comments:  Medic - chest pain     Delta FADUMO Pierre  02/28/20 [id number]

## 2020-02-29 ENCOUNTER — APPOINTMENT (OUTPATIENT)
Dept: GENERAL RADIOLOGY | Age: 55
DRG: 313 | End: 2020-02-29
Payer: COMMERCIAL

## 2020-02-29 LAB
EKG ATRIAL RATE: 72 BPM
EKG ATRIAL RATE: 72 BPM
EKG ATRIAL RATE: 83 BPM
EKG DIAGNOSIS: NORMAL
EKG P AXIS: 40 DEGREES
EKG P AXIS: 43 DEGREES
EKG P AXIS: 57 DEGREES
EKG P-R INTERVAL: 170 MS
EKG P-R INTERVAL: 172 MS
EKG P-R INTERVAL: 174 MS
EKG Q-T INTERVAL: 384 MS
EKG Q-T INTERVAL: 396 MS
EKG Q-T INTERVAL: 404 MS
EKG QRS DURATION: 82 MS
EKG QRS DURATION: 84 MS
EKG QRS DURATION: 84 MS
EKG QTC CALCULATION (BAZETT): 433 MS
EKG QTC CALCULATION (BAZETT): 442 MS
EKG QTC CALCULATION (BAZETT): 451 MS
EKG R AXIS: 52 DEGREES
EKG R AXIS: 57 DEGREES
EKG R AXIS: 57 DEGREES
EKG T AXIS: 10 DEGREES
EKG T AXIS: 25 DEGREES
EKG T AXIS: 59 DEGREES
EKG VENTRICULAR RATE: 72 BPM
EKG VENTRICULAR RATE: 72 BPM
EKG VENTRICULAR RATE: 83 BPM
GLUCOSE BLD-MCNC: 157 MG/DL (ref 70–99)
GLUCOSE BLD-MCNC: 79 MG/DL (ref 70–99)
GLUCOSE BLD-MCNC: 85 MG/DL (ref 70–99)
GLUCOSE BLD-MCNC: 99 MG/DL (ref 70–99)
HCT VFR BLD CALC: 34.6 % (ref 37–47)
HEMOGLOBIN: 10.8 GM/DL (ref 12.5–16)
MCH RBC QN AUTO: 31.2 PG (ref 27–31)
MCHC RBC AUTO-ENTMCNC: 31.2 % (ref 32–36)
MCV RBC AUTO: 100 FL (ref 78–100)
PDW BLD-RTO: 13.3 % (ref 11.7–14.9)
PLATELET # BLD: 233 K/CU MM (ref 140–440)
PMV BLD AUTO: 10.4 FL (ref 7.5–11.1)
RBC # BLD: 3.46 M/CU MM (ref 4.2–5.4)
REASON FOR REJECTION: NORMAL
REJECTED TEST: NORMAL
TROPONIN T: <0.01 NG/ML
WBC # BLD: 5.4 K/CU MM (ref 4–10.5)

## 2020-02-29 PROCEDURE — 2580000003 HC RX 258: Performed by: INTERNAL MEDICINE

## 2020-02-29 PROCEDURE — 84484 ASSAY OF TROPONIN QUANT: CPT

## 2020-02-29 PROCEDURE — 6370000000 HC RX 637 (ALT 250 FOR IP): Performed by: PHYSICIAN ASSISTANT

## 2020-02-29 PROCEDURE — 71046 X-RAY EXAM CHEST 2 VIEWS: CPT

## 2020-02-29 PROCEDURE — 85027 COMPLETE CBC AUTOMATED: CPT

## 2020-02-29 PROCEDURE — 76937 US GUIDE VASCULAR ACCESS: CPT

## 2020-02-29 PROCEDURE — 93005 ELECTROCARDIOGRAM TRACING: CPT | Performed by: INTERNAL MEDICINE

## 2020-02-29 PROCEDURE — 82962 GLUCOSE BLOOD TEST: CPT

## 2020-02-29 PROCEDURE — 36415 COLL VENOUS BLD VENIPUNCTURE: CPT

## 2020-02-29 PROCEDURE — 6360000002 HC RX W HCPCS

## 2020-02-29 PROCEDURE — 93010 ELECTROCARDIOGRAM REPORT: CPT | Performed by: INTERNAL MEDICINE

## 2020-02-29 PROCEDURE — 2140000000 HC CCU INTERMEDIATE R&B

## 2020-02-29 PROCEDURE — 02HV33Z INSERTION OF INFUSION DEVICE INTO SUPERIOR VENA CAVA, PERCUTANEOUS APPROACH: ICD-10-PCS | Performed by: INTERNAL MEDICINE

## 2020-02-29 PROCEDURE — 2500000003 HC RX 250 WO HCPCS: Performed by: INTERNAL MEDICINE

## 2020-02-29 PROCEDURE — 36569 INSJ PICC 5 YR+ W/O IMAGING: CPT

## 2020-02-29 PROCEDURE — C1751 CATH, INF, PER/CENT/MIDLINE: HCPCS

## 2020-02-29 PROCEDURE — 6370000000 HC RX 637 (ALT 250 FOR IP): Performed by: INTERNAL MEDICINE

## 2020-02-29 RX ORDER — SODIUM CHLORIDE 0.9 % (FLUSH) 0.9 %
10 SYRINGE (ML) INJECTION PRN
Status: DISCONTINUED | OUTPATIENT
Start: 2020-02-29 | End: 2020-03-01 | Stop reason: HOSPADM

## 2020-02-29 RX ORDER — SODIUM CHLORIDE 0.9 % (FLUSH) 0.9 %
10 SYRINGE (ML) INJECTION EVERY 12 HOURS SCHEDULED
Status: DISCONTINUED | OUTPATIENT
Start: 2020-02-29 | End: 2020-03-01 | Stop reason: HOSPADM

## 2020-02-29 RX ORDER — METHYLPREDNISOLONE SODIUM SUCCINATE 40 MG/ML
40 INJECTION, POWDER, LYOPHILIZED, FOR SOLUTION INTRAMUSCULAR; INTRAVENOUS EVERY 12 HOURS
Status: DISCONTINUED | OUTPATIENT
Start: 2020-02-29 | End: 2020-03-01 | Stop reason: HOSPADM

## 2020-02-29 RX ORDER — LIDOCAINE HYDROCHLORIDE 10 MG/ML
5 INJECTION, SOLUTION EPIDURAL; INFILTRATION; INTRACAUDAL; PERINEURAL ONCE
Status: COMPLETED | OUTPATIENT
Start: 2020-02-29 | End: 2020-02-29

## 2020-02-29 RX ORDER — MORPHINE SULFATE 2 MG/ML
INJECTION, SOLUTION INTRAMUSCULAR; INTRAVENOUS
Status: COMPLETED
Start: 2020-02-29 | End: 2020-02-29

## 2020-02-29 RX ORDER — MORPHINE SULFATE 2 MG/ML
1 INJECTION, SOLUTION INTRAMUSCULAR; INTRAVENOUS ONCE
Status: COMPLETED | OUTPATIENT
Start: 2020-02-29 | End: 2020-02-29

## 2020-02-29 RX ORDER — MORPHINE SULFATE 2 MG/ML
1 INJECTION, SOLUTION INTRAMUSCULAR; INTRAVENOUS ONCE
Status: DISCONTINUED | OUTPATIENT
Start: 2020-02-29 | End: 2020-03-01 | Stop reason: HOSPADM

## 2020-02-29 RX ADMIN — INSULIN LISPRO 2 UNITS: 100 INJECTION, SOLUTION INTRAVENOUS; SUBCUTANEOUS at 11:53

## 2020-02-29 RX ADMIN — NITROGLYCERIN 0.4 MG: 0.4 TABLET, ORALLY DISINTEGRATING SUBLINGUAL at 09:47

## 2020-02-29 RX ADMIN — ATORVASTATIN CALCIUM 80 MG: 40 TABLET, FILM COATED ORAL at 21:25

## 2020-02-29 RX ADMIN — OXYCODONE HYDROCHLORIDE 10 MG: 10 TABLET ORAL at 15:24

## 2020-02-29 RX ADMIN — BUSPIRONE HYDROCHLORIDE 15 MG: 7.5 TABLET ORAL at 13:50

## 2020-02-29 RX ADMIN — CLONAZEPAM 1 MG: 1 TABLET ORAL at 02:54

## 2020-02-29 RX ADMIN — METFORMIN HYDROCHLORIDE 1000 MG: 500 TABLET, EXTENDED RELEASE ORAL at 16:46

## 2020-02-29 RX ADMIN — CARVEDILOL 12.5 MG: 12.5 TABLET, FILM COATED ORAL at 16:51

## 2020-02-29 RX ADMIN — RANOLAZINE 1000 MG: 500 TABLET, FILM COATED, EXTENDED RELEASE ORAL at 09:13

## 2020-02-29 RX ADMIN — FENOFIBRATE 160 MG: 160 TABLET ORAL at 09:13

## 2020-02-29 RX ADMIN — ACETAMINOPHEN 325 MG: 325 TABLET ORAL at 15:26

## 2020-02-29 RX ADMIN — MORPHINE SULFATE 1 MG: 2 INJECTION, SOLUTION INTRAMUSCULAR; INTRAVENOUS at 10:37

## 2020-02-29 RX ADMIN — RIVAROXABAN 2.5 MG: 2.5 TABLET, FILM COATED ORAL at 21:51

## 2020-02-29 RX ADMIN — CLONAZEPAM 1 MG: 1 TABLET ORAL at 21:24

## 2020-02-29 RX ADMIN — ALOGLIPTIN 12.5 MG: 12.5 TABLET, FILM COATED ORAL at 16:46

## 2020-02-29 RX ADMIN — BUSPIRONE HYDROCHLORIDE 15 MG: 7.5 TABLET ORAL at 21:25

## 2020-02-29 RX ADMIN — SODIUM CHLORIDE, PRESERVATIVE FREE 10 ML: 5 INJECTION INTRAVENOUS at 21:29

## 2020-02-29 RX ADMIN — BUSPIRONE HYDROCHLORIDE 15 MG: 7.5 TABLET ORAL at 09:13

## 2020-02-29 RX ADMIN — CYCLOBENZAPRINE 10 MG: 10 TABLET, FILM COATED ORAL at 23:14

## 2020-02-29 RX ADMIN — LIDOCAINE HYDROCHLORIDE ANHYDROUS 5 ML: 10 INJECTION, SOLUTION INFILTRATION at 11:10

## 2020-02-29 RX ADMIN — ASPIRIN 81 MG 81 MG: 81 TABLET ORAL at 09:13

## 2020-02-29 RX ADMIN — RANOLAZINE 1000 MG: 500 TABLET, FILM COATED, EXTENDED RELEASE ORAL at 21:24

## 2020-02-29 RX ADMIN — TICAGRELOR 90 MG: 90 TABLET ORAL at 21:29

## 2020-02-29 RX ADMIN — OXYCODONE HYDROCHLORIDE 10 MG: 10 TABLET ORAL at 06:33

## 2020-02-29 RX ADMIN — CYCLOBENZAPRINE 10 MG: 10 TABLET, FILM COATED ORAL at 13:50

## 2020-02-29 RX ADMIN — FUROSEMIDE 20 MG: 20 TABLET ORAL at 09:14

## 2020-02-29 RX ADMIN — SODIUM CHLORIDE, PRESERVATIVE FREE 10 ML: 5 INJECTION INTRAVENOUS at 09:18

## 2020-02-29 RX ADMIN — RIVAROXABAN 2.5 MG: 2.5 TABLET, FILM COATED ORAL at 09:14

## 2020-02-29 RX ADMIN — CITALOPRAM HYDROBROMIDE 40 MG: 40 TABLET ORAL at 09:13

## 2020-02-29 RX ADMIN — CARVEDILOL 12.5 MG: 12.5 TABLET, FILM COATED ORAL at 09:13

## 2020-02-29 RX ADMIN — CYCLOBENZAPRINE 10 MG: 10 TABLET, FILM COATED ORAL at 09:13

## 2020-02-29 RX ADMIN — TICAGRELOR 90 MG: 90 TABLET ORAL at 09:13

## 2020-02-29 RX ADMIN — LISINOPRIL 2.5 MG: 2.5 TABLET ORAL at 09:13

## 2020-02-29 ASSESSMENT — PAIN SCALES - GENERAL
PAINLEVEL_OUTOF10: 0
PAINLEVEL_OUTOF10: 8
PAINLEVEL_OUTOF10: 9
PAINLEVEL_OUTOF10: 0
PAINLEVEL_OUTOF10: 7
PAINLEVEL_OUTOF10: 0
PAINLEVEL_OUTOF10: 7

## 2020-02-29 ASSESSMENT — PAIN DESCRIPTION - LOCATION
LOCATION: CHEST
LOCATION: CHEST;BACK
LOCATION: HIP
LOCATION: CHEST

## 2020-02-29 ASSESSMENT — PAIN DESCRIPTION - FREQUENCY: FREQUENCY: CONTINUOUS

## 2020-02-29 ASSESSMENT — PAIN DESCRIPTION - DESCRIPTORS: DESCRIPTORS: CRUSHING

## 2020-02-29 NOTE — H&P
History and Physical        CHIEF COMPLAINT:  Chest pain      HISTORY OF PRESENT ILLNESS:      The patient is a 53 y.o. female with significant past medical history of DM-II, COPD and CAD s/p CABG who presented to my office yesterday for follow up and complained of Chest pain while in the waiting rooom which just started, she had associated, diaphoresis, shortness of breath, palpitations and nausea. No radiation but pain is retrosternal. She was given SL NTG and 81 mg aspirin with a slight decrease in Chest pain and she was sent to ER via Squad. Her  tells me that she was using a new  Clean product before she came to my office.     Past Medical History:        Diagnosis Date    Acid reflux     CAD (coronary artery disease)     Sees Dr. Anna Nguyen COPD (chronic obstructive pulmonary disease) ([facility] [address]     \"have mild COPD- no pulmonologist\"    Depression     Diabetes mellitus ([facility] [address] Dx 2001    Eczema of hand     Hx of migraines     HX OTHER MEDICAL     \"difficulty IV stick\"    Hyperlipidemia     Hypertension     follows with Dr Alcon Sutton Kidney stones 2000's    \"Passed One Kidney Stone\"    PVD (peripheral vascular disease) ([facility] [address]     Wears glasses      Past Surgical History:        Procedure Laterality Date   [address] During TOSHA    BACK SURGERY  Last Done 9-11/2013    \"4 Lower Back Surgeries, 2 Rods, 4 Pins At L5, S1 Put In During Last Surgery\"    BONE RESECTION, RIB      \"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- surgery 2013- ok since then\"    BREAST BIOPSY Bilateral 1990's-2000's    Benign    CARDIAC CATHETERIZATION      per pt \"had heart cath 5/10/2019\"   [address]  5-4-12    CABG (3 Bypasses)   [facility]    \"took [facility] and [facility]    COLONOSCOPY  last one 2014    Dr. Mirian Smith      6/28/2018 total of 9    DENTAL SURGERY  2000's    \"Dental Implants Upper And Lower\"    FEMORAL ENDARTERECTOMY Right 6/11/2019    RIGHT FEMORAL ENDARTERECTOMY WITH CORMATRIX PATCH performed by Trey Leonard MD at [address] [place] [address]    Appendectomy Also Done    OTHER SURGICAL HISTORY Left 4/4/2013    Left transaxillary 1st rib resection    OTHER SURGICAL HISTORY      peripheral angiography 6/6/2019    TONSILLECTOMY AND ADENOIDECTOMY  1971    VASCULAR SURGERY      \"5/22/2019\"they went in and unblocked the femoral artery on right side and then in again 6/6/2019\"put dye in -  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\"       Medications Prior to Admission:   Medications Prior to Admission: ticagrelor (BRILINTA) 90 MG TABS tablet, Take 90 mg by mouth  JANUMET -1000 MG TB24, Take 1 tablet by mouth nightly  citalopram (CELEXA) 40 MG tablet, Take 40 mg by mouth daily  cyclobenzaprine (FLEXERIL) 10 MG tablet, Take 10 mg by mouth 3 times daily as needed  aspirin 81 MG EC tablet, Take 81 mg by mouth daily  dapagliflozin (FARXIGA) 10 MG tablet, Take 10 mg by mouth every morning   busPIRone (BUSPAR) 15 MG tablet, Take 15 mg by mouth 3 times daily  insulin glargine (BASAGLAR KWIKPEN) 100 UNIT/ML injection pen, Inject 25 Units into the skin nightly (Patient taking differently: Inject 30 Units into the skin nightly )  carvedilol (COREG) 25 MG tablet, Take 25 mg by mouth  ranolazine (RANEXA) 1000 MG extended release tablet, Take 1,000 mg by mouth  SITagliptin-metFORMIN (JANUMET XR)  MG TB24 per extended release tablet, Take 1 tablet by mouth  [DISCONTINUED] atorvastatin (LIPITOR) 40 MG tablet, Take 40 mg by mouth  [DISCONTINUED] busPIRone (BUSPAR) 5 MG tablet, Take 15 mg by mouth  [DISCONTINUED] JANUMET XR  MG TB24 per extended release tablet, TAKE 2 TABLETS BY MOUTH EVERY DAY WITH EVENING MEAL  fenofibrate (TRICOR) 145 MG tablet, Take 145 mg by mouth nightly  gabapentin (NEURONTIN) 100 MG capsule, Take 100 mg by mouth nightly.   clonazePAM (KLONOPIN) 1 MG tablet, Take 1 mg by mouth daily as

## 2020-02-29 NOTE — PROGRESS NOTES
Patient still complains of midsternal pain 8/10, patient's IV was unable to be flushed, IV dc'd unable to get IV access. Dr. Giuliana Trimble called new order to give 1 mg sq verses IV. [facility] floor nurse to get picc team. Patient denies allergy to morphine. Lab stuck multiple times and only got enough blood for triponin level, patient tearful and emotional support was given. 1 mg= 0.5mL, morphine given. Telemetry shows NSR with depressed Twave.  BP is 106/54 HR 74

## 2020-02-29 NOTE — ED NOTES
DR Nabil Laughlin ANSWERED     [facility].  Minnie Mendez  02/28/20 1903

## 2020-02-29 NOTE — PLAN OF CARE
Problem: Falls - Risk of:  Goal: Will remain free from falls  Description  Will remain free from falls  Outcome: Ongoing  Goal: Absence of physical injury  Description  Absence of physical injury  Outcome: Ongoing     Problem: Pain:  Goal: Pain level will decrease  Description  Pain level will decrease  Outcome: Ongoing  Goal: Control of acute pain  Description  Control of acute pain  Outcome: Ongoing  Goal: Control of chronic pain  Description  Control of chronic pain  Outcome: Ongoing  Goal: Patient's pain/discomfort is manageable  Description  Patient's pain/discomfort is manageable  Outcome: Ongoing     Problem: Infection:  Goal: Will remain free from infection  Description  Will remain free from infection  Outcome: Ongoing     Problem: Safety:  Goal: Free from accidental physical injury  Description  Free from accidental physical injury  Outcome: Ongoing  Goal: Free from intentional harm  Description  Free from intentional harm  Outcome: Ongoing     Problem: Daily Care:  Goal: Daily care needs are met  Description  Daily care needs are met  Outcome: Ongoing     Problem: Skin Integrity:  Goal: Skin integrity will stabilize  Description  Skin integrity will stabilize  Outcome: Ongoing     Problem: Discharge Planning:  Goal: Patients continuum of care needs are met  Description  Patients continuum of care needs are met  Outcome: Ongoing

## 2020-02-29 NOTE — CONSULTS
Consult to IV team for IV access. PICC line ordered for poor access and inability to draw labs. Education regarding insertion of PICC reviewed with patient. Risk/benefit/and alternatives discussed. verbalized understanding. Consent given by patient . Time out done at 10:45. PICC line inserted right upper arm  without difficulty per protocol. Pt tolerated well. Good blood return and flushes easily. ECG tip confirmation system utilized for tip placement with P wave changes noted by RN during insertion and ECG strips left on chart. Educational booklet left at bedside.   Toya Whitmore

## 2020-02-29 NOTE — PROGRESS NOTES
midsternal chest pain radiating down left arm. Pain 8/10. Patient states it feels like someone is sitting on her chest. Vitals; blood pressure 104/57, pulse 74, respirations 16 even and unlabored, EKG done, Nitro 0.4mg sublingual given. Pain after nitro 7/10. DR. Wood called and new order placed for STAT triponin level, morphine 1 mg, and reevaluate.
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
## 2020-02-29 NOTE — PROGRESS NOTES
Patient state pain is now 9/10 not as much in chest now the pain is in lower back. I offered to move her to the chair to se if that helps she refused. Pain med was given around 330 pm will continue to monitor. RN was notified and was concerned about the blood pressure being 103/50 so no more pain meds are given at this time.

## 2020-02-29 NOTE — ED NOTES
Report to [employer]. RN states no further questions at this time.      Sis Real RN  02/28/20 [id number]

## 2020-03-01 ENCOUNTER — APPOINTMENT (OUTPATIENT)
Dept: CT IMAGING | Age: 55
DRG: 313 | End: 2020-03-01
Payer: COMMERCIAL

## 2020-03-01 VITALS
WEIGHT: 170.6 LBS | RESPIRATION RATE: 17 BRPM | HEART RATE: 87 BPM | HEIGHT: 66 IN | OXYGEN SATURATION: 95 % | BODY MASS INDEX: 27.42 KG/M2 | SYSTOLIC BLOOD PRESSURE: 106 MMHG | TEMPERATURE: 97.8 F | DIASTOLIC BLOOD PRESSURE: 63 MMHG

## 2020-03-01 PROBLEM — R07.9 CHEST PAIN: Status: RESOLVED | Noted: 2018-06-28 | Resolved: 2020-03-01

## 2020-03-01 LAB
GLUCOSE BLD-MCNC: 181 MG/DL (ref 70–99)
GLUCOSE BLD-MCNC: 265 MG/DL (ref 70–99)
GLUCOSE BLD-MCNC: 95 MG/DL (ref 70–99)

## 2020-03-01 PROCEDURE — 2580000003 HC RX 258: Performed by: INTERNAL MEDICINE

## 2020-03-01 PROCEDURE — 6360000002 HC RX W HCPCS: Performed by: INTERNAL MEDICINE

## 2020-03-01 PROCEDURE — 82962 GLUCOSE BLOOD TEST: CPT

## 2020-03-01 PROCEDURE — 70450 CT HEAD/BRAIN W/O DYE: CPT

## 2020-03-01 PROCEDURE — 6370000000 HC RX 637 (ALT 250 FOR IP): Performed by: INTERNAL MEDICINE

## 2020-03-01 RX ORDER — METHYLPREDNISOLONE 4 MG/1
TABLET ORAL
Qty: 1 KIT | Refills: 0 | Status: SHIPPED | OUTPATIENT
Start: 2020-03-01 | End: 2020-03-07

## 2020-03-01 RX ORDER — METFORMIN HYDROCHLORIDE 500 MG/1
1000 TABLET, EXTENDED RELEASE ORAL
Status: DISCONTINUED | OUTPATIENT
Start: 2020-03-01 | End: 2020-03-01 | Stop reason: HOSPADM

## 2020-03-01 RX ADMIN — OXYCODONE HYDROCHLORIDE 10 MG: 10 TABLET ORAL at 07:46

## 2020-03-01 RX ADMIN — INSULIN LISPRO 2 UNITS: 100 INJECTION, SOLUTION INTRAVENOUS; SUBCUTANEOUS at 17:13

## 2020-03-01 RX ADMIN — RIVAROXABAN 2.5 MG: 2.5 TABLET, FILM COATED ORAL at 08:30

## 2020-03-01 RX ADMIN — BUSPIRONE HYDROCHLORIDE 15 MG: 7.5 TABLET ORAL at 15:05

## 2020-03-01 RX ADMIN — RANOLAZINE 1000 MG: 500 TABLET, FILM COATED, EXTENDED RELEASE ORAL at 08:29

## 2020-03-01 RX ADMIN — METHYLPREDNISOLONE SODIUM SUCCINATE 40 MG: 40 INJECTION, POWDER, FOR SOLUTION INTRAMUSCULAR; INTRAVENOUS at 08:30

## 2020-03-01 RX ADMIN — PROMETHAZINE HYDROCHLORIDE 12.5 MG: 25 TABLET ORAL at 03:03

## 2020-03-01 RX ADMIN — ASPIRIN 81 MG 81 MG: 81 TABLET ORAL at 08:30

## 2020-03-01 RX ADMIN — TICAGRELOR 90 MG: 90 TABLET ORAL at 08:29

## 2020-03-01 RX ADMIN — SODIUM CHLORIDE, PRESERVATIVE FREE 10 ML: 5 INJECTION INTRAVENOUS at 08:29

## 2020-03-01 RX ADMIN — CLONAZEPAM 1 MG: 1 TABLET ORAL at 05:36

## 2020-03-01 RX ADMIN — OXYCODONE HYDROCHLORIDE 10 MG: 10 TABLET ORAL at 01:17

## 2020-03-01 RX ADMIN — FENOFIBRATE 160 MG: 160 TABLET ORAL at 08:30

## 2020-03-01 RX ADMIN — CYCLOBENZAPRINE 10 MG: 10 TABLET, FILM COATED ORAL at 15:05

## 2020-03-01 RX ADMIN — CITALOPRAM HYDROBROMIDE 40 MG: 40 TABLET ORAL at 08:29

## 2020-03-01 RX ADMIN — BUSPIRONE HYDROCHLORIDE 15 MG: 7.5 TABLET ORAL at 08:30

## 2020-03-01 RX ADMIN — INSULIN LISPRO 6 UNITS: 100 INJECTION, SOLUTION INTRAVENOUS; SUBCUTANEOUS at 11:23

## 2020-03-01 RX ADMIN — OXYCODONE HYDROCHLORIDE 10 MG: 10 TABLET ORAL at 15:05

## 2020-03-01 RX ADMIN — CYCLOBENZAPRINE 10 MG: 10 TABLET, FILM COATED ORAL at 08:29

## 2020-03-01 RX ADMIN — ACETAMINOPHEN 325 MG: 325 TABLET ORAL at 08:30

## 2020-03-01 RX ADMIN — ACETAMINOPHEN 325 MG: 325 TABLET ORAL at 15:05

## 2020-03-01 ASSESSMENT — PAIN DESCRIPTION - PAIN TYPE: TYPE: CHRONIC PAIN

## 2020-03-01 ASSESSMENT — PAIN SCALES - GENERAL
PAINLEVEL_OUTOF10: 0
PAINLEVEL_OUTOF10: 8
PAINLEVEL_OUTOF10: 0
PAINLEVEL_OUTOF10: 0
PAINLEVEL_OUTOF10: 7
PAINLEVEL_OUTOF10: 7

## 2020-03-01 ASSESSMENT — PAIN DESCRIPTION - LOCATION: LOCATION: BACK

## 2020-03-01 NOTE — PLAN OF CARE
Problem: Falls - Risk of:  Goal: Will remain free from falls  Description  Will remain free from falls  3/1/2020 0051 by Arlet Gamboa RN  Outcome: Ongoing  2/29/2020 1301 by Annika Palomo RN  Outcome: Ongoing  Goal: Absence of physical injury  Description  Absence of physical injury  3/1/2020 0051 by Arlet Gamboa RN  Outcome: Ongoing  2/29/2020 1301 by Annika Palomo RN  Outcome: Ongoing

## 2020-03-01 NOTE — PROGRESS NOTES
Progress note    Brown Huang    3/1/2020    Subjective:     Patient says her chest pain is almost gone. She fell today, says she just slid down, denies hitting her head. CT was done which is negative  Medication side effects: none. Scheduled Meds:   metFORMIN  1,000 mg Oral Dinner    And    linagliptin  5 mg Oral Dinner    morphine  1 mg Intravenous Once    sodium chloride flush  10 mL Intravenous 2 times per day    methylPREDNISolone  40 mg Intravenous Q12H    ondansetron  4 mg Intravenous Once    atorvastatin  80 mg Oral Daily    busPIRone  15 mg Oral TID    carvedilol  12.5 mg Oral BID WC    citalopram  40 mg Oral Daily    cyclobenzaprine  10 mg Oral TID    dapagliflozin  10 mg Oral QAM    fenofibrate  160 mg Oral Daily    furosemide  20 mg Oral Daily    insulin glargine  30 Units Subcutaneous Nightly    lisinopril  2.5 mg Oral Daily    ranolazine  1,000 mg Oral BID    rivaroxaban  2.5 mg Oral BID    ticagrelor  90 mg Oral BID    aspirin  81 mg Oral Daily    insulin lispro  0-12 Units Subcutaneous TID WC    insulin lispro  0-6 Units Subcutaneous Nightly     Continuous Infusions:   dextrose       PRN Meds:sodium chloride flush, albuterol sulfate HFA, clonazePAM, acetaminophen **OR** acetaminophen, polyethylene glycol, promethazine **OR** ondansetron, potassium chloride, magnesium sulfate, glucose, dextrose, glucagon (rDNA), dextrose, oxyCODONE **AND** acetaminophen    Review of Systems  Pertinent items are noted in HPI. Objective:     Patient Vitals for the past 8 hrs:   BP Temp Temp src Pulse Resp SpO2   03/01/20 1503 106/63 97.8 °F (36.6 °C) Oral 87 17 95 %   03/01/20 1217 113/67 -- -- 94 (!) 32 --   03/01/20 1214 114/65 -- -- 92 22 --   03/01/20 1211 (!) 106/54 -- -- 96 22 --   03/01/20 1138 (!) 99/58 -- -- 86 12 --     I/O last 3 completed shifts: In: 575 [P.O.:575]  Out: -   No intake/output data recorded.     /63   Pulse 87   Temp 97.8 °F (36.6 °C) (Oral)   Resp 17 Ht 5' 5.98\" (1.676 m)   Wt 170 lb 9.6 oz (77.4 kg)   SpO2 95%   BMI 27.55 kg/m²   General appearance: alert and cooperative  Lungs: clear to auscultation bilaterally  Heart: regular rate and rhythm, S1, S2 normal, no murmur, click, rub or gallop  Abdomen: soft, non-tender; bowel sounds normal; no masses,  no organomegaly  Extremities: extremities normal, atraumatic, no cyanosis or edema  Skin: Skin color, texture, turgor normal. No rashes or lesions      Labs and imaging reviewed.   CBC with Differential:    Lab Results   Component Value Date    WBC 5.4 02/29/2020    RBC 3.46 02/29/2020    HGB 10.8 02/29/2020    HCT 34.6 02/29/2020     02/29/2020    .0 02/29/2020    MCH 31.2 02/29/2020    MCHC 31.2 02/29/2020    RDW 13.3 02/29/2020    SEGSPCT 55.5 02/28/2020    LYMPHOPCT 32.2 02/28/2020    MONOPCT 8.5 02/28/2020    EOSPCT 0.7 05/04/2012    BASOPCT 0.9 02/28/2020    MONOSABS 0.6 02/28/2020    LYMPHSABS 2.4 02/28/2020    EOSABS 0.2 02/28/2020    BASOSABS 0.1 02/28/2020    DIFFTYPE AUTOMATED DIFFERENTIAL 02/28/2020     CMP:    Lab Results   Component Value Date     02/28/2020    K 4.6 02/28/2020     02/28/2020    CO2 25 02/28/2020    BUN 19 02/28/2020    CREATININE 1.2 02/28/2020    GFRAA 56 02/28/2020    LABGLOM 47 02/28/2020    GLUCOSE 69 02/28/2020    PROT 7.5 02/28/2020    PROT 7.8 02/08/2013    LABALBU 4.6 02/28/2020    CALCIUM 9.5 02/28/2020    BILITOT 0.3 02/28/2020    ALKPHOS 36 02/28/2020    AST 22 02/28/2020    ALT 14 02/28/2020     BMP:    Lab Results   Component Value Date     02/28/2020    K 4.6 02/28/2020     02/28/2020    CO2 25 02/28/2020    BUN 19 02/28/2020    LABALBU 4.6 02/28/2020    CREATININE 1.2 02/28/2020    CALCIUM 9.5 02/28/2020    GFRAA 56 02/28/2020    LABGLOM 47 02/28/2020    GLUCOSE 69 02/28/2020     Sodium:    Lab Results   Component Value Date     02/28/2020     Potassium:    Lab Results   Component Value Date    K 4.6 02/28/2020 Calcium:    Lab Results   Component Value Date    CALCIUM 9.5 02/28/2020     Warfarin PT/INR:  No components found for: Tina Ochoa  PTT:    Lab Results   Component Value Date    APTT 32.3 06/06/2019   [APTT  Last 3 Troponin:    Lab Results   Component Value Date    TROPONINI <0.006 06/11/2014    TROPONINI <0.006 05/16/2014    TROPONINI 0.008 05/15/2014    TROPONINI 0.119 05/31/2012    TROPONINI <0.006 05/05/2012    TROPONINI <0.006 05/04/2012     ABG:    Lab Results   Component Value Date    PO2ART 300 05/05/2012    BEART 2 05/05/2012     TSH:  No results found for: TSH  VITAMIN B12: No components found for: B12  FOLATE:  No results found for: FOLATE  IRON:  No results found for: IRON  Iron Saturation:  No components found for: PERCENTFE  TIBC:  No results found for: TIBC  FERRITIN:  No results found for: FERRITIN    Assessment:     Principal Problem:    Chest pain  Active Problems:    Coronary artery disease    Diabetes mellitus type 2 with neurological manifestations    Major depressive disorder, recurrent episode with anxious distress (HCC)    Diabetes mellitus (HCC)    GERD (gastroesophageal reflux disease)    Hyperlipidemia    Essential hypertension    Tobacco dependence    Chronic midline low back pain with right-sided sciatica    PVD (peripheral vascular disease) (HCC)    COPD (chronic obstructive pulmonary disease) (HCC)    CAD (coronary artery disease)    Acid reflux    Hx of migraines    Hypertension    Kidney stones  Resolved Problems:    * No resolved hospital problems. *      Plan:   Noted cardiology recommendation  Her lungs are clear. Discharge home today on medrol dose pack in case this was chemical pneumonitis from Mr. Tolentino. Will see her in my office tomorrow.       Aileen VALENTIN M.D.  3/1/2020

## 2020-03-01 NOTE — PROGRESS NOTES
Low    COPD (chronic obstructive pulmonary disease) (HCC)     CAD (coronary artery disease)     Acid reflux     Depression     Hx of migraines     Hypertension     Kidney stones     Femoral artery occlusion ([facility] [address] 06/11/2019    PVD (peripheral vascular disease) ([facility] 05/23/2019    Arterial occlusion 05/22/2019    Eczema of hand     Change in mole 10/31/2018       Regi Ott MD 3/1/2020 12:17 PM
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
## 2020-04-20 ENCOUNTER — HOSPITAL ENCOUNTER (EMERGENCY)
Age: 55
Discharge: HOME | End: 2020-04-21
Payer: COMMERCIAL

## 2020-04-20 ENCOUNTER — APPOINTMENT (OUTPATIENT)
Dept: GENERAL RADIOLOGY | Age: 55
End: 2020-04-20
Payer: COMMERCIAL

## 2020-04-20 ENCOUNTER — HOSPITAL ENCOUNTER (EMERGENCY)
Age: 55
Discharge: HOME | End: 2020-04-20
Payer: COMMERCIAL

## 2020-04-20 VITALS
WEIGHT: 170 LBS | HEIGHT: 66 IN | RESPIRATION RATE: 16 BRPM | SYSTOLIC BLOOD PRESSURE: 129 MMHG | TEMPERATURE: 98.1 F | OXYGEN SATURATION: 97 % | DIASTOLIC BLOOD PRESSURE: 92 MMHG | BODY MASS INDEX: 27.32 KG/M2 | HEART RATE: 92 BPM

## 2020-04-20 PROCEDURE — 4500000027

## 2020-04-20 PROCEDURE — 6370000000 HC RX 637 (ALT 250 FOR IP): Performed by: PHYSICIAN ASSISTANT

## 2020-04-20 PROCEDURE — 73140 X-RAY EXAM OF FINGER(S): CPT

## 2020-04-20 PROCEDURE — 90715 TDAP VACCINE 7 YRS/> IM: CPT | Performed by: PHYSICIAN ASSISTANT

## 2020-04-20 PROCEDURE — 99282 EMERGENCY DEPT VISIT SF MDM: CPT

## 2020-04-20 PROCEDURE — 90471 IMMUNIZATION ADMIN: CPT | Performed by: PHYSICIAN ASSISTANT

## 2020-04-20 PROCEDURE — 6360000002 HC RX W HCPCS: Performed by: PHYSICIAN ASSISTANT

## 2020-04-20 PROCEDURE — 2500000003 HC RX 250 WO HCPCS: Performed by: PHYSICIAN ASSISTANT

## 2020-04-20 RX ORDER — BUPIVACAINE HYDROCHLORIDE 5 MG/ML
10 INJECTION, SOLUTION EPIDURAL; INTRACAUDAL ONCE
Status: COMPLETED | OUTPATIENT
Start: 2020-04-20 | End: 2020-04-20

## 2020-04-20 RX ORDER — CEPHALEXIN 250 MG/1
500 CAPSULE ORAL ONCE
Status: COMPLETED | OUTPATIENT
Start: 2020-04-20 | End: 2020-04-20

## 2020-04-20 RX ORDER — CEPHALEXIN 500 MG/1
500 CAPSULE ORAL 3 TIMES DAILY
Qty: 21 CAPSULE | Refills: 0 | Status: SHIPPED | OUTPATIENT
Start: 2020-04-20 | End: 2020-04-27

## 2020-04-20 RX ORDER — IBUPROFEN 600 MG/1
600 TABLET ORAL EVERY 6 HOURS PRN
Qty: 20 TABLET | Refills: 0 | Status: ON HOLD | OUTPATIENT
Start: 2020-04-20 | End: 2021-07-26 | Stop reason: HOSPADM

## 2020-04-20 RX ORDER — HYDROCODONE BITARTRATE AND ACETAMINOPHEN 5; 325 MG/1; MG/1
1 TABLET ORAL ONCE
Status: COMPLETED | OUTPATIENT
Start: 2020-04-20 | End: 2020-04-20

## 2020-04-20 RX ORDER — TRAMADOL HYDROCHLORIDE 50 MG/1
50 TABLET ORAL EVERY 8 HOURS PRN
Qty: 6 TABLET | Refills: 0 | Status: SHIPPED | OUTPATIENT
Start: 2020-04-20 | End: 2020-04-23

## 2020-04-20 RX ORDER — LIDOCAINE HYDROCHLORIDE 20 MG/ML
10 INJECTION, SOLUTION INFILTRATION; PERINEURAL ONCE
Status: COMPLETED | OUTPATIENT
Start: 2020-04-20 | End: 2020-04-20

## 2020-04-20 RX ADMIN — BUPIVACAINE HYDROCHLORIDE 50 MG: 5 INJECTION, SOLUTION EPIDURAL; INTRACAUDAL at 18:59

## 2020-04-20 RX ADMIN — LIDOCAINE HYDROCHLORIDE 10 ML: 20 INJECTION, SOLUTION INFILTRATION; PERINEURAL at 19:00

## 2020-04-20 RX ADMIN — TETANUS TOXOID, REDUCED DIPHTHERIA TOXOID AND ACELLULAR PERTUSSIS VACCINE, ADSORBED 0.5 ML: 5; 2.5; 8; 8; 2.5 SUSPENSION INTRAMUSCULAR at 19:00

## 2020-04-20 RX ADMIN — CEPHALEXIN 500 MG: 250 CAPSULE ORAL at 19:04

## 2020-04-20 RX ADMIN — HYDROCODONE BITARTRATE AND ACETAMINOPHEN 1 TABLET: 5; 325 TABLET ORAL at 19:04

## 2020-04-20 ASSESSMENT — PAIN SCALES - GENERAL
PAINLEVEL_OUTOF10: 7
PAINLEVEL_OUTOF10: 8
PAINLEVEL_OUTOF10: 1
PAINLEVEL_OUTOF10: 9

## 2020-04-20 ASSESSMENT — PAIN DESCRIPTION - PAIN TYPE
TYPE: ACUTE PAIN
TYPE: ACUTE PAIN

## 2020-04-20 ASSESSMENT — PAIN DESCRIPTION - ORIENTATION: ORIENTATION: RIGHT

## 2020-04-20 ASSESSMENT — PAIN DESCRIPTION - LOCATION: LOCATION: HAND

## 2020-04-20 NOTE — ED PROVIDER NOTES
Triage Chief Complaint:   Laceration (to right thumb laceration)    [place]:  Yohan Jones is a 53 y.o. female that presents today for laceration. Context is pt was cutting vegetables when hse cut her R lateral thumb with sharp object. Onset just pta. Pain is ranked  10/10  Onset was just prior to arrival  No gross blood loss. No loss of consciousness no confusion or dizziness no lightheadedness no headache. Tetanus shot is not up-to-date    ROS:  At least  06 systems reviewed and otherwise negative except as in the [address]     Past Medical History:   Diagnosis Date    Acid reflux     CAD (coronary artery disease)     Sees Dr. Sabrina Gonzales    COPD (chronic obstructive [facility] disease) ([facility]     \"have mild COPD- no pulmonologist\"    Depression     Diabetes mellitus ([facility] Dx 2001    Eczema of hand     Hx of migraines     HX OTHER MEDICAL     \"difficulty IV stick\"    Hyperlipidemia     Hypertension     follows with Dr Kiley Sanabria Kidney stones 2000's    \"Passed One Kidney Stone\"    PVD (peripheral vascular disease) ([facility]     Wears glasses      Past Surgical History:   Procedure Laterality Date   [address] [facility] [address] During TOSHA    BACK SURGERY  Last Done 9-11/2013    \"4 Lower Back Surgeries, 2 Rods, 4 Pins At L5, S1 Put In During Last Surgery\"    BONE RESECTION, RIB      \"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 Katrina Wynne      6/28/2018 total of 9    DENTAL SURGERY  2000's    \"Dental Implants Upper And Lower\"    FEMORAL ENDARTERECTOMY Right 6/11/2019    RIGHT FEMORAL ENDARTERECTOMY WITH CORMATRIX PATCH performed by Brenda Briceno MD at [place] [id number] file     Gets together: Not on file     Attends Sikhism service: Not on file     Active member of club or organization: Not on file     Attends meetings of clubs or organizations: Not on file     Relationship status: Not on file    Intimate partner violence     Fear of current or ex partner: Not on file     Emotionally abused: Not on file     Physically abused: Not on file     Forced sexual activity: Not on file   Other Topics Concern    Not on file   Social History Narrative    Not on file     No current facility-administered medications for this encounter. Current Outpatient Medications   Medication Sig Dispense Refill    cephALEXin (KEFLEX) 500 MG capsule Take 1 capsule by mouth 3 times daily for 7 days 21 capsule 0    ibuprofen (IBU) 600 MG tablet Take 1 tablet by mouth every 6 hours as needed for Pain 20 tablet 0    traMADol (ULTRAM) 50 MG tablet Take 1 tablet by mouth every 8 hours as needed for Pain for up to 3 days. 6 tablet 0    ranolazine (RANEXA) 1000 MG extended release tablet Take 1,000 mg by mouth 2 times daily       fenofibrate (TRICOR) 145 MG tablet Take 145 mg by mouth nightly  5    gabapentin (NEURONTIN) 100 MG capsule Take 100 mg by mouth nightly.  clonazePAM (KLONOPIN) 1 MG tablet Take 1 mg by mouth daily as needed.  lisinopril (PRINIVIL;ZESTRIL) 5 MG tablet Take 2.5 mg by mouth daily   5    furosemide (LASIX) 20 MG tablet Take 20 mg by mouth daily  11    JANUMET -1000 MG TB24 Take 1 tablet by mouth nightly  0    citalopram (CELEXA) 40 MG tablet Take 40 mg by mouth daily  2    cyclobenzaprine (FLEXERIL) 10 MG tablet Take 10 mg by mouth 3 times daily as needed      aspirin 81 MG EC tablet Take 81 mg by mouth daily      nitroGLYCERIN (NITROSTAT) 0.4 MG SL tablet up to max of 3 total doses.  If no relief after 1 dose, call 911. 25 tablet 3    atorvastatin (LIPITOR) 80 MG tablet Take 80 mg by mouth daily       dapagliflozin (FARXIGA) 10 MG tablet Take 10 mg by mouth every morning       rivaroxaban (XARELTO) 2.5 MG TABS tablet Take 1 tablet by mouth 2 times daily 60 tablet 0    busPIRone (BUSPAR) 15 MG tablet Take 15 mg by mouth 3 times daily 90 tablet 1    insulin glargine (BASAGLAR KWIKPEN) 100 UNIT/ML injection pen Inject 25 Units into the skin nightly (Patient taking differently: Inject 30 Units into the skin nightly ) 5 pen 0    carvedilol (COREG) 12.5 MG tablet Take 1 tablet by mouth 2 times daily (with meals) 60 tablet 0    ticagrelor (BRILINTA) 90 MG TABS tablet Take 1 tablet by mouth 2 times daily 60 tablet 0    albuterol sulfate HFA (PROAIR HFA) 108 (90 Base) MCG/ACT inhaler Inhale 2 puffs into the lungs every 6 hours as needed for Wheezing 2 Inhaler 0     Allergies   Allergen Reactions    Erythromycin Hives and Nausea And Vomiting    Lyrica [Pregabalin] Swelling    Codeine Palpitations    Imitrex [Sumatriptan] Anxiety     \"Makes Me Hyper\"    Ketorolac Tromethamine Other (See Comments)     \"Rapid Heart Rate\"    Prochlorperazine Edisylate Nausea And Vomiting    Tape Parker Du Tape] Itching     Blisters    Ultram [Tramadol] Itching    Zofran [Ondansetron Hcl] Nausea Only       Nursing Notes Reviewed    Physical Exam:  ED Triage Vitals   Enc Vitals Group      BP       Pulse       Resp       Temp       Temp src       SpO2       Weight       Height       Head Circumference       Peak Flow       Pain Score       Pain Loc       Pain Edu? Excl. in [address]? GENERAL APPEARANCE: Awake and alert. Cooperative. No acute distress. HEAD: Normocephalic. Atraumatic. EYES: EOM's grossly intact. Sclera anicteric. PERRLA. Conjunctiva non injected. No discharge  ENT: Mucous membranes are moist. No trismus. Posterior Pharynx non injected, no tongue swelling, airway patent, no tonsillar edema. No exudates noted. No abscess. No discharge. Middle ear spaces clear. Tympanic membrane non injected. Auditory canal clear. ABDOMEN: Soft. Non-tender.  No guarding or rebound. No organomegaly. No palpable masses  MUSCULOSKELETAL: No acute deformities. SKIN: Warm and dry. No rash, No erythema, No edema. No ecchymoses. Laceration:   1.5 cm x 1 cm flap laceration on lateral aspect of patient's right thumb. There is some mild nail bed/nail plate involvement. Scant active bleeding. No foreign bodies. Affected digit has full range of motion along all joints. In all directions. Cap refill less than 2 seconds distal sensation is intact. No arterial, bony, tenderness injury noted  NEUROLOGICAL: No gross facial drooping. Moves all 4 extremities spontaneously. PSYCHIATRIC: Normal mood. Procedure Note - Digital Block:  Patient offered a digital block for pain control. Questions were sought and answered and verbal consent was given by patient for the procedure. Digital block of affected digit performed with 3cc 2% Lidocaine/0. Conchetta Peaks 5% marcaine 50/50 mixture  without added sodium bicarbonate. Waleska Fernández tolerated the procedure well, no acute complications. Adequate anesthesia achieved. Procedure Note - Laceration repair:  Questions were sought and answered and verbal consent was given by patient for the procedure. The area was prepped and draped in standard bedside fashion. The wound was explored with No foreign bodies found. The wound was repaired with 5-0 Prolene; 7 simple external  sutures were used. The patient tolerated the procedure well without complications and my repeat neurovascular exam post-procedure is unchanged. I have reviewed and interpreted all of the currently available lab results from this visit (if applicable):  No results found for this visit on 04/20/20. Radiographs (if obtained):  [] The following radiograph was interpreted by myself in the absence of a radiologist:   [] Radiologist's Report Reviewed:  XR FINGER RIGHT (MIN 2 VIEWS)   Final Result   Clinically evident laceration not radiographically evident.   No radiopaque   foreign bodies or soft tissue gas noted. No acute bony abnormalities. EKG (if obtained):   Please See Note of attending physician for EKG interpretation. Chart review shows recent radiograph(s):  No results found. MDM:   Patient presents today with laceration. Laceration repair performed by myself without complications. Patient did tolerate procedure well. Wound care discussed with patient/family. As well as return precautions, suture staple/removal.    Wound care and scar minimization education was provided. Instructions were given to return for increasing pain, redness, streaking, discharge, or any other worsening or worrisome concerns. I'm very pleased with the results of the wound repair. Pt is to be discharged home. Pt is  to return immediately to the emergency department if he has any new, worrisome or worsening symptoms. Pt is to follow up with PCP/DOD within 2 days. Patient/Surrogate vocalizes agreement and understanding with this plan and he has no questions upon disposition. Pt is comfortable upon disposition home. Patient is stable, Patients vital signs are stable. Vital signs and nursing notes reviewed during ED course. I independently managed patient today in the ED. All pertinent Lab data and radiographic results reviewed with patient at bedside. The patient and/or the family were informed of the results of any tests/labs/imaging, the treatment plan, and time was allotted to answer questions. See chart for details of medications given during the ED stay. BP (!) 129/92   Pulse 92   Temp 98.1 °F (36.7 °C) (Oral)   Resp 16   Ht 5' 6\" (1.676 m)   Wt 170 lb (77.1 kg)   SpO2 97%   BMI 27.44 kg/m²       Clinical Impression:  1. Laceration of right thumb with foreign body and damage to nail, initial encounter        Disposition referral (if applicable):   Maciej Yeager  [phone]    In 2 days  For wound re-check    [facility] Emergency Department  [address]  [phone]  In 2 days  For wound re-check    [facility] Emergency Department  [address] [phone]  In 1 week  For suture removal    Disposition medications (if applicable):  Discharge Medication List as of 4/20/2020  8:29 PM      START taking these medications    Details   cephALEXin (KEFLEX) 500 MG capsule Take 1 capsule by mouth 3 times daily for 7 days, Disp-21 capsule, R-0Print      ibuprofen (IBU) 600 MG tablet Take 1 tablet by mouth every 6 hours as needed for Pain, Disp-20 tablet, R-0Print      traMADol (ULTRAM) 50 MG tablet Take 1 tablet by mouth every 8 hours as needed for Pain for up to 3 days. , Disp-6 tablet, R-0Print               Comment: Please note this report has been produced using speech recognition software and may contain errors related to that system including errors in grammar, punctuation, and spelling, as well as words and phrases that may be inappropriate. If there are any questions or concerns please feel free to contact the dictating provider for clarification.       Alix Holloway, [address] [place] [address]  04/21/20 0821

## 2020-04-21 ENCOUNTER — TELEPHONE (OUTPATIENT)
Dept: SURGERY | Age: 55
End: 2020-04-21

## 2020-04-21 VITALS
SYSTOLIC BLOOD PRESSURE: 137 MMHG | RESPIRATION RATE: 19 BRPM | DIASTOLIC BLOOD PRESSURE: 62 MMHG | OXYGEN SATURATION: 100 % | WEIGHT: 170 LBS | TEMPERATURE: 98 F | HEART RATE: 99 BPM | BODY MASS INDEX: 27.32 KG/M2 | HEIGHT: 66 IN

## 2020-04-21 PROCEDURE — 6370000000 HC RX 637 (ALT 250 FOR IP)

## 2020-04-21 RX ORDER — HYDROCODONE BITARTRATE AND ACETAMINOPHEN 5; 325 MG/1; MG/1
TABLET ORAL
Status: COMPLETED
Start: 2020-04-21 | End: 2020-04-21

## 2020-04-21 RX ORDER — HYDROCODONE BITARTRATE AND ACETAMINOPHEN 5; 325 MG/1; MG/1
1 TABLET ORAL EVERY 6 HOURS PRN
Status: COMPLETED | OUTPATIENT
Start: 2020-04-21 | End: 2020-04-21

## 2020-04-21 RX ADMIN — HYDROCODONE BITARTRATE AND ACETAMINOPHEN 1 TABLET: 5; 325 TABLET ORAL at 00:54

## 2020-04-21 ASSESSMENT — PAIN DESCRIPTION - DIRECTION: RADIATING_TOWARDS: THUMB

## 2020-04-21 ASSESSMENT — PAIN DESCRIPTION - LOCATION: LOCATION: FINGER (COMMENT WHICH ONE)

## 2020-04-21 ASSESSMENT — PAIN SCALES - GENERAL
PAINLEVEL_OUTOF10: 8
PAINLEVEL_OUTOF10: 8

## 2020-04-21 ASSESSMENT — PAIN - FUNCTIONAL ASSESSMENT: PAIN_FUNCTIONAL_ASSESSMENT: 0-10

## 2020-04-21 ASSESSMENT — PAIN DESCRIPTION - ORIENTATION: ORIENTATION: RIGHT

## 2020-04-21 NOTE — ED TRIAGE NOTES
Pt seen here earlier and sutured for lac. Reports was laying in bed with arm elevated when she felt something wet. Noticed lac was bleeding through dressing. Pt did not remove dressing. This nurse removing soiled dressing. Active bleeding noted near nail suture. All sutures appear to be intact. Pressure dsg applied.

## 2020-04-21 NOTE — ED PROVIDER NOTES
Triage Chief Complaint:   Laceration (R thumb)    [place]:  Today in the ED I had the pleasure of caring for Zainab Christie who is a 53 y.o. female that presents today to the emergency department for wound check. Patient was just seen here several hours ago for laceration of the thumb. She states that her laceration was bleeding through her bandage was prompted her to come back in for evaluation. She does endorse pain 5/10 no paresthesias.   No new trauma or injury    ROS:  REVIEW OF SYSTEMS    At least 04 systems reviewed      All other review of systems are negative  See HPI and nursing notes for additional information       Past Medical History:   Diagnosis Date    Acid reflux     CAD (coronary artery disease)     Sees Dr. Mar Larkin    COPD (chronic obstructive pulmonary disease) ([facility] [facility]     \"have mild COPD- no pulmonologist\"    Depression     Diabetes mellitus ([facility] [facility] Dx 2001    Eczema of hand     Hx of migraines     HX OTHER MEDICAL     \"difficulty IV stick\"    Hyperlipidemia     Hypertension     follows with Dr Valdez Purcell Kidney stones 2000's    \"Passed One Kidney Stone\"    PVD (peripheral vascular disease) ([facility] [facility]     Wears glasses      Past Surgical History:   Procedure Laterality Date   [address] During TOSHA    BACK SURGERY  Last Done 9-11/2013    \"4 Lower Back Surgeries, 2 Rods, 4 Pins At L5, S1 Put In During Last Surgery\"    BONE RESECTION, RIB      \"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- surgery 2013- ok since then\"    BREAST BIOPSY Bilateral 1990's-2000's    Benign    CARDIAC CATHETERIZATION      per pt \"had heart cath 5/10/2019\"   [address]  5-4-12    CABG (3 Bypasses)   [place]    \"took with appendix and hyster    COLONOSCOPY  last one 2014    Dr. King Code      6/28/2018 total of 9    DENTAL SURGERY  2000's    \"Dental Implants Upper And Lower\"    FEMORAL ENDARTERECTOMY Right 6/11/2019    RIGHT FEMORAL ENDARTERECTOMY WITH CORMATRIX PATCH performed by Emili Mcdonald MD at [address] [facility]    Appendectomy Also Done    OTHER SURGICAL HISTORY Left 2013    Left transaxillary 1st rib resection    OTHER SURGICAL HISTORY      peripheral angiography 2019    TONSILLECTOMY AND ADENOIDECTOMY  1971    VASCULAR SURGERY      \"2019\"they went in and unblocked the femoral artery on right side and then in again 2019\"put dye in -  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\"     Family History   Problem Relation Age of Onset    Diabetes Mother     High Blood Pressure Mother     Asthma Mother     High Cholesterol Mother     High Blood Pressure Father     High Cholesterol Father     Asthma Father     Heart Disease Father         mvp    Asthma Brother     Mental Illness Son     Early Death Son 25        \"Suicide\"    Mental Illness Daughter         \"Bipolar\"     Social History     Socioeconomic History    Marital status:      Spouse name: Not on file    Number of children: Not on file    Years of education: Not on file    Highest education level: Not on file   Occupational History    Not on file   Social Needs    Financial resource strain: Not on file    Food insecurity     Worry: Not on file     Inability: Not on file    Transportation needs     Medical: Not on file     Non-medical: Not on file   Tobacco Use    Smoking status: Former Smoker     Packs/day: 0.50     Years: 35.00     Pack years: 17.50     Types: Cigarettes     Last attempt to quit: 2019     Years since quittin.8    Smokeless tobacco: Never Used   Substance and Sexual Activity    Alcohol use: No     Alcohol/week: 0.0 standard drinks     Comment: \"quit drinking in - use to drink 5 days per week- average use to drink 6 beers each time\"    Drug use: No    Sexual activity: Yes     Partners: Male   Lifestyle    Physical activity     Days per week: Not on file     Minutes per session: Not on file    Stress: Not on file   Relationships    Social connections     Talks on phone: Not on file     Gets together: Not on file     Attends Orthodox service: Not on file     Active member of club or organization: Not on file     Attends meetings of clubs or organizations: Not on file     Relationship status: Not on file    Intimate partner violence     Fear of current or ex partner: Not on file     Emotionally abused: Not on file     Physically abused: Not on file     Forced sexual activity: Not on file   Other Topics Concern    Not on file   Social History Narrative    Not on file     No current facility-administered medications for this encounter. Current Outpatient Medications   Medication Sig Dispense Refill    cephALEXin (KEFLEX) 500 MG capsule Take 1 capsule by mouth 3 times daily for 7 days 21 capsule 0    ibuprofen (IBU) 600 MG tablet Take 1 tablet by mouth every 6 hours as needed for Pain 20 tablet 0    traMADol (ULTRAM) 50 MG tablet Take 1 tablet by mouth every 8 hours as needed for Pain for up to 3 days. 6 tablet 0    ranolazine (RANEXA) 1000 MG extended release tablet Take 1,000 mg by mouth 2 times daily       fenofibrate (TRICOR) 145 MG tablet Take 145 mg by mouth nightly  5    gabapentin (NEURONTIN) 100 MG capsule Take 100 mg by mouth nightly.  clonazePAM (KLONOPIN) 1 MG tablet Take 1 mg by mouth daily as needed.  lisinopril (PRINIVIL;ZESTRIL) 5 MG tablet Take 2.5 mg by mouth daily   5    furosemide (LASIX) 20 MG tablet Take 20 mg by mouth daily  11    JANUMET -1000 MG TB24 Take 1 tablet by mouth nightly  0    citalopram (CELEXA) 40 MG tablet Take 40 mg by mouth daily  2    cyclobenzaprine (FLEXERIL) 10 MG tablet Take 10 mg by mouth 3 times daily as needed      aspirin 81 MG EC tablet Take 81 mg by mouth daily      nitroGLYCERIN (NITROSTAT) 0.4 MG SL tablet up to max of 3 total doses.  If no relief after 1 dose, call 911. 25 tablet 3    atorvastatin (LIPITOR) 80 MG tablet Take 80 mg by mouth daily       dapagliflozin (FARXIGA) 10 MG tablet Take 10 mg by mouth every morning       rivaroxaban (XARELTO) 2.5 MG TABS tablet Take 1 tablet by mouth 2 times daily 60 tablet 0    busPIRone (BUSPAR) 15 MG tablet Take 15 mg by mouth 3 times daily 90 tablet 1    insulin glargine (BASAGLAR KWIKPEN) 100 UNIT/ML injection pen Inject 25 Units into the skin nightly (Patient taking differently: Inject 30 Units into the skin nightly ) 5 pen 0    carvedilol (COREG) 12.5 MG tablet Take 1 tablet by mouth 2 times daily (with meals) 60 tablet 0    ticagrelor (BRILINTA) 90 MG TABS tablet Take 1 tablet by mouth 2 times daily 60 tablet 0    albuterol sulfate HFA (PROAIR HFA) 108 (90 Base) MCG/ACT inhaler Inhale 2 puffs into the lungs every 6 hours as needed for Wheezing 2 Inhaler 0     Allergies   Allergen Reactions    Erythromycin Hives and Nausea And Vomiting    Lyrica [Pregabalin] Swelling    Codeine Palpitations    Imitrex [Sumatriptan] Anxiety     \"Makes Me Hyper\"    Ketorolac Tromethamine Other (See Comments)     \"Rapid Heart Rate\"    Prochlorperazine Edisylate Nausea And Vomiting    Tape Jaime Flakes Tape] Itching     Blisters    Ultram [Tramadol] Itching    Zofran [Ondansetron Hcl] Nausea Only       Nursing Notes Reviewed    Physical Exam:  ED Triage Vitals   Enc Vitals Group      BP 04/20/20 2323 137/62      Pulse 04/20/20 2323 92      Resp 04/20/20 2330 16      Temp 04/20/20 2323 97.8 °F (36.6 °C)      Temp Source 04/21/20 0057 Oral      SpO2 04/20/20 2323 94 %      Weight 04/20/20 2320 170 lb (77.1 kg)      Height 04/20/20 2320 5' 6\" (1.676 m)      Head Circumference --       Peak Flow --       Pain Score --       Pain Loc --       Pain Edu? --       Excl.  in [address]? --      General :Patient is awake alert oriented person place and time no acute distress nontoxic appearing  HEENT: Pupils are equally round and reactive to light extraocular motors are intact conjunctivae clear sclerae white there is no injection no icterus. Nose without any rhinorrhea or epistaxis. Musculoskeletal: 5 out of 5 strength in all 4 extremities full flexion extension abduction and adduction supination pronation of all extremities and all digits. No obvious muscle atrophy is noted. No focal muscle deficits are appreciated. .  Patient's right thumb ( had pressure dressing placed in triage) pressure dressing removed revealed no vastly intact thumb. With good cap refill and sensation and movement. Sutures are intact. There is no active bleeding at this time. Dermatology: Skin is warm and dry there is no obvious abscesses lacerations or lesions noted  Psych: Mentation is grossly normal cognition is grossly normal. Affect is appropriate  . I have reviewed and interpreted all of the currently available lab results from this visit (if applicable):  No results found for this visit on 04/20/20. Radiographs (if obtained):  [] The following radiograph was interpreted by myself in the absence of a radiologist:   [] Radiologist's Report Reviewed:  No orders to display       EKG (if obtained):   Please See Note of attending physician for EKG interpretation. Chart review shows recent radiograph(s):  Xr Finger Right (min 2 Views)    Result Date: 4/20/2020  EXAMINATION: THREE XRAY VIEWS OF THE RIGHT FINGERS 4/20/2020 7:36 pm COMPARISON: None. HISTORY: ORDERING SYSTEM PROVIDED HISTORY: pain TECHNOLOGIST PROVIDED HISTORY: Reason for exam:->pain Reason for Exam: patient cut thumb Acuity: Acute Type of Exam: Initial Mechanism of Injury: was using a mandolin cutter to cut vegetables and got thumb Relevant Medical/Surgical History: na FINDINGS: No fractures or dislocations. No suspicious focal bony lesions. No bony erosions or bony destructive changes. No degenerative changes noted. No acute soft tissue abnormalities. Clinically evident laceration not radiographically evident.   No radiopaque foreign bodies or soft tissue gas noted. Clinically evident laceration not radiographically evident. No radiopaque foreign bodies or soft tissue gas noted. No acute bony abnormalities. MDM:   Presents today for wound check as she had a recent laceration. And had bleeding through her dressing. Pressure dressing was applied in triage. I then evaluated patient's thumb. Is neurovascular intact and we have achieved hemostasis. Will replacement pressure dressing and patient is educated on when to remove. As well as proper wound care and proper follow-up for wound check. /62   Pulse 99   Temp 98 °F (36.7 °C) (Oral)   Resp 19   Ht 5' 6\" (1.676 m)   Wt 170 lb (77.1 kg)   SpO2 100%   BMI 27.44 kg/m²       Clinical Impression:  1. Wound check, abscess        Disposition referral (if applicable):  Nael Caceres MD  [address]  [phone]    In 2 days      Disposition medications (if applicable):  Discharge Medication List as of 4/21/2020 12:49 AM            Comment: Please note this report has been produced using speech recognition software and may contain errors related to that system including errors in grammar, punctuation, and spelling, as well as words and phrases that may be inappropriate. If there are any questions or concerns please feel free to contact the dictating provider for clarification.       Manju Hidalgo, [address] [place] [address]  04/21/20 4506

## 2020-04-22 ENCOUNTER — TELEPHONE (OUTPATIENT)
Dept: SURGERY | Age: 55
End: 2020-04-22

## 2020-04-22 NOTE — TELEPHONE ENCOUNTER
You Patient resolved from the Care Transitions episode on 4.22.2020  Patient/family has been provided the following resources and education related to COVID-19:                         Signs, symptoms and red flags related to COVID-19            CDC exposure and quarantine guidelines            [facility] exposure contact - [phone]            Contact for their local Department of Health                 Patient currently reports that the following symptoms have improved:  Unable to reach pt, left appropriate voicemail with provided information and return call numbers. No further outreach scheduled with this CTN/ACM. Episode of Care resolved. Patient has this CTN/ACM contact information if future needs arise.

## 2020-05-16 ENCOUNTER — HOSPITAL ENCOUNTER (EMERGENCY)
Age: 55
Discharge: HOME | End: 2020-05-16
Attending: EMERGENCY MEDICINE
Payer: COMMERCIAL

## 2020-05-16 VITALS
SYSTOLIC BLOOD PRESSURE: 109 MMHG | DIASTOLIC BLOOD PRESSURE: 79 MMHG | OXYGEN SATURATION: 100 % | WEIGHT: 170 LBS | BODY MASS INDEX: 27.32 KG/M2 | TEMPERATURE: 98 F | HEART RATE: 85 BPM | HEIGHT: 66 IN | RESPIRATION RATE: 17 BRPM

## 2020-05-16 DIAGNOSIS — S39.012A BACK STRAIN, INITIAL ENCOUNTER: Primary | ICD-10-CM

## 2020-05-16 PROCEDURE — 96372 THER/PROPH/DIAG INJ SC/IM: CPT

## 2020-05-16 PROCEDURE — 74011250636 HC RX REV CODE- 250/636: Performed by: EMERGENCY MEDICINE

## 2020-05-16 PROCEDURE — 99282 EMERGENCY DEPT VISIT SF MDM: CPT

## 2020-05-16 RX ORDER — CITALOPRAM 40 MG/1
40 TABLET, FILM COATED ORAL DAILY
COMMUNITY

## 2020-05-16 RX ORDER — ASPIRIN 81 MG/1
81 TABLET ORAL DAILY
COMMUNITY

## 2020-05-16 RX ORDER — RANOLAZINE 1000 MG/1
TABLET, EXTENDED RELEASE ORAL 2 TIMES DAILY
COMMUNITY

## 2020-05-16 RX ORDER — NITROGLYCERIN 0.4 MG/1
0.4 TABLET SUBLINGUAL
COMMUNITY

## 2020-05-16 RX ORDER — ATORVASTATIN CALCIUM 80 MG/1
80 TABLET, FILM COATED ORAL DAILY
COMMUNITY

## 2020-05-16 RX ORDER — OXYCODONE AND ACETAMINOPHEN 5; 325 MG/1; MG/1
TABLET ORAL
COMMUNITY

## 2020-05-16 RX ORDER — MORPHINE SULFATE 4 MG/ML
4 INJECTION, SOLUTION INTRAMUSCULAR; INTRAVENOUS
Status: COMPLETED | OUTPATIENT
Start: 2020-05-16 | End: 2020-05-16

## 2020-05-16 RX ORDER — DIFLUNISAL 500 MG/1
500 TABLET, FILM COATED ORAL
Qty: 15 TAB | Refills: 0 | Status: SHIPPED | OUTPATIENT
Start: 2020-05-16

## 2020-05-16 RX ORDER — INSULIN GLARGINE 100 [IU]/ML
30 INJECTION, SOLUTION SUBCUTANEOUS
COMMUNITY

## 2020-05-16 RX ORDER — LISINOPRIL 5 MG/1
TABLET ORAL DAILY
COMMUNITY

## 2020-05-16 RX ORDER — PROMETHAZINE HYDROCHLORIDE 25 MG/ML
12.5 INJECTION, SOLUTION INTRAMUSCULAR; INTRAVENOUS
Status: COMPLETED | OUTPATIENT
Start: 2020-05-16 | End: 2020-05-16

## 2020-05-16 RX ORDER — CARVEDILOL 12.5 MG/1
TABLET ORAL 2 TIMES DAILY WITH MEALS
COMMUNITY

## 2020-05-16 RX ORDER — BUSPIRONE HYDROCHLORIDE 15 MG/1
15 TABLET ORAL 3 TIMES DAILY
COMMUNITY

## 2020-05-16 RX ORDER — GABAPENTIN 100 MG/1
CAPSULE ORAL DAILY
COMMUNITY

## 2020-05-16 RX ADMIN — PROMETHAZINE HYDROCHLORIDE 12.5 MG: 25 INJECTION INTRAMUSCULAR; INTRAVENOUS at 10:45

## 2020-05-16 RX ADMIN — MORPHINE SULFATE 4 MG: 4 INJECTION, SOLUTION INTRAMUSCULAR; INTRAVENOUS at 10:44
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
## 2020-05-16 NOTE — ED NOTES
Pt on shot time discharge and awaiting her ride. Pt also requesting prescription for pain medication.   Will make provider [email or web address]
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
## 2020-08-28 ENCOUNTER — APPOINTMENT (OUTPATIENT)
Dept: GENERAL RADIOLOGY | Age: 55
End: 2020-08-28
Payer: COMMERCIAL

## 2020-08-28 ENCOUNTER — HOSPITAL ENCOUNTER (OUTPATIENT)
Age: 55
Setting detail: OBSERVATION
Discharge: HOME | End: 2020-08-29
Attending: EMERGENCY MEDICINE | Admitting: HOSPITALIST
Payer: COMMERCIAL

## 2020-08-28 PROBLEM — R07.9 CHEST PAIN: Status: ACTIVE | Noted: 2020-08-28

## 2020-08-28 LAB
ALBUMIN SERPL-MCNC: 4.7 GM/DL (ref 3.4–5)
ALP BLD-CCNC: 37 IU/L (ref 40–129)
ALT SERPL-CCNC: 7 U/L (ref 10–40)
ANION GAP SERPL CALCULATED.3IONS-SCNC: 11 MMOL/L (ref 4–16)
AST SERPL-CCNC: 13 IU/L (ref 15–37)
BASOPHILS ABSOLUTE: 0.1 K/CU MM
BASOPHILS RELATIVE PERCENT: 0.8 % (ref 0–1)
BILIRUB SERPL-MCNC: 0.2 MG/DL (ref 0–1)
BUN BLDV-MCNC: 16 MG/DL (ref 6–23)
CALCIUM SERPL-MCNC: 9 MG/DL (ref 8.3–10.6)
CHLORIDE BLD-SCNC: 102 MMOL/L (ref 99–110)
CO2: 26 MMOL/L (ref 21–32)
CREAT SERPL-MCNC: 1.1 MG/DL (ref 0.6–1.1)
DIFFERENTIAL TYPE: ABNORMAL
EOSINOPHILS ABSOLUTE: 0.4 K/CU MM
EOSINOPHILS RELATIVE PERCENT: 5.1 % (ref 0–3)
GFR AFRICAN AMERICAN: >60 ML/MIN/1.73M2
GFR NON-AFRICAN AMERICAN: 52 ML/MIN/1.73M2
GLUCOSE BLD-MCNC: 103 MG/DL (ref 70–99)
GLUCOSE BLD-MCNC: 104 MG/DL (ref 70–99)
HCT VFR BLD CALC: 37 % (ref 37–47)
HEMOGLOBIN: 11.8 GM/DL (ref 12.5–16)
IMMATURE NEUTROPHIL %: 0.5 % (ref 0–0.43)
LYMPHOCYTES ABSOLUTE: 1.5 K/CU MM
LYMPHOCYTES RELATIVE PERCENT: 17.8 % (ref 24–44)
MCH RBC QN AUTO: 30.2 PG (ref 27–31)
MCHC RBC AUTO-ENTMCNC: 31.9 % (ref 32–36)
MCV RBC AUTO: 94.6 FL (ref 78–100)
MONOCYTES ABSOLUTE: 0.6 K/CU MM
MONOCYTES RELATIVE PERCENT: 7.3 % (ref 0–4)
NUCLEATED RBC %: 0 %
PDW BLD-RTO: 13.8 % (ref 11.7–14.9)
PLATELET # BLD: 250 K/CU MM (ref 140–440)
PMV BLD AUTO: 10.5 FL (ref 7.5–11.1)
POTASSIUM SERPL-SCNC: 4.1 MMOL/L (ref 3.5–5.1)
PRO-BNP: 124.8 PG/ML
RBC # BLD: 3.91 M/CU MM (ref 4.2–5.4)
SEGMENTED NEUTROPHILS ABSOLUTE COUNT: 5.8 K/CU MM
SEGMENTED NEUTROPHILS RELATIVE PERCENT: 68.5 % (ref 36–66)
SODIUM BLD-SCNC: 139 MMOL/L (ref 135–145)
TOTAL IMMATURE NEUTOROPHIL: 0.04 K/CU MM
TOTAL NUCLEATED RBC: 0 K/CU MM
TOTAL PROTEIN: 7 GM/DL (ref 6.4–8.2)
TROPONIN T: <0.01 NG/ML
TROPONIN T: <0.01 NG/ML
TSH HIGH SENSITIVITY: 0.61 UIU/ML (ref 0.27–4.2)
WBC # BLD: 8.5 K/CU MM (ref 4–10.5)

## 2020-08-28 PROCEDURE — 96361 HYDRATE IV INFUSION ADD-ON: CPT

## 2020-08-28 PROCEDURE — 71045 X-RAY EXAM CHEST 1 VIEW: CPT

## 2020-08-28 PROCEDURE — 82962 GLUCOSE BLOOD TEST: CPT

## 2020-08-28 PROCEDURE — 2500000003 HC RX 250 WO HCPCS: Performed by: PHYSICIAN ASSISTANT

## 2020-08-28 PROCEDURE — 94761 N-INVAS EAR/PLS OXIMETRY MLT: CPT

## 2020-08-28 PROCEDURE — 84443 ASSAY THYROID STIM HORMONE: CPT

## 2020-08-28 PROCEDURE — 99285 EMERGENCY DEPT VISIT HI MDM: CPT

## 2020-08-28 PROCEDURE — 6360000002 HC RX W HCPCS: Performed by: PHYSICIAN ASSISTANT

## 2020-08-28 PROCEDURE — 93005 ELECTROCARDIOGRAM TRACING: CPT | Performed by: PHYSICIAN ASSISTANT

## 2020-08-28 PROCEDURE — 84484 ASSAY OF TROPONIN QUANT: CPT

## 2020-08-28 PROCEDURE — G0378 HOSPITAL OBSERVATION PER HR: HCPCS

## 2020-08-28 PROCEDURE — 85025 COMPLETE CBC W/AUTO DIFF WBC: CPT

## 2020-08-28 PROCEDURE — 36415 COLL VENOUS BLD VENIPUNCTURE: CPT

## 2020-08-28 PROCEDURE — 6370000000 HC RX 637 (ALT 250 FOR IP): Performed by: NURSE PRACTITIONER

## 2020-08-28 PROCEDURE — 96375 TX/PRO/DX INJ NEW DRUG ADDON: CPT

## 2020-08-28 PROCEDURE — 83880 ASSAY OF NATRIURETIC PEPTIDE: CPT

## 2020-08-28 PROCEDURE — 80053 COMPREHEN METABOLIC PANEL: CPT

## 2020-08-28 PROCEDURE — 2580000003 HC RX 258: Performed by: PHYSICIAN ASSISTANT

## 2020-08-28 PROCEDURE — 96374 THER/PROPH/DIAG INJ IV PUSH: CPT

## 2020-08-28 PROCEDURE — 6370000000 HC RX 637 (ALT 250 FOR IP): Performed by: PHYSICIAN ASSISTANT

## 2020-08-28 PROCEDURE — 2580000003 HC RX 258: Performed by: NURSE PRACTITIONER

## 2020-08-28 RX ORDER — INSULIN GLARGINE 100 [IU]/ML
30 INJECTION, SOLUTION SUBCUTANEOUS NIGHTLY
Status: DISCONTINUED | OUTPATIENT
Start: 2020-08-28 | End: 2020-08-29 | Stop reason: HOSPADM

## 2020-08-28 RX ORDER — ACETAMINOPHEN 325 MG/1
650 TABLET ORAL EVERY 6 HOURS PRN
Status: DISCONTINUED | OUTPATIENT
Start: 2020-08-28 | End: 2020-08-29 | Stop reason: HOSPADM

## 2020-08-28 RX ORDER — CARVEDILOL 12.5 MG/1
12.5 TABLET ORAL 2 TIMES DAILY WITH MEALS
Status: DISCONTINUED | OUTPATIENT
Start: 2020-08-29 | End: 2020-08-29 | Stop reason: HOSPADM

## 2020-08-28 RX ORDER — ATORVASTATIN CALCIUM 80 MG/1
80 TABLET, FILM COATED ORAL DAILY
Status: DISCONTINUED | OUTPATIENT
Start: 2020-08-28 | End: 2020-08-29 | Stop reason: HOSPADM

## 2020-08-28 RX ORDER — CITALOPRAM 40 MG/1
40 TABLET ORAL DAILY
Status: DISCONTINUED | OUTPATIENT
Start: 2020-08-28 | End: 2020-08-29 | Stop reason: HOSPADM

## 2020-08-28 RX ORDER — POLYETHYLENE GLYCOL 3350 17 G/17G
17 POWDER, FOR SOLUTION ORAL DAILY PRN
Status: DISCONTINUED | OUTPATIENT
Start: 2020-08-28 | End: 2020-08-29 | Stop reason: HOSPADM

## 2020-08-28 RX ORDER — PROMETHAZINE HYDROCHLORIDE 25 MG/1
12.5 TABLET ORAL EVERY 6 HOURS PRN
Status: DISCONTINUED | OUTPATIENT
Start: 2020-08-28 | End: 2020-08-29 | Stop reason: HOSPADM

## 2020-08-28 RX ORDER — FENOFIBRATE 160 MG/1
160 TABLET ORAL NIGHTLY
Status: DISCONTINUED | OUTPATIENT
Start: 2020-08-28 | End: 2020-08-29 | Stop reason: HOSPADM

## 2020-08-28 RX ORDER — MAGNESIUM HYDROXIDE/ALUMINUM HYDROXICE/SIMETHICONE 120; 1200; 1200 MG/30ML; MG/30ML; MG/30ML
30 SUSPENSION ORAL ONCE
Status: COMPLETED | OUTPATIENT
Start: 2020-08-28 | End: 2020-08-28

## 2020-08-28 RX ORDER — DEXTROSE MONOHYDRATE 25 G/50ML
12.5 INJECTION, SOLUTION INTRAVENOUS PRN
Status: DISCONTINUED | OUTPATIENT
Start: 2020-08-28 | End: 2020-08-29 | Stop reason: HOSPADM

## 2020-08-28 RX ORDER — CLONAZEPAM 1 MG/1
1 TABLET ORAL DAILY PRN
Status: DISCONTINUED | OUTPATIENT
Start: 2020-08-28 | End: 2020-08-29 | Stop reason: HOSPADM

## 2020-08-28 RX ORDER — MORPHINE SULFATE 4 MG/ML
2 INJECTION, SOLUTION INTRAMUSCULAR; INTRAVENOUS ONCE
Status: COMPLETED | OUTPATIENT
Start: 2020-08-28 | End: 2020-08-28

## 2020-08-28 RX ORDER — ACETAMINOPHEN 650 MG/1
650 SUPPOSITORY RECTAL EVERY 6 HOURS PRN
Status: DISCONTINUED | OUTPATIENT
Start: 2020-08-28 | End: 2020-08-29 | Stop reason: HOSPADM

## 2020-08-28 RX ORDER — LISINOPRIL 2.5 MG/1
2.5 TABLET ORAL DAILY
Status: DISCONTINUED | OUTPATIENT
Start: 2020-08-28 | End: 2020-08-29 | Stop reason: HOSPADM

## 2020-08-28 RX ORDER — BUSPIRONE HYDROCHLORIDE 15 MG/1
15 TABLET ORAL 3 TIMES DAILY
Status: DISCONTINUED | OUTPATIENT
Start: 2020-08-28 | End: 2020-08-29 | Stop reason: HOSPADM

## 2020-08-28 RX ORDER — NICOTINE POLACRILEX 4 MG
15 LOZENGE BUCCAL PRN
Status: DISCONTINUED | OUTPATIENT
Start: 2020-08-28 | End: 2020-08-29 | Stop reason: HOSPADM

## 2020-08-28 RX ORDER — DEXTROSE MONOHYDRATE 50 MG/ML
100 INJECTION, SOLUTION INTRAVENOUS PRN
Status: DISCONTINUED | OUTPATIENT
Start: 2020-08-28 | End: 2020-08-29 | Stop reason: HOSPADM

## 2020-08-28 RX ORDER — RANOLAZINE 500 MG/1
1000 TABLET, EXTENDED RELEASE ORAL 2 TIMES DAILY
Status: DISCONTINUED | OUTPATIENT
Start: 2020-08-28 | End: 2020-08-29 | Stop reason: HOSPADM

## 2020-08-28 RX ORDER — SODIUM CHLORIDE 0.9 % (FLUSH) 0.9 %
10 SYRINGE (ML) INJECTION EVERY 12 HOURS SCHEDULED
Status: DISCONTINUED | OUTPATIENT
Start: 2020-08-28 | End: 2020-08-29 | Stop reason: HOSPADM

## 2020-08-28 RX ORDER — SODIUM CHLORIDE 0.9 % (FLUSH) 0.9 %
10 SYRINGE (ML) INJECTION PRN
Status: DISCONTINUED | OUTPATIENT
Start: 2020-08-28 | End: 2020-08-29 | Stop reason: HOSPADM

## 2020-08-28 RX ORDER — ONDANSETRON 2 MG/ML
4 INJECTION INTRAMUSCULAR; INTRAVENOUS EVERY 6 HOURS PRN
Status: DISCONTINUED | OUTPATIENT
Start: 2020-08-28 | End: 2020-08-29 | Stop reason: HOSPADM

## 2020-08-28 RX ORDER — 0.9 % SODIUM CHLORIDE 0.9 %
1000 INTRAVENOUS SOLUTION INTRAVENOUS ONCE
Status: COMPLETED | OUTPATIENT
Start: 2020-08-28 | End: 2020-08-28

## 2020-08-28 RX ORDER — CARVEDILOL 12.5 MG/1
12.5 TABLET ORAL 2 TIMES DAILY WITH MEALS
Status: DISCONTINUED | OUTPATIENT
Start: 2020-08-28 | End: 2020-08-28

## 2020-08-28 RX ORDER — ASPIRIN 81 MG/1
81 TABLET ORAL DAILY
Status: DISCONTINUED | OUTPATIENT
Start: 2020-08-28 | End: 2020-08-29 | Stop reason: HOSPADM

## 2020-08-28 RX ORDER — GABAPENTIN 100 MG/1
100 CAPSULE ORAL NIGHTLY
Status: DISCONTINUED | OUTPATIENT
Start: 2020-08-28 | End: 2020-08-29 | Stop reason: HOSPADM

## 2020-08-28 RX ORDER — OXYCODONE HYDROCHLORIDE AND ACETAMINOPHEN 5; 325 MG/1; MG/1
1 TABLET ORAL EVERY 4 HOURS PRN
Status: DISCONTINUED | OUTPATIENT
Start: 2020-08-28 | End: 2020-08-29 | Stop reason: HOSPADM

## 2020-08-28 RX ADMIN — BUSPIRONE HYDROCHLORIDE 15 MG: 15 TABLET ORAL at 21:09

## 2020-08-28 RX ADMIN — OXYCODONE HYDROCHLORIDE AND ACETAMINOPHEN 1 TABLET: 5; 325 TABLET ORAL at 18:23

## 2020-08-28 RX ADMIN — CLONAZEPAM 1 MG: 1 TABLET ORAL at 21:11

## 2020-08-28 RX ADMIN — RANOLAZINE 1000 MG: 500 TABLET, FILM COATED, EXTENDED RELEASE ORAL at 21:09

## 2020-08-28 RX ADMIN — SODIUM CHLORIDE 1000 ML: 9 INJECTION, SOLUTION INTRAVENOUS at 15:23

## 2020-08-28 RX ADMIN — FAMOTIDINE 20 MG: 10 INJECTION INTRAVENOUS at 15:09

## 2020-08-28 RX ADMIN — GABAPENTIN 100 MG: 100 CAPSULE ORAL at 21:09

## 2020-08-28 RX ADMIN — SODIUM CHLORIDE, PRESERVATIVE FREE 10 ML: 5 INJECTION INTRAVENOUS at 21:09

## 2020-08-28 RX ADMIN — FENOFIBRATE 160 MG: 160 TABLET ORAL at 21:09

## 2020-08-28 RX ADMIN — RIVAROXABAN 2.5 MG: 2.5 TABLET, FILM COATED ORAL at 21:09

## 2020-08-28 RX ADMIN — ATORVASTATIN CALCIUM 80 MG: 80 TABLET, FILM COATED ORAL at 18:18

## 2020-08-28 RX ADMIN — MORPHINE SULFATE 2 MG: 4 INJECTION, SOLUTION INTRAMUSCULAR; INTRAVENOUS at 15:09

## 2020-08-28 RX ADMIN — TICAGRELOR 90 MG: 90 TABLET ORAL at 21:09

## 2020-08-28 RX ADMIN — OXYCODONE HYDROCHLORIDE AND ACETAMINOPHEN 1 TABLET: 5; 325 TABLET ORAL at 22:41

## 2020-08-28 RX ADMIN — ASPIRIN 81 MG: 81 TABLET, COATED ORAL at 18:18

## 2020-08-28 RX ADMIN — ALUMINUM HYDROXIDE, MAGNESIUM HYDROXIDE, AND SIMETHICONE 30 ML: 200; 200; 20 SUSPENSION ORAL at 15:08

## 2020-08-28 RX ADMIN — CITALOPRAM 40 MG: 40 TABLET, FILM COATED ORAL at 18:18

## 2020-08-28 ASSESSMENT — PAIN DESCRIPTION - PAIN TYPE
TYPE: CHRONIC PAIN
TYPE: ACUTE PAIN
TYPE: CHRONIC PAIN

## 2020-08-28 ASSESSMENT — PAIN DESCRIPTION - LOCATION
LOCATION: CHEST

## 2020-08-28 ASSESSMENT — PAIN DESCRIPTION - DIRECTION
RADIATING_TOWARDS: LEFT SHOULDER
RADIATING_TOWARDS: LEFT SHOULDER

## 2020-08-28 ASSESSMENT — PAIN SCALES - GENERAL
PAINLEVEL_OUTOF10: 7
PAINLEVEL_OUTOF10: 8
PAINLEVEL_OUTOF10: 7
PAINLEVEL_OUTOF10: 8
PAINLEVEL_OUTOF10: 7

## 2020-08-28 ASSESSMENT — PAIN DESCRIPTION - DESCRIPTORS
DESCRIPTORS: CONSTANT;RADIATING
DESCRIPTORS: CONSTANT;RADIATING

## 2020-08-28 ASSESSMENT — HEART SCORE: ECG: 0

## 2020-08-28 ASSESSMENT — PAIN DESCRIPTION - ORIENTATION
ORIENTATION: MID
ORIENTATION: MID
ORIENTATION: LEFT

## 2020-08-28 NOTE — ED NOTES
Upon evaluation of the client, they are observed to be alert and oriented to person, place and situation. The head of the bed is elevated above 30 degrees. The client verbalizes appropriately to all questions and/or comments. The client also makes eye contact when prompted. The client mild difficulty breathing and skin is pink, warm & dry, and are observed to have relaxed extremities. When communicating, the client speaks with clear speech and normal tone and is in no apparent distress. The call light is within reach, the bed is in the low position and both side rails are up.      Gaviota Ortega RN  08/28/20 [id number]

## 2020-08-28 NOTE — ED NOTES
Patient still states that she is having chest pain of a 7 out of 10 substernal with radiation to the left shoulder.      Thelma Encarnacion RN  08/28/20 [id number]

## 2020-08-28 NOTE — ED PROVIDER NOTES
I independently examined and evaluated [facility]. In brief their history revealed chest pain described as a pressure like a \"elephant on my chest\". Patient reports pain is very similar to when she had a heart attack in the past.  Some associated nausea and shortness of breath. Patient has a chronic cough secondary to smoking. No fever. No leg swelling. Patient without history of DVT or PE. Patient is still smoking tobacco.  No illicit substances of abuse. Their focused exam revealed the patient is afebrile and hemodynamically stable on room air. The patient appears age appropriate, appears well-hydrated, well-nourished. Appears slightly anxious but otherwise nontoxic. Mucous membranes are moist. Speech is clear. Breathing is unlabored. Speaking clearly in full sentences. No respiratory distress. Skin is dry. Mental status is normal. The patient moves all extremities and is without facial droop. Strong symmetric bilateral radial and PT pulses. No bilateral lower extremity edema. No calf tenderness to elevation.     Results for orders placed or performed during the hospital encounter of 08/28/20   CBC Auto Differential   Result Value Ref Range    WBC 8.5 4.0 - 10.5 K/CU MM    RBC 3.91 (L) 4.2 - 5.4 M/CU MM    Hemoglobin 11.8 (L) 12.5 - 16.0 GM/DL    Hematocrit 37.0 37 - 47 %    MCV 94.6 78 - 100 FL    MCH 30.2 27 - 31 PG    MCHC 31.9 (L) 32.0 - 36.0 %    RDW 13.8 11.7 - 14.9 %    Platelets 671 341 - 567 K/CU MM    MPV 10.5 7.5 - 11.1 FL    Differential Type AUTOMATED DIFFERENTIAL     Segs Relative 68.5 (H) 36 - 66 %    Lymphocytes % 17.8 (L) 24 - 44 %    Monocytes % 7.3 (H) 0 - 4 %    Eosinophils % 5.1 (H) 0 - 3 %    Basophils % 0.8 0 - 1 %    Segs Absolute 5.8 K/CU MM    Lymphocytes Absolute 1.5 K/CU MM    Monocytes Absolute 0.6 K/CU MM    Eosinophils Absolute 0.4 K/CU MM    Basophils Absolute 0.1 K/CU MM    Nucleated RBC % 0.0 %    Total Nucleated RBC 0.0 K/CU MM    Total Immature Neutrophil 0.04 K/CU MM    Immature Neutrophil % 0.5 (H) 0 - 0.43 %   Comprehensive Metabolic Panel   Result Value Ref Range    Sodium 139 135 - 145 MMOL/L    Potassium 4.1 3.5 - 5.1 MMOL/L    Chloride 102 99 - 110 mMol/L    CO2 26 21 - 32 MMOL/L    BUN 16 6 - 23 MG/DL    CREATININE 1.1 0.6 - 1.1 MG/DL    Glucose 103 (H) 70 - 99 MG/DL    Calcium 9.0 8.3 - 10.6 MG/DL    Alb 4.7 3.4 - 5.0 GM/DL    Total Protein 7.0 6.4 - 8.2 GM/DL    Total Bilirubin 0.2 0.0 - 1.0 MG/DL    ALT 7 (L) 10 - 40 U/L    AST 13 (L) 15 - 37 IU/L    Alkaline Phosphatase 37 (L) 40 - 129 IU/L    GFR Non- 52 (L) >60 mL/min/1.73m2    GFR African American >60 >60 mL/min/1.73m2    Anion Gap 11 4 - 16   Troponin   Result Value Ref Range    Troponin T <0.010 <0.01 NG/ML   Brain Natriuretic Peptide   Result Value Ref Range    Pro-.8 <300 PG/ML   EKG 12 Lead   Result Value Ref Range    Ventricular Rate 74 BPM    Atrial Rate 74 BPM    P-R Interval 182 ms    QRS Duration 90 ms    Q-T Interval 434 ms    QTc Calculation (Bazett) 481 ms    P Axis 66 degrees    R Axis 64 degrees    T Axis 26 degrees    Diagnosis       Normal sinus rhythm  Possible Left atrial enlargement  Nonspecific ST abnormality  Prolonged QT  Abnormal ECG  When compared with ECG of 29-FEB-2020 09:45,  Nonspecific T wave abnormality, improved in Inferior leads  Nonspecific T wave abnormality no longer evident in Lateral leads           12 lead EKG per my interpretation:  Normal Sinus Rhythm at 74  Axis is   Normal  QTc is  slightly prolonged at 481  There is no specific T wave changes appreciated. There is no specific ST wave changes appreciated. Non-specific ST-T changes  No STEMI    Prior EKG to compare with was available and no clinically significant change in overall morphology. ED course: Pt presents as above. Emergent conditions considered. Presentation prompted initial labs, EKG and imaging. Labs are overall nonemergent.   Given patient's history and quality of pain decision made to admit the patient for further cardiac work-up especially given lack of recent assessment. Patient already received full dose aspirin and some nitro prior to arrival.  Patient to be discussed with hospitalist team and patient is to be admitted to medicine for further evaluation treatment as above. Questions sought and answered with the patient. They voice understanding and agree with plan. All diagnostic, treatment, and disposition decisions were made by myself in conjunction with the Advanced Practice Provider. For all further details of the patient's emergency department visit, please see the Advanced Practice Provider's documentation.        Mirna Holley MD  08/28/20 3163

## 2020-08-28 NOTE — ED PROVIDER NOTES
EMERGENCY DEPARTMENT ENCOUNTER        PCP: Leroy Shin, [address] [facility]    Chief Complaint   Patient presents with    Chest Pain       Of note, this patient was also evaluated by the attending physician, Dr. Yared Mata is a 53 y.o. female who presents with chest pain. Onset - acute, approximately 1 hour prior to arrival to ED  Location -left-sided chest and shoulder  Duration -constant since onset  Character -pressure, pain does not migrate   Aggravating/Alleviating factors -nitro to the pain from 9 out of 10 to 7 out of 10  Activity at onset -at rest  Associate symptoms -nausea and shortness of breath, Pt denies any concurrent neurological symptoms (TAD)  Radiation -none noted  Severity -7 out of 10 at present    Patient has extensive history of atherosclerotic disease in the heart and stent placement. She says the symptoms feel similar to her last heart attack. REVIEW OF SYSTEMS    Constitutional:  Denies fever, chills. HENT:  Denies sore throat or ear pain   Cardiovascular:  +Chest Pain,  Denies palpitations,  Denies syncope, no extremity edema. Respiratory:    Denies cough, shortness of breath, or hemoptysis    GI:  Denies abdominal pain, nausea, vomiting, or diarrhea  :  Denies any urinary symptoms  Musculoskeletal:  Denies back pain, no extremity pain, swelling or discoloration.   No pale or cold extremity  Skin:  Denies rash  Neurologic:  Denies changes in vision,  lightheadedness, dizziness, headache, focal weakness or sensory changes   Endocrine:  Denies polyuria or polydypsia   Lymphatic:  Denies swollen glands     All other review of systems are negative  See HPI and nursing notes for additional information       PAST MEDICAL & SURGICAL HISTORY    Past Medical History:   Diagnosis Date    Acid reflux     CAD (coronary artery disease)     Sees Dr. Stephy Steven    [facility]     \"have mild COPD- no pulmonologist\"    Depression     Diabetes mellitus ([facility] [address] Dx 2001    Eczema of hand     Hx of migraines     HX OTHER MEDICAL     \"difficulty IV stick\"    Hyperlipidemia     Hypertension     follows with Dr Shawn Cannon Kidney stones 2000's    \"Passed One Kidney Stone\"    PVD (peripheral vascular disease) ([facility] [address]     Wears glasses      Past Surgical History:   Procedure Laterality Date   [address] During TOSHA    BACK SURGERY  Last Done 9-11/2013    \"4 Lower Back Surgeries, 2 Rods, 4 Pins At L5, S1 Put In During Last Surgery\"    BONE RESECTION, RIB      \"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- surgery 2013- ok since then\"    BREAST BIOPSY Bilateral 1990's-2000's    Benign    CARDIAC CATHETERIZATION      per pt \"had heart cath 5/10/2019\"   [address]  5-4-12    CABG [facility]3 Bypasses)   [facility]    \"took with [facility] and hyster    COLONOSCOPY  last one 2014    Dr. Stacey Lopez      6/28/2018 total of [address]  2000's    \"Dental Implants Upper And Lower\"    FEMORAL ENDARTERECTOMY Right 6/11/2019    RIGHT FEMORAL ENDARTERECTOMY WITH CORMATRIX PATCH performed by Cezar Sosa MD at [address] [place] [address]    Appendectomy Also Done    OTHER SURGICAL HISTORY Left 4/4/2013    Left transaxillary 1st rib resection    OTHER SURGICAL HISTORY      peripheral angiography 6/6/2019    TONSILLECTOMY AND ADENOIDECTOMY  1971    VASCULAR SURGERY      \"5/22/2019\"they went in and unblocked the femoral artery on right side and then in again 6/6/2019\"put dye in -  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\"       CURRENT MEDICATIONS    Current Outpatient Rx   Medication Sig Dispense Refill    ibuprofen (IBU) 600 MG tablet Take 1 tablet by mouth every 6 hours as needed for Pain 20 tablet 0    ranolazine (RANEXA) 1000 MG extended release tablet Take 1,000 mg by mouth 2 times daily       fenofibrate (TRICOR) 145 MG tablet Take 145 mg by Asthma Father     Heart Disease Father         mvp    Asthma Brother     Mental Illness Son     Early Death Son 25        \"Suicide\"    Mental Illness Daughter         \"Bipolar\"       PHYSICAL EXAM    VITAL SIGNS: BP (!) 106/49   Pulse 69   Temp 98 °F (36.7 °C)   Resp 20   Ht 5' 6\" (1.676 m)   Wt 160 lb (72.6 kg)   SpO2 98%   BMI 25.82 kg/m²    Constitutional:  Well developed, well nourished, mild distress  HENT:  Atraumatic, moist mucus membranes  Neck/Lymphatics: supple, no JVD, no swollen nodes  Respiratory:  Lungs Clear, no retractions. Rate 20    Cardiovascular:  Rate normal 69, regular Rhythm,  no murmurs/rubs/gallops. No carotid bruits or murmurs heard in carotids. No JVD  Vascular:   Radial and femoral pulses palpable and reveal no discernible inequality    GI:  Soft, nontender, normal bowel sounds  Musculoskeletal:    There is no edema, asymmetry, or calf / thigh tenderness bilaterally. No cyanosis. No cool or pale-appearing limb.   Distal cap refill and pulses intact bilateral upper and lower extremities  Bilateral upper and lower extremity ROM intact without pain or obvious deficit    Integument:  Skin warm and dry, no petechiae   Neurologic:  Alert & oriented, normal speech    - Alert & oriented person, place, time, and situation, no speech difficulties or slurring.  - No obvious gross motor deficits  - Cranial nerves 2-12 grossly intact  - Negative meningeal signs.  - Sensation intact to light touch  - Strength 5/5 in upper and lower extremities bilaterally  - No truncal ataxia    Psych: Pleasant affect, no hallucinations      Results for orders placed or performed during the hospital encounter of 08/28/20   CBC Auto Differential   Result Value Ref Range    WBC 8.5 4.0 - 10.5 K/CU MM    RBC 3.91 (L) 4.2 - 5.4 M/CU MM    Hemoglobin 11.8 (L) 12.5 - 16.0 GM/DL    Hematocrit 37.0 37 - 47 %    MCV 94.6 78 - 100 FL    MCH 30.2 27 - 31 PG    MCHC 31.9 (L) 32.0 - 36.0 %    RDW 13.8 11.7 - 14.9 Final Result   1. No acute cardiopulmonary disease. ED COURSE & MEDICAL DECISION MAKING      Exact etiology of patient's symptoms unknown at this time. Considered PE as a cause for the symptoms however patient does not have many risk factors and I believe she is low risk especially because ACS is much more likely in this patient. I considered but do not suspect Aortic discection / aneurysm. Pt does not report sudden onset of tearing pain, pain does not migrate, pt denies concurrent neuro symptoms (& pt neurologically intact on exam today), and I do not appreciate inequality of pulses on exam today. Additionally, CXR is without abnormality suggestive of TAD         Pt's HEART score today is 5. Pt understands that this HEART score, based on the HEART score study, represents a 12% to 16.6% risk of adverse cardiac event. It is therefore my recommendation that Pt be admitted to the hospital for further monitoring & evaluation. Patient agrees to plan for admission. Hospitalist- I discussed patient case with Shauna nurse practitioner who agrees admit the patient with cardiac work-up and chest pain rule out. All pertinent Lab data and radiographic results reviewed with patient at bedside. The patient and/or the family were informed of the results of any tests/labs/imaging, the treatment plan, and time was allotted to answer questions. Clinical  IMPRESSION    1. Chest pain, unspecified type         Admission to the hospital    Comment: Please note this report has been produced using speech recognition software and may contain errors related to that system including errors in grammar, punctuation, and spelling, as well as words and phrases that may be inappropriate. If there are any questions or concerns please feel free to contact the dictating provider for clarification.        [facility]  58/17/32 7734

## 2020-08-28 NOTE — H&P
History and Physical  SARI Hernández-BC   Internal Medicine Hospitalist      Name:  Lizette Ornelas /Age/Sex: 1965  (53 y.o. female)   MRN & CSN:  [id number] & [id number] Admission Date/Time: 2020 11:20 AM   Location:  [address] [place] [address]-A PCP: [address] [place] [address] Day: 1      Supervising Physician: Dr. Nhan Adkins     Chief Complaint: Chest Pain     Assessment and Plan:   Lizette Ornelas is a 53 y.o. female who presents with Chest pain      Chest pain, r/o ACS - known CAD, h/o multiple stents placement in the past + CABG, initial trop neg, Last Echo () EF 50-55%, GR II DD, last cardiac cath 2019.         - CXR non acute         - admit for observation, telemetry monitoring          - consult Cardiology, Dr. Cyrus Mcnally         - trend trop         - c/w ASA, Brilinta, Lipitor, Coreg (w/ BP parameters), Ranexa         - pending Echo, TSH         - check lab works in AM including repeat ECG         - defer stress test to cardio     Soft BP in ED - 108/51, known h/o HTN. - hold Lisinopril for now         - monitor BP     DM type 2 - Last A1C 5.4         - c/w home dose Lantus + Sliding scale         - monitor accucheck         - hold oral diabetic med for now         - diabetic diet     Chronic Illnesses:          - COPD - breathing stable on room air.         - GERD         - hyperlipidemia         - depression/anxiety - c/w Buspar.         - PVD         - chronic back pain     Current diagnosis and plan of management discussed with the patient at the time of admission in lay language who agree to the above plan and disposition of admission for further care. All concerns and questions addressed. Patient assessment and plan in conjunction with supervising physician - Dr. Reid De La Garza; Carb Control: 4 carb choices (60 gms)/meal; No Caffeine    DVT Prophylaxis [] Lovenox, []  Heparin, [] SCDs, [] Ambulation  [] Long term AC  Patient is on Xarelto.    GI Prophylaxis [x] PPI,  [] H2 Blocker,  [] Carafate,  [] Diet,  [] No GI prophylaxis, N/A: patient is not under significant medical stress, non-ICU or is receiving a diet/tube feeds   Code Status Full Code    Disposition Patient requires continued admission due to Chest pain. Discharge Plan: Patient plans to return home upon discharge. MDM [] Low, [x] Moderate,[]  High  Patient's risk as above due to:      [x] One or more chronic illnesses with mild exacerbation progression      [] Two or more stable chronic illnesses      [] Undiagnosed new problem with uncertain prognosis      [] Elective major surgery      []Prescription drug management     History of Present Illness:     Principal Problem: Chest pain  Lizette Ornelas is a 53 y.o. female with past medical history of CAD, s/p multiple stent placement, CABG, COPD, GERD, DM type 2, hyperlipidemia, hypertension, depression, anxiety, and PVD presented to the ED with complaints of chest pain followed by dizziness and lightheadedness. She denied syncopal episode and passing out. Reports that she was getting ready to go to [place] when sudden onset of chest pain, dizziness, and lightheadedness started. She described left-sided chest pain as pressure like someone sitting on her chest that radiates to her left shoulder, rates 7/10, and felt better with Pepcid and nitro. Patient denies palpitation, fever, chills or diaphoresis, cough, and SOB or difficulty breathing. Denies any other symptoms including headache, paresthesias, abdominal pain, nausea, vomiting, changes in bowels, dysuria, hematuria, frequency or urgency, and B/L weakness. Patient is presently taking Xarelto and was prescribed by cardiology, Dr. Cyrus Mcnally. Upon interview, the patient provided the history as above. ED Course: Discussed case with ED physician prior to admission. ROS: Ten point ROS reviewed and negative, unless as noted above per HPI.     Objective:   No intake or output data in the 24  HX OTHER MEDICAL     \"difficulty IV stick\"    Hyperlipidemia     Hypertension     follows with Dr Bladimir Morgan Kidney stones     \"Passed One Kidney Stone\"    PVD (peripheral vascular disease) ([facility] [address]     Wears glasses      Past Surgery History:  Patient  has a past surgical history that includes Dental surgery (); Breast biopsy (Bilateral, -); Tonsillectomy and adenoidectomy (); Hysterectomy, total abdominal (); Appendectomy (); other surgical history (Left, 2013); Bone Resection, Rib; Coronary angioplasty with stent; back surgery (Last Done ); Cholecystectomy (); Colonoscopy (last one ); Cardiac surgery (12); Cardiac catheterization; other surgical history; vascular surgery; and FEMORAL ENDARTERECTOMY (Right, 2019). Social History:    [facility] HX: Assessed: family history includes Asthma in her brother, father, and mother; Diabetes in her mother; Early Death (age of onset: 25) in her son; Heart Disease in her father; High Blood Pressure in her father and mother; High Cholesterol in her father and mother; Mental Illness in her daughter and son.   Soc HX:   Social History     Socioeconomic History    Marital status:      Spouse name: Not on file    Number of children: Not on file    Years of education: Not on file    Highest education level: Not on file   Occupational History    Not on file   Social Needs    Financial resource strain: Not on file    Food insecurity     Worry: Not on file     Inability: Not on file    Transportation needs     Medical: Not on file     Non-medical: Not on file   Tobacco Use    Smoking status: Former Smoker     Packs/day: 0.50     Years: 35.00     Pack years: 17.50     Types: Cigarettes     Last attempt to quit: 2019     Years since quittin.1    Smokeless tobacco: Never Used   Substance and Sexual Activity    Alcohol use: No     Alcohol/week: 0.0 standard drinks     Comment: \"quit drinking in 57714- use to drink 5 days per week- average use to drink 6 beers each time\"    Drug use: No    Sexual activity: Yes     Partners: Male   Lifestyle    Physical activity     Days per week: Not on file     Minutes per session: Not on file    Stress: Not on file   Relationships    Social connections     Talks on phone: Not on file     Gets together: Not on file     Attends Jain service: Not on file     Active member of club or organization: Not on file     Attends meetings of clubs or organizations: Not on file     Relationship status: Not on file    Intimate partner violence     Fear of current or ex partner: Not on file     Emotionally abused: Not on file     Physically abused: Not on file     Forced sexual activity: Not on file   Other Topics Concern    Not on file   Social History Narrative    Not on file     TOBACCO:   reports that she quit smoking about 14 months ago. Her smoking use included cigarettes. She has a 17.50 pack-year smoking history. She has never used smokeless tobacco.  ETOH:   reports no history of alcohol use. Drugs:  reports no history of drug use. Allergies: Allergies   Allergen Reactions    Erythromycin Hives and Nausea And Vomiting    Lyrica [Pregabalin] Swelling    Codeine Palpitations    Imitrex [Sumatriptan] Anxiety     \"Makes Me Hyper\"    Ketorolac Tromethamine Other (See Comments)     \"Rapid Heart Rate\"    Prochlorperazine Edisylate Nausea And Vomiting    Tape Floy Croon Tape] Itching     Blisters    Ultram [Tramadol] Itching    Zofran [Ondansetron Hcl] Nausea Only     Medications:   Medications:    Infusions:   PRN Meds:   Prior to Admission Meds:  Prior to Admission medications    Medication Sig Start Date End Date Taking?  Authorizing Provider   ibuprofen (IBU) 600 MG tablet Take 1 tablet by mouth every 6 hours as needed for Pain 4/20/20 5/20/20  Huyen [facility]   ranolazine (RANEXA) 1000 MG extended release tablet Take 1,000 mg by mouth 2 times daily     Historical Provider, MD   fenofibrate (TRICOR) 145 MG tablet Take 145 mg by mouth nightly 11/23/19   Historical Provider, MD   gabapentin (NEURONTIN) 100 MG capsule Take 100 mg by mouth nightly. 1/29/20   Historical Provider, MD   clonazePAM (KLONOPIN) 1 MG tablet Take 1 mg by mouth daily as needed. 2/10/20   Historical Provider, MD   lisinopril (PRINIVIL;ZESTRIL) 5 MG tablet Take 2.5 mg by mouth daily  11/23/19   Historical Provider, MD   furosemide (LASIX) 20 MG tablet Take 20 mg by mouth daily 11/25/19   Historical Provider, MD   JANUMET -1000 MG TB24 Take 1 tablet by mouth nightly 11/25/19   Historical Provider, MD   citalopram (CELEXA) 40 MG tablet Take 40 mg by mouth daily 5/14/19   Historical Provider, MD   cyclobenzaprine (FLEXERIL) 10 MG tablet Take 10 mg by mouth 3 times daily as needed 5/22/19   Historical Provider, MD   aspirin 81 MG EC tablet Take 81 mg by mouth daily    Historical Provider, MD   nitroGLYCERIN (NITROSTAT) 0.4 MG SL tablet up to max of 3 total doses.  If no relief after 1 dose, call 911. 5/12/19   Pam Powell MD   atorvastatin (LIPITOR) 80 MG tablet Take 80 mg by mouth daily     Historical Provider, MD   dapagliflozin (FARXIGA) 10 MG tablet Take 10 mg by mouth every morning     Historical Provider, MD   rivaroxaban (XARELTO) 2.5 MG TABS tablet Take 1 tablet by mouth 2 times daily 5/7/19   ADAMARIS Hwang   busPIRone (BUSPAR) 15 MG tablet Take 15 mg by mouth 3 times daily 11/13/18   Manuel Flores MD   insulin glargine (BASAGLAR KWIKPEN) 100 UNIT/ML injection pen Inject 25 Units into the skin nightly  Patient taking differently: Inject 30 Units into the skin nightly  6/29/18   Efren Argueta MD   carvedilol (COREG) 12.5 MG tablet Take 1 tablet by mouth 2 times daily (with meals) 6/29/18   Efren Argueta MD   ticagrelor (BRILINTA) 90 MG TABS tablet Take 1 tablet by mouth 2 times daily 6/13/18 2/25/20  Nabeel Webb MD   albuterol sulfate HFA (PROAIR HFA) 108 (90 Base) MCG/ACT inhaler Inhale 2 puffs into the lungs every 6 hours as needed for Wheezing 1/29/18   Maddy Barclay MD     Data:     Laboratory this visit:  Reviewed  Recent Labs     08/28/20  1335   WBC 8.5   HGB 11.8*   HCT 37.0         Recent Labs     08/28/20  1335      K 4.1      CO2 26   BUN 16   CREATININE 1.1     Recent Labs     08/28/20  1335   AST 13*   ALT 7*   BILITOT 0.2   ALKPHOS 37*     No results for input(s): INR in the last 72 hours. No results for input(s): CKTOTAL, CKMB, CKMBINDEX in the last 72 hours. Invalid input(s): Darya Denny input(s): PRO-BNP    Radiology this visit:  Reviewed. Xr Chest Portable    Result Date: 8/28/2020  EXAMINATION: ONE XRAY VIEW OF THE CHEST 8/28/2020 11:42 am COMPARISON: 02/29/2020 HISTORY: ORDERING SYSTEM PROVIDED HISTORY: cp TECHNOLOGIST PROVIDED HISTORY: Reason for exam:->cp Reason for Exam: chest pain FINDINGS: Cardiac silhouette and mediastinal contours are normal.  Median sternotomy wires are noted. The lungs are clear. No focal lung infiltrate. No pleural effusion or pneumothorax. 1. No acute cardiopulmonary disease.      EKG this visit:   EKG: Normal sinus rhythm, rate 74   Possible Left atrial enlargement   Nonspecific ST abnormality   Prolonged QT   Abnormal ECG   When compared with ECG of 29-FEB-2020 09:45,   Nonspecific T wave abnormality, improved in Inferior leads   Nonspecific T wave abnormality no longer evident in Lateral leads     Current Treatment Team:  Treatment Team: Attending Provider: Ndaeem Bonilla MD; Consulting Physician: Nadeem Bonilla MD; Consulting Physician: MD Lorenzo Reese APRN-BC   [facility]  8/28/2020 5:36 PM      Electronically signed by SARI Sorenson CNP on 8/28/2020 at 5:36 PM

## 2020-08-28 NOTE — CONSULTS
[address] [place] [address], [address] [place] [address]                                  CONSULTATION    PATIENT NAME: Annie Foss                     :        1965  MED REC NO:   [id number]                          ROOM:       9052  ACCOUNT NO:   [de-identified]                           ADMIT DATE: 2020  PROVIDER:     Brenda Christine MD    CONSULT DATE:  2020    INDICATION:  Chest pain. HISTORY OF PRESENT ILLNESS:  This is a 77-year-old female patient, she  states that this morning, she was getting ready to go to [facility], then  all of the sudden, she started to having chest pain and was dizzy and  lightheaded. She has Apple watch and she checked her pulse on it and  her heart rate was 150s; therefore, she came to the hospital.  She has  received GI cocktail in the ER. She seems to be doing better right now. She is in sinus rhythm. She denies any fevers, chills. No cough or  sputum production. No other  or GI complaints present. The patient has a significant history of coronary artery disease. She  is status post CABG done. She had a heart catheterization done back in  2019 and her heart cath shows that left system was non-injected. Her  LIMA to LAD graft is patent, SVG to OM is patent, SVG to diagonal was  also patent. PAST MEDICAL HISTORY:  History of coronary artery disease, status post  CABG done and last heart catheterization from 2019, and found to have  patent grafts present, native vessel coronary artery disease present. She has hypertension, hyperlipidemia, she continues to smoke, diabetes  present, anxiety and depression present. She also has peripheral  vascular disease. She had common femoral artery occlusion present for  which she had endarterectomy done and a patch was placed.     PAST SURGICAL HISTORY:  Coronary artery bypass surgery; LIMA to LAD, SVG  to OM-1 and SVG to diagonal branch, history of thoracic aortic syndrome. She has a history of having right common femoral artery patch was  placed, tonsillectomy done and right carotid endarterectomy done also. SOCIAL HISTORY:  She still smokes. No history of alcohol abuse present. ALLERGIES:  Multiple, see the medication list.    MEDICATIONS AT HOME:  She is on ibuprofen as needed, Ranexa, TriCor,  Neurontin, Klonopin, lisinopril, Lasix, Janumet, Celexa, Flexeril,  aspirin, Farxiga, Lipitor, Xarelto 2.5 mg b.i.d., BuSpar, Coreg and  Brilinta. PHYSICAL EXAMINATION:  GENERAL:  The patient is awake, alert, answering questions, not in acute  distress. VITAL SIGNS:  Temperature afebrile, pulse is 69, blood pressure 106/49. HEENT:  Head is normocephalic, atraumatic. Pupils are equal and  reactive. CHEST:  Symmetric expansion. LUNGS:  Clear to auscultation. No wheezing or rhonchi. HEART:  Regular rhythm. ABDOMEN:  Soft, nontender. Bowel sounds present. No hepatosplenomegaly  or guarding appreciated. EXTREMITIES:  No cyanosis or clubbing noted. NEUROLOGIC:  Cranial nerves II through XII are grossly intact. LABORATORY DATA:  Her troponin is negative. Her EKG shows sinus rhythm  with no acute ST changes present. BUN is 16, creatinine 0.1. Troponins  are negative. LFTs are normal.  CBC is normal.  TSH is normal.    IMPRESSION AND PLAN:  A 21-year-old female patient who comes in with  multiple complaints present. She says she has had a tachycardia. At  this time, from a cardiac stand, I will watch her. We will wait and see  what she will do in the next 24 hours. We will make further  recommendations at this time as she just had a heart cath back in  12/2019, so we will continue to treat her medically.         Yohannes Orourke MD    D: 08/28/2020 16:09:25       T: 08/28/2020 16:51:52     [email or web address]  Job#: [id number]     Doc#: [id number]    CC:

## 2020-08-28 NOTE — ED NOTES
Upon evaluation of the client, they are observed to be alert and oriented to person, place and situation. The head of the bed is elevated above 30 degrees. The client verbalizes appropriately to all questions and/or comments. The client also makes eye contact when prompted. The client also exhibits unlabored breathing, their skin is pink, warm & dry, and are observed to have relaxed extremities. s. The call light is within reach, the bed is in the low position and one side rails are up.      Therese Garcia RN  08/28/20 [id number]

## 2020-08-28 NOTE — ED NOTES
The patient presents to the emergency department alert and oriented with a complaint of substernal chest pain with radiation to the left shoulder for one hour. The patient was riding in a vehicle when it started. The patient is also short of breath. The patient took two Nitroglycerin which brought the pain down from a 9 to an 8 on the 1-10 scale. The patient is also nauseated. The patient placed on the monitor with vital signs taken. Assessment as follows; Skin is pink, warm and dry. Lung sounds are clear. 12 lead EKG was done. Megha Good RN  08/28/20 [id number]

## 2020-08-29 VITALS
HEART RATE: 71 BPM | DIASTOLIC BLOOD PRESSURE: 68 MMHG | SYSTOLIC BLOOD PRESSURE: 117 MMHG | WEIGHT: 164.6 LBS | HEIGHT: 66 IN | RESPIRATION RATE: 14 BRPM | TEMPERATURE: 98.1 F | BODY MASS INDEX: 26.45 KG/M2 | OXYGEN SATURATION: 97 %

## 2020-08-29 LAB
EKG ATRIAL RATE: 67 BPM
EKG ATRIAL RATE: 74 BPM
EKG DIAGNOSIS: NORMAL
EKG DIAGNOSIS: NORMAL
EKG P AXIS: 53 DEGREES
EKG P AXIS: 66 DEGREES
EKG P-R INTERVAL: 178 MS
EKG P-R INTERVAL: 182 MS
EKG Q-T INTERVAL: 406 MS
EKG Q-T INTERVAL: 434 MS
EKG QRS DURATION: 84 MS
EKG QRS DURATION: 90 MS
EKG QTC CALCULATION (BAZETT): 429 MS
EKG QTC CALCULATION (BAZETT): 481 MS
EKG R AXIS: 59 DEGREES
EKG R AXIS: 64 DEGREES
EKG T AXIS: 26 DEGREES
EKG T AXIS: 38 DEGREES
EKG VENTRICULAR RATE: 67 BPM
EKG VENTRICULAR RATE: 74 BPM
GLUCOSE BLD-MCNC: 160 MG/DL (ref 70–99)
GLUCOSE BLD-MCNC: 93 MG/DL (ref 70–99)
HCT VFR BLD CALC: 39.7 % (ref 37–47)
HEMOGLOBIN: 12 GM/DL (ref 12.5–16)
MCH RBC QN AUTO: 30.7 PG (ref 27–31)
MCHC RBC AUTO-ENTMCNC: 30.2 % (ref 32–36)
MCV RBC AUTO: 101.5 FL (ref 78–100)
PDW BLD-RTO: 13.9 % (ref 11.7–14.9)
PLATELET # BLD: 247 K/CU MM (ref 140–440)
PMV BLD AUTO: 10.8 FL (ref 7.5–11.1)
RBC # BLD: 3.91 M/CU MM (ref 4.2–5.4)
REASON FOR REJECTION: NORMAL
REASON FOR REJECTION: NORMAL
REJECTED TEST: NORMAL
REJECTED TEST: NORMAL
WBC # BLD: 7.1 K/CU MM (ref 4–10.5)

## 2020-08-29 PROCEDURE — 80061 LIPID PANEL: CPT

## 2020-08-29 PROCEDURE — 80048 BASIC METABOLIC PNL TOTAL CA: CPT

## 2020-08-29 PROCEDURE — 85027 COMPLETE CBC AUTOMATED: CPT

## 2020-08-29 PROCEDURE — 84484 ASSAY OF TROPONIN QUANT: CPT

## 2020-08-29 PROCEDURE — 93010 ELECTROCARDIOGRAM REPORT: CPT | Performed by: INTERNAL MEDICINE

## 2020-08-29 PROCEDURE — 36415 COLL VENOUS BLD VENIPUNCTURE: CPT

## 2020-08-29 PROCEDURE — 83721 ASSAY OF BLOOD LIPOPROTEIN: CPT

## 2020-08-29 PROCEDURE — 2580000003 HC RX 258: Performed by: NURSE PRACTITIONER

## 2020-08-29 PROCEDURE — 6370000000 HC RX 637 (ALT 250 FOR IP): Performed by: NURSE PRACTITIONER

## 2020-08-29 PROCEDURE — 82962 GLUCOSE BLOOD TEST: CPT

## 2020-08-29 PROCEDURE — G0378 HOSPITAL OBSERVATION PER HR: HCPCS

## 2020-08-29 PROCEDURE — 93005 ELECTROCARDIOGRAM TRACING: CPT | Performed by: NURSE PRACTITIONER

## 2020-08-29 RX ADMIN — SODIUM CHLORIDE, PRESERVATIVE FREE 10 ML: 5 INJECTION INTRAVENOUS at 09:28

## 2020-08-29 RX ADMIN — OXYCODONE HYDROCHLORIDE AND ACETAMINOPHEN 1 TABLET: 5; 325 TABLET ORAL at 09:27

## 2020-08-29 RX ADMIN — TICAGRELOR 90 MG: 90 TABLET ORAL at 09:27

## 2020-08-29 RX ADMIN — RANOLAZINE 1000 MG: 500 TABLET, FILM COATED, EXTENDED RELEASE ORAL at 09:26

## 2020-08-29 RX ADMIN — ASPIRIN 81 MG: 81 TABLET, COATED ORAL at 09:27

## 2020-08-29 RX ADMIN — BUSPIRONE HYDROCHLORIDE 15 MG: 15 TABLET ORAL at 14:48

## 2020-08-29 RX ADMIN — CITALOPRAM 40 MG: 40 TABLET, FILM COATED ORAL at 09:27

## 2020-08-29 RX ADMIN — CARVEDILOL 12.5 MG: 12.5 TABLET, FILM COATED ORAL at 09:27

## 2020-08-29 RX ADMIN — BUSPIRONE HYDROCHLORIDE 15 MG: 15 TABLET ORAL at 09:27

## 2020-08-29 RX ADMIN — RIVAROXABAN 2.5 MG: 2.5 TABLET, FILM COATED ORAL at 09:26

## 2020-08-29 RX ADMIN — ATORVASTATIN CALCIUM 80 MG: 80 TABLET, FILM COATED ORAL at 09:27

## 2020-08-29 ASSESSMENT — PAIN DESCRIPTION - LOCATION: LOCATION: CHEST

## 2020-08-29 ASSESSMENT — PAIN DESCRIPTION - PAIN TYPE: TYPE: CHRONIC PAIN

## 2020-08-29 ASSESSMENT — PAIN SCALES - GENERAL
PAINLEVEL_OUTOF10: 8
PAINLEVEL_OUTOF10: 7

## 2020-08-29 ASSESSMENT — PAIN DESCRIPTION - ORIENTATION: ORIENTATION: MID

## 2020-08-29 NOTE — PLAN OF CARE
Problem: Pain:  Goal: Pain level will decrease  Description: Pain level will decrease  Outcome: Ongoing  Goal: Control of acute pain  Description: Control of acute pain  Outcome: Ongoing  Goal: Control of chronic pain  Description: Control of chronic pain  Outcome: Ongoing     Problem: SAFETY  Goal: Free from accidental physical injury  Outcome: Ongoing  Goal: Free from intentional harm  Outcome: Ongoing     Problem: DAILY CARE  Goal: Daily care needs are met  Outcome: Ongoing     Problem: PAIN  Goal: Patient's pain/discomfort is manageable  Outcome: Ongoing     Problem: SKIN INTEGRITY  Goal: Skin integrity is maintained or improved  Outcome: Ongoing     Problem: KNOWLEDGE DEFICIT  Goal: Patient/S.O. demonstrates understanding of disease process, treatment plan, medications, and discharge instructions.   Outcome: Ongoing     Problem: DISCHARGE BARRIERS  Goal: Patient's continuum of care needs are met  Outcome: Ongoing

## 2020-08-29 NOTE — DISCHARGE INSTR - COC
Continuity of Care Form    Patient Name: Rylie Royal   :  1965  MRN:  [id number]    Admit date:  2020  Discharge date:  ***    Code Status Order: Full Code   Advance Directives:   Advance Care Flowsheet Documentation     Date/Time Healthcare Directive Type of Healthcare Directive Copy in [address] Agent's Name Healthcare Agent's Phone Number    20 1827  No, patient does not have an advance directive for healthcare treatment -- -- -- -- --          Admitting Physician:  Geno Yoo MD  PCP: Brianne Sofia DO    Discharging Nurse: [facility] Unit/Room#: 5159/3703-M  Discharging Unit Phone Number: ***    Emergency Contact:   Extended Emergency Contact Information  Primary Emergency Contact: Alan Fernández  Address: Magdy Russ [address] [place] [address] Phone: [phone]  Mobile Phone: [phone]  Relation: Spouse  Secondary Emergency Contact: Yaa Ferrer  [place] Phone: [phone]  Mobile Phone: [phone]  Relation: Child  Preferred language: English   needed?  No    Past Surgical History:  Past Surgical History:   Procedure Laterality Date    APPENDECTOMY  79    Done During TOSHA    BACK SURGERY  Last Done     \"4 Lower Back Surgeries, 2 Rods, 4 Pins At L5, S1 Put In During Last Surgery\"    BONE RESECTION, RIB      \"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- surgery 2013- ok since then\"    BREAST BIOPSY Bilateral 's-    Benign    CARDIAC CATHETERIZATION      per pt \"had heart cath 5/10/2019\"   [address]  5-4-12    CABG (3 Bypasses)   [facility]    \"took with [facility] and hyster    COLONOSCOPY  last one     Dr. Ruy Fernandez      2018 total of 9    DENTAL SURGERY  's    \"Dental Implants Upper And Lower\"    FEMORAL ENDARTERECTOMY Right 2019    RIGHT FEMORAL ENDARTERECTOMY WITH CORMATRIX PATCH performed by Tuan Pelaez MD at [address]    Appendectomy Also Done    OTHER SURGICAL HISTORY Left 4/4/2013    Left transaxillary 1st rib resection    OTHER SURGICAL HISTORY      peripheral angiography 6/6/2019    TONSILLECTOMY AND ADENOIDECTOMY  1971    VASCULAR SURGERY      \"5/22/2019\"they went in and unblocked the femoral artery on right side and then in again 6/6/2019\"put dye in -  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\"       Immunization History:   Immunization History   Administered Date(s) Administered    Influenza Vaccine, unspecified formulation 09/14/2016, 10/28/2016    Influenza Virus Vaccine 06/01/2013, 11/26/2013, 09/10/2014, 09/25/2017    Pneumococcal Polysaccharide ([id number]) 06/02/2013    Tdap (Boostrix, Adacel) 04/20/2020       Active Problems:  Patient Active Problem List   Diagnosis Code    TOS (thoracic outlet syndrome) G54.0    Coronary artery disease I25.10    Major depressive disorder, recurrent episode with anxious distress ([facility] F33.9    Diabetes mellitus ([facility] E11.9    GERD (gastroesophageal reflux disease) K21.9    Hyperlipidemia E78.5    Essential hypertension I10    Tobacco dependence F17.200    Overweight (BMI 25.0-29. 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    COPD (chronic obstructive pulmonary disease) ([facility]) J44.9    CAD (coronary artery disease) I25.10    Acid reflux K21.9    Depression F32.9    Hx of migraines Z86.69    Hypertension I10    Kidney stones N20.0    Chest pain R07.9       Isolation/Infection:   Isolation          No Isolation        Patient Infection Status     None to display          Nurse Assessment:  Last Vital Signs: /68   Pulse 71   Temp 98.1 °F (36.7 °C) (Oral)   Resp 14   Ht 5' 6\" (1.676 m)   Wt 164 lb 9.6 oz (74.7 kg)   SpO2 97%   BMI 26.57 kg/m²     Last documented pain score (0-10 scale): Pain Level: 7  Last Weight:   Wt Readings from Last 1 Encounters:   20 164 lb 9.6 oz (74.7 kg)     Mental Status:  {IP PT MENTAL STATUS:}    IV Access:  { MAYRA IV ACCESS:381785851}    Nursing Mobility/ADLs:  Walking   {CHP DME MZUZ:576712559}  Transfer  {CHP DME BHLL:203838051}  Bathing  {CHP DME LVPD:556348293}  Dressing  {CHP DME NYHZ:746909243}  Toileting  {CHP DME VZRW:754421597}  Feeding  {[facility] DME DUX}  Med Admin  {P DME MLML:394153818}  Med Delivery   { MAYRA MED Delivery:510587745}    Wound Care Documentation and Therapy:        Elimination:  Continence:   · Bowel: {YES / SR:11856}  · Bladder: {YES / TV:90010}  Urinary Catheter: {Urinary Catheter:669293135}   Colostomy/Ileostomy/Ileal Conduit: {YES / XL:77156}       Date of Last BM: ***    Intake/Output Summary (Last 24 hours) at 2020 1006  Last data filed at 2020 0919  Gross per 24 hour   Intake 460 ml   Output 0 ml   Net 460 ml     No intake/output data recorded.     Safety Concerns:     [address] Safety Concerns:774457349}    Impairments/Disabilities:      [address] Impairments/Disabilities:529995292}    Nutrition Therapy:  Current Nutrition Therapy:   [address] Diet List:620648708}    Routes of Feeding: {CHP DME Other Feedings:457984276}  Liquids: {Slp liquid thickness:42777}  Daily Fluid Restriction: {CHP DME Yes amt example:220964563}  Last Modified Barium Swallow with Video (Video Swallowing Test): {Done Not Done WQXX:548556402}    Treatments at the Time of Hospital Discharge:   Respiratory Treatments: ***  Oxygen Therapy:  {Therapy; copd oxygen:54487}  Ventilator:    {MH CC Vent HLEQ:374738711}    Rehab Therapies: {THERAPEUTIC INTERVENTION:9032233356}  Weight Bearing Status/Restrictions: 50Alissa Rico CC Weight Bearin}  Other Medical Equipment (for information only, NOT a DME order):  {EQUIPMENT:757832176}  Other Treatments: ***    Patient's personal belongings (please select all that are sent with patient):  {CHP DME Belongings:254989754}    RN SIGNATURE:  {Esignature:164688471}    CASE MANAGEMENT/SOCIAL WORK SECTION    Inpatient Status Date: ***    Readmission Risk Assessment Score:  Readmission Risk              Risk of Unplanned Readmission:        0           Discharging to Facility/ Agency   · Name:   · Address:  · Phone:  · Fax:    Dialysis Facility (if applicable)   · Name:  · Address:  · Dialysis Schedule:  · Phone:  · Fax:    / signature: {Esignature:820118773}    PHYSICIAN SECTION    Prognosis: {Prognosis:8345054636}    Condition at Discharge: 50[address] Rico Patient Condition:565085082}    Rehab Potential (if transferring to Rehab): {Prognosis:8322020181}    Recommended Labs or Other Treatments After Discharge: ***    Physician Certification: I certify the above information and transfer of Maty Iniguez  is necessary for the continuing treatment of the diagnosis listed and that she requires {Admit to Appropriate Level of Care:33234} for {GREATER/LESS:465162030} 30 days.      Update Admission H&P: {CHP DME Changes in LUXLT:820139863}    PHYSICIAN SIGNATURE:  {Esignature:013541790}

## 2020-08-29 NOTE — PROGRESS NOTES
Daily Progress Note     awake alert   Feeling better  She has hx of CAD and CABG last cath 12/19-patent grafts  Ok to d/c home from cardiac stand  She is still smoking 1/2 PPD need to quit smoking  Diabetes and HTN   Continue home meds      Objective:   /68   Pulse 71   Temp 98.1 °F (36.7 °C) (Oral)   Resp 14   Ht 5' 6\" (1.676 m)   Wt 164 lb 9.6 oz (74.7 kg)   SpO2 97%   BMI 26.57 kg/m²       Intake/Output Summary (Last 24 hours) at 8/29/2020 1029  Last data filed at 8/29/2020 0919  Gross per 24 hour   Intake 460 ml   Output 0 ml   Net 460 ml       Medications:   Scheduled Meds:   aspirin  81 mg Oral Daily    atorvastatin  80 mg Oral Daily    rivaroxaban  2.5 mg Oral BID    ticagrelor  90 mg Oral BID    ranolazine  1,000 mg Oral BID    insulin glargine  30 Units Subcutaneous Nightly    sodium chloride flush  10 mL Intravenous 2 times per day    insulin lispro  0-6 Units Subcutaneous TID WC    insulin lispro  0-3 Units Subcutaneous Nightly    busPIRone  15 mg Oral TID    citalopram  40 mg Oral Daily    fenofibrate  160 mg Oral Nightly    gabapentin  100 mg Oral Nightly    [Held by provider] lisinopril  2.5 mg Oral Daily    carvedilol  12.5 mg Oral BID WC      Infusions:   dextrose        PRN Meds:  sodium chloride flush, acetaminophen **OR** acetaminophen, polyethylene glycol, promethazine **OR** ondansetron, glucose, dextrose, glucagon (rDNA), dextrose, clonazePAM, oxyCODONE-acetaminophen       Physical Exam:  Vitals:    08/29/20 0922   BP: 117/68   Pulse: 71   Resp: 14   Temp: 98.1 °F (36.7 °C)   SpO2: 97%        General: patient is awake alert   Chest: Nontender  Cardiac: sinus   Lungs:Clear to auscultation and percussion. Abdomen: Soft, NT, ND, +BS  Extremities: no edema   Vascular:  Equal 2+ peripheral pulses.         Lab Data:  CBC:   Recent Labs     08/28/20  1335 08/29/20  0512   WBC 8.5 7.1   HGB 11.8* 12.0*   HCT 37.0 39.7   MCV 94.6 101.5*    247     BMP:   Recent

## 2020-08-29 NOTE — DISCHARGE SUMMARY
Discharge Summary    Name:  Jong Ojeda /Age/Sex: 1965  (53 y.o. female)   MRN & CSN:  [id number] & [id number] Admission Date/Time: 2020 11:20 AM   Attending:  Kena Hernandez MD Discharging Physician: Rui Will MD     Hospital Course:   Jong Ojeda is a 53 y.o.  female  who presents with Chest pain      Chest pain, r/o ACS -  - known CAD, h/o multiple stents placement in the past + CABG, initial trop neg, Last Echo () EF 50-55%, GR II DD, last cardiac cath 2019.  -Patient seen by cardiology, recommend medical management at this time. No acute concern for coronary event  - pending Echo pending  -TSH H0.613, WNL  - check lab works in AM including repeat ECG  -Cardiology on board recommend discharge       HTN   -blood pressure on admission low low normal   -Currently blood pressure within normal limits        -Can resume Lisinopril   - monitor BP      DM type 2: Controlled   - Last A1C 5.4  -Can resume oral diabetics at home            Chronic Illnesses:          - COPD - breathing stable on room air.         - GERD         - hyperlipidemia         - depression/anxiety - c/w Buspar.         - PVD         - chronic back pain      The patient expressed appropriate understanding of and agreement with the discharge recommendations, medications, and plan.      Consults this admission:  IP CONSULT TO HOSPITALIST  IP CONSULT TO CARDIOLOGY  IP CONSULT TO Brittni    Discharge Instruction:   Follow up appointments: Audiology  Primary care physician:  within 2 weeks    Diet:  cardiac diet and diabetic diet   Activity: activity as tolerated  Disposition: Discharged to:   [x]Home, []C, []SNF, []Acute Rehab, []Hospice   Condition on discharge: Stable    Discharge Medications:      Nabil Dobbins   Home Medication Instructions M:[id number]    Printed on:20 0947   Medication Information                      albuterol sulfate HFA (PROAIR HFA) 108 (90 Base) MCG/ACT inhaler  Inhale 2 puffs into the lungs every 6 hours as needed for Wheezing             aspirin 81 MG EC tablet  Take 81 mg by mouth daily             atorvastatin (LIPITOR) 80 MG tablet  Take 80 mg by mouth daily              busPIRone (BUSPAR) 15 MG tablet  Take 15 mg by mouth 3 times daily             carvedilol (COREG) 12.5 MG tablet  Take 1 tablet by mouth 2 times daily (with meals)             citalopram (CELEXA) 40 MG tablet  Take 40 mg by mouth daily             clonazePAM (KLONOPIN) 1 MG tablet  Take 1 mg by mouth daily as needed. cyclobenzaprine (FLEXERIL) 10 MG tablet  Take 10 mg by mouth 3 times daily as needed             dapagliflozin (FARXIGA) 10 MG tablet  Take 10 mg by mouth every morning              fenofibrate (TRICOR) 145 MG tablet  Take 145 mg by mouth nightly             furosemide (LASIX) 20 MG tablet  Take 20 mg by mouth daily             gabapentin (NEURONTIN) 100 MG capsule  Take 100 mg by mouth nightly. ibuprofen (IBU) 600 MG tablet  Take 1 tablet by mouth every 6 hours as needed for Pain             insulin glargine (BASAGLAR KWIKPEN) 100 UNIT/ML injection pen  Inject 25 Units into the skin nightly             JANUMET -1000 MG TB24  Take 1 tablet by mouth nightly             lisinopril (PRINIVIL;ZESTRIL) 5 MG tablet  Take 2.5 mg by mouth daily              nitroGLYCERIN (NITROSTAT) 0.4 MG SL tablet  up to max of 3 total doses. If no relief after 1 dose, call 911.              ranolazine (RANEXA) 1000 MG extended release tablet  Take 1,000 mg by mouth 2 times daily              rivaroxaban (XARELTO) 2.5 MG TABS tablet  Take 1 tablet by mouth 2 times daily             ticagrelor (BRILINTA) 90 MG TABS tablet  Take 1 tablet by mouth 2 times daily                 Objective Findings at Discharge:   /68   Pulse 71   Temp 98.1 °F (36.7 °C) (Oral)   Resp 14   Ht 5' 6\" (1.676 m)   Wt 164 lb 9.6 oz (74.7 kg)   SpO2 97%   BMI 26.57 kg/m²            PHYSICAL

## 2020-09-04 PROCEDURE — 93005 ELECTROCARDIOGRAM TRACING: CPT | Performed by: PHYSICIAN ASSISTANT

## 2020-09-04 ASSESSMENT — PAIN SCALES - GENERAL: PAINLEVEL_OUTOF10: 10

## 2020-09-04 ASSESSMENT — PAIN DESCRIPTION - ONSET: ONSET: ON-GOING

## 2020-09-04 ASSESSMENT — PAIN DESCRIPTION - DESCRIPTORS: DESCRIPTORS: CONSTANT;SHOOTING

## 2020-09-04 ASSESSMENT — PAIN DESCRIPTION - PROGRESSION: CLINICAL_PROGRESSION: NOT CHANGED

## 2020-09-04 ASSESSMENT — PAIN DESCRIPTION - PAIN TYPE: TYPE: ACUTE PAIN

## 2020-09-04 ASSESSMENT — PAIN DESCRIPTION - FREQUENCY: FREQUENCY: CONTINUOUS

## 2020-09-04 ASSESSMENT — PAIN DESCRIPTION - LOCATION: LOCATION: BACK;NECK;CHEST

## 2020-09-05 ENCOUNTER — APPOINTMENT (OUTPATIENT)
Dept: CT IMAGING | Age: 55
End: 2020-09-05
Payer: COMMERCIAL

## 2020-09-05 ENCOUNTER — APPOINTMENT (OUTPATIENT)
Dept: GENERAL RADIOLOGY | Age: 55
End: 2020-09-05
Payer: COMMERCIAL

## 2020-09-05 ENCOUNTER — HOSPITAL ENCOUNTER (EMERGENCY)
Age: 55
Discharge: LEFT BEFORE DISCHARGE | End: 2020-09-05
Payer: COMMERCIAL

## 2020-09-05 VITALS
HEART RATE: 83 BPM | TEMPERATURE: 97.9 F | HEIGHT: 66 IN | SYSTOLIC BLOOD PRESSURE: 132 MMHG | RESPIRATION RATE: 17 BRPM | DIASTOLIC BLOOD PRESSURE: 96 MMHG | WEIGHT: 160 LBS | BODY MASS INDEX: 25.71 KG/M2 | OXYGEN SATURATION: 96 %

## 2020-09-05 LAB
ALBUMIN SERPL-MCNC: 4.7 GM/DL (ref 3.4–5)
ALP BLD-CCNC: 38 IU/L (ref 40–128)
ALT SERPL-CCNC: 9 U/L (ref 10–40)
ANION GAP SERPL CALCULATED.3IONS-SCNC: 12 MMOL/L (ref 4–16)
AST SERPL-CCNC: 17 IU/L (ref 15–37)
BASOPHILS ABSOLUTE: 0.1 K/CU MM
BASOPHILS RELATIVE PERCENT: 1 % (ref 0–1)
BILIRUB SERPL-MCNC: 0.2 MG/DL (ref 0–1)
BUN BLDV-MCNC: 15 MG/DL (ref 6–23)
CALCIUM SERPL-MCNC: 9.7 MG/DL (ref 8.3–10.6)
CHLORIDE BLD-SCNC: 108 MMOL/L (ref 99–110)
CO2: 23 MMOL/L (ref 21–32)
CREAT SERPL-MCNC: 1 MG/DL (ref 0.6–1.1)
DIFFERENTIAL TYPE: ABNORMAL
EOSINOPHILS ABSOLUTE: 0.6 K/CU MM
EOSINOPHILS RELATIVE PERCENT: 5.9 % (ref 0–3)
GFR AFRICAN AMERICAN: >60 ML/MIN/1.73M2
GFR NON-AFRICAN AMERICAN: 58 ML/MIN/1.73M2
GLUCOSE BLD-MCNC: 114 MG/DL (ref 70–99)
HCT VFR BLD CALC: 38.1 % (ref 37–47)
HEMOGLOBIN: 12.1 GM/DL (ref 12.5–16)
IMMATURE NEUTROPHIL %: 0.5 % (ref 0–0.43)
LIPASE: 32 IU/L (ref 13–60)
LYMPHOCYTES ABSOLUTE: 3.1 K/CU MM
LYMPHOCYTES RELATIVE PERCENT: 31.1 % (ref 24–44)
MCH RBC QN AUTO: 30.2 PG (ref 27–31)
MCHC RBC AUTO-ENTMCNC: 31.8 % (ref 32–36)
MCV RBC AUTO: 95 FL (ref 78–100)
MONOCYTES ABSOLUTE: 0.9 K/CU MM
MONOCYTES RELATIVE PERCENT: 9 % (ref 0–4)
NUCLEATED RBC %: 0 %
PDW BLD-RTO: 14.1 % (ref 11.7–14.9)
PLATELET # BLD: 309 K/CU MM (ref 140–440)
PMV BLD AUTO: 10.6 FL (ref 7.5–11.1)
POTASSIUM SERPL-SCNC: 3.9 MMOL/L (ref 3.5–5.1)
PRO-BNP: 114.7 PG/ML
RBC # BLD: 4.01 M/CU MM (ref 4.2–5.4)
SEGMENTED NEUTROPHILS ABSOLUTE COUNT: 5.2 K/CU MM
SEGMENTED NEUTROPHILS RELATIVE PERCENT: 52.5 % (ref 36–66)
SODIUM BLD-SCNC: 143 MMOL/L (ref 135–145)
TOTAL IMMATURE NEUTOROPHIL: 0.05 K/CU MM
TOTAL NUCLEATED RBC: 0 K/CU MM
TOTAL PROTEIN: 7.4 GM/DL (ref 6.4–8.2)
TROPONIN T: <0.01 NG/ML
WBC # BLD: 9.9 K/CU MM (ref 4–10.5)

## 2020-09-05 PROCEDURE — 83690 ASSAY OF LIPASE: CPT

## 2020-09-05 PROCEDURE — 93010 ELECTROCARDIOGRAM REPORT: CPT | Performed by: INTERNAL MEDICINE

## 2020-09-05 PROCEDURE — 83880 ASSAY OF NATRIURETIC PEPTIDE: CPT

## 2020-09-05 PROCEDURE — 99284 EMERGENCY DEPT VISIT MOD MDM: CPT

## 2020-09-05 PROCEDURE — 85025 COMPLETE CBC W/AUTO DIFF WBC: CPT

## 2020-09-05 PROCEDURE — 84484 ASSAY OF TROPONIN QUANT: CPT

## 2020-09-05 PROCEDURE — 80053 COMPREHEN METABOLIC PANEL: CPT

## 2020-09-05 RX ORDER — LIDOCAINE 4 G/G
1 PATCH TOPICAL DAILY
Status: DISCONTINUED | OUTPATIENT
Start: 2020-09-05 | End: 2020-09-05 | Stop reason: HOSPADM

## 2020-09-05 RX ORDER — IBUPROFEN 600 MG/1
600 TABLET ORAL ONCE
Status: DISCONTINUED | OUTPATIENT
Start: 2020-09-05 | End: 2020-09-05 | Stop reason: HOSPADM

## 2020-09-05 NOTE — ED NOTES
Patient requesting [facility] notified      Mian Arnold [facility]  09/05/20 3761

## 2020-09-05 NOTE — ED PROVIDER NOTES
Triage Chief Complaint:   Back Pain (low back pain, into neck, into left chest that started this afternoon. NKI ); Neck Pain; and Chest Pain    [place]:  Today in the ED I had the pleasure of caring for Rogelio Rivera who is a 53 y.o. female that presents  Patient was reading a book at around 7pm and then her back started hurting R lower back. And then it began moving up the back. At around 10pm, she took a percocet and he rpain didn't get any worse. Then the pain began moving up towards her head and chest.   She has a hx of chronic back pain for which she takes percocet.      ROS:  REVIEW OF SYSTEMS    At least 10 systems reviewed      All other review of systems are negative  See HPI and nursing notes for additional information       Past Medical History:   Diagnosis Date    Acid reflux     CAD (coronary artery disease)     Sees Dr. Clare Menjivar    COPD (chronic obstructive pulmonary disease) ([facility]     \"have mild COPD- no pulmonologist\"    Depression     Diabetes mellitus ([facility] Dx 2001    Eczema of hand     Hx of migraines     HX OTHER MEDICAL     \"difficulty IV stick\"    Hyperlipidemia     Hypertension     follows with Dr Kyle Whitt Kidney stones 2000's    \"Passed One Kidney Stone\"    PVD (peripheral vascular disease) ([facility]     Wears glasses      Past Surgical History:   Procedure Laterality Date   [address] During TOSHA    BACK SURGERY  Last Done 9-11/2013    \"4 Lower Back Surgeries, 2 Rods, 4 Pins At L5, S1 Put In During Last Surgery\"    BONE RESECTION, RIB      \"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- surgery 2013- ok since then\"    BREAST BIOPSY Bilateral 1990's-2000's    Benign    CARDIAC CATHETERIZATION      per pt \"had heart cath 5/10/2019\"   [address]  5-4-12    CABG (3 Bypasses)   [address] with appendix and hyster    COLONOSCOPY  last one 2014    Dr. Richard Dempsey      6/28/2018 total of 9   [facility] 's    \"Dental Implants Upper And Lower\"    FEMORAL ENDARTERECTOMY Right 2019    RIGHT FEMORAL ENDARTERECTOMY WITH CORMATRIX PATCH performed by Edwina Padilla MD at [address] [place] [address]    Appendectomy Also Done    OTHER SURGICAL HISTORY Left 2013    Left transaxillary 1st rib resection    OTHER SURGICAL HISTORY      peripheral angiography 2019    TONSILLECTOMY AND ADENOIDECTOMY  1971    VASCULAR SURGERY      \"2019\"they went in and unblocked the femoral artery on right side and then in again 2019\"put dye in -  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\"     Family History   Problem Relation Age of Onset    Diabetes Mother     High Blood Pressure Mother     Asthma Mother     High Cholesterol Mother     High Blood Pressure Father     High Cholesterol Father     Asthma Father     Heart Disease Father         mvp    Asthma Brother     Mental Illness Son     Early Death Son 25        \"Suicide\"    Mental Illness Daughter         \"Bipolar\"     Social History     Socioeconomic History    Marital status:      Spouse name: Not on file    Number of children: Not on file    Years of education: Not on file    Highest education level: Not on file   Occupational History    Not on file   Social Needs    Financial resource strain: Not on file    Food insecurity     Worry: Not on file     Inability: Not on file    Transportation needs     Medical: Not on file     Non-medical: Not on file   Tobacco Use    Smoking status: Former Smoker     Packs/day: 0.50     Years: 35.00     Pack years: 17.50     Types: Cigarettes     Last attempt to quit: 2019     Years since quittin.2    Smokeless tobacco: Never Used   Substance and Sexual Activity    Alcohol use: No     Alcohol/week: 0.0 standard drinks     Comment: \"quit drinking in - use to drink 5 days per week- average use to drink 6 beers each time\"    Drug use: No    Sexual activity: Yes nontoxic appearing  HEENT: Pupils are equally round and reactive to light extraocular motors are intact conjunctivae clear sclerae white there is no injection no icterus. Nose without any rhinorrhea or epistaxis. Oral mucosa is moist no exudate buccal mucosa shows no ulcerations. Uvula is midline    Neck: Neck is supple full range of motion trachea midline thyroid nonpalpable  Cardiac: Heart regular rate rhythm no murmurs rubs clicks or gallops  Lungs: Lungs are clear to auscultation there is no wheezing rhonchi or rales. There is no use of accessory muscles no nasal flaring identified. Chest wall: There is no tenderness to palpation over the chest wall or over ribs  Abdomen: Abdomen is soft nontender nondistended. There is no firm or pulsatile masses no rebound rigidity or guarding negative Gao's negative McBurney, no peritoneal signs  Suprapubic:  there is no tenderness to palpation over the external bladder   Musculoskeletal: 5 out of 5 strength in all 4 extremities full flexion extension abduction and adduction supination pronation of all extremities and all digits. No obvious muscle atrophy is noted. No focal muscle deficits are appreciated  Dermatology: Skin is warm and dry there is no obvious abscesses lacerations or lesions noted  Psych: Mentation is grossly normal cognition is grossly normal. Affect is appropriate  Neuro: Motor intact sensory intact cranial nerves II through XII are intact level of consciousness is normal cerebellar function is normal reflexes are grossly normal. No evidence of incontinence or loss of bowel or bladder no saddle anesthesia noted Lymphatic: There is no submandibular or cervical adenopathy appreciated.         I have reviewed and interpreted all of the currently available lab results from this visit (if applicable):  Results for orders placed or performed during the hospital encounter of 09/05/20   CBC Auto Differential   Result Value Ref Range    WBC 9.9 4.0 - 10.5 K/CU MM    RBC 4.01 (L) 4.2 - 5.4 M/CU MM    Hemoglobin 12.1 (L) 12.5 - 16.0 GM/DL    Hematocrit 38.1 37 - 47 %    MCV 95.0 78 - 100 FL    MCH 30.2 27 - 31 PG    MCHC 31.8 (L) 32.0 - 36.0 %    RDW 14.1 11.7 - 14.9 %    Platelets 052 125 - 588 K/CU MM    MPV 10.6 7.5 - 11.1 FL    Differential Type AUTOMATED DIFFERENTIAL     Segs Relative 52.5 36 - 66 %    Lymphocytes % 31.1 24 - 44 %    Monocytes % 9.0 (H) 0 - 4 %    Eosinophils % 5.9 (H) 0 - 3 %    Basophils % 1.0 0 - 1 %    Segs Absolute 5.2 K/CU MM    Lymphocytes Absolute 3.1 K/CU MM    Monocytes Absolute 0.9 K/CU MM    Eosinophils Absolute 0.6 K/CU MM    Basophils Absolute 0.1 K/CU MM    Nucleated RBC % 0.0 %    Total Nucleated RBC 0.0 K/CU MM    Total Immature Neutrophil 0.05 K/CU MM    Immature Neutrophil % 0.5 (H) 0 - 0.43 %   Comprehensive Metabolic Panel   Result Value Ref Range    Sodium 143 135 - 145 MMOL/L    Potassium 3.9 3.5 - 5.1 MMOL/L    Chloride 108 99 - 110 mMol/L    CO2 23 21 - 32 MMOL/L    BUN 15 6 - 23 MG/DL    CREATININE 1.0 0.6 - 1.1 MG/DL    Glucose 114 (H) 70 - 99 MG/DL    Calcium 9.7 8.3 - 10.6 MG/DL    Alb 4.7 3.4 - 5.0 GM/DL    Total Protein 7.4 6.4 - 8.2 GM/DL    Total Bilirubin 0.2 0.0 - 1.0 MG/DL    ALT 9 (L) 10 - 40 U/L    AST 17 15 - 37 IU/L    Alkaline Phosphatase 38 (L) 40 - 128 IU/L    GFR Non- 58 (L) >60 mL/min/1.73m2    GFR African American >60 >60 mL/min/1.73m2    Anion Gap 12 4 - 16   Troponin   Result Value Ref Range    Troponin T <0.010 <0.01 NG/ML   Lipase   Result Value Ref Range    Lipase 32 13 - 60 IU/L   Brain Natriuretic Peptide   Result Value Ref Range    Pro-.7 <300 PG/ML      Radiographs (if obtained):  [] The following radiograph was interpreted by myself in the absence of a radiologist:   [] Radiologist's Report Reviewed:  No orders to display       EKG (if obtained):   Please See Note of attending physician for EKG interpretation.      Chart review shows recent radiograph(s):  Xr Chest Portable    Result Date: 8/28/2020  EXAMINATION: ONE XRAY VIEW OF THE CHEST 8/28/2020 11:42 am COMPARISON: 02/29/2020 HISTORY: ORDERING SYSTEM PROVIDED HISTORY: cp TECHNOLOGIST PROVIDED HISTORY: Reason for exam:->cp Reason for Exam: chest pain FINDINGS: Cardiac silhouette and mediastinal contours are normal.  Median sternotomy wires are noted. The lungs are clear. No focal lung infiltrate. No pleural effusion or pneumothorax. 1. No acute cardiopulmonary disease. MDM:   Presents today to the ED with lower back pain. Consistent with musculoskeletal strain. Exacerbation of her chronic back pain. CT scan was ordered. However patient left prior to obtaining images. Her labs are not reviewed by myself prior to her leaving historically I have low suspicion of ACS. However patient did site that her back pain radiated into her chest.  So cardiac work-up was initiated here in the ED. Reveals no acute abnormalities per chest x-ray radiology, troponin, other. I independently managed patient today in the ED        BP (!) 132/96   Pulse 83   Temp 97.9 °F (36.6 °C) (Oral)   Resp 17   Ht 5' 6\" (1.676 m)   Wt 160 lb (72.6 kg)   SpO2 96%   BMI 25.82 kg/m²       Clinical Impression:  1. Strain of lumbar region, initial encounter        Disposition referral (if applicable):  No follow-up provider specified. Disposition medications (if applicable):  New Prescriptions    No medications on file         Comment: Please note this report has been produced using speech recognition software and may contain errors related to that system including errors in grammar, punctuation, and spelling, as well as words and phrases that may be inappropriate. If there are any questions or concerns please feel free to contact the dictating provider for clarification.       Lambert De León, [id number]  09/05/20 1327

## 2020-09-05 NOTE — ED NOTES
Paperwork signed,patient walked out on her own     Cherrie Lukestefanie, [facility]  09/05/20 0119

## 2020-09-07 ENCOUNTER — HOSPITAL ENCOUNTER (EMERGENCY)
Age: 55
Discharge: HOME HEALTH | End: 2020-09-07
Attending: EMERGENCY MEDICINE
Payer: COMMERCIAL

## 2020-09-07 ENCOUNTER — APPOINTMENT (OUTPATIENT)
Dept: GENERAL RADIOLOGY | Age: 55
End: 2020-09-07
Payer: COMMERCIAL

## 2020-09-07 VITALS
SYSTOLIC BLOOD PRESSURE: 131 MMHG | HEART RATE: 83 BPM | TEMPERATURE: 98.3 F | OXYGEN SATURATION: 95 % | BODY MASS INDEX: 25.71 KG/M2 | WEIGHT: 160 LBS | DIASTOLIC BLOOD PRESSURE: 60 MMHG | RESPIRATION RATE: 16 BRPM | HEIGHT: 66 IN

## 2020-09-07 PROCEDURE — 4500000002 HC ER NO CHARGE

## 2020-09-07 PROCEDURE — 73560 X-RAY EXAM OF KNEE 1 OR 2: CPT

## 2020-09-07 ASSESSMENT — PAIN SCALES - GENERAL: PAINLEVEL_OUTOF10: 7

## 2020-09-07 NOTE — ED NOTES
Pt signs AMA form. Pt has not seen Dr. Golden Iqbal yet at this time. Pt upset and crying and wanting to go home. Pt verbalized understanding.       Connor English RN  09/07/20 9585

## 2020-09-07 NOTE — ED NOTES
Pt laying on bed crying, pt answering all questions appropriately.  EMS reports she was diagnosed with a kidney stone two days ago when she was here last.      Florinda Ambrocio RN  09/07/20 0111

## 2020-09-09 LAB
EKG ATRIAL RATE: 91 BPM
EKG DIAGNOSIS: NORMAL
EKG P AXIS: 46 DEGREES
EKG P-R INTERVAL: 160 MS
EKG Q-T INTERVAL: 356 MS
EKG QRS DURATION: 78 MS
EKG QTC CALCULATION (BAZETT): 437 MS
EKG R AXIS: 64 DEGREES
EKG T AXIS: -16 DEGREES
EKG VENTRICULAR RATE: 91 BPM

## 2020-09-25 ENCOUNTER — HOSPITAL ENCOUNTER (OUTPATIENT)
Age: 55
Discharge: HOME | End: 2020-09-25
Payer: COMMERCIAL

## 2020-09-25 ENCOUNTER — HOSPITAL ENCOUNTER (OUTPATIENT)
Dept: GENERAL RADIOLOGY | Age: 55
Discharge: HOME | End: 2020-09-25
Payer: COMMERCIAL

## 2020-09-25 PROCEDURE — 72040 X-RAY EXAM NECK SPINE 2-3 VW: CPT

## 2021-07-23 ENCOUNTER — HOSPITAL ENCOUNTER (INPATIENT)
Age: 56
LOS: 3 days | Discharge: HOME | DRG: 378 | End: 2021-07-26
Attending: STUDENT IN AN ORGANIZED HEALTH CARE EDUCATION/TRAINING PROGRAM | Admitting: INTERNAL MEDICINE
Payer: COMMERCIAL

## 2021-07-23 ENCOUNTER — APPOINTMENT (OUTPATIENT)
Dept: GENERAL RADIOLOGY | Age: 56
DRG: 378 | End: 2021-07-23
Payer: COMMERCIAL

## 2021-07-23 ENCOUNTER — APPOINTMENT (OUTPATIENT)
Dept: CT IMAGING | Age: 56
DRG: 378 | End: 2021-07-23
Payer: COMMERCIAL

## 2021-07-23 DIAGNOSIS — K92.2 ACUTE UPPER GI BLEED: Primary | ICD-10-CM

## 2021-07-23 DIAGNOSIS — R53.83 FATIGUE, UNSPECIFIED TYPE: ICD-10-CM

## 2021-07-23 DIAGNOSIS — R07.9 CHEST PAIN, UNSPECIFIED TYPE: ICD-10-CM

## 2021-07-23 DIAGNOSIS — R10.13 ABDOMINAL PAIN, EPIGASTRIC: ICD-10-CM

## 2021-07-23 LAB
ALBUMIN SERPL-MCNC: 4.8 GM/DL (ref 3.4–5)
ALP BLD-CCNC: 52 IU/L (ref 40–129)
ALT SERPL-CCNC: 7 U/L (ref 10–40)
ANION GAP SERPL CALCULATED.3IONS-SCNC: 13 MMOL/L (ref 4–16)
APTT: 28.6 SECONDS (ref 25.1–37.1)
AST SERPL-CCNC: 12 IU/L (ref 15–37)
BACTERIA: ABNORMAL /HPF
BASOPHILS ABSOLUTE: 0.1 K/CU MM
BASOPHILS RELATIVE PERCENT: 0.7 % (ref 0–1)
BILIRUB SERPL-MCNC: 0.2 MG/DL (ref 0–1)
BILIRUBIN URINE: NEGATIVE MG/DL
BLOOD, URINE: ABNORMAL
BUN BLDV-MCNC: 37 MG/DL (ref 6–23)
CALCIUM SERPL-MCNC: 9 MG/DL (ref 8.3–10.6)
CHLORIDE BLD-SCNC: 102 MMOL/L (ref 99–110)
CHP ED QC CHECK: YES
CLARITY: CLEAR
CO2: 17 MMOL/L (ref 21–32)
COLOR: YELLOW
CREAT SERPL-MCNC: 1 MG/DL (ref 0.6–1.1)
DIFFERENTIAL TYPE: ABNORMAL
EKG ATRIAL RATE: 84 BPM
EKG DIAGNOSIS: NORMAL
EKG P AXIS: 71 DEGREES
EKG P-R INTERVAL: 172 MS
EKG Q-T INTERVAL: 392 MS
EKG QRS DURATION: 88 MS
EKG QTC CALCULATION (BAZETT): 463 MS
EKG R AXIS: 79 DEGREES
EKG T AXIS: -86 DEGREES
EKG VENTRICULAR RATE: 84 BPM
EOSINOPHILS ABSOLUTE: 0.2 K/CU MM
EOSINOPHILS RELATIVE PERCENT: 2.3 % (ref 0–3)
ESTIMATED AVERAGE GLUCOSE: 120 MG/DL
GFR AFRICAN AMERICAN: >60 ML/MIN/1.73M2
GFR NON-AFRICAN AMERICAN: 57 ML/MIN/1.73M2
GLUCOSE BLD-MCNC: 115 MG/DL (ref 70–99)
GLUCOSE BLD-MCNC: 121 MG/DL
GLUCOSE BLD-MCNC: 121 MG/DL (ref 70–99)
GLUCOSE BLD-MCNC: 92 MG/DL (ref 70–99)
GLUCOSE, URINE: NEGATIVE MG/DL
HBA1C MFR BLD: 5.8 % (ref 4.2–6.3)
HCT VFR BLD CALC: 32.1 % (ref 37–47)
HCT VFR BLD CALC: 33.5 % (ref 37–47)
HEMOGLOBIN: 10.3 GM/DL (ref 12.5–16)
HEMOGLOBIN: 10.8 GM/DL (ref 12.5–16)
HYALINE CASTS: 2 /LPF
IMMATURE NEUTROPHIL %: 0.5 % (ref 0–0.43)
INR BLD: 1.06 INDEX
KETONES, URINE: NEGATIVE MG/DL
LACTATE: 1.8 MMOL/L (ref 0.4–2)
LEUKOCYTE ESTERASE, URINE: ABNORMAL
LIPASE: 33 IU/L (ref 13–60)
LYMPHOCYTES ABSOLUTE: 2.1 K/CU MM
LYMPHOCYTES RELATIVE PERCENT: 23.7 % (ref 24–44)
MAGNESIUM: 1.7 MG/DL (ref 1.8–2.4)
MCH RBC QN AUTO: 31 PG (ref 27–31)
MCHC RBC AUTO-ENTMCNC: 32.2 % (ref 32–36)
MCV RBC AUTO: 96.3 FL (ref 78–100)
MONOCYTES ABSOLUTE: 0.5 K/CU MM
MONOCYTES RELATIVE PERCENT: 5.8 % (ref 0–4)
MUCUS: ABNORMAL HPF
NITRITE URINE, QUANTITATIVE: POSITIVE
NUCLEATED RBC %: 0 %
PDW BLD-RTO: 12.6 % (ref 11.7–14.9)
PH, URINE: 5 (ref 5–8)
PLATELET # BLD: 268 K/CU MM (ref 140–440)
PMV BLD AUTO: 10.6 FL (ref 7.5–11.1)
POTASSIUM SERPL-SCNC: 4.3 MMOL/L (ref 3.5–5.1)
PRO-BNP: 418 PG/ML
PROTEIN UA: NEGATIVE MG/DL
PROTHROMBIN TIME: 13.7 SECONDS (ref 11.7–14.5)
RBC # BLD: 3.48 M/CU MM (ref 4.2–5.4)
RBC URINE: 1 /HPF (ref 0–6)
SEGMENTED NEUTROPHILS ABSOLUTE COUNT: 5.9 K/CU MM
SEGMENTED NEUTROPHILS RELATIVE PERCENT: 67 % (ref 36–66)
SODIUM BLD-SCNC: 132 MMOL/L (ref 135–145)
SPECIFIC GRAVITY UA: 1.02 (ref 1–1.03)
SQUAMOUS EPITHELIAL: 1 /HPF
TOTAL IMMATURE NEUTOROPHIL: 0.04 K/CU MM
TOTAL NUCLEATED RBC: 0 K/CU MM
TOTAL PROTEIN: 6.6 GM/DL (ref 6.4–8.2)
TRICHOMONAS: ABNORMAL /HPF
TROPONIN T: 0.01 NG/ML
TROPONIN T: <0.01 NG/ML
TROPONIN T: <0.01 NG/ML
TSH HIGH SENSITIVITY: 1.41 UIU/ML (ref 0.27–4.2)
UROBILINOGEN, URINE: NEGATIVE MG/DL (ref 0.2–1)
WBC # BLD: 8.7 K/CU MM (ref 4–10.5)
WBC UA: 11 /HPF (ref 0–5)

## 2021-07-23 PROCEDURE — 96366 THER/PROPH/DIAG IV INF ADDON: CPT

## 2021-07-23 PROCEDURE — 96367 TX/PROPH/DG ADDL SEQ IV INF: CPT

## 2021-07-23 PROCEDURE — 87186 SC STD MICRODIL/AGAR DIL: CPT

## 2021-07-23 PROCEDURE — 80053 COMPREHEN METABOLIC PANEL: CPT

## 2021-07-23 PROCEDURE — 86901 BLOOD TYPING SEROLOGIC RH(D): CPT

## 2021-07-23 PROCEDURE — 85014 HEMATOCRIT: CPT

## 2021-07-23 PROCEDURE — 6360000004 HC RX CONTRAST MEDICATION: Performed by: PHYSICIAN ASSISTANT

## 2021-07-23 PROCEDURE — 6360000002 HC RX W HCPCS: Performed by: NURSE PRACTITIONER

## 2021-07-23 PROCEDURE — 2580000003 HC RX 258: Performed by: NURSE PRACTITIONER

## 2021-07-23 PROCEDURE — 99285 EMERGENCY DEPT VISIT HI MDM: CPT

## 2021-07-23 PROCEDURE — 87086 URINE CULTURE/COLONY COUNT: CPT

## 2021-07-23 PROCEDURE — 71045 X-RAY EXAM CHEST 1 VIEW: CPT

## 2021-07-23 PROCEDURE — G0378 HOSPITAL OBSERVATION PER HR: HCPCS

## 2021-07-23 PROCEDURE — 74177 CT ABD & PELVIS W/CONTRAST: CPT

## 2021-07-23 PROCEDURE — 6370000000 HC RX 637 (ALT 250 FOR IP): Performed by: NURSE PRACTITIONER

## 2021-07-23 PROCEDURE — 86922 COMPATIBILITY TEST ANTIGLOB: CPT

## 2021-07-23 PROCEDURE — 86900 BLOOD TYPING SEROLOGIC ABO: CPT

## 2021-07-23 PROCEDURE — 1200000000 HC SEMI PRIVATE

## 2021-07-23 PROCEDURE — 85730 THROMBOPLASTIN TIME PARTIAL: CPT

## 2021-07-23 PROCEDURE — 93010 ELECTROCARDIOGRAM REPORT: CPT | Performed by: INTERNAL MEDICINE

## 2021-07-23 PROCEDURE — 83735 ASSAY OF MAGNESIUM: CPT

## 2021-07-23 PROCEDURE — 85025 COMPLETE CBC W/AUTO DIFF WBC: CPT

## 2021-07-23 PROCEDURE — 83605 ASSAY OF LACTIC ACID: CPT

## 2021-07-23 PROCEDURE — 2580000003 HC RX 258: Performed by: STUDENT IN AN ORGANIZED HEALTH CARE EDUCATION/TRAINING PROGRAM

## 2021-07-23 PROCEDURE — 83690 ASSAY OF LIPASE: CPT

## 2021-07-23 PROCEDURE — P9016 RBC LEUKOCYTES REDUCED: HCPCS

## 2021-07-23 PROCEDURE — 96376 TX/PRO/DX INJ SAME DRUG ADON: CPT

## 2021-07-23 PROCEDURE — 84484 ASSAY OF TROPONIN QUANT: CPT

## 2021-07-23 PROCEDURE — 85018 HEMOGLOBIN: CPT

## 2021-07-23 PROCEDURE — 84443 ASSAY THYROID STIM HORMONE: CPT

## 2021-07-23 PROCEDURE — 82962 GLUCOSE BLOOD TEST: CPT

## 2021-07-23 PROCEDURE — 83880 ASSAY OF NATRIURETIC PEPTIDE: CPT

## 2021-07-23 PROCEDURE — 96365 THER/PROPH/DIAG IV INF INIT: CPT

## 2021-07-23 PROCEDURE — 96375 TX/PRO/DX INJ NEW DRUG ADDON: CPT

## 2021-07-23 PROCEDURE — 81001 URINALYSIS AUTO W/SCOPE: CPT

## 2021-07-23 PROCEDURE — 36569 INSJ PICC 5 YR+ W/O IMAGING: CPT

## 2021-07-23 PROCEDURE — C9113 INJ PANTOPRAZOLE SODIUM, VIA: HCPCS | Performed by: PHYSICIAN ASSISTANT

## 2021-07-23 PROCEDURE — 2500000003 HC RX 250 WO HCPCS: Performed by: STUDENT IN AN ORGANIZED HEALTH CARE EDUCATION/TRAINING PROGRAM

## 2021-07-23 PROCEDURE — 87088 URINE BACTERIA CULTURE: CPT

## 2021-07-23 PROCEDURE — 6360000002 HC RX W HCPCS: Performed by: PHYSICIAN ASSISTANT

## 2021-07-23 PROCEDURE — 85610 PROTHROMBIN TIME: CPT

## 2021-07-23 PROCEDURE — 36415 COLL VENOUS BLD VENIPUNCTURE: CPT

## 2021-07-23 PROCEDURE — 96374 THER/PROPH/DIAG INJ IV PUSH: CPT

## 2021-07-23 PROCEDURE — 6370000000 HC RX 637 (ALT 250 FOR IP): Performed by: STUDENT IN AN ORGANIZED HEALTH CARE EDUCATION/TRAINING PROGRAM

## 2021-07-23 PROCEDURE — 2580000003 HC RX 258: Performed by: PHYSICIAN ASSISTANT

## 2021-07-23 PROCEDURE — 86850 RBC ANTIBODY SCREEN: CPT

## 2021-07-23 PROCEDURE — 05HY33Z INSERTION OF INFUSION DEVICE INTO UPPER VEIN, PERCUTANEOUS APPROACH: ICD-10-PCS | Performed by: STUDENT IN AN ORGANIZED HEALTH CARE EDUCATION/TRAINING PROGRAM

## 2021-07-23 PROCEDURE — 83036 HEMOGLOBIN GLYCOSYLATED A1C: CPT

## 2021-07-23 PROCEDURE — C1751 CATH, INF, PER/CENT/MIDLINE: HCPCS

## 2021-07-23 PROCEDURE — 76937 US GUIDE VASCULAR ACCESS: CPT

## 2021-07-23 PROCEDURE — 36430 TRANSFUSION BLD/BLD COMPNT: CPT

## 2021-07-23 PROCEDURE — 93005 ELECTROCARDIOGRAM TRACING: CPT | Performed by: STUDENT IN AN ORGANIZED HEALTH CARE EDUCATION/TRAINING PROGRAM

## 2021-07-23 RX ORDER — BUSPIRONE HYDROCHLORIDE 5 MG/1
15 TABLET ORAL 3 TIMES DAILY
Status: DISCONTINUED | OUTPATIENT
Start: 2021-07-23 | End: 2021-07-26 | Stop reason: HOSPADM

## 2021-07-23 RX ORDER — LIDOCAINE HYDROCHLORIDE 10 MG/ML
5 INJECTION, SOLUTION EPIDURAL; INFILTRATION; INTRACAUDAL; PERINEURAL ONCE
Status: COMPLETED | OUTPATIENT
Start: 2021-07-23 | End: 2021-07-23

## 2021-07-23 RX ORDER — LISINOPRIL 5 MG/1
2.5 TABLET ORAL DAILY
Status: CANCELLED | OUTPATIENT
Start: 2021-07-23

## 2021-07-23 RX ORDER — CITALOPRAM 40 MG/1
40 TABLET ORAL DAILY
Status: DISCONTINUED | OUTPATIENT
Start: 2021-07-23 | End: 2021-07-26 | Stop reason: HOSPADM

## 2021-07-23 RX ORDER — PANTOPRAZOLE SODIUM 40 MG/10ML
40 INJECTION, POWDER, LYOPHILIZED, FOR SOLUTION INTRAVENOUS EVERY 12 HOURS
Status: DISCONTINUED | OUTPATIENT
Start: 2021-07-24 | End: 2021-07-26 | Stop reason: HOSPADM

## 2021-07-23 RX ORDER — SODIUM CHLORIDE 9 MG/ML
25 INJECTION, SOLUTION INTRAVENOUS PRN
Status: DISCONTINUED | OUTPATIENT
Start: 2021-07-23 | End: 2021-07-26 | Stop reason: HOSPADM

## 2021-07-23 RX ORDER — MORPHINE SULFATE 4 MG/ML
2 INJECTION, SOLUTION INTRAMUSCULAR; INTRAVENOUS EVERY 4 HOURS PRN
Status: DISCONTINUED | OUTPATIENT
Start: 2021-07-23 | End: 2021-07-26 | Stop reason: HOSPADM

## 2021-07-23 RX ORDER — SODIUM CHLORIDE 9 MG/ML
INJECTION, SOLUTION INTRAVENOUS CONTINUOUS
Status: DISCONTINUED | OUTPATIENT
Start: 2021-07-23 | End: 2021-07-26 | Stop reason: HOSPADM

## 2021-07-23 RX ORDER — SODIUM CHLORIDE 0.9 % (FLUSH) 0.9 %
5-40 SYRINGE (ML) INJECTION EVERY 12 HOURS SCHEDULED
Status: DISCONTINUED | OUTPATIENT
Start: 2021-07-23 | End: 2021-07-26 | Stop reason: HOSPADM

## 2021-07-23 RX ORDER — PANTOPRAZOLE SODIUM 40 MG/10ML
80 INJECTION, POWDER, LYOPHILIZED, FOR SOLUTION INTRAVENOUS ONCE
Status: COMPLETED | OUTPATIENT
Start: 2021-07-23 | End: 2021-07-23

## 2021-07-23 RX ORDER — CLONAZEPAM 0.5 MG/1
1 TABLET ORAL DAILY PRN
Status: DISCONTINUED | OUTPATIENT
Start: 2021-07-23 | End: 2021-07-26 | Stop reason: HOSPADM

## 2021-07-23 RX ORDER — FENTANYL CITRATE 50 UG/ML
25 INJECTION, SOLUTION INTRAMUSCULAR; INTRAVENOUS ONCE
Status: COMPLETED | OUTPATIENT
Start: 2021-07-23 | End: 2021-07-23

## 2021-07-23 RX ORDER — INSULIN GLARGINE 100 [IU]/ML
30 INJECTION, SOLUTION SUBCUTANEOUS NIGHTLY
Status: DISCONTINUED | OUTPATIENT
Start: 2021-07-23 | End: 2021-07-26 | Stop reason: HOSPADM

## 2021-07-23 RX ORDER — NICOTINE POLACRILEX 4 MG
15 LOZENGE BUCCAL PRN
Status: DISCONTINUED | OUTPATIENT
Start: 2021-07-23 | End: 2021-07-26 | Stop reason: HOSPADM

## 2021-07-23 RX ORDER — DEXTROSE MONOHYDRATE 50 MG/ML
100 INJECTION, SOLUTION INTRAVENOUS PRN
Status: DISCONTINUED | OUTPATIENT
Start: 2021-07-23 | End: 2021-07-26 | Stop reason: HOSPADM

## 2021-07-23 RX ORDER — ACETAMINOPHEN 325 MG/1
650 TABLET ORAL EVERY 6 HOURS PRN
Status: DISCONTINUED | OUTPATIENT
Start: 2021-07-23 | End: 2021-07-26 | Stop reason: HOSPADM

## 2021-07-23 RX ORDER — GABAPENTIN 100 MG/1
100 CAPSULE ORAL NIGHTLY
Status: DISCONTINUED | OUTPATIENT
Start: 2021-07-23 | End: 2021-07-26 | Stop reason: HOSPADM

## 2021-07-23 RX ORDER — SODIUM CHLORIDE 9 MG/ML
10 INJECTION INTRAVENOUS EVERY 12 HOURS
Status: DISCONTINUED | OUTPATIENT
Start: 2021-07-24 | End: 2021-07-26 | Stop reason: HOSPADM

## 2021-07-23 RX ORDER — DEXTROSE MONOHYDRATE 25 G/50ML
12.5 INJECTION, SOLUTION INTRAVENOUS PRN
Status: DISCONTINUED | OUTPATIENT
Start: 2021-07-23 | End: 2021-07-26 | Stop reason: HOSPADM

## 2021-07-23 RX ORDER — ACETAMINOPHEN 325 MG/1
650 TABLET ORAL ONCE
Status: COMPLETED | OUTPATIENT
Start: 2021-07-23 | End: 2021-07-23

## 2021-07-23 RX ORDER — ONDANSETRON 2 MG/ML
4 INJECTION INTRAMUSCULAR; INTRAVENOUS EVERY 6 HOURS PRN
Status: DISCONTINUED | OUTPATIENT
Start: 2021-07-23 | End: 2021-07-25

## 2021-07-23 RX ORDER — ONDANSETRON 4 MG/1
4 TABLET, ORALLY DISINTEGRATING ORAL EVERY 8 HOURS PRN
Status: DISCONTINUED | OUTPATIENT
Start: 2021-07-23 | End: 2021-07-25

## 2021-07-23 RX ORDER — MAGNESIUM SULFATE IN WATER 40 MG/ML
2000 INJECTION, SOLUTION INTRAVENOUS ONCE
Status: COMPLETED | OUTPATIENT
Start: 2021-07-23 | End: 2021-07-23

## 2021-07-23 RX ORDER — SODIUM CHLORIDE 0.9 % (FLUSH) 0.9 %
5-40 SYRINGE (ML) INJECTION PRN
Status: DISCONTINUED | OUTPATIENT
Start: 2021-07-23 | End: 2021-07-26 | Stop reason: HOSPADM

## 2021-07-23 RX ORDER — PANTOPRAZOLE SODIUM 40 MG/10ML
40 INJECTION, POWDER, LYOPHILIZED, FOR SOLUTION INTRAVENOUS EVERY 12 HOURS
Status: DISCONTINUED | OUTPATIENT
Start: 2021-07-23 | End: 2021-07-23

## 2021-07-23 RX ORDER — ATORVASTATIN CALCIUM 80 MG/1
80 TABLET, FILM COATED ORAL DAILY
Status: DISCONTINUED | OUTPATIENT
Start: 2021-07-23 | End: 2021-07-26 | Stop reason: HOSPADM

## 2021-07-23 RX ORDER — ACETAMINOPHEN 650 MG/1
650 SUPPOSITORY RECTAL EVERY 6 HOURS PRN
Status: DISCONTINUED | OUTPATIENT
Start: 2021-07-23 | End: 2021-07-26 | Stop reason: HOSPADM

## 2021-07-23 RX ORDER — CARVEDILOL 12.5 MG/1
12.5 TABLET ORAL 2 TIMES DAILY WITH MEALS
Status: CANCELLED | OUTPATIENT
Start: 2021-07-23

## 2021-07-23 RX ORDER — SODIUM CHLORIDE 9 MG/ML
INJECTION, SOLUTION INTRAVENOUS PRN
Status: COMPLETED | OUTPATIENT
Start: 2021-07-23 | End: 2021-07-23

## 2021-07-23 RX ORDER — SODIUM CHLORIDE 9 MG/ML
10 INJECTION INTRAVENOUS EVERY 12 HOURS
Status: DISCONTINUED | OUTPATIENT
Start: 2021-07-23 | End: 2021-07-23

## 2021-07-23 RX ORDER — RANOLAZINE 500 MG/1
1000 TABLET, EXTENDED RELEASE ORAL 2 TIMES DAILY
Status: DISCONTINUED | OUTPATIENT
Start: 2021-07-23 | End: 2021-07-26 | Stop reason: HOSPADM

## 2021-07-23 RX ORDER — 0.9 % SODIUM CHLORIDE 0.9 %
1000 INTRAVENOUS SOLUTION INTRAVENOUS ONCE
Status: COMPLETED | OUTPATIENT
Start: 2021-07-23 | End: 2021-07-23

## 2021-07-23 RX ADMIN — IOPAMIDOL 80 ML: 755 INJECTION, SOLUTION INTRAVENOUS at 13:41

## 2021-07-23 RX ADMIN — GABAPENTIN 100 MG: 100 CAPSULE ORAL at 20:12

## 2021-07-23 RX ADMIN — MAGNESIUM SULFATE HEPTAHYDRATE 2000 MG: 40 INJECTION, SOLUTION INTRAVENOUS at 15:36

## 2021-07-23 RX ADMIN — RANOLAZINE 1000 MG: 500 TABLET, FILM COATED, EXTENDED RELEASE ORAL at 20:15

## 2021-07-23 RX ADMIN — SODIUM CHLORIDE: 9 INJECTION, SOLUTION INTRAVENOUS at 18:17

## 2021-07-23 RX ADMIN — SODIUM CHLORIDE 1000 ML: 9 INJECTION, SOLUTION INTRAVENOUS at 12:50

## 2021-07-23 RX ADMIN — CITALOPRAM 40 MG: 40 TABLET, FILM COATED ORAL at 19:38

## 2021-07-23 RX ADMIN — MORPHINE SULFATE 2 MG: 4 INJECTION, SOLUTION INTRAMUSCULAR; INTRAVENOUS at 18:17

## 2021-07-23 RX ADMIN — SODIUM CHLORIDE, PRESERVATIVE FREE 10 ML: 5 INJECTION INTRAVENOUS at 20:16

## 2021-07-23 RX ADMIN — LIDOCAINE HYDROCHLORIDE 5 ML: 10 INJECTION, SOLUTION EPIDURAL; INFILTRATION; INTRACAUDAL; PERINEURAL at 17:52

## 2021-07-23 RX ADMIN — MORPHINE SULFATE 2 MG: 4 INJECTION, SOLUTION INTRAMUSCULAR; INTRAVENOUS at 23:03

## 2021-07-23 RX ADMIN — CEFTRIAXONE SODIUM 1000 MG: 1 INJECTION, POWDER, FOR SOLUTION INTRAMUSCULAR; INTRAVENOUS at 19:36

## 2021-07-23 RX ADMIN — BUSPIRONE HYDROCHLORIDE 15 MG: 5 TABLET ORAL at 20:12

## 2021-07-23 RX ADMIN — FENTANYL CITRATE 25 MCG: 50 INJECTION, SOLUTION INTRAMUSCULAR; INTRAVENOUS at 14:24

## 2021-07-23 RX ADMIN — SODIUM CHLORIDE: 9 INJECTION, SOLUTION INTRAVENOUS at 19:40

## 2021-07-23 RX ADMIN — ATORVASTATIN CALCIUM 80 MG: 80 TABLET, FILM COATED ORAL at 20:12

## 2021-07-23 RX ADMIN — PANTOPRAZOLE SODIUM 80 MG: 40 INJECTION, POWDER, FOR SOLUTION INTRAVENOUS at 13:56

## 2021-07-23 RX ADMIN — ACETAMINOPHEN 650 MG: 325 TABLET ORAL at 15:05

## 2021-07-23 ASSESSMENT — PAIN DESCRIPTION - LOCATION
LOCATION: ABDOMEN
LOCATION: HEAD
LOCATION: CHEST;JAW;ARM

## 2021-07-23 ASSESSMENT — PAIN DESCRIPTION - PAIN TYPE
TYPE: ACUTE PAIN

## 2021-07-23 ASSESSMENT — PAIN DESCRIPTION - ORIENTATION: ORIENTATION: LEFT

## 2021-07-23 ASSESSMENT — PAIN DESCRIPTION - DESCRIPTORS
DESCRIPTORS: DULL;ACHING
DESCRIPTORS: ACHING

## 2021-07-23 ASSESSMENT — PAIN SCALES - GENERAL
PAINLEVEL_OUTOF10: 8
PAINLEVEL_OUTOF10: 7
PAINLEVEL_OUTOF10: 8
PAINLEVEL_OUTOF10: 6
PAINLEVEL_OUTOF10: 4
PAINLEVEL_OUTOF10: 8
PAINLEVEL_OUTOF10: 6

## 2021-07-23 ASSESSMENT — PAIN DESCRIPTION - FREQUENCY
FREQUENCY: CONTINUOUS
FREQUENCY: CONTINUOUS

## 2021-07-23 NOTE — ED NOTES
[address] [place] [address] paged Dr Misha Weeks  07/23/21 [phone] Dr Morales Cruz returned call      Augustus White  07/23/21 0610

## 2021-07-23 NOTE — CONSULTS
PICC Consult complete. Education regarding PICC insertion discussed and risks and benefits assessed with patient and . Patient and  verbalized understanding. Consent signed by . Time out done @ 1700. PICC inserted in RUE Basilic Vessel without difficulty per protocol. Placement verified via VPSG4 monitoring system. Good blood return and flushes easily on both ports. Patient tolerated well. Education pamphlets left in chart  Ultrasound photos of vein diameter and doppler signature placed in chart. Please consult IV/PICC team if patient's needs change. PICC OK to use.

## 2021-07-23 NOTE — ED NOTES
[id number] Fernanda Skinner notified Dr Devin Pedro on in patient consult from hospitalist. Added to treatment team.      Judith Guerrier  07/23/21 8738

## 2021-07-23 NOTE — ED PROVIDER NOTES
EKG:   Normal sinus rhythm with a rate of 84. KY interval 172, QRS 88, QTc 463. Nonspecific ST segments and T waves. Prolonged QT. Impression: Abnormal EKG. When compared to previous EKG from 9/4/2020, there are no significant changes.       Jadon Sue MD  07/23/21 8420

## 2021-07-23 NOTE — ED NOTES
PICC team at bedside. Loss of R AC US IV, tried x2 nurses with US for further access without success.  Pt refusing more tries at this time but agreeable to PICC states has had to have PICC in past.      Hailee Jamison RN  07/23/21 1700

## 2021-07-23 NOTE — H&P
History and Physical      Name:  Kathrin Dolan /Age/Sex: 1965  (63 y.o. female)   MRN & CSN:  [id number] & [id number] Admission Date/Time: 2021 12:13 PM   Location:  0303-03 PCP: [address] [place] [address] Day: 1        Admitting Physician: Dr. Ba Antunezum and Plan:   Kathrin Dolan is a 63 y.o. female who presents with  Chest Pain (jaw pain, radiating into left arm) and Shortness of Breath (started yesterday, worsening today)    GI Bleed- presenting with melena. H/H 10.8/33.5   MedSurg admit to PCU   Serial H/H q 6 hr/ T&S   IV PPI BID   Consult GI Dr Morales Estrada   Transfused 1 unit PRBC in ED due to hypotension   Continue statin/ holding ASA for now    CAD s/p CABG x 3 vessel current chest pain r/o ACS. Concern for anginal source given risk factors and history. Initial troponin   EKG shows no acute ischemic changes. Telemetry monitoring    Serial troponin level and repeat EKG in AM   Cardiology consult. Follows with Dr. Diana Sinha   Pending labs for further risk stratification    Statin/Ranexa/sl Nitro on medication regimen. Chronic anticoagulation-left Xarelto   Holding anticoagulation    Acute cystitis UA suspicious for pyuria   Pending cultures   IV Rocephin    HFpEF- appears compensated   TTE (2018) EF 50 to 55%, mild LVH, G2 DD, trace TR    DMII with chronic complications and with long term use of insulin. Last A1c 5.4 (2019)              Continue glargine. Monitor FSBS and cover with SSI   Hold PO medications while inpatient   Continue gabapentin    Hypertension holding antihypertensive regimen for now she is currently hypotensive    COPD- stable.  continue PRN and routine breathing treatments    Depression- buspar     Patient case discussed with ED provider    Diet No diet orders on file   DVT Prophylaxis [] Lovenox, []  Heparin, [] SCDs, [] Ambulation  [] Long term AC   GI Prophylaxis [] PPI,  [] H2 Blocker,  [] Carafate,  [] Diet/Tube Feeds   Code Status Full     Disposition Admit to IP. Patient plans to return home upon discharge     -Patient assessment and plan discussed and reviewed with admitting physician: Philip Jain DO. History of Present Illness:     Chief Complaint: Chest Pain (jaw pain, radiating into left arm) and Shortness of Breath (started yesterday, worsening today)    Huang Herr is a 63 y.o. female who presents with chest pain and shortness of breath. Describes as epigastric pain radiating to left jaw and arm. Reports episodes of melena. Is on anticoagulation-Xarelto. Hx of CAD/PVD s/p CABG x 3 vessel (2019)    Spouse at bedside augments HPI information. He states noted intermittent episodes of lower extremity edema. Patient reports she is compliant with her medications. Ten point ROS: reviewed negative, unless as noted in above HPI. Objective:   No intake or output data in the 24 hours ending 07/23/21 1555     Vitals:   Vitals:    07/23/21 1440 07/23/21 1445 07/23/21 1500 07/23/21 1505   BP: 107/68 102/62 129/68 113/64   Pulse: 77 75 75 76   Resp: 13 17 14 17   Temp:       TempSrc:       SpO2:    100%   Weight:       Height:           Physical Exam: 07/23/21     GEN -Awake nontoxic appearing female, sitting upright in bed , NAD. normal body habitus. Appears given age. EYES -No scleral erythema, discharge, or conjunctivitis. HENT -MM are moist. Oral pharynx without exudates, no evidence of thrush. NECK -Supple, no apparent thyromegaly or masses. RESP -CTA, no wheezes, rales or rhonchi. Symmetric chest movement while on RA.   C/V -S1/S2 auscultated. RRR without appreciable M/R/G. No JVD or carotid bruits. Peripheral pulses equal bilaterally and palpable. Cap refill <3 sec. No peripheral edema. GI -Abdomen is soft non distended and without significant TTP. + BS. No masses or guarding. Rectal exam deferred. No HSM   -No CVA/ flank tenderness. Vera catheter is not present.   LYMPH-No palpable cervical lymphadenopathy and no hepatosplenomegaly. No petechiae or ecchymoses. MS -No gross joint deformities. SKIN -Normal coloration, warm, dry. NEURO-Cranial nerves appear grossly intact, normal speech, no lateralizing weakness. PSYC-Awake, alert, oriented x 4- person, place, time, situation,  Appropriate affect. Past Medical History:      Past Medical History:   Diagnosis Date    Acid reflux     CAD (coronary artery disease)     Sees Dr. Laurel Funes    COPD (chronic obstructive pulmonary disease) ([facility]     \"have mild COPD- no pulmonologist\"    Depression     Diabetes mellitus ([facility] Dx 2001    Eczema of hand     Hx of migraines     HX OTHER MEDICAL     \"difficulty IV stick\"    Hyperlipidemia     Hypertension     follows with Dr Julee Holm Kidney stones 2000's    \"Passed One Kidney Stone\"    PVD (peripheral vascular disease) ([facility]     Wears glasses        Past Surgical  History:    has a past surgical history that includes Dental surgery (2000's); Breast biopsy (Bilateral, 1990's-2000's); Tonsillectomy and adenoidectomy (1971); Hysterectomy, total abdominal (1988); Appendectomy (1988); other surgical history (Left, 4/4/2013); Bone Resection, Rib; Coronary angioplasty with stent; back surgery (Last Done 9-11/2013); Cholecystectomy (1988); Colonoscopy (last one 2014); Cardiac surgery (5-4-12); Cardiac catheterization; other surgical history; vascular surgery; and FEMORAL ENDARTERECTOMY (Right, 6/11/2019). Social History:    FAM HX: Reviewed  family history includes Asthma in her brother, father, and mother; Diabetes in her mother; Early Death (age of onset: 25) in her son; Heart Disease in her father; High Blood Pressure in her father and mother; High Cholesterol in her father and mother; Mental Illness in her daughter and son.     Soc HX:   Social History     Socioeconomic History    Marital status:      Spouse name: Not on file    Number of children: Not on file    Years of education: Not on file    Highest education level: Not on file   Occupational History    Not on file   Tobacco Use    Smoking status: Former Smoker     Packs/day: 0.50     Years: 35.00     Pack years: 17.50     Types: Cigarettes     Quit date: 2019     Years since quittin.0    Smokeless tobacco: Never Used   Vaping Use    Vaping Use: Never used   Substance and Sexual Activity    Alcohol use: No     Alcohol/week: 0.0 standard drinks     Comment: \"quit drinking in - use to drink 5 days per week- average use to drink 6 beers each time\"    Drug use: No    Sexual activity: Yes     Partners: Male   Other Topics Concern    Not on file   Social History Narrative    Not on file     Social Determinants of Health     Financial Resource Strain:     Difficulty of Paying Living Expenses:    Food Insecurity:     Worried About Running Out of Food in the Last Year:     [address] in the Last Year:    Transportation Needs:     Lack of Transportation (Medical):  Lack of Transportation (Non-Medical):    Physical Activity:     Days of Exercise per Week:     Minutes of Exercise per Session:    Stress:     Feeling of Stress :    Social Connections:     Frequency of Communication with Friends and Family:     Frequency of Social Gatherings with Friends and Family:     Attends Anab[employer]ist Services:     Active Member of Clubs or Organizations:     Attends Club or Organization Meetings:     Marital Status:    Intimate Partner Violence:     Fear of Current or Ex-Partner:     Emotionally Abused:     Physically Abused:     Sexually Abused:      TOBACCO:   reports that she quit smoking about 2 years ago. Her smoking use included cigarettes. She has a 17.50 pack-year smoking history. She has never used smokeless tobacco.  ETOH:   reports no history of alcohol use. Drugs:  reports no history of drug use. Allergies:    Allergies   Allergen Reactions    Erythromycin Hives and Nausea And Vomiting    Lyrica [Pregabalin] Swelling    Codeine Palpitations    Imitrex [Sumatriptan] Anxiety     \"Makes Me Hyper\"    Ketorolac Tromethamine Other (See Comments)     \"Rapid Heart Rate\"    Prochlorperazine Edisylate Nausea And Vomiting    Tape Raymond  Tape] Itching     Blisters    Ultram [Tramadol] Itching    Zofran [Ondansetron Hcl] Nausea Only       Home Medications:     Prior to Admission medications    Medication Sig Start Date End Date Taking? Authorizing Provider   ibuprofen (IBU) 600 MG tablet Take 1 tablet by mouth every 6 hours as needed for Pain 4/20/20 5/20/20  Lucrecia Ledezma [address] [facility]   ranolazine (RANEXA) 1000 MG extended release tablet Take 1,000 mg by mouth 2 times daily     Historical Provider, MD   fenofibrate (TRICOR) 145 MG tablet Take 145 mg by mouth nightly 11/23/19   Historical Provider, MD   gabapentin (NEURONTIN) 100 MG capsule Take 100 mg by mouth nightly. 1/29/20   Historical Provider, MD   clonazePAM (KLONOPIN) 1 MG tablet Take 1 mg by mouth daily as needed. 2/10/20   Historical Provider, MD   lisinopril (PRINIVIL;ZESTRIL) 5 MG tablet Take 2.5 mg by mouth daily  11/23/19   Historical Provider, MD   furosemide (LASIX) 20 MG tablet Take 20 mg by mouth daily 11/25/19   Historical Provider, MD   JANUMET -1000 MG TB24 Take 1 tablet by mouth nightly 11/25/19   Historical Provider, MD   citalopram (CELEXA) 40 MG tablet Take 40 mg by mouth daily 5/14/19   Historical Provider, MD   cyclobenzaprine (FLEXERIL) 10 MG tablet Take 10 mg by mouth 3 times daily as needed 5/22/19   Historical Provider, MD   aspirin 81 MG EC tablet Take 81 mg by mouth daily    Historical Provider, MD   nitroGLYCERIN (NITROSTAT) 0.4 MG SL tablet up to max of 3 total doses.  If no relief after 1 dose, call 911. 5/12/19   Katerine Funes MD   atorvastatin (LIPITOR) 80 MG tablet Take 80 mg by mouth daily     Historical Provider, MD   dapagliflozin (FARXIGA) 10 MG tablet Take 10 mg by mouth every morning     Historical Provider, MD rivaroxaban (XARELTO) 2.5 MG TABS tablet Take 1 tablet by mouth 2 times daily 5/7/19   ADAMARIS Harmon   busPIRone (BUSPAR) 15 MG tablet Take 15 mg by mouth 3 times daily 11/13/18   Levi Hernandez MD   insulin glargine (BASAGLAR KWIKPEN) 100 UNIT/ML injection pen Inject 25 Units into the skin nightly  Patient taking differently: Inject 30 Units into the skin nightly  6/29/18   Taina Hawk MD   carvedilol (COREG) 12.5 MG tablet Take 1 tablet by mouth 2 times daily (with meals) 6/29/18   Taina Hawk MD   ticagrelor (BRILINTA) 90 MG TABS tablet Take 1 tablet by mouth 2 times daily 6/13/18 2/25/20  Jevon Sandoval MD   albuterol sulfate HFA (PROAIR HFA) 108 (90 Base) MCG/ACT inhaler Inhale 2 puffs into the lungs every 6 hours as needed for Wheezing 1/29/18   Levi Hernandez MD         Medications:   Medications:    magnesium sulfate  2,000 mg Intravenous Once      Infusions:    sodium chloride       PRN Meds: sodium chloride, , PRN        Data:     Laboratory this visit:  Reviewed  Recent Labs     07/23/21  1230   WBC 8.7   HGB 10.8*   HCT 33.5*         Recent Labs     07/23/21  1230   *   K 4.3      CO2 17*   BUN 37*   CREATININE 1.0     Recent Labs     07/23/21  1230   AST 12*   ALT 7*   BILITOT 0.2   ALKPHOS 52     Recent Labs     07/23/21  1230   INR 1.06         Radiology this visit:  Reviewed. CT ABDOMEN PELVIS W IV CONTRAST Additional Contrast? None    Result Date: 7/23/2021  EXAMINATION: CT OF THE ABDOMEN AND PELVIS WITH CONTRAST 7/23/2021 1:25 pm TECHNIQUE: CT of the abdomen and pelvis was performed with the administration of intravenous contrast. Multiplanar reformatted images are provided for review. Dose modulation, iterative reconstruction, and/or weight based adjustment of the mA/kV was utilized to reduce the radiation dose to as low as reasonably achievable. COMPARISON: CT abdomen and pelvis angiogram 05/22/2019.  HISTORY: ORDERING SYSTEM PROVIDED HISTORY: severe epigastric pain TECHNOLOGIST PROVIDED HISTORY: Reason for exam:->severe epigastric pain Additional Contrast?->None Decision Support Exception - unselect if not a suspected or confirmed emergency medical condition->Emergency Medical Condition (MA) Reason for Exam: severe epigastric pain Acuity: Acute Type of Exam: Initial Relevant Medical/Surgical History: 80 ML ISOVUE 370 FINDINGS: Lower Chest: Limited images through the lung bases demonstrate no acute process. Organs: The liver is unremarkable. The gallbladder is surgically absent. The biliary tree is within normal limits status post cholecystectomy. The pancreas, spleen, and bilateral adrenal glands are unremarkable. 4 mm nonobstructing stone in the lower pole the right kidney. 2.4 cm left renal cyst.  1.4 cm indeterminate cystic lesion in the upper pole of the left kidney measuring hyperdense to water but not significantly changed from 2019. This measured approximately 1 cm on 08/23/2016. No obstructive uropathy. GI/Bowel: No evidence of acute appendicitis. The colon is unremarkable. No bowel obstruction or wall thickening identified. Pelvis: The urinary bladder is normal in appearance. Status post hysterectomy. No free fluid. No pelvic or inguinal lymphadenopathy. Peritoneum/Retroperitoneum: The abdominal aorta is normal in caliber with severe atherosclerotic vascular disease. No retroperitoneal or mesenteric lymphadenopathy is identified. No free air or fluid is seen in the abdomen. Bones/Soft Tissues: No acute osseous or soft tissue abnormality. 1. No acute abnormality in the abdomen or pelvis. 2. Status post cholecystectomy and hysterectomy. 3. 1.4 cm indeterminate cystic lesion in the left kidney unchanged from 2019 but mildly increased in size from 2016. Recommend further evaluation with nonemergent renal protocol MRI or CT.      XR CHEST PORTABLE    Result Date: 7/23/2021  EXAMINATION: ONE XRAY VIEW OF THE CHEST 7/23/2021 12:21 pm COMPARISON: 08/28/2020 HISTORY: ORDERING SYSTEM PROVIDED HISTORY: Chest pain TECHNOLOGIST PROVIDED HISTORY: Reason for exam:->Chest pain Reason for Exam: Chest pain FINDINGS: Prior sternotomy with fracture of the two most cranial wires. Cardiomediastinal silhouette is normal in size. No pulmonary consolidation, pleural effusion, or pneumothorax. No acute osseous abnormality. No acute cardiopulmonary abnormality.            EKG this visit:  Reviewed       Electronically signed by SARI Brooks CNP on 7/23/2021 at 3:55 PM

## 2021-07-24 ENCOUNTER — ANESTHESIA (OUTPATIENT)
Dept: ENDOSCOPY | Age: 56
DRG: 378 | End: 2021-07-24
Payer: COMMERCIAL

## 2021-07-24 ENCOUNTER — ANESTHESIA EVENT (OUTPATIENT)
Dept: ENDOSCOPY | Age: 56
DRG: 378 | End: 2021-07-24
Payer: COMMERCIAL

## 2021-07-24 VITALS — OXYGEN SATURATION: 100 % | DIASTOLIC BLOOD PRESSURE: 57 MMHG | SYSTOLIC BLOOD PRESSURE: 137 MMHG

## 2021-07-24 LAB
ABO/RH: NORMAL
ALBUMIN SERPL-MCNC: 4 GM/DL (ref 3.4–5)
ALP BLD-CCNC: 45 IU/L (ref 40–129)
ALT SERPL-CCNC: 5 U/L (ref 10–40)
ANION GAP SERPL CALCULATED.3IONS-SCNC: 8 MMOL/L (ref 4–16)
ANTIBODY SCREEN: NEGATIVE
AST SERPL-CCNC: 9 IU/L (ref 15–37)
BASOPHILS ABSOLUTE: 0.1 K/CU MM
BASOPHILS RELATIVE PERCENT: 0.9 % (ref 0–1)
BILIRUB SERPL-MCNC: 0.3 MG/DL (ref 0–1)
BUN BLDV-MCNC: 20 MG/DL (ref 6–23)
CALCIUM SERPL-MCNC: 8.4 MG/DL (ref 8.3–10.6)
CHLORIDE BLD-SCNC: 110 MMOL/L (ref 99–110)
CO2: 22 MMOL/L (ref 21–32)
COMPONENT: NORMAL
CREAT SERPL-MCNC: 0.8 MG/DL (ref 0.6–1.1)
CROSSMATCH RESULT: NORMAL
DIFFERENTIAL TYPE: ABNORMAL
EOSINOPHILS ABSOLUTE: 0.2 K/CU MM
EOSINOPHILS RELATIVE PERCENT: 2.8 % (ref 0–3)
GFR AFRICAN AMERICAN: >60 ML/MIN/1.73M2
GFR NON-AFRICAN AMERICAN: >60 ML/MIN/1.73M2
GLUCOSE BLD-MCNC: 163 MG/DL (ref 70–99)
GLUCOSE BLD-MCNC: 84 MG/DL (ref 70–99)
GLUCOSE BLD-MCNC: 87 MG/DL (ref 70–99)
GLUCOSE BLD-MCNC: 87 MG/DL (ref 70–99)
GLUCOSE BLD-MCNC: 90 MG/DL (ref 70–99)
GLUCOSE BLD-MCNC: 97 MG/DL (ref 70–99)
GLUCOSE BLD-MCNC: 98 MG/DL (ref 70–99)
HCT VFR BLD CALC: 31.2 % (ref 37–47)
HEMOGLOBIN: 10 GM/DL (ref 12.5–16)
IMMATURE NEUTROPHIL %: 0.4 % (ref 0–0.43)
IRON: 101 UG/DL (ref 37–145)
LYMPHOCYTES ABSOLUTE: 2 K/CU MM
LYMPHOCYTES RELATIVE PERCENT: 29.1 % (ref 24–44)
MCH RBC QN AUTO: 30.3 PG (ref 27–31)
MCHC RBC AUTO-ENTMCNC: 32.1 % (ref 32–36)
MCV RBC AUTO: 94.5 FL (ref 78–100)
MONOCYTES ABSOLUTE: 0.5 K/CU MM
MONOCYTES RELATIVE PERCENT: 6.6 % (ref 0–4)
NUCLEATED RBC %: 0 %
PCT TRANSFERRIN: 28 % (ref 10–44)
PDW BLD-RTO: 13.8 % (ref 11.7–14.9)
PLATELET # BLD: 228 K/CU MM (ref 140–440)
PMV BLD AUTO: 10.6 FL (ref 7.5–11.1)
POTASSIUM SERPL-SCNC: 3.7 MMOL/L (ref 3.5–5.1)
RBC # BLD: 3.3 M/CU MM (ref 4.2–5.4)
SARS-COV-2, NAAT: NOT DETECTED
SEGMENTED NEUTROPHILS ABSOLUTE COUNT: 4.1 K/CU MM
SEGMENTED NEUTROPHILS RELATIVE PERCENT: 60.2 % (ref 36–66)
SODIUM BLD-SCNC: 140 MMOL/L (ref 135–145)
SOURCE: NORMAL
STATUS: NORMAL
TOTAL IMMATURE NEUTOROPHIL: 0.03 K/CU MM
TOTAL IRON BINDING CAPACITY: 364 UG/DL (ref 250–450)
TOTAL NUCLEATED RBC: 0 K/CU MM
TOTAL PROTEIN: 5.6 GM/DL (ref 6.4–8.2)
TRANSFUSION STATUS: NORMAL
UNIT DIVISION: 0
UNIT NUMBER: NORMAL
UNSATURATED IRON BINDING CAPACITY: 263 UG/DL (ref 110–370)
WBC # BLD: 6.9 K/CU MM (ref 4–10.5)

## 2021-07-24 PROCEDURE — G0378 HOSPITAL OBSERVATION PER HR: HCPCS

## 2021-07-24 PROCEDURE — C9113 INJ PANTOPRAZOLE SODIUM, VIA: HCPCS | Performed by: NURSE PRACTITIONER

## 2021-07-24 PROCEDURE — 6360000002 HC RX W HCPCS: Performed by: NURSE PRACTITIONER

## 2021-07-24 PROCEDURE — 3609012400 HC EGD TRANSORAL BIOPSY SINGLE/MULTIPLE: Performed by: INTERNAL MEDICINE

## 2021-07-24 PROCEDURE — 83550 IRON BINDING TEST: CPT

## 2021-07-24 PROCEDURE — 2709999900 HC NON-CHARGEABLE SUPPLY: Performed by: INTERNAL MEDICINE

## 2021-07-24 PROCEDURE — 6370000000 HC RX 637 (ALT 250 FOR IP): Performed by: INTERNAL MEDICINE

## 2021-07-24 PROCEDURE — 80053 COMPREHEN METABOLIC PANEL: CPT

## 2021-07-24 PROCEDURE — 2580000003 HC RX 258: Performed by: NURSE PRACTITIONER

## 2021-07-24 PROCEDURE — 6360000002 HC RX W HCPCS: Performed by: INTERNAL MEDICINE

## 2021-07-24 PROCEDURE — 85025 COMPLETE CBC W/AUTO DIFF WBC: CPT

## 2021-07-24 PROCEDURE — 85014 HEMATOCRIT: CPT

## 2021-07-24 PROCEDURE — C9113 INJ PANTOPRAZOLE SODIUM, VIA: HCPCS | Performed by: INTERNAL MEDICINE

## 2021-07-24 PROCEDURE — 87635 SARS-COV-2 COVID-19 AMP PRB: CPT

## 2021-07-24 PROCEDURE — 2580000003 HC RX 258: Performed by: STUDENT IN AN ORGANIZED HEALTH CARE EDUCATION/TRAINING PROGRAM

## 2021-07-24 PROCEDURE — 1200000000 HC SEMI PRIVATE

## 2021-07-24 PROCEDURE — 85018 HEMOGLOBIN: CPT

## 2021-07-24 PROCEDURE — 83540 ASSAY OF IRON: CPT

## 2021-07-24 PROCEDURE — 2500000003 HC RX 250 WO HCPCS: Performed by: NURSE ANESTHETIST, CERTIFIED REGISTERED

## 2021-07-24 PROCEDURE — 88305 TISSUE EXAM BY PATHOLOGIST: CPT | Performed by: PATHOLOGY

## 2021-07-24 PROCEDURE — 94761 N-INVAS EAR/PLS OXIMETRY MLT: CPT

## 2021-07-24 PROCEDURE — 3700000001 HC ADD 15 MINUTES (ANESTHESIA): Performed by: INTERNAL MEDICINE

## 2021-07-24 PROCEDURE — 2580000003 HC RX 258: Performed by: INTERNAL MEDICINE

## 2021-07-24 PROCEDURE — 88342 IMHCHEM/IMCYTCHM 1ST ANTB: CPT | Performed by: PATHOLOGY

## 2021-07-24 PROCEDURE — 82962 GLUCOSE BLOOD TEST: CPT

## 2021-07-24 PROCEDURE — 0DB68ZX EXCISION OF STOMACH, VIA NATURAL OR ARTIFICIAL OPENING ENDOSCOPIC, DIAGNOSTIC: ICD-10-PCS | Performed by: INTERNAL MEDICINE

## 2021-07-24 PROCEDURE — 3700000000 HC ANESTHESIA ATTENDED CARE: Performed by: INTERNAL MEDICINE

## 2021-07-24 PROCEDURE — 6360000002 HC RX W HCPCS: Performed by: NURSE ANESTHETIST, CERTIFIED REGISTERED

## 2021-07-24 RX ORDER — LIDOCAINE HYDROCHLORIDE 20 MG/ML
INJECTION, SOLUTION EPIDURAL; INFILTRATION; INTRACAUDAL; PERINEURAL PRN
Status: DISCONTINUED | OUTPATIENT
Start: 2021-07-24 | End: 2021-07-24 | Stop reason: SDUPTHER

## 2021-07-24 RX ORDER — PROPOFOL 10 MG/ML
INJECTION, EMULSION INTRAVENOUS PRN
Status: DISCONTINUED | OUTPATIENT
Start: 2021-07-24 | End: 2021-07-24 | Stop reason: SDUPTHER

## 2021-07-24 RX ADMIN — LIDOCAINE HYDROCHLORIDE 100 MG: 20 INJECTION, SOLUTION EPIDURAL; INFILTRATION; INTRACAUDAL; PERINEURAL at 10:57

## 2021-07-24 RX ADMIN — SODIUM CHLORIDE: 9 INJECTION, SOLUTION INTRAVENOUS at 16:46

## 2021-07-24 RX ADMIN — MORPHINE SULFATE 2 MG: 4 INJECTION, SOLUTION INTRAMUSCULAR; INTRAVENOUS at 18:03

## 2021-07-24 RX ADMIN — PROPOFOL 40 MG: 10 INJECTION, EMULSION INTRAVENOUS at 10:59

## 2021-07-24 RX ADMIN — GABAPENTIN 100 MG: 100 CAPSULE ORAL at 22:30

## 2021-07-24 RX ADMIN — SODIUM CHLORIDE, PRESERVATIVE FREE 10 ML: 5 INJECTION INTRAVENOUS at 08:24

## 2021-07-24 RX ADMIN — CITALOPRAM 40 MG: 40 TABLET, FILM COATED ORAL at 12:40

## 2021-07-24 RX ADMIN — MORPHINE SULFATE 2 MG: 4 INJECTION, SOLUTION INTRAMUSCULAR; INTRAVENOUS at 05:38

## 2021-07-24 RX ADMIN — MORPHINE SULFATE 2 MG: 4 INJECTION, SOLUTION INTRAMUSCULAR; INTRAVENOUS at 14:03

## 2021-07-24 RX ADMIN — CEFTRIAXONE SODIUM 1000 MG: 1 INJECTION, POWDER, FOR SOLUTION INTRAMUSCULAR; INTRAVENOUS at 08:25

## 2021-07-24 RX ADMIN — SODIUM CHLORIDE, PRESERVATIVE FREE 10 ML: 5 INJECTION INTRAVENOUS at 08:26

## 2021-07-24 RX ADMIN — PANTOPRAZOLE SODIUM 40 MG: 40 INJECTION, POWDER, FOR SOLUTION INTRAVENOUS at 05:39

## 2021-07-24 RX ADMIN — PANTOPRAZOLE SODIUM 40 MG: 40 INJECTION, POWDER, FOR SOLUTION INTRAVENOUS at 16:47

## 2021-07-24 RX ADMIN — MORPHINE SULFATE 2 MG: 4 INJECTION, SOLUTION INTRAMUSCULAR; INTRAVENOUS at 09:55

## 2021-07-24 RX ADMIN — RANOLAZINE 1000 MG: 500 TABLET, FILM COATED, EXTENDED RELEASE ORAL at 22:30

## 2021-07-24 RX ADMIN — MORPHINE SULFATE 2 MG: 4 INJECTION, SOLUTION INTRAMUSCULAR; INTRAVENOUS at 22:31

## 2021-07-24 RX ADMIN — SODIUM CHLORIDE, PRESERVATIVE FREE 10 ML: 5 INJECTION INTRAVENOUS at 08:23

## 2021-07-24 RX ADMIN — SODIUM CHLORIDE, PRESERVATIVE FREE 10 ML: 5 INJECTION INTRAVENOUS at 22:31

## 2021-07-24 RX ADMIN — RANOLAZINE 1000 MG: 500 TABLET, FILM COATED, EXTENDED RELEASE ORAL at 12:39

## 2021-07-24 RX ADMIN — ATORVASTATIN CALCIUM 80 MG: 80 TABLET, FILM COATED ORAL at 22:29

## 2021-07-24 RX ADMIN — BUSPIRONE HYDROCHLORIDE 15 MG: 5 TABLET ORAL at 12:38

## 2021-07-24 RX ADMIN — PROPOFOL 80 MG: 10 INJECTION, EMULSION INTRAVENOUS at 10:57

## 2021-07-24 RX ADMIN — INSULIN LISPRO 1 UNITS: 100 INJECTION, SOLUTION INTRAVENOUS; SUBCUTANEOUS at 17:34

## 2021-07-24 RX ADMIN — BUSPIRONE HYDROCHLORIDE 15 MG: 5 TABLET ORAL at 22:30

## 2021-07-24 ASSESSMENT — PAIN DESCRIPTION - ONSET
ONSET: GRADUAL

## 2021-07-24 ASSESSMENT — PAIN DESCRIPTION - ORIENTATION
ORIENTATION: MID;UPPER

## 2021-07-24 ASSESSMENT — PAIN DESCRIPTION - PROGRESSION
CLINICAL_PROGRESSION: GRADUALLY WORSENING

## 2021-07-24 ASSESSMENT — PAIN - FUNCTIONAL ASSESSMENT
PAIN_FUNCTIONAL_ASSESSMENT: ACTIVITIES ARE NOT PREVENTED

## 2021-07-24 ASSESSMENT — PAIN DESCRIPTION - LOCATION
LOCATION: ABDOMEN

## 2021-07-24 ASSESSMENT — PAIN DESCRIPTION - PAIN TYPE
TYPE: ACUTE PAIN

## 2021-07-24 ASSESSMENT — PAIN SCALES - GENERAL
PAINLEVEL_OUTOF10: 7
PAINLEVEL_OUTOF10: 4
PAINLEVEL_OUTOF10: 7
PAINLEVEL_OUTOF10: 8
PAINLEVEL_OUTOF10: 7
PAINLEVEL_OUTOF10: 0
PAINLEVEL_OUTOF10: 7
PAINLEVEL_OUTOF10: 8
PAINLEVEL_OUTOF10: 5

## 2021-07-24 ASSESSMENT — PAIN DESCRIPTION - FREQUENCY
FREQUENCY: CONTINUOUS

## 2021-07-24 ASSESSMENT — PAIN DESCRIPTION - DESCRIPTORS
DESCRIPTORS: ACHING

## 2021-07-24 NOTE — ANESTHESIA POSTPROCEDURE EVALUATION
Department of Anesthesiology  Postprocedure Note    Patient: Madilyn Cockayne  MRN: [id number]  YOB: 1965  Date of evaluation: 7/24/2021  Time:  11:10 AM     Procedure Summary     Date: 07/24/21 Room / Location: [address]    Anesthesia Start: 2222 Anesthesia Stop: 1110    Procedure: EGD BIOPSY (N/A ) Diagnosis: (-)    Surgeons: Ofe Mane MD Responsible Provider: Waldron DO [place]    Anesthesia Type: MAC ASA Status: 3          Anesthesia Type: MAC    Jonh Phase I:      Jonh Phase II:      Last vitals: Reviewed and per EMR flowsheets.        Anesthesia Post Evaluation    Patient location during evaluation: bedside  Patient participation: complete - patient participated  Level of consciousness: sleepy but conscious  Pain score: 0  Airway patency: patent  Nausea & Vomiting: no nausea and no vomiting  Complications: no  Cardiovascular status: blood pressure returned to baseline and hemodynamically stable  Respiratory status: acceptable  Hydration status: stable

## 2021-07-24 NOTE — PROGRESS NOTES
Hospitalist Progress Note      Name:  Simona Pompa /Age/Sex: 1965  (63 y.o. female)   MRN & CSN:  [id number] & [id number] Admission Date/Time: 2021 12:13 PM   Location:  [address] [place] [address]- PCP: [address] [place] [address] Day: 2    Assessment and Plan:   Simona Pompa is a 63 y.o.  female  who presents with  chest pain and dyspnea      GI Bleed- presenting with melena. Hemoglobin 10, despite getting a unit of packed red blood cells. Transfused 1 unit PRBC in ED due to hypotension   Continue statin/ holding ASA for now  GI consulted     CAD s/p CABG x 3 vessel current chest pain r/o ACS. Concern for anginal source given risk factors and history. Initial troponin   EKG shows no acute ischemic changes. Repeat troponins were normal, patient no longer having chest pain. Telemetry monitoring                          Cardiology consult. Follows with Dr. Kathrene Jeans on medication regimen. Chronic anticoagulation-left Xarelto              Holding anticoagulation     Acute cystitis UA suspicious for pyuria              Pending cultures              IV Rocephin     HFpEF- appears compensated              TTE (2018) EF 50 to 55%, mild LVH, G2 DD, trace TR     DMII with chronic complications and with long term use of insulin. Last A1c 5.4 (2019)              Continue glargine. Monitor FSBS and cover with SSI              Hold PO medications while inpatient              Continue gabapentin     Hypertension holding antihypertensive regimen for now she is currently hypotensive     COPD- stable. continue PRN and routine breathing treatments     Depression- buspar  1.  Ten point ROS reviewed negative, unless as noted above    Diet Diet NPO Exceptions are: Ice Chips, Sips of Water with Meds   DVT Prophylaxis [] Lovenox, []  Heparin, [] SCDs, [] Ambulation   GI Prophylaxis [] PPI,  [] H2 Blocker,  [] Carafate,  [] Diet/Tube Feeds   Code Status Full Code   Disposition Patient requires continued admission due to GI bleed   MDM [] Low, [] Moderate,[x]  High  Patient's risk as above due to cardiac history     History of Present Illness:     Chief Complaint: <principal problem not specified>  Ruby Carvalho is a 63 y.o.  female  who presents with       Patient still complains of generalized fatigue, but no chest pain  No further bowel movements    Objective:   No intake or output data in the 24 hours ending 07/24/21 0909   Vitals:   Vitals:    07/24/21 0815   BP: (!) 105/54   Pulse: 71   Resp: 16   Temp: 97.8 °F (36.6 °C)   SpO2: 98%     Physical Exam:   GEN Awake female, sitting upright in bed in no apparent distress. Appears given age. EYES Pupils are equally round. No scleral erythema, discharge, or conjunctivitis. HENT Mucous membranes are moist. Oral pharynx without exudates, no evidence of thrush. NECK Supple, no apparent thyromegaly or masses. RESP Clear to auscultation, no wheezes, rales or rhonchi. Symmetric chest movement while on room air. CARDIO/VASC S1/S2 auscultated. Regular rate without appreciable murmurs, rubs, or gallops. No JVD or carotid bruits. Peripheral pulses equal bilaterally and palpable. No peripheral edema. GI Abdomen is soft without significant tenderness, masses, or guarding. Bowel sounds are normoactive. Rectal exam deferred.  No costovertebral angle tenderness. Normal appearing external genitalia. Vera catheter is not present. HEME/LYMPH No palpable cervical lymphadenopathy and no hepatosplenomegaly. No petechiae or ecchymoses. MSK No gross joint deformities. SKIN Normal coloration, warm, dry. NEURO Cranial nerves appear grossly intact, normal speech, no lateralizing weakness. PSYCH Awake, alert, oriented x 4. Affect appropriate.     Medications:   Medications:    sodium chloride flush  5-40 mL Intravenous 2 times per day    atorvastatin  80 mg Oral Daily    busPIRone  15 mg Oral TID    citalopram 40 mg Oral Daily    ranolazine  1,000 mg Oral BID    insulin glargine  30 Units Subcutaneous Nightly    gabapentin  100 mg Oral Nightly    cefTRIAXone (ROCEPHIN) IV  1,000 mg Intravenous Daily    insulin lispro  0-6 Units Subcutaneous Q4H    pantoprazole  40 mg Intravenous Q12H    And    sodium chloride (PF)  10 mL Intravenous Q12H    sodium chloride flush  5-40 mL Intravenous 2 times per day      Infusions:    sodium chloride      sodium chloride 75 mL/hr at 07/23/21 1817    dextrose      sodium chloride       PRN Meds: sodium chloride flush, 5-40 mL, PRN  sodium chloride, 25 mL, PRN  ondansetron, 4 mg, Q8H PRN   Or  ondansetron, 4 mg, Q6H PRN  acetaminophen, 650 mg, Q6H PRN   Or  acetaminophen, 650 mg, Q6H PRN  clonazePAM, 1 mg, Daily PRN  morphine, 2 mg, Q4H PRN  glucose, 15 g, PRN  dextrose, 12.5 g, PRN  glucagon (rDNA), 1 mg, PRN  dextrose, 100 mL/hr, PRN  sodium chloride flush, 5-40 mL, PRN  sodium chloride, 25 mL, PRN          Electronically signed by Blanche Posada DO on 7/24/2021 at 9:09 AM

## 2021-07-24 NOTE — CONSULTS
CARDIOLOGY CONSULT NOTE       Reason for consultation:    Referring physician:  Mook Linder DO     Primary care physician: Mike Thayer DO  Thanks for the consult. History of present illness:Waleska is a 63 y. o.year old who  presents with had concerns including Chest Pain (jaw pain, radiating into left arm) and Shortness of Breath (started yesterday, worsening today). Chief Complaint   Patient presents with    Chest Pain     jaw pain, radiating into left arm    Shortness of Breath     started yesterday, worsening today   Patient with history of a CABG x3 vessel 2012, diabetes, hypertension, high cholesterol smoking history and PVD  Patient admitted had episode of chest pain radiating to the left arm and left jaw with some shortness of breath which is now resolved troponin is negative EKG showed nonspecific ST-T wave changes in inferior leads also has melena and being seen by GI    Past medical history:    has a past medical history of Acid reflux, CAD (coronary artery disease), COPD (chronic obstructive pulmonary disease) ([address] Depression, Diabetes mellitus ([address] Eczema of hand, Hx of migraines, HX OTHER MEDICAL, Hyperlipidemia, Hypertension, Kidney stones, PVD (peripheral vascular disease) ([address] and Wears glasses. Past surgical history:   has a past surgical history that includes Dental surgery (2000's); Breast biopsy (Bilateral, 1990's-2000's); Tonsillectomy and adenoidectomy (1971); Hysterectomy, total abdominal (1988); Appendectomy (1988); other surgical history (Left, 4/4/2013); Bone Resection, Rib; Coronary angioplasty with stent; back surgery (Last Done 9-11/2013); Cholecystectomy (1988); Colonoscopy (last one 2014); Cardiac surgery (5-4-12); Cardiac catheterization; other surgical history; vascular surgery; and FEMORAL ENDARTERECTOMY (Right, 6/11/2019). Social History:   reports that she quit smoking about 2 years ago. Her smoking use included cigarettes. She has a 17.50 pack-year smoking history. She has never used smokeless tobacco. She reports that she does not drink alcohol and does not use drugs. Family history:  family history includes Asthma in her brother, father, and mother; Diabetes in her mother; Early Death (age of onset: 25) in her son; Heart Disease in her father; High Blood Pressure in her father and mother; High Cholesterol in her father and mother; Mental Illness in her daughter and son.     Allergies   Allergen Reactions    Erythromycin Hives and Nausea And Vomiting    Lyrica [Pregabalin] Swelling    Codeine Palpitations    Imitrex [Sumatriptan] Anxiety     \"Makes Me Hyper\"    Ketorolac Tromethamine Other (See Comments)     \"Rapid Heart Rate\"    Prochlorperazine Edisylate Nausea And Vomiting    Tape Gabriela Basques Tape] Itching     Blisters    Ultram [Tramadol] Itching       sodium chloride flush 0.9 % injection 5-40 mL, 2 times per day  sodium chloride flush 0.9 % injection 5-40 mL, PRN  0.9 % sodium chloride infusion, PRN  ondansetron (ZOFRAN-ODT) disintegrating tablet 4 mg, Q8H PRN   Or  ondansetron (ZOFRAN) injection 4 mg, Q6H PRN  acetaminophen (TYLENOL) tablet 650 mg, Q6H PRN   Or  acetaminophen (TYLENOL) suppository 650 mg, Q6H PRN  0.9 % sodium chloride infusion, Continuous  atorvastatin (LIPITOR) tablet 80 mg, Daily  busPIRone (BUSPAR) tablet 15 mg, TID  citalopram (CELEXA) tablet 40 mg, Daily  ranolazine (RANEXA) extended release tablet 1,000 mg, BID  insulin glargine (LANTUS) injection vial 30 Units, Nightly  clonazePAM (KLONOPIN) tablet 1 mg, Daily PRN  gabapentin (NEURONTIN) capsule 100 mg, Nightly  morphine sulfate (PF) injection 2 mg, Q4H PRN  cefTRIAXone (ROCEPHIN) 1000 mg IVPB in 50 mL D5W minibag, Daily  glucose (GLUTOSE) 40 % oral gel 15 g, PRN  dextrose 50 % IV solution, PRN  glucagon (rDNA) injection 1 mg, PRN  dextrose 5 % solution, PRN  insulin lispro (HUMALOG) injection vial 0-6 Units, Q4H  pantoprazole (PROTONIX) injection 40 mg, Q12H   And  sodium chloride (PF) 0.9 % injection 10 mL, Q12H  sodium chloride flush 0.9 % injection 5-40 mL, 2 times per day  sodium chloride flush 0.9 % injection 5-40 mL, PRN  0.9 % sodium chloride infusion, PRN      Current Facility-Administered Medications   Medication Dose Route Frequency Provider Last Rate Last Admin    sodium chloride flush 0.9 % injection 5-40 mL  5-40 mL Intravenous 2 times per day SARI hCaney - CNP   10 mL at 07/24/21 0826    sodium chloride flush 0.9 % injection 5-40 mL  5-40 mL Intravenous PRN Cameron Shell, APRN - CNP        0.9 % sodium chloride infusion  25 mL Intravenous PRN Cameron Shell, APRN - WENDY        ondansetron (ZOFRAN-ODT) disintegrating tablet 4 mg  4 mg Oral Q8H PRN Cameron Shell, SARI - CNP        Or    ondansetron (ZOFRAN) injection 4 mg  4 mg Intravenous Q6H PRN Cameron Shell, APRN - WENDY        acetaminophen (TYLENOL) tablet 650 mg  650 mg Oral Q6H PRN Cameron Shell, APRN - CNP        Or    acetaminophen (TYLENOL) suppository 650 mg  650 mg Rectal Q6H PRN Cameron Shell, SARI - CNP        0.9 % sodium chloride infusion   Intravenous Continuous SARI Chaney CNP 75 mL/hr at 07/23/21 1817 New Bag at 07/23/21 1817    atorvastatin (LIPITOR) tablet 80 mg  80 mg Oral Daily SARI Chaney - CNP   80 mg at 07/23/21 2012    busPIRone (BUSPAR) tablet 15 mg  15 mg Oral TID Cameron Shell APRN - CNP   15 mg at 07/23/21 2012    citalopram (CELEXA) tablet 40 mg  40 mg Oral Daily SARI Chaney - CNP   40 mg at 07/23/21 1938    ranolazine (RANEXA) extended release tablet 1,000 mg  1,000 mg Oral BID SARI Chaney - CNP   1,000 mg at 07/23/21 2015    insulin glargine (LANTUS) injection vial 30 Units  30 Units Subcutaneous Nightly SARI Chaney - CNP        clonazePAM (KLONOPIN) tablet 1 mg  1 mg Oral Daily PRN SARI Chaney CNP        gabapentin (NEURONTIN) capsule 100 mg  100 mg Oral Nightly SARI Chaney CNP   100 mg at 07/23/21 2012    morphine sulfate (PF) injection 2 mg  2 mg Intravenous Q4H PRN [facility] SARI Espinosa CNP   2 mg at 07/24/21 0538    cefTRIAXone (ROCEPHIN) 1000 mg IVPB in 50 mL D5W minibag  1,000 mg Intravenous Daily Robert SARI Espinosa CNP   Stopped at 07/24/21 0855    glucose (GLUTOSE) 40 % oral gel 15 g  15 g Oral PRN [facility] SARI Espinosa CNP        dextrose 50 % IV solution  12.5 g Intravenous PRN [facility] SARI Espinosa CNP        glucagon (rDNA) injection 1 mg  1 mg Intramuscular PRN [facility] SARI Espinosa CNP        dextrose 5 % solution  100 mL/hr Intravenous PRN [facility] SARI Espinosa CNP        insulin lispro (HUMALOG) injection vial 0-6 Units  0-6 Units Subcutaneous Q4H [facility] SARI Espinosa CNP        pantoprazole (PROTONIX) injection 40 mg  40 mg Intravenous Q12H [facility] SARI Espinosa CNP   40 mg at 07/24/21 7183    And    sodium chloride (PF) 0.9 % injection 10 mL  10 mL Intravenous Q12H [facility] SARI Espinosa CNP        sodium chloride flush 0.9 % injection 5-40 mL  5-40 mL Intravenous 2 times per day Nicki Grande MD   10 mL at 07/24/21 0824    sodium chloride flush 0.9 % injection 5-40 mL  5-40 mL Intravenous PRN Nicki Grande MD        0.9 % sodium chloride infusion  25 mL Intravenous PRN Nicki Grande MD         Review of Systems:   · Constitutional: No Fever or Weight Loss   · Eyes: No Decreased Vision  · ENT: No Headaches, Hearing Loss or Vertigo  · Cardiovascular: No chest pain, dyspnea on exertion, palpitations or loss of consciousness  · Respiratory: No cough or wheezing    · Gastrointestinal: No abdominal pain, appetite loss, blood in stools, constipation, diarrhea or heartburn  · Genitourinary: No dysuria, trouble voiding, or hematuria  · Musculoskeletal:  No gait disturbance, weakness or joint complaints  · Integumentary: No rash or pruritis  · Neurological: No TIA or stroke symptoms  · Psychiatric: No anxiety or depression  · Endocrine: No malaise, fatigue or temperature intolerance  · Hematologic/Lymphatic: No bleeding problems, blood clots or swollen lymph nodes  · Allergic/Immunologic: No nasal congestion or hives  ·   ·      Physical Examination:    Vitals:    07/24/21 0815   BP: (!) 105/54   Pulse: 71   Resp: 16   Temp: 97.8 °F (36.6 °C)   SpO2: 98%        General Appearance:      HEENT: normal    NECK: No JVP or carotid bruits  No thromegaly  Cardiovascular: Auscultation: Normal S1 and S2,      Respiratory:  Breath sounds are Normal    Extremities:  No Edema clubbing or cyanosis    SKIN: Warm and well perfused, no pallor or cyanosis    Vascular exam:  : ,Femoral Arteries: 2+ and equal, Pedal Pulses: 2+ and equal     Abdomen: nontender      Neurological/Psychiatric:  nonfocal  Normal affect  Lab Review   Recent Labs     07/24/21  0552   WBC 6.9   HGB 10.0*   HCT 31.2*         Recent Labs     07/24/21  0552      K 3.7      CO2 22   BUN 20   CREATININE 0.8     Recent Labs     07/24/21  0552   AST 9*   ALT 5*   BILITOT 0.3   ALKPHOS 45     No results for input(s): TROPONINI in the last 72 hours. Lab Results   Component Value Date    BNP 3 05/15/2014    BNP <2 05/09/2014    BNP 10 03/17/2014     Lab Results   Component Value Date    INR 1.06 07/23/2021    PROTIME 13.7 07/23/2021       QUALITY MEASURES:  CAD:     EKG:    Chest Xray:    ECHO:  Labs, echo, meds reviewed  Assessment:   [unfilled]   Patient Active Problem List   Diagnosis Code    TOS (thoracic outlet syndrome) G54.0    Coronary artery disease I25.10    Major depressive disorder, recurrent episode with anxious distress ([facility] F33.9    Diabetes mellitus ([facility] E11.9    Hyperlipidemia E78.5    Essential hypertension I10    Tobacco dependence F17.200    Overweight (BMI 25.0-29. 9) E66.3    Chronic midline low back pain with right-sided sciatica M54.41, G89.29    Acute left-sided low back pain with left-sided sciatica M54.42    Unstable angina pectoris due to coronary arteriosclerosis (HCC) I25.110    Change in mole D22.9    Eczema of hand L30.9    Arterial occlusion I70.90    PVD (peripheral vascular disease) ([facility]) I73.9    Femoral artery occlusion ([facility]) I70.209    COPD (chronic obstructive pulmonary disease) ([facility]) J44.9    CAD (coronary artery disease) I25.10    Acid reflux K21.9    Depression F32.9    Hx of migraines Z86.69    Hypertension I10    Kidney stones N20.0    Chest pain R07.9    GI bleed K92.2     [unfilled]  Problem List Items Addressed This Visit     Chest pain      Other Visit Diagnoses     Acute upper GI bleed    -  Primary    Abdominal pain, epigastric        Fatigue, unspecified type          1 chest pain in the patient with history of a CABG 2012  Chest pain is resolved troponin is negative EKG is unremarkable  Medical treatment,  2 upper GI bleed hemoglobin is stable  Okay to proceed with endoscopy  3 hypertension currently well controlled  4 diabetes management hospitalist group  5 smoking history encouraged to discontinue  6  PVD no claudication  7 chronic anticoagulation Xarelto being held  Recommendations:   Okay to proceed with endoscopy  Consider outpatient nuclear stress test     Bethany Ayala MD, 7/24/2021 9:56 AM

## 2021-07-24 NOTE — CONSULTS
[address] [facility] Gastroenterology  Gastroenterology Consultation    2021  8:44 AM    Patient:    Ashley Whitmore  : 1965   63 y.o. MRN: [id number]  Admitted: 2021 12:13 PM ATT: Charlene Aaron DO   [id number]  AdmitDx: Abdominal pain, epigastric [R10.13]  GI bleed [K92.2]  Acute upper GI bleed [K92.2]  Fatigue, unspecified type [R53.83]  Chest pain, unspecified type [R07.9]  PCP: Libra Bhandari DO    Reason for Consult:  Anemia and melena        Impression and RECOMMENDATIONS:  Upper GI bleed  Melena   takes significant ibuprofen  Appears to be peptic ulcer disease  Protonix twice a day  EGD today  Would advised to stop all NSAIDs and try over-the-counter Tylenol if possible  If NSAIDs needed advised to take on full stomach  Further recommendations after EGD  Treat nausea symptomatically with ondansetron        Patient Active Problem List   Diagnosis Code    TOS (thoracic outlet syndrome) G54.0    Coronary artery disease I25.10    Major depressive disorder, recurrent episode with anxious distress ([facility] [address] F33.9    Diabetes mellitus ([facility] E11.9    Hyperlipidemia E78.5    Essential hypertension I10    Tobacco dependence F17.200    Overweight (BMI 25.0-29. 9) E66.3    Chronic midline low back pain with right-sided sciatica M54.41, G89.29    Acute left-sided low back pain with left-sided sciatica M54.42    Unstable angina pectoris due to coronary arteriosclerosis ([facility]) I25.110    Change in mole D22.9    Eczema of hand L30.9    Arterial occlusion I70.90    PVD (peripheral vascular disease) ([facility]) I73.9    Femoral artery occlusion ([facility]) I70.209    COPD (chronic obstructive pulmonary disease) ([facility]) J44.9    CAD (coronary artery disease) I25.10    Acid reflux K21.9    Depression F32.9    Hx of migraines Z86.69    Hypertension I10    Kidney stones N20.0    Chest pain R07.9    GI bleed K92.2           Requesting Physician:  Paula Bergeron DO      History Obtained From:  Patient and review of all records    HISTORY OF PRESENT ILLNESS:                The patient is a 63 y.o. female with significant past medical history as below who presents with above mentioned causes in reason for consult.   Admitted with chest pain and shortness of breath  Reports having dark stools melena some mid abdominal pain  Has some nausea  Reports taking ibuprofen 4 ibuprofens a day for headaches  This morning no more melena but still feel nauseous      Past Medical History:        Diagnosis Date    Acid reflux     CAD (coronary artery disease)     Sees Dr. Cindy Mejiaing    COPD (chronic obstructive pulmonary disease) ([facility]     \"have mild COPD- no pulmonologist\"    Depression     Diabetes mellitus ([facility] Dx 2001    Eczema of hand     Hx of migraines     HX OTHER MEDICAL     \"difficulty IV stick\"    Hyperlipidemia     Hypertension     follows with Dr Brian Hernandez Kidney stones 2000's    \"Passed One Kidney Stone\"    PVD (peripheral vascular disease) ([facility]     Wears glasses        Past Surgical History:        Procedure Laterality Date    APPENDECTOMY  79    Done During [facility]    BACK SURGERY  Last Done 9-11/2013    \"4 Lower Back Surgeries, 2 Rods, 4 Pins At L5, S1 Put In During Last Surgery\"    BONE RESECTION, RIB      \"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- surgery 2013- ok since then\"    BREAST BIOPSY Bilateral 1990's-2000's    Benign    CARDIAC CATHETERIZATION      per pt \"had heart cath 5/10/2019\"   [address]  5-4-12    CABG ([address])   [facility]    \"took with appendix and hyster    COLONOSCOPY  last one 2014    Dr. Darlin Pina      6/28/2018 total of 9    DENTAL SURGERY  2000's    \"Dental Implants Upper And Lower\"    FEMORAL ENDARTERECTOMY Right 6/11/2019    RIGHT FEMORAL ENDARTERECTOMY WITH CORMATRIX PATCH performed by Rafael Chakraborty MD at [place] [address] Appendectomy Also Done    OTHER SURGICAL HISTORY Left 4/4/2013    Left transaxillary 1st rib resection    OTHER SURGICAL HISTORY      peripheral angiography 6/6/2019    TONSILLECTOMY AND ADENOIDECTOMY  1971    VASCULAR SURGERY      \"5/22/2019\"they went in and unblocked the femoral artery on right side and then in again 6/6/2019\"put dye in -  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\"         Current Medications:    Medications    Prior to Admission medications    Medication Sig Start Date End Date Taking? Authorizing Provider   ibuprofen (IBU) 600 MG tablet Take 1 tablet by mouth every 6 hours as needed for Pain 4/20/20 5/20/20  Nichelle Montalvo 1984 [facility]   ranolazine (RANEXA) 1000 MG extended release tablet Take 1,000 mg by mouth 2 times daily     Historical Provider, MD   fenofibrate (TRICOR) 145 MG tablet Take 145 mg by mouth nightly 11/23/19   Historical Provider, MD   gabapentin (NEURONTIN) 100 MG capsule Take 100 mg by mouth nightly. 1/29/20   Historical Provider, MD   clonazePAM (KLONOPIN) 1 MG tablet Take 1 mg by mouth daily as needed. 2/10/20   Historical Provider, MD   lisinopril (PRINIVIL;ZESTRIL) 5 MG tablet Take 2.5 mg by mouth daily  11/23/19   Historical Provider, MD   furosemide (LASIX) 20 MG tablet Take 20 mg by mouth daily 11/25/19   Historical Provider, MD   JANUMET -1000 MG TB24 Take 1 tablet by mouth nightly 11/25/19   Historical Provider, MD   citalopram (CELEXA) 40 MG tablet Take 40 mg by mouth daily 5/14/19   Historical Provider, MD   cyclobenzaprine (FLEXERIL) 10 MG tablet Take 10 mg by mouth 3 times daily as needed 5/22/19   Historical Provider, MD   aspirin 81 MG EC tablet Take 81 mg by mouth daily    Historical Provider, MD   nitroGLYCERIN (NITROSTAT) 0.4 MG SL tablet up to max of 3 total doses.  If no relief after 1 dose, call 911. 5/12/19   Maciel Lipscomb MD   atorvastatin (LIPITOR) 80 MG tablet Take 80 mg by mouth daily     Historical Provider, MD   dapagliflozin (FARXIGA) 10 MG tablet Take 10 mg by mouth every morning     Historical Provider, MD   rivaroxaban (XARELTO) 2.5 MG TABS tablet Take 1 tablet by mouth 2 times daily 5/7/19   Andres Roots, PA   busPIRone (BUSPAR) 15 MG tablet Take 15 mg by mouth 3 times daily 11/13/18   Mike Hendrickson MD   insulin glargine (BASAGLAR KWIKPEN) 100 UNIT/ML injection pen Inject 25 Units into the skin nightly  Patient taking differently: Inject 30 Units into the skin nightly  6/29/18   Lesley Carroll MD   carvedilol (COREG) 12.5 MG tablet Take 1 tablet by mouth 2 times daily (with meals) 6/29/18   Lesley Carroll MD   ticagrelor (BRILINTA) 90 MG TABS tablet Take 1 tablet by mouth 2 times daily 6/13/18 2/25/20  Selina Ramachandran MD   albuterol sulfate HFA (PROAIR HFA) 108 (90 Base) MCG/ACT inhaler Inhale 2 puffs into the lungs every 6 hours as needed for Wheezing 1/29/18   Mike Hendrickson MD      Scheduled Medications:    sodium chloride flush  5-40 mL Intravenous 2 times per day    atorvastatin  80 mg Oral Daily    busPIRone  15 mg Oral TID    citalopram  40 mg Oral Daily    ranolazine  1,000 mg Oral BID    insulin glargine  30 Units Subcutaneous Nightly    gabapentin  100 mg Oral Nightly    cefTRIAXone (ROCEPHIN) IV  1,000 mg Intravenous Daily    insulin lispro  0-6 Units Subcutaneous Q4H    pantoprazole  40 mg Intravenous Q12H    And    sodium chloride (PF)  10 mL Intravenous Q12H    sodium chloride flush  5-40 mL Intravenous 2 times per day     Infusions:    sodium chloride      sodium chloride 75 mL/hr at 07/23/21 1817    dextrose      sodium chloride       PRN Medications: sodium chloride flush, sodium chloride, ondansetron **OR** ondansetron, acetaminophen **OR** acetaminophen, clonazePAM, morphine, glucose, dextrose, glucagon (rDNA), dextrose, sodium chloride flush, sodium chloride  Allergies:    Allergies   Allergen Reactions    Erythromycin Hives and Nausea And Vomiting    Lyrica [Pregabalin] Swelling    Codeine Palpitations    Imitrex [Sumatriptan] Anxiety     \"Makes Me Hyper\"    Ketorolac Tromethamine Other (See Comments)     \"Rapid Heart Rate\"    Prochlorperazine Edisylate Nausea And Vomiting    Tape Nancey Brine Tape] Itching     Blisters    Ultram [Tramadol] Itching         Allergies:  Erythromycin, Lyrica [pregabalin], Codeine, Imitrex [sumatriptan], Ketorolac tromethamine, Prochlorperazine edisylate, Tape [adhesive tape], and Ultram [tramadol]    Social History:   TOBACCO:   reports that she quit smoking about 2 years ago. Her smoking use included cigarettes. She has a 17.50 pack-year smoking history. She has never used smokeless tobacco.  ETOH:   reports no history of alcohol use. Family History:       Problem Relation Age of Onset    Diabetes Mother     High Blood Pressure Mother     Asthma Mother     High Cholesterol Mother     High Blood Pressure Father     High Cholesterol Father     Asthma Father     Heart Disease Father         mvp    Asthma Brother     Mental Illness Son     Early Death Son 25        \"Suicide\"   Robertha Rumps Mental Illness Daughter         \"Bipolar\"     No family history of colon cancer, Crohn's disease, or ulcerative colitis.     REVIEW OF SYSTEMS:      Negative other than symptoms of HPI    PHYSICAL EXAM:      Vitals:    BP (!) 105/54   Pulse 71   Temp 97.8 °F (36.6 °C) (Oral)   Resp 16   Ht 5' 3\" (1.6 m)   Wt 160 lb (72.6 kg)   SpO2 98%   BMI 28.34 kg/m²     General Appearance:    Alert, cooperative, mild nausea and mild distress, appears stated age   HEENT:    Normocephalic, atraumatic, PERRL, Conjunctiva clear, Ears Normal, Normal nares,  gums normal   Neck:   Supple, no JVD, No lymph nodes   Lungs:     Clear to auscultation bilaterally,    Chest Wall:    No tenderness or deformity    Heart:     S1 and S2 normal,   Abdomen:     Soft, mid abdominal tenderness, bowel sounds active all four quadrants,     no masses, no organomegaly, no ascitis   Rectal:    Patient refused   Extremities:  , no

## 2021-07-24 NOTE — ANESTHESIA PRE PROCEDURE
Department of Anesthesiology  Preprocedure Note       Name:  Tamika Winslow   Age:  63 y.o.  :  1965                                          MRN:  [id number]         Date:  2021      Surgeon: Roseline Álvarez):  Hipolito Pritchett MD    Procedure: Procedure(s):  EGD ESOPHAGOGASTRODUODENOSCOPY    Medications prior to admission:   Prior to Admission medications    Medication Sig Start Date End Date Taking? Authorizing Provider   ibuprofen (IBU) 600 MG tablet Take 1 tablet by mouth every 6 hours as needed for Pain 20  [facility]   ranolazine (RANEXA) 1000 MG extended release tablet Take 1,000 mg by mouth 2 times daily     Historical Provider, MD   fenofibrate (TRICOR) 145 MG tablet Take 145 mg by mouth nightly 19   Historical Provider, MD   gabapentin (NEURONTIN) 100 MG capsule Take 100 mg by mouth nightly. 20   Historical Provider, MD   clonazePAM (KLONOPIN) 1 MG tablet Take 1 mg by mouth daily as needed. 2/10/20   Historical Provider, MD   lisinopril (PRINIVIL;ZESTRIL) 5 MG tablet Take 2.5 mg by mouth daily  19   Historical Provider, MD   furosemide (LASIX) 20 MG tablet Take 20 mg by mouth daily 19   Historical Provider, MD   JANUMET -1000 MG TB24 Take 1 tablet by mouth nightly 19   Historical Provider, MD   citalopram (CELEXA) 40 MG tablet Take 40 mg by mouth daily 19   Historical Provider, MD   cyclobenzaprine (FLEXERIL) 10 MG tablet Take 10 mg by mouth 3 times daily as needed 19   Historical Provider, MD   aspirin 81 MG EC tablet Take 81 mg by mouth daily    Historical Provider, MD   nitroGLYCERIN (NITROSTAT) 0.4 MG SL tablet up to max of 3 total doses.  If no relief after 1 dose, call 911. 19   Lila Cates MD   atorvastatin (LIPITOR) 80 MG tablet Take 80 mg by mouth daily     Historical Provider, MD   dapagliflozin (FARXIGA) 10 MG tablet Take 10 mg by mouth every morning     Historical Provider, MD   rivaroxaban (XARELTO) 2.5 MG TABS tablet Take 1 tablet by mouth 2 times daily 5/7/19   Dewayne Flatness, PA   busPIRone (BUSPAR) 15 MG tablet Take 15 mg by mouth 3 times daily 11/13/18   Whit Marie MD   insulin glargine (BASAGLAR KWIKPEN) 100 UNIT/ML injection pen Inject 25 Units into the skin nightly  Patient taking differently: Inject 30 Units into the skin nightly  6/29/18   Rubi Haney MD   carvedilol (COREG) 12.5 MG tablet Take 1 tablet by mouth 2 times daily (with meals) 6/29/18   Rubi Haney MD   ticagrelor (BRILINTA) 90 MG TABS tablet Take 1 tablet by mouth 2 times daily 6/13/18 2/25/20  Myesha Quispe MD   albuterol sulfate HFA (PROAIR HFA) 108 (90 Base) MCG/ACT inhaler Inhale 2 puffs into the lungs every 6 hours as needed for Wheezing 1/29/18   Whit Marie MD       Current medications:    Current Facility-Administered Medications   Medication Dose Route Frequency Provider Last Rate Last Admin    sodium chloride flush 0.9 % injection 5-40 mL  5-40 mL Intravenous 2 times per day SARI Moore CNP   10 mL at 07/24/21 0826    sodium chloride flush 0.9 % injection 5-40 mL  5-40 mL Intravenous PRN SARI Moore CNP        0.9 % sodium chloride infusion  25 mL Intravenous PRN SARI Moore CNP        ondansetron (ZOFRAN-ODT) disintegrating tablet 4 mg  4 mg Oral Q8H PRN SARI Moore CNP        Or    ondansetron (ZOFRAN) injection 4 mg  4 mg Intravenous Q6H PRN SARI Moore - CNP        acetaminophen (TYLENOL) tablet 650 mg  650 mg Oral Q6H PRN SARI Moore CNP        Or    acetaminophen (TYLENOL) suppository 650 mg  650 mg Rectal Q6H PRN SARI Moore CNP        0.9 % sodium chloride infusion   Intravenous Continuous SARI Moore CNP 75 mL/hr at 07/23/21 1817 New Bag at 07/23/21 1817    atorvastatin (LIPITOR) tablet 80 mg  80 mg Oral Daily SARI Moore CNP   80 mg at 07/23/21 2012    busPIRone (BUSPAR) tablet 15 mg  15 mg Oral TID SARI Moore CNP   15 mg at 07/23/21 2012    citalopram (CELEXA) tablet 40 mg  40 mg Oral Daily Katherene Ace, APRN - CNP   40 mg at 07/23/21 1938    ranolazine (RANEXA) extended release tablet 1,000 mg  1,000 mg Oral BID Katherene Ace, APRN - CNP   1,000 mg at 07/23/21 2015    insulin glargine (LANTUS) injection vial 30 Units  30 Units Subcutaneous Nightly Katherene Ace, APRN - CNP        clonazePAM (KLONOPIN) tablet 1 mg  1 mg Oral Daily PRN Katherene Ace, APRN - CNP        gabapentin (NEURONTIN) capsule 100 mg  100 mg Oral Nightly Katherene Ace, APRN - CNP   100 mg at 07/23/21 2012    morphine sulfate (PF) injection 2 mg  2 mg Intravenous Q4H PRN Katherene Ace, APRN - CNP   2 mg at 07/24/21 0955    cefTRIAXone (ROCEPHIN) 1000 mg IVPB in 50 mL D5W minibag  1,000 mg Intravenous Daily Katherene Ace, APRN - CNP   Stopped at 07/24/21 0855    glucose (GLUTOSE) 40 % oral gel 15 g  15 g Oral PRN Katherene Ace, APRN - CNP        dextrose 50 % IV solution  12.5 g Intravenous PRN Katherene Ace, APRN - CNP        glucagon (rDNA) injection 1 mg  1 mg Intramuscular PRN Katherene Ace, APRN - CNP        dextrose 5 % solution  100 mL/hr Intravenous PRN Katherene Ace, APRN - CNP        insulin lispro (HUMALOG) injection vial 0-6 Units  0-6 Units Subcutaneous Q4H Talishaherene Ace, APRN - CNP        pantoprazole (PROTONIX) injection 40 mg  40 mg Intravenous Q12H Katherene Ace, APRN - CNP   40 mg at 07/24/21 0539    And    sodium chloride (PF) 0.9 % injection 10 mL  10 mL Intravenous Q12H Katherene Ace, APRN - CNP        sodium chloride flush 0.9 % injection 5-40 mL  5-40 mL Intravenous 2 times per day Hoang Palmer MD   10 mL at 07/24/21 0824    sodium chloride flush 0.9 % injection 5-40 mL  5-40 mL Intravenous PRN Hoang Palmer MD        0.9 % sodium chloride infusion  25 mL Intravenous PRN Hoang Palmer MD           Allergies:     Allergies   Allergen Reactions    Erythromycin Hives and Nausea And Vomiting    Lyrica [Pregabalin] Swelling    Codeine Palpitations    Imitrex [Sumatriptan] Anxiety     \"Makes Me Hyper\"    Ketorolac Tromethamine Other (See Comments)     \"Rapid Heart Rate\"    Prochlorperazine Edisylate Nausea And Vomiting    Tape Ressie Men Tape] Itching     Blisters    Ultram [Tramadol] Itching       Problem List:    Patient Active Problem List   Diagnosis Code    TOS (thoracic outlet syndrome) G54.0    Coronary artery disease I25.10    Major depressive disorder, recurrent episode with anxious distress ([facility] F33.9    Diabetes mellitus ([facility] E11.9    Hyperlipidemia E78.5    Essential hypertension I10    Tobacco dependence F17.200    Overweight (BMI 25.0-29. 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 Miky Quinteros    COPD (chronic obstructive pulmonary disease) ([facility] [address]     \"have mild COPD- no pulmonologist\"    Depression     Diabetes mellitus ([facility] Dx 2001    Eczema of hand     Hx of migraines     HX OTHER MEDICAL     \"difficulty IV stick\"    Hyperlipidemia     Hypertension     follows with Dr Beto Womack Kidney stones 2000's    \"Passed One Kidney Stone\"    PVD (peripheral vascular disease) ([facility] [address] [facility]     Wears glasses        Past Surgical History:        Procedure Laterality Date    APPEN[facility]CTOMY  1988    [facility] During [facility]    BACK SURGERY  Last Done -2013    \"4 Lower Back Surgeries, 2 Rods, 4 Pins At L5, S1 Put In During Last Surgery\"    BONE RESECTION, RIB      \"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- surgery - ok since then\"    BREAST BIOPSY Bilateral -    Benign    CARDIAC CATHETERIZATION      per pt \"had heart cath 5/10/2019\"   [address]  12    [facility]3 Bypasses)   [facility]    \"took with [facility] and hyster    COLONOSCOPY  last one     Dr. Hsieh Phi      2018 total of [address]      \"Dental Implants Upper And Lower\"    FEMORAL ENDARTERECTOMY Right 2019    RIGHT FEMORAL ENDARTERECTOMY WITH CORMATRIX PATCH performed by Jose Luis Contreras MD at [address] [place] [address]    Appendectomy Also Done    OTHER SURGICAL HISTORY Left 2013    Left transaxillary 1st rib resection    OTHER SURGICAL HISTORY      peripheral angiography 2019    TONSILLECTOMY AND ADENOIDECTOMY  1971    VASCULAR SURGERY      \"2019\"they went in and unblocked the femoral artery on right side and then in again 2019\"put dye in -  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\"       Social History:    Social History     Tobacco Use    Smoking status: Former Smoker     Packs/day: 0.50     Years: 35.00     Pack years: 17.50     Types: Cigarettes     Quit date: 2019     Years since quittin.0    Smokeless tobacco: Never Used   Substance Use Topics    Alcohol use: No     Alcohol/week: 0.0 standard drinks     Comment: \"quit drinking in - use to drink 5 days per week- average use to drink 6 beers each time\"                                Counseling given: Not Answered      Vital Signs (Current):   Vitals:    21 2130 21 2303 21 0220 21 0815   BP: (!) 116/56 (!) 116/56 (!) 97/42 (!) 105/54   Pulse: 74 74 71 71   Resp: 16 16 16 16   Temp: 36.5 °C (97.7 °F) 36.5 °C (97.7 °F) 36.6 °C (97.9 °F) 36.6 °C (97.8 °F) TempSrc: Oral Oral Oral Oral   SpO2: 98% 98% 94% 98%   Weight:       Height:                                                  BP Readings from Last 3 Encounters:   07/24/21 (!) 105/54   09/07/20 131/60   09/05/20 (!) 132/96       NPO Status:                                                                                 BMI:   Wt Readings from Last 3 Encounters:   07/23/21 160 lb (72.6 kg)   09/07/20 160 lb (72.6 kg)   09/04/20 160 lb (72.6 kg)     Body mass index is 28.34 kg/m².     CBC:   Lab Results   Component Value Date    WBC 6.9 07/24/2021    RBC 3.30 07/24/2021    HGB 10.0 07/24/2021    HCT 31.2 07/24/2021    MCV 94.5 07/24/2021    RDW 13.8 07/24/2021     07/24/2021       CMP:   Lab Results   Component Value Date     07/24/2021    K 3.7 07/24/2021     07/24/2021    CO2 22 07/24/2021    BUN 20 07/24/2021    CREATININE 0.8 07/24/2021    GFRAA >60 07/24/2021    LABGLOM >60 07/24/2021    GLUCOSE 90 07/24/2021    PROT 5.6 07/24/2021    PROT 7.8 02/08/2013    CALCIUM 8.4 07/24/2021    BILITOT 0.3 07/24/2021    ALKPHOS 45 07/24/2021    AST 9 07/24/2021    ALT 5 07/24/2021       POC Tests:   Recent Labs     07/24/21  0826   POCGLU 98       Coags:   Lab Results   Component Value Date    PROTIME 13.7 07/23/2021    PROTIME 11.8 05/09/2012    INR 1.06 07/23/2021    APTT 28.6 07/23/2021       HCG (If Applicable): No results found for: PREGTESTUR, PREGSERUM, HCG, HCGQUANT     ABGs:   Lab Results   Component Value Date    PO2ART 300 05/05/2012    BEART 2 05/05/2012        Type & Screen (If Applicable):  No results found for: LABABO, LABRH    Drug/Infectious Status (If Applicable):  Lab Results   Component Value Date    HEPCAB NON REACTIVE 03/24/2017       COVID-19 Screening (If Applicable):   Lab Results   Component Value Date    COVID19 NOT DETECTED 07/24/2021           Anesthesia Evaluation  Patient summary reviewed  Airway: Mallampati: II  TM distance: >3 FB   Neck ROM: full  Mouth opening: > = 3 FB Dental:          Pulmonary:normal exam    (+) COPD:                             Cardiovascular:    (+) hypertension:, CAD:,                   Neuro/Psych:   (+) neuromuscular disease:, psychiatric history:            GI/Hepatic/Renal:   (+) GERD:,           Endo/Other:    (+) DiabetesType II DM, , .                 Abdominal:             Vascular: Other Findings:             Anesthesia Plan      MAC     ASA 3       Induction: intravenous. Anesthetic plan and risks discussed with patient. Plan discussed with attending.                   SARI Samayoa - CRNA   7/24/2021

## 2021-07-24 NOTE — PROGRESS NOTES
REPORT CALLED TO JOSEFA THORNE. PT AWAKE AND RESPONSIVE. VS STABLE. MAY ADVANCE DIET AS TOLERATED PER DR HARRINGTON.

## 2021-07-24 NOTE — ED PROVIDER NOTES
EMERGENCY DEPARTMENT ENCOUNTER      PCP: Eh Landaverde, [address] [facility]    Chief Complaint   Patient presents with    Chest Pain     jaw pain, radiating into left arm    Shortness of Breath     started yesterday, worsening today       Of note, this patient was also evaluated by the attending physician, Dr. Munguia Fret is a 63 y.o. female who presents to the emergency department today with generalized malaise, jaw pain into the arm, shortness of breath and malaise today. Also complaining of epigastrium discomfort. Also states that she is been having some dark-colored stools. Patient has history of significant coronary artery disease, she is anticoagulated, and on Effient, Xarelto. No history of hemorrhage. Patient's blood pressure soft on arrival.  Otherwise she is alert oriented describing no significant chest pain. Admits to epigastrium discomfort. No hematemesis, no hematochezia. No flank tenderness. REVIEW OF SYSTEMS    Constitutional:  Denies fever, chills, weight loss or weakness   HENT:  Denies sore throat or ear pain   Cardiovascular: See HPI. Respiratory:  Denies cough or shortness of breath    GI:  Denies abdominal pain, nausea, vomiting, or diarrhea  :  Denies any urinary symptoms or vaginal symptoms.    Musculoskeletal:  Denies back pain  Skin:  Denies rash  Neurologic:  Denies headache, focal weakness or sensory changes   Endocrine:  Denies polyuria or polydypsia   Lymphatic:  Denies swollen glands   All other review of systems are negative  See HPI and nursing notes for additional information     PAST MEDICAL AND SURGICAL HISTORY    Past Medical History:   Diagnosis Date    Acid reflux     CAD (coronary artery disease)     Sees Dr. Dinesh Heller    [facility]     \"have mild COPD- no pulmonologist\"    Depression     Diabetes mellitus ([facility] Dx 2001    Eczema of hand     Hx of migraines     HX OTHER MEDICAL     \"difficulty IV stick\"    Hyperlipidemia     Hypertension     follows with Dr Yvan Chapman Kidney stones 2000's    \"Passed One Kidney Stone\"    PVD (peripheral vascular disease) ([address]     Wears glasses      Past Surgical History:   Procedure Laterality Date    APPENDECTOMY  1988    Done During TOSHA    BACK SURGERY  Last Done 9-11/2013    \"4 Lower Back Surgeries, 2 Rods, 4 Pins At L5, S1 Put In During Last Surgery\"    BONE RESECTION, RIB      \"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- surgery 2013- ok since then\"    BREAST BIOPSY Bilateral 1990's-2000's    Benign    CARDIAC CATHETERIZATION      per pt \"had heart cath 5/10/2019\"   [address] 42  5-4-12    [facility]3 Bypasses)   [facility]    \"[facility] and [facility]    COLONOSCOPY  last one 2014    Dr. Bennett Hernandez      6/28/2018 total of [address]  2000's    \"Dental Implants Upper And Lower\"    FEMORAL ENDARTERECTOMY Right 6/11/2019    RIGHT FEMORAL ENDARTERECTOMY WITH CORMATRIX PATCH performed by Mavis Butcher MD at [address] [place] [address]    Appendectomy Also Done    OTHER SURGICAL HISTORY Left 4/4/2013    Left transaxillary 1st rib resection    OTHER SURGICAL HISTORY      peripheral angiography 6/6/2019    TONSILLECTOMY AND ADENOIDECTOMY  1971    VASCULAR SURGERY      \"5/22/2019\"they went in and unblocked the femoral artery on right side and then in again 6/6/2019\"put dye in -  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\"       CURRENT MEDICATIONS    Current Outpatient Rx   Medication Sig Dispense Refill    ibuprofen (IBU) 600 MG tablet Take 1 tablet by mouth every 6 hours as needed for Pain 20 tablet 0    ranolazine (RANEXA) 1000 MG extended release tablet Take 1,000 mg by mouth 2 times daily       fenofibrate (TRICOR) 145 MG tablet Take 145 mg by mouth nightly  5    gabapentin (NEURONTIN) 100 MG capsule Take 100 mg by mouth nightly.       clonazePAM (KLONOPIN) 1 MG tablet Take 1 mg by mouth daily as needed.  lisinopril (PRINIVIL;ZESTRIL) 5 MG tablet Take 2.5 mg by mouth daily   5    furosemide (LASIX) 20 MG tablet Take 20 mg by mouth daily  11    JANUMET -1000 MG TB24 Take 1 tablet by mouth nightly  0    citalopram (CELEXA) 40 MG tablet Take 40 mg by mouth daily  2    cyclobenzaprine (FLEXERIL) 10 MG tablet Take 10 mg by mouth 3 times daily as needed      aspirin 81 MG EC tablet Take 81 mg by mouth daily      nitroGLYCERIN (NITROSTAT) 0.4 MG SL tablet up to max of 3 total doses.  If no relief after 1 dose, call 911. 25 tablet 3    atorvastatin (LIPITOR) 80 MG tablet Take 80 mg by mouth daily       dapagliflozin (FARXIGA) 10 MG tablet Take 10 mg by mouth every morning       rivaroxaban (XARELTO) 2.5 MG TABS tablet Take 1 tablet by mouth 2 times daily 60 tablet 0    busPIRone (BUSPAR) 15 MG tablet Take 15 mg by mouth 3 times daily 90 tablet 1    insulin glargine (BASAGLAR KWIKPEN) 100 UNIT/ML injection pen Inject 25 Units into the skin nightly (Patient taking differently: Inject 30 Units into the skin nightly ) 5 pen 0    carvedilol (COREG) 12.5 MG tablet Take 1 tablet by mouth 2 times daily (with meals) 60 tablet 0    ticagrelor (BRILINTA) 90 MG TABS tablet Take 1 tablet by mouth 2 times daily 60 tablet 0    albuterol sulfate HFA (PROAIR HFA) 108 (90 Base) MCG/ACT inhaler Inhale 2 puffs into the lungs every 6 hours as needed for Wheezing 2 Inhaler 0       ALLERGIES    Allergies   Allergen Reactions    Erythromycin Hives and Nausea And Vomiting    Lyrica [Pregabalin] Swelling    Codeine Palpitations    Imitrex [Sumatriptan] Anxiety     \"Makes Me Hyper\"    Ketorolac Tromethamine Other (See Comments)     \"Rapid Heart Rate\"    Prochlorperazine Edisylate Nausea And Vomiting    Tape Nancey Brine Tape] Itching     Blisters    Ultram [Tramadol] Itching       SOCIAL AND FAMILY HISTORY    Social History     Socioeconomic History    Marital status:   Mental Illness Son     Early Death Son 25        \"Suicide\"    Mental Illness Daughter         \"Bipolar\"         PHYSICAL EXAM    VITAL SIGNS: /73   Pulse 68   Temp 98.1 °F (36.7 °C) (Oral)   Resp 20   Ht 5' 3\" (1.6 m)   Wt 160 lb (72.6 kg)   SpO2 100%   BMI 28.34 kg/m²    Constitutional:  Well developed, Well nourished. No distress  HENT:  Normocephalic, Atraumatic, PERRL. EOMI. Sclera clear. Conjunctiva normal, No discharge. Neck/Lymphatics: supple, no JVD, no swollen nodes  Cardiovascular:   RRR,  no murmurs/rubs/gallops. No JVD  No carotid bruits or murmurs heard in carotids. Respiratory:  Nonlabored breathing. Normal breath sounds, No wheezing  Abdomen: Bowel sounds normal, Soft, epigastrium tenderness. No discolorations. Rectal exam did demonstrate positive for fecal occult blood and  melanotic stools  Musculoskeletal:    There is no edema, asymmetry, or calf / thigh tenderness bilaterally. No cyanosis. No cool or pale-appearing limb. Distal cap refill and pulses intact bilateral upper and lower extremities  Bilateral upper and lower extremity ROM intact without pain or obvious deficit  Integument:  Warm, Dry  Neurologic: Alert & oriented , No focal deficits noted. Cranial nerves II through XII grossly intact. Normal gross motor coordination & motor strength bilateral upper and lower extremities  Sensation intact.   Psychiatric:  Affect normal, Mood normal.       Labs:  Results for orders placed or performed during the hospital encounter of 07/23/21   CBC auto diff   Result Value Ref Range    WBC 8.7 4.0 - 10.5 K/CU MM    RBC 3.48 (L) 4.2 - 5.4 M/CU MM    Hemoglobin 10.8 (L) 12.5 - 16.0 GM/DL    Hematocrit 33.5 (L) 37 - 47 %    MCV 96.3 78 - 100 FL    MCH 31.0 27 - 31 PG    MCHC 32.2 32.0 - 36.0 %    RDW 12.6 11.7 - 14.9 %    Platelets 147 001 - 124 K/CU MM    MPV 10.6 7.5 - 11.1 FL    Differential Type AUTOMATED DIFFERENTIAL     Segs Relative 67.0 (H) 36 - 66 % Lymphocytes % 23.7 (L) 24 - 44 %    Monocytes % 5.8 (H) 0 - 4 %    Eosinophils % 2.3 0 - 3 %    Basophils % 0.7 0 - 1 %    Segs Absolute 5.9 K/CU MM    Lymphocytes Absolute 2.1 K/CU MM    Monocytes Absolute 0.5 K/CU MM    Eosinophils Absolute 0.2 K/CU MM    Basophils Absolute 0.1 K/CU MM    Nucleated RBC % 0.0 %    Total Nucleated RBC 0.0 K/CU MM    Total Immature Neutrophil 0.04 K/CU MM    Immature Neutrophil % 0.5 (H) 0 - 0.43 %   Troponin   Result Value Ref Range    Troponin T 0.012 (H) <0.01 NG/ML   Lipase   Result Value Ref Range    Lipase 33 13 - 60 IU/L   PT - INR   Result Value Ref Range    Protime 13.7 11.7 - 14.5 SECONDS    INR 1.06 INDEX   PTT   Result Value Ref Range    aPTT 28.6 25.1 - 37.1 SECONDS   Brain Natriuretic Peptide   Result Value Ref Range    Pro-.0 (H) <300 PG/ML   Urinalysis (Lab)   Result Value Ref Range    Color, UA YELLOW YELLOW    Clarity, UA CLEAR CLEAR    Glucose, Urine NEGATIVE NEGATIVE MG/DL    Bilirubin Urine NEGATIVE NEGATIVE MG/DL    Ketones, Urine NEGATIVE NEGATIVE MG/DL    Specific Gravity, UA 1.021 1.001 - 1.035    Blood, Urine SMALL (A) NEGATIVE    pH, Urine 5.0 5.0 - 8.0    Protein, UA NEGATIVE NEGATIVE MG/DL    Urobilinogen, Urine NEGATIVE 0.2 - 1.0 MG/DL    Nitrite Urine, Quantitative POSITIVE (A) NEGATIVE    Leukocyte Esterase, Urine TRACE (A) NEGATIVE    RBC, UA 1 0 - 6 /HPF    WBC, UA 11 (H) 0 - 5 /HPF    Bacteria, UA MANY (A) NEGATIVE /HPF    Squam Epithel, UA 1 /HPF    Mucus, UA MODERATE (A) NEGATIVE HPF    Trichomonas, UA NONE SEEN NONE SEEN /HPF    Hyaline Casts, UA 2 /LPF   Lactic Acid, Plasma   Result Value Ref Range    Lactate 1.8 0.4 - 2.0 mMOL/L   TSH without Reflex   Result Value Ref Range    TSH, High Sensitivity 1.410 0.270 - 4.20 uIu/ml   Magnesium   Result Value Ref Range    Magnesium 1.7 (L) 1.8 - 2.4 mg/dl   Comprehensive Metabolic Panel   Result Value Ref Range    Sodium 132 (L) 135 - 145 MMOL/L    Potassium 4.3 3.5 - 5.1 MMOL/L    Chloride 102 99 - 110 mMol/L    CO2 17 (L) 21 - 32 MMOL/L    BUN 37 (H) 6 - 23 MG/DL    CREATININE 1.0 0.6 - 1.1 MG/DL    Glucose 115 (H) 70 - 99 MG/DL    Calcium 9.0 8.3 - 10.6 MG/DL    Albumin 4.8 3.4 - 5.0 GM/DL    Total Protein 6.6 6.4 - 8.2 GM/DL    Total Bilirubin 0.2 0.0 - 1.0 MG/DL    ALT 7 (L) 10 - 40 U/L    AST 12 (L) 15 - 37 IU/L    Alkaline Phosphatase 52 40 - 129 IU/L    GFR Non- 57 (L) >60 mL/min/1.73m2    GFR African American >60 >60 mL/min/1.73m2    Anion Gap 13 4 - 16   Troponin   Result Value Ref Range    Troponin T <0.010 <0.01 NG/ML   Hemoglobin A1c   Result Value Ref Range    Hemoglobin A1C 5.8 4.2 - 6.3 %    eAG 120 mg/dL   POC Blood Glucose   Result Value Ref Range    Glucose 121 mg/dL    QC OK?  yes    POCT Glucose   Result Value Ref Range    POC Glucose 121 (H) 70 - 99 MG/DL   POCT Glucose   Result Value Ref Range    POC Glucose 92 70 - 99 MG/DL   EKG 12 Lead   Result Value Ref Range    Ventricular Rate 84 BPM    Atrial Rate 84 BPM    P-R Interval 172 ms    QRS Duration 88 ms    Q-T Interval 392 ms    QTc Calculation (Bazett) 463 ms    P Axis 71 degrees    R Axis 79 degrees    T Axis -86 degrees    Diagnosis       Normal sinus rhythm  ST & T wave abnormality, consider inferior ischemia  Prolonged QT  Abnormal ECG  When compared with ECG of 04-SEP-2020 23:36,  Nonspecific T wave abnormality no longer evident in Anterior leads  Confirmed by [facility] Kervin WOOD ([id number]) on 7/23/2021 4:14:02 PM     TYPE AND SCREEN   Result Value Ref Range    ABO/Rh A POSITIVE     Antibody Screen NEGATIVE     Unit Number [id number]     Component LEUKO-POOR RED CELLS     Unit Divison 00     Status ISSUED     Transfusion Status OK TO TRANSFUSE     Crossmatch Result COMPATIBLE      EKG    See supervising physician for EKG interpretation    RADIOLOGY    CT ABDOMEN PELVIS W IV CONTRAST Additional Contrast? None    Result Date: 7/23/2021  EXAMINATION: CT OF THE ABDOMEN AND PELVIS WITH CONTRAST 7/23/2021 1:25 pm TECHNIQUE: CT of the abdomen and pelvis was performed with the administration of intravenous contrast. Multiplanar reformatted images are provided for review. Dose modulation, iterative reconstruction, and/or weight based adjustment of the mA/kV was utilized to reduce the radiation dose to as low as reasonably achievable. COMPARISON: CT abdomen and pelvis angiogram 05/22/2019. HISTORY: ORDERING SYSTEM PROVIDED HISTORY: severe epigastric pain TECHNOLOGIST PROVIDED HISTORY: Reason for exam:->severe epigastric pain Additional Contrast?->None Decision Support Exception - unselect if not a suspected or confirmed emergency medical condition->Emergency Medical Condition (MA) Reason for Exam: severe epigastric pain Acuity: Acute Type of Exam: Initial Relevant Medical/Surgical History: 80 ML ISOVUE 370 FINDINGS: Lower Chest: Limited images through the lung bases demonstrate no acute process. Organs: The liver is unremarkable. The gallbladder is surgically absent. The biliary tree is within normal limits status post cholecystectomy. The pancreas, spleen, and bilateral adrenal glands are unremarkable. 4 mm nonobstructing stone in the lower pole the right kidney. 2.4 cm left renal cyst.  1.4 cm indeterminate cystic lesion in the upper pole of the left kidney measuring hyperdense to water but not significantly changed from 2019. This measured approximately 1 cm on 08/23/2016. No obstructive uropathy. GI/Bowel: No evidence of acute appendicitis. The colon is unremarkable. No bowel obstruction or wall thickening identified. Pelvis: The urinary bladder is normal in appearance. Status post hysterectomy. No free fluid. No pelvic or inguinal lymphadenopathy. Peritoneum/Retroperitoneum: The abdominal aorta is normal in caliber with severe atherosclerotic vascular disease. No retroperitoneal or mesenteric lymphadenopathy is identified. No free air or fluid is seen in the abdomen.  Bones/Soft Tissues: No acute osseous or soft tissue abnormality. 1. No acute abnormality in the abdomen or pelvis. 2. Status post cholecystectomy and hysterectomy. 3. 1.4 cm indeterminate cystic lesion in the left kidney unchanged from 2019 but mildly increased in size from 2016. Recommend further evaluation with nonemergent renal protocol MRI or CT. XR CHEST PORTABLE    Result Date: 7/23/2021  EXAMINATION: ONE XRAY VIEW OF THE CHEST 7/23/2021 12:21 pm COMPARISON: 08/28/2020 HISTORY: ORDERING SYSTEM PROVIDED HISTORY: Chest pain TECHNOLOGIST PROVIDED HISTORY: Reason for exam:->Chest pain Reason for Exam: Chest pain FINDINGS: Prior sternotomy with fracture of the two most cranial wires. Cardiomediastinal silhouette is normal in size. No pulmonary consolidation, pleural effusion, or pneumothorax. No acute osseous abnormality. No acute cardiopulmonary abnormality. ED COURSE & MEDICAL DECISION MAKING     Patient presents as above. Emergent etiology considered. She is coming with generalized malaise, epigastrium discomfort, chest pain, left-sided jaw pain with evidence of dark-colored stools. Overall her blood pressure was in the 90s on arrival.  She otherwise appeared stable, did demonstrate some epigastrium tenderness. Fecal occult blood showing positive for melanotic stool. Secondary to her blood pressure, we did provide fluid resuscitation, she was given 1 unit of packed red blood cells. Her hemoglobin did remain stable around 10. She was given 80 mg of Protonix. CT scan otherwise acutely negative. Touch base with gastroenterology that will follow the case. Will admit to the hospitalist team.  This patient was seen and evaluated by supervising physician. CRITICAL CARE NOTE:  There was a high probability of clinically significant life-threatening deterioration of the patient's condition requiring my urgent intervention due to gastrointestinal hemorrhage.   Frequent reevaluation, patient counseling, specialty consultations, IV Protonix, packed red blood cells transfusion was performed to address this. Total critical care time is  30 minutes. This includes vital sign monitoring, pulse oximetry monitoring, telemetry monitoring, clinical response to the IV medications, reviewing the nursing notes, consultation time, dictation/documentation time, and interpretation of the lab work. This time excludes time spent performing procedures and separately billable procedures and family discussion time. Vital signs and nursing notes reviewed during ED course. Clinical  IMPRESSION    1. Acute upper GI bleed    2. Abdominal pain, epigastric    3. Fatigue, unspecified type    4. Chest pain, unspecified type      Comment: Please note this report has been produced using speech recognition software and may contain errors related to that system including errors in grammar, punctuation, and spelling, as well as words and phrases that may be inappropriate. If there are any questions or concerns please feel free to contact the dictating provider for clarification.           Marcos Aguilar 411, PA  07/23/21 2695

## 2021-07-24 NOTE — OP NOTE
Operative Note      Patient: Daniel Junior  YOB: 1965  MRN: [id number]    Date of Procedure: 7/24/2021    [facility]      BRIEF OP REPORT:    Impression:    1) mild antral gastritis biopsy for H. pylori done   2) severe erosive duodenitis in duodenal.  Rest of duodenum normal   3) mild esophagitis rest of study normal        Suggest:   1) PPI twice a day   2) NSAIDs to minimal   3) can advance diet can DC from GI standpoint of view and will plan colonoscopy as outpatient     Full EGD/COLONOSCOPY report available by going to \"chart review\" then \"procedures\" then  \"EGD/Colonoscopy\"  then \"View Endoscopy Report\" Electronically signed by Tracie Ojeda MD on 7/24/2021 at 11:08 AM

## 2021-07-25 LAB
ALBUMIN SERPL-MCNC: 3.5 GM/DL (ref 3.4–5)
ALP BLD-CCNC: 45 IU/L (ref 40–128)
ALT SERPL-CCNC: <5 U/L (ref 10–40)
ANION GAP SERPL CALCULATED.3IONS-SCNC: 7 MMOL/L (ref 4–16)
AST SERPL-CCNC: 9 IU/L (ref 15–37)
BASOPHILS ABSOLUTE: 0.1 K/CU MM
BASOPHILS RELATIVE PERCENT: 1.1 % (ref 0–1)
BILIRUB SERPL-MCNC: 0.3 MG/DL (ref 0–1)
BUN BLDV-MCNC: 13 MG/DL (ref 6–23)
CALCIUM SERPL-MCNC: 8.6 MG/DL (ref 8.3–10.6)
CHLORIDE BLD-SCNC: 109 MMOL/L (ref 99–110)
CO2: 23 MMOL/L (ref 21–32)
CREAT SERPL-MCNC: 0.7 MG/DL (ref 0.6–1.1)
DIFFERENTIAL TYPE: ABNORMAL
EOSINOPHILS ABSOLUTE: 0.2 K/CU MM
EOSINOPHILS RELATIVE PERCENT: 4.2 % (ref 0–3)
GFR AFRICAN AMERICAN: >60 ML/MIN/1.73M2
GFR NON-AFRICAN AMERICAN: >60 ML/MIN/1.73M2
GLUCOSE BLD-MCNC: 123 MG/DL (ref 70–99)
GLUCOSE BLD-MCNC: 146 MG/DL (ref 70–99)
GLUCOSE BLD-MCNC: 173 MG/DL (ref 70–99)
GLUCOSE BLD-MCNC: 88 MG/DL (ref 70–99)
GLUCOSE BLD-MCNC: 90 MG/DL (ref 70–99)
HCT VFR BLD CALC: 29.6 % (ref 37–47)
HEMOGLOBIN: 9.7 GM/DL (ref 12.5–16)
IMMATURE NEUTROPHIL %: 0.4 % (ref 0–0.43)
LYMPHOCYTES ABSOLUTE: 1.7 K/CU MM
LYMPHOCYTES RELATIVE PERCENT: 31.3 % (ref 24–44)
MCH RBC QN AUTO: 31.2 PG (ref 27–31)
MCHC RBC AUTO-ENTMCNC: 32.8 % (ref 32–36)
MCV RBC AUTO: 95.2 FL (ref 78–100)
MONOCYTES ABSOLUTE: 0.4 K/CU MM
MONOCYTES RELATIVE PERCENT: 8 % (ref 0–4)
NUCLEATED RBC %: 0 %
PDW BLD-RTO: 13.3 % (ref 11.7–14.9)
PLATELET # BLD: 195 K/CU MM (ref 140–440)
PMV BLD AUTO: 10.7 FL (ref 7.5–11.1)
POTASSIUM SERPL-SCNC: 3.8 MMOL/L (ref 3.5–5.1)
RBC # BLD: 3.11 M/CU MM (ref 4.2–5.4)
SEGMENTED NEUTROPHILS ABSOLUTE COUNT: 2.9 K/CU MM
SEGMENTED NEUTROPHILS RELATIVE PERCENT: 55 % (ref 36–66)
SODIUM BLD-SCNC: 139 MMOL/L (ref 135–145)
TOTAL IMMATURE NEUTOROPHIL: 0.02 K/CU MM
TOTAL NUCLEATED RBC: 0 K/CU MM
TOTAL PROTEIN: 5.4 GM/DL (ref 6.4–8.2)
WBC # BLD: 5.3 K/CU MM (ref 4–10.5)

## 2021-07-25 PROCEDURE — 6370000000 HC RX 637 (ALT 250 FOR IP): Performed by: INTERNAL MEDICINE

## 2021-07-25 PROCEDURE — 2580000003 HC RX 258: Performed by: INTERNAL MEDICINE

## 2021-07-25 PROCEDURE — 6360000002 HC RX W HCPCS

## 2021-07-25 PROCEDURE — 96376 TX/PRO/DX INJ SAME DRUG ADON: CPT

## 2021-07-25 PROCEDURE — 80053 COMPREHEN METABOLIC PANEL: CPT

## 2021-07-25 PROCEDURE — 96375 TX/PRO/DX INJ NEW DRUG ADDON: CPT

## 2021-07-25 PROCEDURE — 6360000002 HC RX W HCPCS: Performed by: INTERNAL MEDICINE

## 2021-07-25 PROCEDURE — 94761 N-INVAS EAR/PLS OXIMETRY MLT: CPT

## 2021-07-25 PROCEDURE — 1200000000 HC SEMI PRIVATE

## 2021-07-25 PROCEDURE — C9113 INJ PANTOPRAZOLE SODIUM, VIA: HCPCS | Performed by: INTERNAL MEDICINE

## 2021-07-25 PROCEDURE — G0378 HOSPITAL OBSERVATION PER HR: HCPCS

## 2021-07-25 PROCEDURE — 96372 THER/PROPH/DIAG INJ SC/IM: CPT

## 2021-07-25 PROCEDURE — 85025 COMPLETE CBC W/AUTO DIFF WBC: CPT

## 2021-07-25 PROCEDURE — 82962 GLUCOSE BLOOD TEST: CPT

## 2021-07-25 PROCEDURE — 96366 THER/PROPH/DIAG IV INF ADDON: CPT

## 2021-07-25 PROCEDURE — 6360000002 HC RX W HCPCS: Performed by: NURSE PRACTITIONER

## 2021-07-25 RX ORDER — PROMETHAZINE HYDROCHLORIDE 25 MG/1
25 TABLET ORAL EVERY 6 HOURS PRN
Status: DISCONTINUED | OUTPATIENT
Start: 2021-07-25 | End: 2021-07-26 | Stop reason: HOSPADM

## 2021-07-25 RX ORDER — SUCRALFATE 1 G/1
1 TABLET ORAL EVERY 6 HOURS SCHEDULED
Status: DISCONTINUED | OUTPATIENT
Start: 2021-07-25 | End: 2021-07-26 | Stop reason: HOSPADM

## 2021-07-25 RX ORDER — PROMETHAZINE HYDROCHLORIDE 25 MG/ML
12.5 INJECTION, SOLUTION INTRAMUSCULAR; INTRAVENOUS EVERY 6 HOURS PRN
Status: DISCONTINUED | OUTPATIENT
Start: 2021-07-25 | End: 2021-07-26 | Stop reason: HOSPADM

## 2021-07-25 RX ORDER — MORPHINE SULFATE 2 MG/ML
INJECTION, SOLUTION INTRAMUSCULAR; INTRAVENOUS
Status: COMPLETED
Start: 2021-07-25 | End: 2021-07-25

## 2021-07-25 RX ADMIN — CEFTRIAXONE SODIUM 1000 MG: 1 INJECTION, POWDER, FOR SOLUTION INTRAMUSCULAR; INTRAVENOUS at 08:53

## 2021-07-25 RX ADMIN — MORPHINE SULFATE 2 MG: 4 INJECTION, SOLUTION INTRAMUSCULAR; INTRAVENOUS at 06:21

## 2021-07-25 RX ADMIN — MORPHINE SULFATE 2 MG: 4 INJECTION, SOLUTION INTRAMUSCULAR; INTRAVENOUS at 22:36

## 2021-07-25 RX ADMIN — RANOLAZINE 1000 MG: 500 TABLET, FILM COATED, EXTENDED RELEASE ORAL at 08:53

## 2021-07-25 RX ADMIN — SODIUM CHLORIDE, PRESERVATIVE FREE 10 ML: 5 INJECTION INTRAVENOUS at 06:26

## 2021-07-25 RX ADMIN — SUCRALFATE 1 G: 1 TABLET ORAL at 17:27

## 2021-07-25 RX ADMIN — PROMETHAZINE HYDROCHLORIDE 12.5 MG: 25 INJECTION INTRAMUSCULAR; INTRAVENOUS at 21:26

## 2021-07-25 RX ADMIN — BUSPIRONE HYDROCHLORIDE 15 MG: 5 TABLET ORAL at 08:53

## 2021-07-25 RX ADMIN — SODIUM CHLORIDE: 9 INJECTION, SOLUTION INTRAVENOUS at 21:14

## 2021-07-25 RX ADMIN — MORPHINE SULFATE 2 MG: 4 INJECTION, SOLUTION INTRAMUSCULAR; INTRAVENOUS at 14:29

## 2021-07-25 RX ADMIN — SUCRALFATE 1 G: 1 TABLET ORAL at 10:38

## 2021-07-25 RX ADMIN — PANTOPRAZOLE SODIUM 40 MG: 40 INJECTION, POWDER, FOR SOLUTION INTRAVENOUS at 17:26

## 2021-07-25 RX ADMIN — MORPHINE SULFATE 2 MG: 4 INJECTION, SOLUTION INTRAMUSCULAR; INTRAVENOUS at 18:25

## 2021-07-25 RX ADMIN — BUSPIRONE HYDROCHLORIDE 15 MG: 5 TABLET ORAL at 12:08

## 2021-07-25 RX ADMIN — ATORVASTATIN CALCIUM 80 MG: 80 TABLET, FILM COATED ORAL at 21:25

## 2021-07-25 RX ADMIN — BUSPIRONE HYDROCHLORIDE 15 MG: 5 TABLET ORAL at 21:25

## 2021-07-25 RX ADMIN — RANOLAZINE 1000 MG: 500 TABLET, FILM COATED, EXTENDED RELEASE ORAL at 21:26

## 2021-07-25 RX ADMIN — MORPHINE SULFATE 2 MG: 4 INJECTION, SOLUTION INTRAMUSCULAR; INTRAVENOUS at 02:20

## 2021-07-25 RX ADMIN — PANTOPRAZOLE SODIUM 40 MG: 40 INJECTION, POWDER, FOR SOLUTION INTRAVENOUS at 06:21

## 2021-07-25 RX ADMIN — GABAPENTIN 100 MG: 100 CAPSULE ORAL at 21:25

## 2021-07-25 RX ADMIN — MORPHINE SULFATE 2 MG: 2 INJECTION, SOLUTION INTRAMUSCULAR; INTRAVENOUS at 10:38

## 2021-07-25 RX ADMIN — CITALOPRAM 40 MG: 40 TABLET, FILM COATED ORAL at 08:53

## 2021-07-25 RX ADMIN — SODIUM CHLORIDE, PRESERVATIVE FREE 10 ML: 5 INJECTION INTRAVENOUS at 17:27

## 2021-07-25 RX ADMIN — SODIUM CHLORIDE, PRESERVATIVE FREE 10 ML: 5 INJECTION INTRAVENOUS at 08:53

## 2021-07-25 RX ADMIN — SODIUM CHLORIDE: 9 INJECTION, SOLUTION INTRAVENOUS at 06:22

## 2021-07-25 ASSESSMENT — PAIN DESCRIPTION - ORIENTATION
ORIENTATION: MID;UPPER

## 2021-07-25 ASSESSMENT — PAIN DESCRIPTION - DESCRIPTORS
DESCRIPTORS: ACHING

## 2021-07-25 ASSESSMENT — PAIN DESCRIPTION - ONSET
ONSET: GRADUAL

## 2021-07-25 ASSESSMENT — PAIN SCALES - GENERAL
PAINLEVEL_OUTOF10: 8
PAINLEVEL_OUTOF10: 0
PAINLEVEL_OUTOF10: 7
PAINLEVEL_OUTOF10: 8
PAINLEVEL_OUTOF10: 8

## 2021-07-25 ASSESSMENT — PAIN DESCRIPTION - PAIN TYPE
TYPE: ACUTE PAIN

## 2021-07-25 ASSESSMENT — PAIN - FUNCTIONAL ASSESSMENT
PAIN_FUNCTIONAL_ASSESSMENT: ACTIVITIES ARE NOT PREVENTED

## 2021-07-25 ASSESSMENT — PAIN DESCRIPTION - PROGRESSION
CLINICAL_PROGRESSION: GRADUALLY WORSENING
CLINICAL_PROGRESSION: GRADUALLY WORSENING
CLINICAL_PROGRESSION: NOT CHANGED
CLINICAL_PROGRESSION: GRADUALLY WORSENING

## 2021-07-25 ASSESSMENT — PAIN DESCRIPTION - LOCATION
LOCATION: ABDOMEN

## 2021-07-25 ASSESSMENT — PAIN DESCRIPTION - FREQUENCY
FREQUENCY: CONTINUOUS

## 2021-07-25 NOTE — PROGRESS NOTES
[address] [facility] - [facility]       Progress Note       2021  9:18 AM    Patient:    Huang Herr  : 1965   63 y.o. MRN: [id number]  Admitted: 2021 12:13 PM ATT: Abiodun Thornton MD   [id number]  AdmitDx: Abdominal pain, epigastric [R10.13]  GI bleed [K92.2]  Acute upper GI bleed [K92.2]  Fatigue, unspecified type [R53.83]  Chest pain, unspecified type [R07.9]  PCP: Tanna Sykes DO    ASSESSMENT AND PLAN :  Persisting upper abdominal pain somewhat better today  EGD gastritis and duodenitis  Continue Protonix  We will add Carafate  Reviewed all labs and CT scans  If tolerating p.o. diet can be discharged from GI standpoint can follow-up as outpatient    Patient Active Problem List   Diagnosis Code    TOS (thoracic outlet syndrome) G54.0    Coronary artery disease I25.10    Major depressive disorder, recurrent episode with anxious distress ([facility] F33.9    Diabetes mellitus ([facility] E11.9    Hyperlipidemia E78.5    Essential hypertension I10    Tobacco dependence F17.200    Overweight (BMI 25.0-29. 9) E66.3    Chronic midline low back pain with right-sided sciatica M54.41, G89.29    Acute left-sided low back pain with left-sided sciatica M54.42    Unstable angina pectoris due to coronary arteriosclerosis (HCC) I25.110    Change in mole D22.9    Eczema of hand L30.9    Arterial occlusion I70.90    PVD (peripheral vascular disease) ([facility] I73.9    Femoral artery occlusion ([facility]) I70.209    COPD (chronic obstructive pulmonary disease) ([facility] J44.9    CAD (coronary artery disease) I25.10    Acid reflux K21.9    Depression F32.9    Hx of migraines Z86.69    Hypertension I10    Kidney stones N20.0    Chest pain R07.9    GI bleed K92.2       Dr Montez Gross MD  2021  9:18 AM      SUBJECTIVE:  Chart reviewed, events noted  Patient feeling well. No complaints. Having BM. Tolerating p.o. diet. Less N/V.   Still complaining of some upper abdominal abdominal pain. ROS:  Cardiovascular ROS: No chest pain  Gastrointestinal ROS: see above  Respiratory ROS: No SOB    OBJECTIVE:      BP (!) 117/58   Pulse 74   Temp 97.7 °F (36.5 °C) (Oral)   Resp 16   Ht 5' 3\" (1.6 m)   Wt 169 lb 12.8 oz (77 kg)   SpO2 98%   BMI 30.08 kg/m²     NAD  RRR, Nl s1s2  Lungs CTA Bilaterally, normal effort  Abdomen soft, epigastric tendernessAAOx3, No asterixis     CBC:   Recent Labs     07/23/21  1230 07/23/21  2317 07/24/21  0552   WBC 8.7  --  6.9   HGB 10.8* 10.3* 10.0*     --  228     BMP:    Recent Labs     07/23/21  1230 07/23/21  1250 07/24/21  0552   *  --  140   K 4.3  --  3.7     --  110   CO2 17*  --  22   BUN 37*  --  20   CREATININE 1.0  --  0.8   GLUCOSE 115* 121 90     Magnesium:   Lab Results   Component Value Date    MG 1.7 07/23/2021     Hepatic:   Recent Labs     07/23/21  1230 07/24/21  0552   AST 12* 9*   ALT 7* 5*   BILITOT 0.2 0.3   ALKPHOS 52 45     INR:   Recent Labs     07/23/21  1230   INR 1.06         Intake/Output Summary (Last 24 hours) at 7/25/2021 9264  Last data filed at 7/24/2021 2231  Gross per 24 hour   Intake 370 ml   Output --   Net 370 ml         Medications      Prior to Admission medications    Medication Sig Start Date End Date Taking? Authorizing Provider   ibuprofen (IBU) 600 MG tablet Take 1 tablet by mouth every 6 hours as needed for Pain 4/20/20 5/20/20  Trisha Brown 1984 [facility], [place]   ranolazine (RANEXA) 1000 MG extended release tablet Take 1,000 mg by mouth 2 times daily     Historical Provider, MD   fenofibrate (TRICOR) 145 MG tablet Take 145 mg by mouth nightly 11/23/19   Historical Provider, MD   gabapentin (NEURONTIN) 100 MG capsule Take 100 mg by mouth nightly. 1/29/20   Historical Provider, MD   clonazePAM (KLONOPIN) 1 MG tablet Take 1 mg by mouth daily as needed.  2/10/20   Historical Provider, MD   lisinopril (PRINIVIL;ZESTRIL) 5 MG tablet Take 2.5 mg by mouth daily

## 2021-07-25 NOTE — PROGRESS NOTES
Hospitalist Progress Note      Name:  Nathalie Ponce /Age/Sex: 1965  (63 y.o. female)   MRN & CSN:  [id number] & [id number] Admission Date/Time: 2021 12:13 PM   Location:  [facility] PCP:  [facility] Day: 3    Assessment and Plan:       GI Bleed- presenting with melena.    Hemoglobin 10, s/p unit of packed red blood cells. S/p EGD with the finding of mild antral gastritis and severe erosive duodenitis  To continue on PPI twice a day avoid NSAID advance diet as tolerated  Patient still complain of upper abdominal pain  require IV pain medication  Blood pressure stable this morning   Continue statin/ holding ASA for now  GI on board     CAD s/p CABG x 3 vessel current chest pain r/o ACS. Concern for anginal source given risk factors and history. Initial troponin   EKG shows no acute ischemic changes. Repeat troponins were normal, patient no longer having chest pain.              Telemetry monitoring                          Cardiology consult. Follows with Dr. Allan Pay                 Statin/Ranexa/sl Nitro on medication regimen.                 Chronic anticoagulation-left Xarelto              Holding anticoagulation until cleared by the GI     Acute cystitis UA suspicious for pyuria              Pending cultures              IV Rocephin     HFpEF- appears compensated              TTE (2018) EF 50 to 55%, mild LVH, G2 DD, trace TR     DMII with chronic complications and with long term use of insulin.  Last A1c 5.4 (2019)              Continue glargine. Monitor FSBS and cover with SSI              Hold PO medications while inpatient              Continue gabapentin     Hypertension holding antihypertensive regimen for now she is currently hypotensive     COPD- stable. continue PRN and routine breathing treatments       Diet ADULT DIET;  Regular; 5 carb choices (75 gm/meal)   DVT Prophylaxis [] Lovenox, []  Heparin, [x] SCDs, [] Ambulation   GI Prophylaxis [x] PPI, [] H2 Blocker,  [] Carafate,  [] Diet/Tube Feeds   Code Status Full Code   Disposition Patient requires continued admission due to    MDM [] Low, [] Moderate,[]  High  Patient's risk as above due to      History of Present Illness: The patient was seen and examined at the bedside  Patient complain of upper abdominal pain which is 8 out of 10 in severity patient is not radiating constant associated with the nausea but no vomiting patient denies any change in bowel movement  Patient underwent EGD yesterday with the finding of acute gastritis, currently on IV pantoprazole twice a day. Patient has history of cholecystectomy before, CT abdomen pelvis nonacute show 1.4 cm intermediate cystic lesion in the left kidney unchanged from 2019. Objective: Intake/Output Summary (Last 24 hours) at 7/25/2021 0806  Last data filed at 7/24/2021 2231  Gross per 24 hour   Intake 370 ml   Output --   Net 370 ml      Vitals:   Vitals:    07/25/21 0532   BP: 131/61   Pulse: 69   Resp: 16   Temp: 98.1 °F (36.7 °C)   SpO2: 97%     Physical Exam:   GEN Awake.  Alert , not in respiratory distress, not in pain  HEENT: PEERLA, , supple neck,   Chest: air entry equal bilaterally, no wheezing or crepitation  Heart: S1 and S2 heard, no murmur, no gallop or rub, regular rate  Abdomen: soft, ND , upper abdomen tenderness on palpation, no rigidify , +BS  Extremities: no cyanosis, tenderness or erythema, peripheral pulses audible  Neurology: alert, oriented x3, able to move 4 limbs    Medications:   Medications:    sodium chloride flush  5-40 mL Intravenous 2 times per day    atorvastatin  80 mg Oral Daily    busPIRone  15 mg Oral TID    citalopram  40 mg Oral Daily    ranolazine  1,000 mg Oral BID    insulin glargine  30 Units Subcutaneous Nightly    gabapentin  100 mg Oral Nightly    cefTRIAXone (ROCEPHIN) IV  1,000 mg Intravenous Daily    insulin lispro  0-6 Units Subcutaneous Q4H    pantoprazole  40 mg Intravenous Q12H And    sodium chloride (PF)  10 mL Intravenous Q12H    sodium chloride flush  5-40 mL Intravenous 2 times per day      Infusions:    sodium chloride      sodium chloride 75 mL/hr at 07/25/21 0622    dextrose      sodium chloride       PRN Meds: sodium chloride flush, 5-40 mL, PRN  sodium chloride, 25 mL, PRN  ondansetron, 4 mg, Q8H PRN   Or  ondansetron, 4 mg, Q6H PRN  acetaminophen, 650 mg, Q6H PRN   Or  acetaminophen, 650 mg, Q6H PRN  clonazePAM, 1 mg, Daily PRN  morphine, 2 mg, Q4H PRN  glucose, 15 g, PRN  dextrose, 12.5 g, PRN  glucagon (rDNA), 1 mg, PRN  dextrose, 100 mL/hr, PRN  sodium chloride flush, 5-40 mL, PRN  sodium chloride, 25 mL, PRN          Electronically signed by Lisa Mays MD on 7/25/2021 at 8:06 AM

## 2021-07-25 NOTE — PROGRESS NOTES
Daily Progress Note  Awake and alert today  Pt. Denies any CP or SOB this AM  BP well controlled  Not on monitor  EGD done yesterday  Supportive care    Chest pain    Pain radiated into Jaw and left arm    Resolved today    Hx of CABG in 2012     Trops negative    EKG showed non specific ST-T wave changes    Will get stress test outpatient     GI bleed    GI following    Endoscopy yesterday showed, mild antral gastritis biopsy for H. pylori done, severe erosive duodenitis in duodenal.  Rest of duodenum normal, and mild esophagitis rest of study normal.    HGB yesterday 10.0    Chronic Anticoagulation    Continue to hold Xarelto     Stable from cardiac standpoint    Past medical history:    has a past medical history of Acid reflux, CAD (coronary artery disease), COPD (chronic obstructive pulmonary disease) ([address] Depression, Diabetes mellitus ([address] Eczema of hand, Hx of migraines, HX OTHER MEDICAL, Hyperlipidemia, Hypertension, Kidney stones, PVD (peripheral vascular disease) ([address] and Wears glasses. Past surgical history:   has a past surgical history that includes Dental surgery (2000's); Breast biopsy (Bilateral, 1990's-2000's); Tonsillectomy and adenoidectomy (1971); Hysterectomy, total abdominal (1988); Appendectomy (1988); other surgical history (Left, 4/4/2013); Bone Resection, Rib; Coronary angioplasty with stent; back surgery (Last Done 9-11/2013); Cholecystectomy (1988); Colonoscopy (last one 2014); Cardiac surgery (5-4-12); Cardiac catheterization; other surgical history; vascular surgery; and FEMORAL ENDARTERECTOMY (Right, 6/11/2019). Social History:   reports that she quit smoking about 2 years ago. Her smoking use included cigarettes. She has a 17.50 pack-year smoking history. She has never used smokeless tobacco. She reports that she does not drink alcohol and does not use drugs.   Family history:  family history includes Asthma in her brother, father, and mother; Diabetes in her mother; Early Death (age of onset: 25) in her son; Heart Disease in her father; High Blood Pressure in her father and mother; High Cholesterol in her father and mother; Mental Illness in her daughter and son. Objective:   /61   Pulse 69   Temp 98.1 °F (36.7 °C) (Oral)   Resp 16   Ht 5' 3\" (1.6 m)   Wt 169 lb 12.8 oz (77 kg)   SpO2 97%   BMI 30.08 kg/m²       Intake/Output Summary (Last 24 hours) at 7/25/2021 0746  Last data filed at 7/24/2021 2231  Gross per 24 hour   Intake 370 ml   Output --   Net 370 ml       Medications:   Scheduled Meds:   sodium chloride flush  5-40 mL Intravenous 2 times per day    atorvastatin  80 mg Oral Daily    busPIRone  15 mg Oral TID    citalopram  40 mg Oral Daily    ranolazine  1,000 mg Oral BID    insulin glargine  30 Units Subcutaneous Nightly    gabapentin  100 mg Oral Nightly    cefTRIAXone (ROCEPHIN) IV  1,000 mg Intravenous Daily    insulin lispro  0-6 Units Subcutaneous Q4H    pantoprazole  40 mg Intravenous Q12H    And    sodium chloride (PF)  10 mL Intravenous Q12H    sodium chloride flush  5-40 mL Intravenous 2 times per day      Infusions:   sodium chloride      sodium chloride 75 mL/hr at 07/25/21 0622    dextrose      sodium chloride        PRN Meds:  sodium chloride flush, sodium chloride, ondansetron **OR** ondansetron, acetaminophen **OR** acetaminophen, clonazePAM, morphine, glucose, dextrose, glucagon (rDNA), dextrose, sodium chloride flush, sodium chloride       Physical Exam:  Vitals:    07/25/21 0532   BP: 131/61   Pulse: 69   Resp: 16   Temp: 98.1 °F (36.7 °C)   SpO2: 97%        General: AAO, NAD  Chest: Nontender  Cardiac: First and Second Heart Sounds are Normal, No Murmurs or Gallops noted  Lungs:Clear to auscultation and percussion. Abdomen: Soft, NT, ND, +BS  Extremities: No clubbing, no edema  Vascular:  Equal 2+ peripheral pulses.         Lab Data:  CBC:   Recent Labs     07/23/21  1230 07/23/21  2317 07/24/21  0552   WBC 8.7  --  6.9 HGB 10.8* 10.3* 10.0*   HCT 33.5* 32.1* 31.2*   MCV 96.3  --  94.5     --  228     BMP:   Recent Labs     07/23/21  1230 07/24/21  0552   * 140   K 4.3 3.7    110   CO2 17* 22   BUN 37* 20   CREATININE 1.0 0.8     LIVER PROFILE:   Recent Labs     07/23/21  1230 07/24/21  0552   AST 12* 9*   ALT 7* 5*   LIPASE 33  --    BILITOT 0.2 0.3   ALKPHOS 52 45     PT/INR:   Recent Labs     07/23/21  1230   PROTIME 13.7   INR 1.06     APTT:   Recent Labs     07/23/21  1230   APTT 28.6     BNP:  No results for input(s): BNP in the last 72 hours.       Assessment:  Patient Active Problem List    Diagnosis Date Noted    TOS (thoracic outlet syndrome) 04/04/2013    GI bleed 07/23/2021    Chest pain 08/28/2020    COPD (chronic obstructive pulmonary disease) ([address]     CAD (coronary artery disease)     Acid reflux     Depression     Hx of migraines     Hypertension     Kidney stones     Femoral artery occlusion ([address] 06/11/2019    PVD (peripheral vascular disease) ([address] 05/23/2019    Arterial occlusion 05/22/2019    Eczema of hand     Change in mole 10/31/2018    Unstable angina pectoris due to coronary arteriosclerosis ([address] 06/10/2018    Acute left-sided low back pain with left-sided sciatica 01/15/2018    Chronic midline low back pain with right-sided sciatica 10/06/2017    Tobacco dependence 02/27/2017    Overweight (BMI 25.0-29.9) 02/27/2017    Hyperlipidemia 02/09/2016    Essential hypertension 02/09/2016    Diabetes mellitus ([address] 12/08/2013    Coronary artery disease 10/08/2013    Major depressive disorder, recurrent episode with anxious distress ([address] 10/08/2013       Electronically signed by SARI Geller CNP on 7/25/2021 at 7:46 AM  Patient seen and examined agree with above assessment and plan

## 2021-07-26 VITALS
SYSTOLIC BLOOD PRESSURE: 141 MMHG | TEMPERATURE: 98.1 F | WEIGHT: 169.8 LBS | RESPIRATION RATE: 16 BRPM | HEIGHT: 63 IN | DIASTOLIC BLOOD PRESSURE: 72 MMHG | HEART RATE: 61 BPM | OXYGEN SATURATION: 95 % | BODY MASS INDEX: 30.09 KG/M2

## 2021-07-26 PROBLEM — K92.2 GI BLEED: Status: RESOLVED | Noted: 2021-07-23 | Resolved: 2021-07-26

## 2021-07-26 LAB
ALBUMIN SERPL-MCNC: 3.7 GM/DL (ref 3.4–5)
ALP BLD-CCNC: 48 IU/L (ref 40–129)
ALT SERPL-CCNC: <5 U/L (ref 10–40)
ANION GAP SERPL CALCULATED.3IONS-SCNC: 6 MMOL/L (ref 4–16)
AST SERPL-CCNC: 8 IU/L (ref 15–37)
BASOPHILS ABSOLUTE: 0 K/CU MM
BASOPHILS RELATIVE PERCENT: 0.7 % (ref 0–1)
BILIRUB SERPL-MCNC: 0.1 MG/DL (ref 0–1)
BUN BLDV-MCNC: 13 MG/DL (ref 6–23)
CALCIUM SERPL-MCNC: 8.4 MG/DL (ref 8.3–10.6)
CHLORIDE BLD-SCNC: 111 MMOL/L (ref 99–110)
CO2: 26 MMOL/L (ref 21–32)
CREAT SERPL-MCNC: 0.8 MG/DL (ref 0.6–1.1)
CULTURE: ABNORMAL
CULTURE: ABNORMAL
DIFFERENTIAL TYPE: ABNORMAL
EOSINOPHILS ABSOLUTE: 0.2 K/CU MM
EOSINOPHILS RELATIVE PERCENT: 4.3 % (ref 0–3)
GFR AFRICAN AMERICAN: >60 ML/MIN/1.73M2
GFR NON-AFRICAN AMERICAN: >60 ML/MIN/1.73M2
GLUCOSE BLD-MCNC: 108 MG/DL (ref 70–99)
GLUCOSE BLD-MCNC: 141 MG/DL (ref 70–99)
GLUCOSE BLD-MCNC: 142 MG/DL (ref 70–99)
HCT VFR BLD CALC: 28.6 % (ref 37–47)
HEMOGLOBIN: 9.3 GM/DL (ref 12.5–16)
IMMATURE NEUTROPHIL %: 0.4 % (ref 0–0.43)
LYMPHOCYTES ABSOLUTE: 1.8 K/CU MM
LYMPHOCYTES RELATIVE PERCENT: 34.1 % (ref 24–44)
Lab: ABNORMAL
MCH RBC QN AUTO: 30.9 PG (ref 27–31)
MCHC RBC AUTO-ENTMCNC: 32.5 % (ref 32–36)
MCV RBC AUTO: 95 FL (ref 78–100)
MONOCYTES ABSOLUTE: 0.5 K/CU MM
MONOCYTES RELATIVE PERCENT: 8.6 % (ref 0–4)
NUCLEATED RBC %: 0 %
PDW BLD-RTO: 13.2 % (ref 11.7–14.9)
PLATELET # BLD: 198 K/CU MM (ref 140–440)
PMV BLD AUTO: 11.2 FL (ref 7.5–11.1)
POTASSIUM SERPL-SCNC: 3.8 MMOL/L (ref 3.5–5.1)
RBC # BLD: 3.01 M/CU MM (ref 4.2–5.4)
SEGMENTED NEUTROPHILS ABSOLUTE COUNT: 2.8 K/CU MM
SEGMENTED NEUTROPHILS RELATIVE PERCENT: 51.9 % (ref 36–66)
SODIUM BLD-SCNC: 143 MMOL/L (ref 135–145)
SPECIMEN: ABNORMAL
TOTAL IMMATURE NEUTOROPHIL: 0.02 K/CU MM
TOTAL NUCLEATED RBC: 0 K/CU MM
TOTAL PROTEIN: 5 GM/DL (ref 6.4–8.2)
WBC # BLD: 5.3 K/CU MM (ref 4–10.5)

## 2021-07-26 PROCEDURE — 2580000003 HC RX 258: Performed by: INTERNAL MEDICINE

## 2021-07-26 PROCEDURE — G0378 HOSPITAL OBSERVATION PER HR: HCPCS

## 2021-07-26 PROCEDURE — 80053 COMPREHEN METABOLIC PANEL: CPT

## 2021-07-26 PROCEDURE — 94761 N-INVAS EAR/PLS OXIMETRY MLT: CPT

## 2021-07-26 PROCEDURE — 82962 GLUCOSE BLOOD TEST: CPT

## 2021-07-26 PROCEDURE — 6370000000 HC RX 637 (ALT 250 FOR IP): Performed by: INTERNAL MEDICINE

## 2021-07-26 PROCEDURE — 96366 THER/PROPH/DIAG IV INF ADDON: CPT

## 2021-07-26 PROCEDURE — 96376 TX/PRO/DX INJ SAME DRUG ADON: CPT

## 2021-07-26 PROCEDURE — 6360000002 HC RX W HCPCS: Performed by: INTERNAL MEDICINE

## 2021-07-26 PROCEDURE — C9113 INJ PANTOPRAZOLE SODIUM, VIA: HCPCS | Performed by: INTERNAL MEDICINE

## 2021-07-26 PROCEDURE — 85025 COMPLETE CBC W/AUTO DIFF WBC: CPT

## 2021-07-26 PROCEDURE — 36415 COLL VENOUS BLD VENIPUNCTURE: CPT

## 2021-07-26 PROCEDURE — 85027 COMPLETE CBC AUTOMATED: CPT

## 2021-07-26 RX ORDER — PANTOPRAZOLE SODIUM 40 MG/1
40 TABLET, DELAYED RELEASE ORAL
Qty: 60 TABLET | Refills: 0 | Status: SHIPPED | OUTPATIENT
Start: 2021-07-26 | End: 2022-04-06

## 2021-07-26 RX ADMIN — SUCRALFATE 1 G: 1 TABLET ORAL at 01:40

## 2021-07-26 RX ADMIN — MORPHINE SULFATE 2 MG: 4 INJECTION, SOLUTION INTRAMUSCULAR; INTRAVENOUS at 08:29

## 2021-07-26 RX ADMIN — BUSPIRONE HYDROCHLORIDE 15 MG: 5 TABLET ORAL at 09:28

## 2021-07-26 RX ADMIN — CEFTRIAXONE SODIUM 1000 MG: 1 INJECTION, POWDER, FOR SOLUTION INTRAMUSCULAR; INTRAVENOUS at 09:10

## 2021-07-26 RX ADMIN — RANOLAZINE 1000 MG: 500 TABLET, FILM COATED, EXTENDED RELEASE ORAL at 09:28

## 2021-07-26 RX ADMIN — SODIUM CHLORIDE: 9 INJECTION, SOLUTION INTRAVENOUS at 10:18

## 2021-07-26 RX ADMIN — CITALOPRAM 40 MG: 40 TABLET, FILM COATED ORAL at 09:28

## 2021-07-26 RX ADMIN — MORPHINE SULFATE 2 MG: 4 INJECTION, SOLUTION INTRAMUSCULAR; INTRAVENOUS at 04:39

## 2021-07-26 RX ADMIN — SUCRALFATE 1 G: 1 TABLET ORAL at 05:52

## 2021-07-26 RX ADMIN — SODIUM CHLORIDE, PRESERVATIVE FREE 10 ML: 5 INJECTION INTRAVENOUS at 05:56

## 2021-07-26 RX ADMIN — PANTOPRAZOLE SODIUM 40 MG: 40 INJECTION, POWDER, FOR SOLUTION INTRAVENOUS at 05:52

## 2021-07-26 ASSESSMENT — PAIN SCALES - GENERAL
PAINLEVEL_OUTOF10: 7

## 2021-07-26 ASSESSMENT — PAIN DESCRIPTION - PROGRESSION
CLINICAL_PROGRESSION: NOT CHANGED

## 2021-07-26 NOTE — DISCHARGE SUMMARY
Discharge Summary  Scottie Artis MD, MD       Name:  Luz Greer /Age/Sex: 1965  (63 y.o. female)   MRN & CSN:  [id number] & [id number] Admission Date/Time: 2021 12:13 PM   Attending:  Scottie Artis MD Discharging Physician: Scottie Artis MD, MD     Hospital Course:   Luz Greer is a 63 y.o.  female  who presents with GI bleed    GI Bleed- presenting with melena. Hemoglobin 10, s/p unit of packed red blood cells. S/p EGD with the finding of mild antral gastritis and severe erosive duodenitis  To continue on PPI twice a day avoid NSAID. On the day of the discharge the patient was evaluated by cardiology and per patient she was told to take anticoagulation but hold aspirin. Was cleared by cardiology and GI the patient will be discharged in stable condition     CAD s/p CABG x 3 vessel current chest pain ACS ruled out. Concern for anginal source given risk factors and history. Initial troponin   EKG shows no acute ischemic changes. Cardiology consult. Follows with Dr. Geno Ashford as an outpatient. Reached out to cardiology prior to discharge. Will DC aspirin but continue Brilinta and Xarelto per their recommendations. Statin/Ranexa/sl Nitro on medication regimen. Chronic anticoagulation-left Xarelto              Holding anticoagulation until cleared by the GI     Acute cystitis UA suspicious for pyuria              Completed therapy     HFpEF- appears compensated              TTE (2018) EF 50 to 55%, mild LVH, G2 DD, trace TR     DMII with chronic complications and with long term use of insulin. Last A1c 5.4 (2019)              Resume home medications upon discharge     Hypertension holding antihypertensive regimen for now she is currently hypotensive     COPD- stable. continue PRN and routine breathing treatments    The patient expressed appropriate understanding of and agreement with the discharge recommendations, medications, and plan. Consults this admission:  IP CONSULT TO GI  IP CONSULT TO HOSPITALIST  IP CONSULT TO IV TEAM  IP CONSULT TO CARDIOLOGY    Discharge Instruction:     Follow up appointments:     Jn Bethea DO  [address] [place] [address] [phone]    Call in 1 day      Garnell Harada, MD  [address] [place] [address], [address] [place] [address]  [phone]    Call in 1 week  If symptoms worsen, As needed      Primary care physician:  within 2 weeks    Diet:  diabetic diet     Activity: activity as tolerated    Disposition: Discharged to:   [x]Home, [][facility], []SNF, []Acute Rehab, []Hospice     Condition on discharge: Stable    Discharge Medications:      Judah Mercadodevorah   Home Medication Instructions NTL:[id number]    Printed on:07/26/21 1007   Medication Information                      albuterol sulfate HFA (PROAIR HFA) 108 (90 Base) MCG/ACT inhaler  Inhale 2 puffs into the lungs every 6 hours as needed for Wheezing             atorvastatin (LIPITOR) 80 MG tablet  Take 80 mg by mouth daily              busPIRone (BUSPAR) 15 MG tablet  Take 15 mg by mouth 3 times daily             carvedilol (COREG) 12.5 MG tablet  Take 1 tablet by mouth 2 times daily (with meals)             citalopram (CELEXA) 40 MG tablet  Take 40 mg by mouth daily             clonazePAM (KLONOPIN) 1 MG tablet  Take 1 mg by mouth daily as needed. cyclobenzaprine (FLEXERIL) 10 MG tablet  Take 10 mg by mouth 3 times daily as needed             dapagliflozin (FARXIGA) 10 MG tablet  Take 10 mg by mouth every morning              fenofibrate (TRICOR) 145 MG tablet  Take 145 mg by mouth nightly             furosemide (LASIX) 20 MG tablet  Take 20 mg by mouth daily             gabapentin (NEURONTIN) 100 MG capsule  Take 100 mg by mouth nightly.              insulin glargine (BASAGLAR KWIKPEN) 100 UNIT/ML injection pen  Inject 25 Units into the skin nightly             JANUMET -1000 MG TB24  Take 1

## 2021-07-26 NOTE — PROGRESS NOTES
Daily Progress Note    Pt. Awake and alert today  HR stable, BP stable  No CP, SOB today     Being d/c home today  Keep on Brilianta and low dose AC  Hx of CAD and CABG and PCI  Continue to smoke     Chest pain    Pain radiated into Jaw and left arm    Resolved now    Hx of CABG in 2012     Trops negative    EKG showed non specific ST-T wave changes    Would consider stress test OP if necessary    Resume low dose xarelto and brilinta- no ASA     GI bleed    GI following    Endoscopy showed, mild antral gastritis biopsy for H. pylori done, severe erosive duodenitis in duodenal.  Rest of duodenum normal, and mild esophagitis rest of study normal.    HGB yesterday 10.0     Stable from cardiac standpoint for D/C today  F/u at office in 1-2 weeks     Past medical Jhon Mortimer a past medical history of Acid reflux, CAD (coronary artery disease), COPD (chronic obstructive pulmonary disease) ([address] Depression, Diabetes mellitus ([address] Eczema of hand, Hx of migraines, HX OTHER MEDICAL, Hyperlipidemia, Hypertension, Kidney stones, PVD (peripheral vascular disease) ([address] and Wears glasses. Past surgical history:   has a past surgical history that includes Dental surgery (2000's); Breast biopsy (Bilateral, 1990's-2000's); Tonsillectomy and adenoidectomy (1971); Hysterectomy, total abdominal (1988); Appendectomy (1988); other surgical history (Left, 4/4/2013); Bone Resection, Rib; Coronary angioplasty with stent; back surgery (Last Done 9-11/2013); Cholecystectomy (1988); Colonoscopy (last one 2014); Cardiac surgery (5-4-12); Cardiac catheterization; other surgical history; vascular surgery; and FEMORAL ENDARTERECTOMY (Right, 6/11/2019). Social History:   reports that she quit smoking about 2 years ago. Her smoking use included cigarettes. She has a 17.50 pack-year smoking history. She has never used smokeless tobacco. She reports that she does not drink alcohol and does not use drugs.   Family history:  family history includes Asthma in her brother, father, and mother; Diabetes in her mother; Early Death (age of onset: 25) in her son; Heart Disease in her father; High Blood Pressure in her father and mother; High Cholesterol in her father and mother; Mental Illness in her daughter and son. Objective:   BP (!) 141/72   Pulse 61   Temp 98.1 °F (36.7 °C) (Oral)   Resp 16   Ht 5' 3\" (1.6 m)   Wt 169 lb 12.8 oz (77 kg)   SpO2 95%   BMI 30.08 kg/m²   No intake or output data in the 24 hours ending 07/26/21 1115    Medications:   Scheduled Meds:   sucralfate  1 g Oral 4 times per day    sodium chloride flush  5-40 mL Intravenous 2 times per day    atorvastatin  80 mg Oral Daily    busPIRone  15 mg Oral TID    citalopram  40 mg Oral Daily    ranolazine  1,000 mg Oral BID    insulin glargine  30 Units Subcutaneous Nightly    gabapentin  100 mg Oral Nightly    cefTRIAXone (ROCEPHIN) IV  1,000 mg Intravenous Daily    insulin lispro  0-6 Units Subcutaneous Q4H    pantoprazole  40 mg Intravenous Q12H    And    sodium chloride (PF)  10 mL Intravenous Q12H    sodium chloride flush  5-40 mL Intravenous 2 times per day      Infusions:   sodium chloride      sodium chloride 75 mL/hr at 07/26/21 1018    dextrose      sodium chloride        PRN Meds:  promethazine, promethazine, sodium chloride flush, sodium chloride, acetaminophen **OR** acetaminophen, clonazePAM, morphine, glucose, dextrose, glucagon (rDNA), dextrose, sodium chloride flush, sodium chloride       Physical Exam:  Vitals:    07/26/21 1050   BP:    Pulse:    Resp:    Temp:    SpO2: 95%        General: AAO, NAD  Chest: Nontender  Cardiac: First and Second Heart Sounds are Normal, No Murmurs or Gallops noted  Lungs:Clear to auscultation and percussion. Abdomen: Soft, NT, ND, +BS  Extremities: No clubbing, no edema  Vascular:  Equal 2+ peripheral pulses.         Lab Data:  CBC:   Recent Labs     07/24/21  0552 07/25/21  0900 07/26/21  0147   WBC 6.9 5.3 5.3 HGB 10.0* 9.7* 9.3*   HCT 31.2* 29.6* 28.6*   MCV 94.5 95.2 95.0    195 198     BMP:   Recent Labs     07/24/21  0552 07/25/21  0900 07/26/21  0147    139 143   K 3.7 3.8 3.8    109 111*   CO2 22 23 26   BUN 20 13 13   CREATININE 0.8 0.7 0.8     LIVER PROFILE:   Recent Labs     07/23/21  1230 07/23/21  1230 07/24/21  0552 07/25/21  0900 07/26/21  0147   AST 12*   < > 9* 9* 8*   ALT 7*   < > 5* <5* <5*   LIPASE 33  --   --   --   --    BILITOT 0.2   < > 0.3 0.3 0.1   ALKPHOS 52   < > 45 45 48    < > = values in this interval not displayed. PT/INR:   Recent Labs     07/23/21  1230   PROTIME 13.7   INR 1.06     APTT:   Recent Labs     07/23/21  1230   APTT 28.6     BNP:  No results for input(s): BNP in the last 72 hours.       Assessment:  Patient Active Problem List    Diagnosis Date Noted    TOS (thoracic outlet syndrome) 04/04/2013    Unstable angina pectoris due to coronary arteriosclerosis ([address] 06/10/2018    Acute left-sided low back pain with left-sided sciatica 01/15/2018    Chronic midline low back pain with right-sided sciatica 10/06/2017    Tobacco dependence 02/27/2017    Overweight (BMI 25.0-29.9) 02/27/2017    Hyperlipidemia 02/09/2016    Essential hypertension 02/09/2016    Diabetes mellitus ([address] 12/08/2013    Coronary artery disease 10/08/2013    Major depressive disorder, recurrent episode with anxious distress ([address] 10/08/2013    Chest pain 08/28/2020    COPD (chronic obstructive pulmonary disease) ([address]     CAD (coronary artery disease)     Acid reflux     Depression     Hx of migraines     Hypertension     Kidney stones     Femoral artery occlusion ([address] 06/11/2019    PVD (peripheral vascular disease) ([address] 05/23/2019    Arterial occlusion 05/22/2019    Eczema of hand     Change in mole 10/31/2018       Electronically signed by Bladimir Grant PA-C on 7/26/2021 at 11:15 AM      Electronically signed by Raven Bueno MD on 7/26/21 at 2:03 PM EDT

## 2021-08-03 NOTE — PROGRESS NOTES
Physician Progress Note      Haim Mckeon  CSN #:                  [id number]  :                       1965  ADMIT DATE:       2021 12:13 PM  Berkley Allen DATE:        2021 9:52 AM  RESPONDING  PROVIDER #:        Geno CHARLES          QUERY TEXT:    Dear hospitalist,    Pt admitted with anemia and has anemia documented. If possible, please   document in progress notes and discharge summary further specificity regarding   the acuity and type of anemia:    The medical record reflects the following:  Risk Factors: GI bleed  Clinical Indicators: OP note: Reason for Consult:[CA.1]  Anemia and melena,    2) severe erosive duodenitis in duodenal.  Rest of duodenum normal.   Consult   GI: reason for consult: Anemia and melena. Admission H&H: 10.8, 33.5, now   9.3, 28.6. Treatment: admission, monitor, H&H, GI, Cardiology and PICC team consult,    Blood transfusion: 1 unit of PRBC,  EGD    Thank you  Cortez Amado. Mynor Garibay. PEGGY ChavarriaN, RN  Phone: [phone]  Options provided:  -- Anemia due to acute blood loss  -- Anemia due to chronic blood loss  -- Anemia due to acute on chronic blood loss  -- Other - I will add my own diagnosis  -- Disagree - Not applicable / Not valid  -- Disagree - Clinically unable to determine / Unknown  -- Refer to Clinical Documentation Reviewer    PROVIDER RESPONSE TEXT:    This patient has acute blood loss anemia.     Query created by: Grabiel Boyer on 8/3/2021 10:22 AM      Electronically signed by:  Sussy Sims 8/3/2021 3:31 PM

## 2022-04-06 ENCOUNTER — APPOINTMENT (OUTPATIENT)
Dept: ULTRASOUND IMAGING | Age: 57
End: 2022-04-06
Payer: COMMERCIAL

## 2022-04-06 ENCOUNTER — APPOINTMENT (OUTPATIENT)
Dept: CT IMAGING | Age: 57
End: 2022-04-06
Payer: COMMERCIAL

## 2022-04-06 ENCOUNTER — APPOINTMENT (OUTPATIENT)
Dept: GENERAL RADIOLOGY | Age: 57
End: 2022-04-06
Payer: COMMERCIAL

## 2022-04-06 ENCOUNTER — HOSPITAL ENCOUNTER (OUTPATIENT)
Age: 57
Setting detail: OBSERVATION
Discharge: HOME | End: 2022-04-08
Attending: INTERNAL MEDICINE | Admitting: INTERNAL MEDICINE
Payer: COMMERCIAL

## 2022-04-06 DIAGNOSIS — D64.9 ANEMIA, UNSPECIFIED TYPE: ICD-10-CM

## 2022-04-06 DIAGNOSIS — R10.13 ABDOMINAL PAIN, EPIGASTRIC: ICD-10-CM

## 2022-04-06 DIAGNOSIS — R55 SYNCOPE AND COLLAPSE: Primary | ICD-10-CM

## 2022-04-06 DIAGNOSIS — R07.9 CHEST PAIN, UNSPECIFIED TYPE: ICD-10-CM

## 2022-04-06 LAB
ABO/RH: NORMAL
ALBUMIN SERPL-MCNC: 4.4 GM/DL (ref 3.4–5)
ALP BLD-CCNC: 56 IU/L (ref 40–129)
ALT SERPL-CCNC: 17 U/L (ref 10–40)
ANION GAP SERPL CALCULATED.3IONS-SCNC: 12 MMOL/L (ref 4–16)
ANTIBODY SCREEN: NEGATIVE
APTT: 30.6 SECONDS (ref 25.1–37.1)
AST SERPL-CCNC: 18 IU/L (ref 15–37)
BACTERIA: NEGATIVE /HPF
BASOPHILS ABSOLUTE: 0.1 K/CU MM
BASOPHILS RELATIVE PERCENT: 1 % (ref 0–1)
BILIRUB SERPL-MCNC: 0.1 MG/DL (ref 0–1)
BILIRUBIN URINE: NEGATIVE MG/DL
BLOOD, URINE: NEGATIVE
BUN BLDV-MCNC: 28 MG/DL (ref 6–23)
CALCIUM SERPL-MCNC: 8.8 MG/DL (ref 8.3–10.6)
CHLORIDE BLD-SCNC: 104 MMOL/L (ref 99–110)
CLARITY: CLEAR
CO2: 21 MMOL/L (ref 21–32)
COLOR: ABNORMAL
CREAT SERPL-MCNC: 1 MG/DL (ref 0.6–1.1)
D DIMER: <200 NG/ML(DDU)
DIFFERENTIAL TYPE: ABNORMAL
EOSINOPHILS ABSOLUTE: 0.2 K/CU MM
EOSINOPHILS RELATIVE PERCENT: 2.5 % (ref 0–3)
ESTIMATED AVERAGE GLUCOSE: 111 MG/DL
GFR AFRICAN AMERICAN: >60 ML/MIN/1.73M2
GFR NON-AFRICAN AMERICAN: 57 ML/MIN/1.73M2
GLUCOSE BLD-MCNC: 123 MG/DL (ref 70–99)
GLUCOSE BLD-MCNC: 97 MG/DL (ref 70–99)
GLUCOSE, URINE: >1000 MG/DL
HBA1C MFR BLD: 5.5 % (ref 4.2–6.3)
HCT VFR BLD CALC: 29.9 % (ref 37–47)
HEMOGLOBIN: 8.8 GM/DL (ref 12.5–16)
IMMATURE NEUTROPHIL %: 0.6 % (ref 0–0.43)
INR BLD: 0.85 INDEX
KETONES, URINE: NEGATIVE MG/DL
LEUKOCYTE ESTERASE, URINE: NEGATIVE
LIPASE: 51 IU/L (ref 13–60)
LYMPHOCYTES ABSOLUTE: 2.8 K/CU MM
LYMPHOCYTES RELATIVE PERCENT: 30.3 % (ref 24–44)
MAGNESIUM: 2.1 MG/DL (ref 1.8–2.4)
MCH RBC QN AUTO: 32 PG (ref 27–31)
MCHC RBC AUTO-ENTMCNC: 29.4 % (ref 32–36)
MCV RBC AUTO: 108.7 FL (ref 78–100)
MONOCYTES ABSOLUTE: 0.7 K/CU MM
MONOCYTES RELATIVE PERCENT: 7 % (ref 0–4)
MUCUS: ABNORMAL HPF
NITRITE URINE, QUANTITATIVE: NEGATIVE
NUCLEATED RBC %: 0 %
PDW BLD-RTO: 13.9 % (ref 11.7–14.9)
PH, URINE: 7 (ref 5–8)
PLATELET # BLD: 250 K/CU MM (ref 140–440)
PMV BLD AUTO: 10.8 FL (ref 7.5–11.1)
POTASSIUM SERPL-SCNC: 3.9 MMOL/L (ref 3.5–5.1)
PRO-BNP: 318.5 PG/ML
PROTEIN UA: NEGATIVE MG/DL
PROTHROMBIN TIME: 11 SECONDS (ref 11.7–14.5)
RBC # BLD: 2.75 M/CU MM (ref 4.2–5.4)
RBC URINE: ABNORMAL /HPF (ref 0–6)
SEGMENTED NEUTROPHILS ABSOLUTE COUNT: 5.4 K/CU MM
SEGMENTED NEUTROPHILS RELATIVE PERCENT: 58.6 % (ref 36–66)
SODIUM BLD-SCNC: 137 MMOL/L (ref 135–145)
SPECIFIC GRAVITY UA: <1.005 (ref 1–1.03)
SQUAMOUS EPITHELIAL: <1 /HPF
TOTAL IMMATURE NEUTOROPHIL: 0.06 K/CU MM
TOTAL NUCLEATED RBC: 0 K/CU MM
TOTAL PROTEIN: 6.7 GM/DL (ref 6.4–8.2)
TRICHOMONAS: ABNORMAL /HPF
TROPONIN T: 0.01 NG/ML
TROPONIN T: 0.01 NG/ML
UROBILINOGEN, URINE: NORMAL MG/DL (ref 0.2–1)
WBC # BLD: 9.3 K/CU MM (ref 4–10.5)
WBC UA: ABNORMAL /HPF (ref 0–5)

## 2022-04-06 PROCEDURE — 85730 THROMBOPLASTIN TIME PARTIAL: CPT

## 2022-04-06 PROCEDURE — 83735 ASSAY OF MAGNESIUM: CPT

## 2022-04-06 PROCEDURE — 96374 THER/PROPH/DIAG INJ IV PUSH: CPT

## 2022-04-06 PROCEDURE — 70450 CT HEAD/BRAIN W/O DYE: CPT

## 2022-04-06 PROCEDURE — G0378 HOSPITAL OBSERVATION PER HR: HCPCS

## 2022-04-06 PROCEDURE — 84484 ASSAY OF TROPONIN QUANT: CPT

## 2022-04-06 PROCEDURE — C9113 INJ PANTOPRAZOLE SODIUM, VIA: HCPCS | Performed by: PHYSICIAN ASSISTANT

## 2022-04-06 PROCEDURE — 85379 FIBRIN DEGRADATION QUANT: CPT

## 2022-04-06 PROCEDURE — 6360000002 HC RX W HCPCS: Performed by: PHYSICIAN ASSISTANT

## 2022-04-06 PROCEDURE — 6370000000 HC RX 637 (ALT 250 FOR IP): Performed by: NURSE PRACTITIONER

## 2022-04-06 PROCEDURE — 2580000003 HC RX 258: Performed by: NURSE PRACTITIONER

## 2022-04-06 PROCEDURE — 36415 COLL VENOUS BLD VENIPUNCTURE: CPT

## 2022-04-06 PROCEDURE — 85610 PROTHROMBIN TIME: CPT

## 2022-04-06 PROCEDURE — 93005 ELECTROCARDIOGRAM TRACING: CPT | Performed by: PHYSICIAN ASSISTANT

## 2022-04-06 PROCEDURE — 99284 EMERGENCY DEPT VISIT MOD MDM: CPT

## 2022-04-06 PROCEDURE — 83690 ASSAY OF LIPASE: CPT

## 2022-04-06 PROCEDURE — 96361 HYDRATE IV INFUSION ADD-ON: CPT

## 2022-04-06 PROCEDURE — 96375 TX/PRO/DX INJ NEW DRUG ADDON: CPT

## 2022-04-06 PROCEDURE — 2580000003 HC RX 258: Performed by: PHYSICIAN ASSISTANT

## 2022-04-06 PROCEDURE — 71045 X-RAY EXAM CHEST 1 VIEW: CPT

## 2022-04-06 PROCEDURE — 6370000000 HC RX 637 (ALT 250 FOR IP): Performed by: PHYSICIAN ASSISTANT

## 2022-04-06 PROCEDURE — 83036 HEMOGLOBIN GLYCOSYLATED A1C: CPT

## 2022-04-06 PROCEDURE — 93880 EXTRACRANIAL BILAT STUDY: CPT

## 2022-04-06 PROCEDURE — 2580000003 HC RX 258: Performed by: INTERNAL MEDICINE

## 2022-04-06 PROCEDURE — 86900 BLOOD TYPING SEROLOGIC ABO: CPT

## 2022-04-06 PROCEDURE — 86901 BLOOD TYPING SEROLOGIC RH(D): CPT

## 2022-04-06 PROCEDURE — 72125 CT NECK SPINE W/O DYE: CPT

## 2022-04-06 PROCEDURE — 83880 ASSAY OF NATRIURETIC PEPTIDE: CPT

## 2022-04-06 PROCEDURE — 82962 GLUCOSE BLOOD TEST: CPT

## 2022-04-06 PROCEDURE — 85025 COMPLETE CBC W/AUTO DIFF WBC: CPT

## 2022-04-06 PROCEDURE — 86850 RBC ANTIBODY SCREEN: CPT

## 2022-04-06 PROCEDURE — 81001 URINALYSIS AUTO W/SCOPE: CPT

## 2022-04-06 PROCEDURE — 80053 COMPREHEN METABOLIC PANEL: CPT

## 2022-04-06 RX ORDER — PANTOPRAZOLE SODIUM 40 MG/10ML
40 INJECTION, POWDER, LYOPHILIZED, FOR SOLUTION INTRAVENOUS 2 TIMES DAILY
Status: DISCONTINUED | OUTPATIENT
Start: 2022-04-07 | End: 2022-04-08 | Stop reason: HOSPADM

## 2022-04-06 RX ORDER — 0.9 % SODIUM CHLORIDE 0.9 %
1000 INTRAVENOUS SOLUTION INTRAVENOUS ONCE
Status: COMPLETED | OUTPATIENT
Start: 2022-04-06 | End: 2022-04-06

## 2022-04-06 RX ORDER — ASPIRIN 325 MG
325 TABLET ORAL ONCE
Status: COMPLETED | OUTPATIENT
Start: 2022-04-06 | End: 2022-04-06

## 2022-04-06 RX ORDER — ROSUVASTATIN CALCIUM 40 MG/1
40 TABLET, COATED ORAL NIGHTLY
COMMUNITY

## 2022-04-06 RX ORDER — 0.9 % SODIUM CHLORIDE 0.9 %
500 INTRAVENOUS SOLUTION INTRAVENOUS ONCE
Status: COMPLETED | OUTPATIENT
Start: 2022-04-06 | End: 2022-04-06

## 2022-04-06 RX ORDER — DEXTROSE MONOHYDRATE 50 MG/ML
100 INJECTION, SOLUTION INTRAVENOUS PRN
Status: DISCONTINUED | OUTPATIENT
Start: 2022-04-06 | End: 2022-04-08 | Stop reason: HOSPADM

## 2022-04-06 RX ORDER — NICOTINE POLACRILEX 4 MG
15 LOZENGE BUCCAL PRN
Status: DISCONTINUED | OUTPATIENT
Start: 2022-04-06 | End: 2022-04-08 | Stop reason: HOSPADM

## 2022-04-06 RX ORDER — 0.9 % SODIUM CHLORIDE 0.9 %
1000 INTRAVENOUS SOLUTION INTRAVENOUS ONCE
Status: DISCONTINUED | OUTPATIENT
Start: 2022-04-06 | End: 2022-04-06

## 2022-04-06 RX ORDER — LIDOCAINE HYDROCHLORIDE 20 MG/ML
15 SOLUTION OROPHARYNGEAL ONCE
Status: DISCONTINUED | OUTPATIENT
Start: 2022-04-06 | End: 2022-04-08 | Stop reason: HOSPADM

## 2022-04-06 RX ORDER — FENOFIBRATE 160 MG/1
160 TABLET ORAL DAILY
Status: DISCONTINUED | OUTPATIENT
Start: 2022-04-06 | End: 2022-04-08 | Stop reason: HOSPADM

## 2022-04-06 RX ORDER — SODIUM CHLORIDE 0.9 % (FLUSH) 0.9 %
5-40 SYRINGE (ML) INJECTION PRN
Status: DISCONTINUED | OUTPATIENT
Start: 2022-04-06 | End: 2022-04-08 | Stop reason: HOSPADM

## 2022-04-06 RX ORDER — EMPAGLIFLOZIN 10 MG/1
10 TABLET, FILM COATED ORAL DAILY
COMMUNITY

## 2022-04-06 RX ORDER — DEXTROSE MONOHYDRATE 25 G/50ML
12.5 INJECTION, SOLUTION INTRAVENOUS PRN
Status: DISCONTINUED | OUTPATIENT
Start: 2022-04-06 | End: 2022-04-08 | Stop reason: HOSPADM

## 2022-04-06 RX ORDER — ONDANSETRON 4 MG/1
4 TABLET, ORALLY DISINTEGRATING ORAL EVERY 8 HOURS PRN
Status: DISCONTINUED | OUTPATIENT
Start: 2022-04-06 | End: 2022-04-08 | Stop reason: HOSPADM

## 2022-04-06 RX ORDER — SODIUM CHLORIDE 9 MG/ML
INJECTION, SOLUTION INTRAVENOUS PRN
Status: DISCONTINUED | OUTPATIENT
Start: 2022-04-06 | End: 2022-04-08 | Stop reason: HOSPADM

## 2022-04-06 RX ORDER — PANTOPRAZOLE SODIUM 40 MG/10ML
40 INJECTION, POWDER, LYOPHILIZED, FOR SOLUTION INTRAVENOUS ONCE
Status: COMPLETED | OUTPATIENT
Start: 2022-04-06 | End: 2022-04-06

## 2022-04-06 RX ORDER — OXYCODONE AND ACETAMINOPHEN 10; 325 MG/1; MG/1
1 TABLET ORAL EVERY 6 HOURS PRN
COMMUNITY

## 2022-04-06 RX ORDER — ROSUVASTATIN CALCIUM 20 MG/1
40 TABLET, COATED ORAL NIGHTLY
Status: DISCONTINUED | OUTPATIENT
Start: 2022-04-06 | End: 2022-04-08 | Stop reason: HOSPADM

## 2022-04-06 RX ORDER — RANOLAZINE 500 MG/1
1000 TABLET, EXTENDED RELEASE ORAL 2 TIMES DAILY
Status: DISCONTINUED | OUTPATIENT
Start: 2022-04-06 | End: 2022-04-08 | Stop reason: HOSPADM

## 2022-04-06 RX ORDER — MAGNESIUM HYDROXIDE/ALUMINUM HYDROXICE/SIMETHICONE 120; 1200; 1200 MG/30ML; MG/30ML; MG/30ML
30 SUSPENSION ORAL ONCE
Status: DISCONTINUED | OUTPATIENT
Start: 2022-04-06 | End: 2022-04-08 | Stop reason: HOSPADM

## 2022-04-06 RX ORDER — POLYETHYLENE GLYCOL 3350 17 G/17G
17 POWDER, FOR SOLUTION ORAL DAILY PRN
Status: DISCONTINUED | OUTPATIENT
Start: 2022-04-06 | End: 2022-04-08 | Stop reason: HOSPADM

## 2022-04-06 RX ORDER — FENTANYL CITRATE 50 UG/ML
25 INJECTION, SOLUTION INTRAMUSCULAR; INTRAVENOUS ONCE
Status: COMPLETED | OUTPATIENT
Start: 2022-04-06 | End: 2022-04-06

## 2022-04-06 RX ORDER — ONDANSETRON 2 MG/ML
4 INJECTION INTRAMUSCULAR; INTRAVENOUS EVERY 6 HOURS PRN
Status: DISCONTINUED | OUTPATIENT
Start: 2022-04-06 | End: 2022-04-08 | Stop reason: HOSPADM

## 2022-04-06 RX ORDER — ASPIRIN 325 MG
325 TABLET ORAL ONCE
Status: DISCONTINUED | OUTPATIENT
Start: 2022-04-06 | End: 2022-04-06

## 2022-04-06 RX ORDER — CITALOPRAM 20 MG/1
20 TABLET ORAL DAILY
Status: DISCONTINUED | OUTPATIENT
Start: 2022-04-07 | End: 2022-04-08 | Stop reason: HOSPADM

## 2022-04-06 RX ORDER — ACETAMINOPHEN 325 MG/1
650 TABLET ORAL EVERY 6 HOURS PRN
Status: DISCONTINUED | OUTPATIENT
Start: 2022-04-06 | End: 2022-04-08 | Stop reason: HOSPADM

## 2022-04-06 RX ORDER — GABAPENTIN 300 MG/1
300 CAPSULE ORAL 2 TIMES DAILY
Status: DISCONTINUED | OUTPATIENT
Start: 2022-04-06 | End: 2022-04-08 | Stop reason: HOSPADM

## 2022-04-06 RX ORDER — SODIUM CHLORIDE 0.9 % (FLUSH) 0.9 %
5-40 SYRINGE (ML) INJECTION EVERY 12 HOURS SCHEDULED
Status: DISCONTINUED | OUTPATIENT
Start: 2022-04-06 | End: 2022-04-08 | Stop reason: HOSPADM

## 2022-04-06 RX ORDER — SODIUM CHLORIDE 9 MG/ML
INJECTION, SOLUTION INTRAVENOUS CONTINUOUS
Status: DISCONTINUED | OUTPATIENT
Start: 2022-04-06 | End: 2022-04-08

## 2022-04-06 RX ORDER — ACETAMINOPHEN 650 MG/1
650 SUPPOSITORY RECTAL EVERY 6 HOURS PRN
Status: DISCONTINUED | OUTPATIENT
Start: 2022-04-06 | End: 2022-04-08 | Stop reason: HOSPADM

## 2022-04-06 RX ORDER — BUSPIRONE HYDROCHLORIDE 15 MG/1
30 TABLET ORAL 2 TIMES DAILY
Status: DISCONTINUED | OUTPATIENT
Start: 2022-04-06 | End: 2022-04-08 | Stop reason: HOSPADM

## 2022-04-06 RX ORDER — OXYCODONE HYDROCHLORIDE AND ACETAMINOPHEN 5; 325 MG/1; MG/1
2 TABLET ORAL EVERY 6 HOURS PRN
Status: DISCONTINUED | OUTPATIENT
Start: 2022-04-06 | End: 2022-04-08 | Stop reason: HOSPADM

## 2022-04-06 RX ADMIN — RANOLAZINE 1000 MG: 500 TABLET, FILM COATED, EXTENDED RELEASE ORAL at 20:38

## 2022-04-06 RX ADMIN — FENOFIBRATE 160 MG: 160 TABLET ORAL at 20:38

## 2022-04-06 RX ADMIN — FENTANYL CITRATE 25 MCG: 50 INJECTION INTRAMUSCULAR; INTRAVENOUS at 16:02

## 2022-04-06 RX ADMIN — SODIUM CHLORIDE: 9 INJECTION, SOLUTION INTRAVENOUS at 20:28

## 2022-04-06 RX ADMIN — ASPIRIN 325 MG: 325 TABLET ORAL at 15:59

## 2022-04-06 RX ADMIN — OXYCODONE AND ACETAMINOPHEN 2 TABLET: 5; 325 TABLET ORAL at 20:38

## 2022-04-06 RX ADMIN — ROSUVASTATIN CALCIUM 40 MG: 20 TABLET, FILM COATED ORAL at 20:38

## 2022-04-06 RX ADMIN — BUSPIRONE HYDROCHLORIDE 30 MG: 15 TABLET ORAL at 20:38

## 2022-04-06 RX ADMIN — SODIUM CHLORIDE 1000 ML: 9 INJECTION, SOLUTION INTRAVENOUS at 17:15

## 2022-04-06 RX ADMIN — GABAPENTIN 300 MG: 300 CAPSULE ORAL at 20:38

## 2022-04-06 RX ADMIN — SODIUM CHLORIDE 500 ML: 9 INJECTION, SOLUTION INTRAVENOUS at 15:37

## 2022-04-06 RX ADMIN — SODIUM CHLORIDE, PRESERVATIVE FREE 10 ML: 5 INJECTION INTRAVENOUS at 20:51

## 2022-04-06 RX ADMIN — PANTOPRAZOLE SODIUM 40 MG: 40 INJECTION, POWDER, FOR SOLUTION INTRAVENOUS at 17:26

## 2022-04-06 RX ADMIN — SODIUM CHLORIDE, PRESERVATIVE FREE 10 ML: 5 INJECTION INTRAVENOUS at 20:38

## 2022-04-06 ASSESSMENT — PAIN SCALES - GENERAL
PAINLEVEL_OUTOF10: 8
PAINLEVEL_OUTOF10: 8
PAINLEVEL_OUTOF10: 6

## 2022-04-06 ASSESSMENT — ENCOUNTER SYMPTOMS
SHORTNESS OF BREATH: 0
BLOOD IN STOOL: 1
NAUSEA: 0
COUGH: 0
VOMITING: 0
ABDOMINAL PAIN: 0

## 2022-04-06 NOTE — ED NOTES
[address] [facility] paged Dr Kami Moran  04/06/22 [id number]  1748 Dr Gloria Stubbs returned call      Makenzie Montoya  04/06/22 9521

## 2022-04-06 NOTE — H&P
History and Physical      Name:  Jermaine Marshall /Age/Sex: 1965  (61 y.o. female)   MRN & CSN:  [id number] & [id number] Admission Date/Time: 2022  2:06 PM   Location:  [facility]14 PCP: [address] [place] [address] Day: 1     Attending physician: Dr. Feng Barcenas and Plan:   Jermaine Marshall is a 61 y.o.  female  who presents with Chest pain and syncopal episode with collapse. Assessment and plan:     Chest pain rule out ACS. Suspect anginal given risk factors. Heart Score 5. Initial troponin 0.010. EKG shows normal sinus rhythm, heart rate 81, normal , ST-T wave abnormalities. Followed by Dr. Anatoly Owen,  Last Echo 2018-EF 50 to 55% DD grade 2. Last Stress 2018  -Observation  -Telemetry monitoring   -Trending troponin levels  -AM fasting lipids  -ECHO,TTE-pending  -Carotid Doppler ultrasound ordered by cardiology  -Pending labs for further risk stratification   -PO or IV pain medication PRN   -Cardiology consult for further assistance and evaluation appreciated    Acute blood loss anemia possible upper GI bleed: several days of dark stools. States she has not been on Xarelto for 2 weeks due to running out of medication,  but still has been taking her Brilinta. Reports hx of GI bleeding in the past.  H&H-8.8/29.9 on admission. Baseline hemoglobin around 10-12.  -Consult to GI  -Clear liquid diet  -Monitor H&H  -CBC daily  -Protonix 40 mg IV twice daily    Syncopal episode with collapse: Patient states she passed out this morning while getting ready for work. -2D echo pending  -Vascular carotid ultrasound pending  -Orthostatics bp pending  -Up with assistance-fall precautions  -Cardiology and GI following    CAD status post three-vessel CABG: Anticoagulated on Xarelto, platelet therapy Brilinta.   We will hold in the setting of possible GI bleed as above  -Cardiology consulted  -Continue statin therapy and fenofibrate    Diabetes Mellitus type II-controlled- with long tern use of insulin. managed on Jardiance, hold long acting antiglycemics, states no longer on basal insulin, last Hgb A1C-5.8, Blood glucose on admission -123. Patient states she no longer takes the [employer].  - SSI  - held home PO meds  - POCT glucose qACHS  - hypoglycemia management protocol   - target blood glucose 100-180  - ADA carb controlled diet    Hypotension in the setting of essential Hypertension- PTA regimen carvedilol and lisinopril. Monitor BP trends.   -Hold in the setting of hypertension  -Resume when blood pressure stabilizes    Chronic lower back pain: Managed on Percocet and gabapentin. Prescriptions active on [facility]  -Continue outpatient regimen    Chronic Conditions: Continue all home medications except as stated above or contraindicated. BMI 24.75: kg/m2 Life style modifications    Disposition: Observation. Patient was accepted for continue evaluation and treatment and improvement of clinical symptoms. Discussed assessment and treatment plan with the admitting and supervising physician who agrees with the plan. Diet ADULT DIET; Clear Liquid; No Caffeine   DVT Prophylaxis [] Lovenox, []  Heparin, [x] SCDs, [] Warfarin  [] NOAC     GI Prophylaxis [x] PPI,  [] H2 Blocker,  [] Carafate,  [] Diet/Tube Feeds   Code Status Full Code     History of Present Illness:     Chief Complaint: Chest pain  Charles Chan is a 61 y.o.  female, with past medical history significant for CAD status post CABG, hypertension, diabetes mellitus, chronic back pain, hyperlipidemia who presented to the emergency room with complaint of chest pain and syncopal episode. The present condition started this morning when she was in the bathroom applying her make-up. Patient states \"I passed out \"states she woke up to the dogs licking her face. Patient denies any headache lightheadedness or dizziness but states like her vision was black she had chest pain.   Patient states over the past 4 days she has had intermittent chest pains that resolved on known duration of left-sided chest pain before she woke up on the floor. Patient reports also- Color stools over the last several days. She does have a history of GI bleed. She is on Brilinta and also Xarelto. States she has not taken her Xarelto 2 weeks due to she ran out of medicine and it was too expensive to refill. Patient denies any dysuria, hematuria urgency or frequency. Denies any hematemesis or hematochezia. Denies any numbness or tingling or focal weakness in extremities. Denies headaches. Patient does complain of chronic lower back pain and is on Percocet, gabapentin and Flexeril. Patient states she does not remember passing out. On Examination,the patient is able to state history. At the ED, vital signs;T 98,HR 88, RR 18, /70 mm/hg,SPO2 100% RA, patient did have episodes of hypotension emergency room 73/63. Significant laboratories were the following: proBNP 318, troponin 0.010, hemoglobin 8.8, hematocrit 29.9  EKG-normal sinus rhythm, heart rate 81, normal . ST and T wave abnormalities noted. Reviewed by cardiology. Dr. Brooklynn Stinson saw patient in the emergency room. The patient was brought to the ED and was subsequently admitted for  further evaluation. and management          Review of Systems   Constitutional: Negative for chills, fatigue and fever. HENT: Negative for congestion. Respiratory: Negative for cough and shortness of breath. Cardiovascular: Positive for chest pain. Negative for palpitations. Gastrointestinal: Positive for blood in stool. Negative for abdominal pain, nausea and vomiting. Describes stools as very dark   Genitourinary: Negative for dysuria, frequency, hematuria and urgency. Musculoskeletal: Negative. Skin: Negative. Neurological: Positive for syncope. Negative for dizziness, weakness and headaches.         States she felt like her vision went black Psychiatric/Behavioral: Negative. Objective:   No intake or output data in the 24 hours ending 04/06/22 1804   Vitals:   Vitals:    04/06/22 1802   BP: 133/63   Pulse: 73   Resp: 17   Temp:    SpO2:      Physical Exam:   GEN- Awake female, NAD, sitting up in bed, appears to be stated age. EYES- Pupils are equally round. No scleral erythema, discharge, or conjunctivitis. HENT- Mucous membranes are moist. Oral pharynx without exudates, no evidence of thrush. NECK- Supple, no apparent thyromegaly or masses. RESP-Clear to auscultation, no wheezes, rales or rhonchi. Symmetric chest movement while on room air. CARDIO/VASC-  S1/S2 auscultated. Regular rate and rhythm,Peripheral pulses equal bilaterally and palpable. GI- Abdomen is soft, no tenderness, masses, or guarding. Bowel sounds present. MSK- No gross joint deformities. EXTREMITIES- pulses intact, no swelling, no calf tenderness  SKIN- Normal coloration, warm, dry. NEURO-Cranial nerves appear grossly intact, normal speech, no lateralizing weakness. PSYCH-Awake, alert, oriented x 4. Past Medical History:      Past Medical History:   Diagnosis Date    Acid reflux     CAD (coronary artery disease)     Sees Dr. Dolly Flowers    COPD (chronic obstructive pulmonary disease) ([facility]     \"have mild COPD- no pulmonologist\"    Depression     Diabetes mellitus ([facility] Dx 2001    Eczema of hand     Hx of migraines     HX OTHER MEDICAL     \"difficulty IV stick\"    Hyperlipidemia     Hypertension     follows with Dr Marianna Gallagher Kidney stones 2000's    \"Passed One Kidney Stone\"    PVD (peripheral vascular disease) ([facility]     Wears glasses      PSHX:  has a past surgical history that includes Dental surgery (2000's); Breast biopsy (Bilateral, 1990's-2000's); Tonsillectomy and adenoidectomy (1971); Hysterectomy, total abdominal (1988); Appendectomy (1988); other surgical history (Left, 4/4/2013);  Bone Resection, Rib; Coronary angioplasty with stent; back surgery (Last Done ); Cholecystectomy (); Colonoscopy (last one ); Cardiac surgery (12); Cardiac catheterization; other surgical history; vascular surgery; FEMORAL ENDARTERECTOMY (Right, 2019); and Upper gastrointestinal endoscopy (N/A, 2021). Allergies: Allergies   Allergen Reactions    Erythromycin Hives and Nausea And Vomiting    Lyrica [Pregabalin] Swelling    Codeine Palpitations    Imitrex [Sumatriptan] Anxiety     \"Makes Me Hyper\"    Ketorolac Tromethamine Other (See Comments)     \"Rapid Heart Rate\"    Prochlorperazine Edisylate Nausea And Vomiting    Tape Era Colder Tape] Itching     Blisters    Ultram [Tramadol] Itching       FAM HX: family history includes Asthma in her brother, father, and mother; Diabetes in her mother; Early Death (age of onset: 25) in her son; Heart Disease in her father; High Blood Pressure in her father and mother; High Cholesterol in her father and mother; Mental Illness in her daughter and son. Soc HX:   Social History     Socioeconomic History    Marital status:      Spouse name: Not on file    Number of children: Not on file    Years of education: Not on file    Highest education level: Not on file   Occupational History    Not on file   Tobacco Use    Smoking status: Former Smoker     Packs/day: 0.50     Years: 35.00     Pack years: 17.50     Types: Cigarettes     Quit date: 2019     Years since quittin.7    Smokeless tobacco: Never Used   Vaping Use    Vaping Use: Never used   Substance and Sexual Activity    Alcohol use: No     Alcohol/week: 0.0 standard drinks     Comment: \"quit drinking in - use to drink 5 days per week- average use to drink 6 beers each time\"    Drug use: No    Sexual activity: Yes     Partners: Male   Other Topics Concern    Not on file   Social History Narrative    Not on file     Social and family history reviewed with patient/family.      Medications:     Home Medication Prior to Admission medications    Medication Sig Start Date End Date Taking? Authorizing Provider   rosuvastatin (CRESTOR) 40 MG tablet Take 40 mg by mouth at bedtime   Yes Historical Provider, MD   empagliflozin (JARDIANCE) 10 MG tablet Take 10 mg by mouth daily   Yes Historical Provider, MD   oxyCODONE-acetaminophen (PERCOCET)  MG per tablet Take 1 tablet by mouth every 6 hours as needed for Pain. Yes Historical Provider, MD   ranolazine (RANEXA) 1000 MG extended release tablet Take 1,000 mg by mouth 2 times daily    Yes Historical Provider, MD   fenofibrate (TRICOR) 145 MG tablet Take 145 mg by mouth nightly 11/23/19  Yes Historical Provider, MD   gabapentin (NEURONTIN) 100 MG capsule Take 300 mg by mouth in the morning and at bedtime. 1/29/20  Yes Historical Provider, MD   lisinopril (PRINIVIL;ZESTRIL) 5 MG tablet Take 5 mg by mouth daily  11/23/19  Yes Historical Provider, MD   furosemide (LASIX) 20 MG tablet Take 20 mg by mouth daily 11/25/19  Yes Historical Provider, MD   citalopram (CELEXA) 40 MG tablet Take 20 mg by mouth daily  5/14/19  Yes Historical Provider, MD   cyclobenzaprine (FLEXERIL) 10 MG tablet Take 10 mg by mouth 3 times daily as needed 5/22/19  Yes Historical Provider, MD   rivaroxaban (XARELTO) 2.5 MG TABS tablet Take 1 tablet by mouth 2 times daily 5/7/19  Yes ADAMARIS Mclaughlin   busPIRone (BUSPAR) 15 MG tablet Take 15 mg by mouth 3 times daily  Patient taking differently: Take 30 mg by mouth in the morning and at bedtime  11/13/18  Yes Lizeth Wells MD   carvedilol (COREG) 12.5 MG tablet Take 1 tablet by mouth 2 times daily (with meals) 6/29/18  Yes Logan Mendoza MD   clonazePAM (KLONOPIN) 1 MG tablet Take 1 mg by mouth daily as needed. 2/10/20   Historical Provider, MD   nitroGLYCERIN (NITROSTAT) 0.4 MG SL tablet up to max of 3 total doses.  If no relief after 1 dose, call 911. 5/12/19   Loreta Mart MD   ticagrelor (BRILINTA) 90 MG TABS tablet Take 1 tablet by mouth 2 times daily 6/13/18 2/25/20  Lizy Manzanares MD   albuterol sulfate HFA (PROAIR HFA) 108 (90 Base) MCG/ACT inhaler Inhale 2 puffs into the lungs every 6 hours as needed for Wheezing 1/29/18   Daniele Frank MD     Medications:    aluminum & magnesium hydroxide-simethicone  30 mL Oral Once    lidocaine viscous hcl  15 mL Mouth/Throat Once    sodium chloride  1,000 mL IntraVENous Once      Infusions:    sodium chloride       PRN Meds:      Recent Labs     04/06/22  1424   WBC 9.3   HGB 8.8*   HCT 29.9*         Recent Labs     04/06/22  1424      K 3.9      CO2 21   BUN 28*   CREATININE 1.0     Recent Labs     04/06/22  1424   AST 18   ALT 17   BILITOT 0.1   ALKPHOS 56     No results for input(s): INR in the last 72 hours. Recent Labs     04/06/22  1424   TROPONINT 0.010*        Imaging reviewed  CT HEAD WO CONTRAST    Result Date: 4/6/2022  EXAMINATION: CT OF THE HEAD WITHOUT CONTRAST  4/6/2022 3:27 pm TECHNIQUE: CT of the head was performed without the administration of intravenous contrast. Dose modulation, iterative reconstruction, and/or weight based adjustment of the mA/kV was utilized to reduce the radiation dose to as low as reasonably achievable. COMPARISON: 03/01/2020 HISTORY: ORDERING SYSTEM PROVIDED HISTORY: closed head injury on [facility] TECHNOLOGIST PROVIDED HISTORY: Reason for exam:->closed head injury on AC Has a \"code stroke\" or \"stroke alert\" been called? ->No Decision Support Exception - unselect if not a suspected or confirmed emergency medical condition->Emergency Medical Condition (MA) Reason for Exam: CLOSED HEAD INJURY ON AC;LOC FINDINGS: BRAIN/VENTRICLES: There is no acute intracranial hemorrhage, mass effect or midline shift. No abnormal extra-axial fluid collection. The gray-white differentiation is maintained without evidence of an acute infarct. There is no evidence of hydrocephalus. Chronic lacunar infarcts in the left basal ganglia.  ORBITS: The visualized portion of the orbits demonstrate no acute abnormality. SINUSES: The visualized paranasal sinuses and mastoid air cells demonstrate no acute abnormality. SOFT TISSUES/SKULL:  No acute abnormality of the visualized skull or soft tissues. 1. No acute intracranial abnormality. 2. Chronic lacunar infarcts in the left basal ganglia. CT CERVICAL SPINE WO CONTRAST    Result Date: 4/6/2022  EXAMINATION: CT OF THE CERVICAL SPINE WITHOUT CONTRAST 4/6/2022 3:27 pm TECHNIQUE: CT of the cervical spine was performed without the administration of intravenous contrast. Multiplanar reformatted images are provided for review. Dose modulation, iterative reconstruction, and/or weight based adjustment of the mA/kV was utilized to reduce the radiation dose to as low as reasonably achievable. COMPARISON: None. HISTORY: ORDERING SYSTEM PROVIDED HISTORY: fall, closed head injury TECHNOLOGIST PROVIDED HISTORY: Reason for exam:->fall, closed head injury Decision Support Exception - unselect if not a suspected or confirmed emergency medical condition->Emergency Medical Condition (MA) Reason for Exam: fall, closed head injury,LOC FINDINGS: BONES/ALIGNMENT: There is no acute fracture or traumatic malalignment. DEGENERATIVE CHANGES: No significant degenerative changes. SOFT TISSUES: There is carotid atherosclerosis. No prevertebral edema. No acute abnormality of the cervical spine. XR CHEST PORTABLE    Result Date: 4/6/2022  EXAMINATION: ONE XRAY VIEW OF THE CHEST 4/6/2022 2:20 pm COMPARISON: Chest radiograph 07/23/2021. HISTORY: ORDERING SYSTEM PROVIDED HISTORY: Chest pain TECHNOLOGIST PROVIDED HISTORY: Reason for exam:->Chest pain Reason for Exam: Chest pain Additional signs and symptoms: NA Relevant Medical/Surgical History: DIABETES, HYPERTENSION FINDINGS: Status post median sternotomy. The cardiomediastinal silhouette is within normal limits. No pneumothorax, vascular congestion, consolidation, or pleural effusion is identified.   No acute osseous abnormality. No acute process.           Electronically signed by SARI Sanon CNP on 4/6/2022 at 6:04 PM

## 2022-04-06 NOTE — ED PROVIDER NOTES
12 lead EKG per my interpretation:  Normal Sinus Rhythm at 88  [place] is   Normal  QTc is  within an acceptable range  There is no specific T wave changes appreciated. There is specific ST wave changes appreciated; ST depressions in V4 through V6 as well as lead I, II and aVF. Latisha Skelton No STEMI    Prior EKG to compare with was available and ST-T changes are more pronounced on today's EKG when compared to prior.     MD Paul Pierre MD  04/06/22 3285

## 2022-04-06 NOTE — ED PROVIDER NOTES
EMERGENCY DEPARTMENT ENCOUNTER      PCP: Roni Vu, [facility]    Chief Complaint   Patient presents with    Loss of Consciousness     possibly hit head; on blood thinners       Of note, this patient was not evaluated by attending physician, attending physician was available for consultation. HPI    Catracho Nieves is a 61 y.o. female who presents to the emergency department today with a syncopal and collapse episode. Patient states that over the last 4 days she has had some intermittent chest pains. She says today after getting ready for work she began having some left-sided chest pain, she then states that she awoke on the floor. Called her primary care who advised to come emergency department, she had her daughter drive her in. Positive patient also admits that she is had some dark-colored stools over the last several days, does have a history of upper GI bleeding. She has extensive cardiac history, has had a bypass graft. Has follow-up with Dr. Carrie Bailey. REVIEW OF SYSTEMS    Constitutional:  Denies fever, chills, weight loss or weakness   HENT:  Denies sore throat or ear pain   Cardiovascular: See HPI. Respiratory:  See HPI. GI:  See HPI. :  Denies any urinary symptoms or vaginal symptoms.    Musculoskeletal:  Denies back pain,   Skin:  Denies rash  Neurologic:  Denies headache, focal weakness or sensory changes   Endocrine:  Denies polyuria or polydypsia   Lymphatic:  Denies swollen glands     All other review of systems are negative  See HPI and nursing notes for additional information       PAST MEDICAL & SURGICAL HISTORY    Past Medical History:   Diagnosis Date    Acid reflux     CAD (coronary artery disease)     Sees Dr. Ruiz Many    COPD (chronic obstructive pulmonary disease) ([facility]     \"have mild COPD- no pulmonologist\"    Depression     Diabetes mellitus ([facility] Dx 2001    Eczema of hand     Hx of migraines     HX OTHER MEDICAL     \"difficulty IV stick\"    Hyperlipidemia     Hypertension     follows with Dr Sergey Jacob Kidney stones 2000's    \"Passed One Kidney Stone\"    PVD (peripheral vascular disease) ([address]     Wears glasses      Past Surgical History:   Procedure Laterality Date    APPENDECTOMY  1988    Done During TOSHA    BACK SURGERY  Last Done 9-11/2013    \"4 Lower Back Surgeries, 2 Rods, 4 Pins At L5, S1 Put In During Last Surgery\"    BONE RESECTION, RIB      \"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- surgery 2013- ok since then\"    BREAST BIOPSY Bilateral 1990's-2000's    Benign    CARDIAC CATHETERIZATION      per pt \"had heart cath 5/10/2019\"   [address]  5-4-12    CABG (3 Bypasses)   [facility]    \"took with appendix and hyster    COLONOSCOPY  last one 2014    Dr. Jhonnie Bloch      6/28/2018 total of [address]  2000's    \"Dental Implants Upper And Lower\"    FEMORAL ENDARTERECTOMY Right 6/11/2019    RIGHT FEMORAL ENDARTERECTOMY WITH CORMATRIX PATCH performed by Donna Sandoval MD at [address] [place] [address]    Appendectomy Also Done    OTHER SURGICAL HISTORY Left 4/4/2013    Left transaxillary 1st rib resection    OTHER SURGICAL HISTORY      peripheral angiography 6/6/2019    TONSILLECTOMY AND ADENOIDECTOMY  1971    UPPER GASTROINTESTINAL ENDOSCOPY N/A 7/24/2021    EGD BIOPSY performed by Rain Mccord MD at [address] [place] [address]      \"5/22/2019\"they went in and unblocked the femoral artery on right side and then in again 6/6/2019\"put dye in -  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\"       CURRENT MEDICATIONS    Current Outpatient Rx   Medication Sig Dispense Refill    pantoprazole (PROTONIX) 40 MG tablet Take 1 tablet by mouth 2 times daily (before meals) 60 tablet 0    ranolazine (RANEXA) 1000 MG extended release tablet Take 1,000 mg by mouth 2 times daily       fenofibrate (TRICOR) 145 MG tablet Take 145 mg by mouth nightly  5    gabapentin (NEURONTIN) 100 MG capsule Take 100 mg by mouth nightly.  clonazePAM (KLONOPIN) 1 MG tablet Take 1 mg by mouth daily as needed.  lisinopril (PRINIVIL;ZESTRIL) 5 MG tablet Take 2.5 mg by mouth daily   5    furosemide (LASIX) 20 MG tablet Take 20 mg by mouth daily  11    JANUMET -1000 MG TB24 Take 1 tablet by mouth nightly  0    citalopram (CELEXA) 40 MG tablet Take 40 mg by mouth daily  2    cyclobenzaprine (FLEXERIL) 10 MG tablet Take 10 mg by mouth 3 times daily as needed      nitroGLYCERIN (NITROSTAT) 0.4 MG SL tablet up to max of 3 total doses.  If no relief after 1 dose, call 911. 25 tablet 3    atorvastatin (LIPITOR) 80 MG tablet Take 80 mg by mouth daily       dapagliflozin (FARXIGA) 10 MG tablet Take 10 mg by mouth every morning       rivaroxaban (XARELTO) 2.5 MG TABS tablet Take 1 tablet by mouth 2 times daily 60 tablet 0    busPIRone (BUSPAR) 15 MG tablet Take 15 mg by mouth 3 times daily 90 tablet 1    insulin glargine (BASAGLAR KWIKPEN) 100 UNIT/ML injection pen Inject 25 Units into the skin nightly (Patient taking differently: Inject 30 Units into the skin nightly ) 5 pen 0    carvedilol (COREG) 12.5 MG tablet Take 1 tablet by mouth 2 times daily (with meals) 60 tablet 0    ticagrelor (BRILINTA) 90 MG TABS tablet Take 1 tablet by mouth 2 times daily 60 tablet 0    albuterol sulfate HFA (PROAIR HFA) 108 (90 Base) MCG/ACT inhaler Inhale 2 puffs into the lungs every 6 hours as needed for Wheezing 2 Inhaler 0       ALLERGIES    Allergies   Allergen Reactions    Erythromycin Hives and Nausea And Vomiting    Lyrica [Pregabalin] Swelling    Codeine Palpitations    Imitrex [Sumatriptan] Anxiety     \"Makes Me Hyper\"    Ketorolac Tromethamine Other (See Comments)     \"Rapid Heart Rate\"    Prochlorperazine Edisylate Nausea And Vomiting    Tape Ned Fried Tape] Itching     Blisters    Ultram [Tramadol] Itching       SOCIAL & FAMILY HISTORY    Social History Socioeconomic History    Marital status:      Spouse name: Not on file    Number of children: Not on file    Years of education: Not on file    Highest education level: Not on file   Occupational History    Not on file   Tobacco Use    Smoking status: Former Smoker     Packs/day: 0.50     Years: 35.00     Pack years: 17.50     Types: Cigarettes     Quit date: 2019     Years since quittin.7    Smokeless tobacco: Never Used   Vaping Use    Vaping Use: Never used   Substance and Sexual Activity    Alcohol use: No     Alcohol/week: 0.0 standard drinks     Comment: \"quit drinking in - use to drink 5 days per week- average use to drink 6 beers each time\"    Drug use: No    Sexual activity: Yes     Partners: Male   Other Topics Concern    Not on file   Social History Narrative    Not on file     Social Determinants of Health     Financial Resource Strain:     Difficulty of Paying Living Expenses: Not on file   Food Insecurity:     Worried About Running Out of Food in the Last Year: Not on file    Judit of Food in the Last Year: Not on file   Transportation Needs:     Lack of Transportation (Medical): Not on file    Lack of Transportation (Non-Medical):  Not on file   Physical Activity:     Days of Exercise per Week: Not on file    Minutes of Exercise per Session: Not on file   Stress:     Feeling of Stress : Not on file   Social Connections:     Frequency of Communication with Friends and Family: Not on file    Frequency of Social Gatherings with Friends and Family: Not on file    Attends Christian Services: Not on file    Active Member of Clubs or Organizations: Not on file    Attends Club or Organization Meetings: Not on file    Marital Status: Not on file   Intimate Partner Violence:     Fear of Current or Ex-Partner: Not on file    Emotionally Abused: Not on file    Physically Abused: Not on file    Sexually Abused: Not on file   Housing Stability:     Unable to Pay for Housing in the Last Year: Not on file    Number of Places Lived in the Last Year: Not on file    Unstable Housing in the Last Year: Not on file     Family History   Problem Relation Age of Onset    Diabetes Mother     High Blood Pressure Mother     Asthma Mother     High Cholesterol Mother     High Blood Pressure Father     High Cholesterol Father     Asthma Father     Heart Disease Father         mvp    Asthma Brother     Mental Illness Son     Early Death Son 25        \"Suicide\"    Mental Illness Daughter         \"Bipolar\"       PHYSICAL EXAM    VITAL SIGNS: /70   Pulse 88   Temp 98 °F (36.7 °C) (Oral)   Resp 18   Ht 5' 7\" (1.702 m)   Wt 158 lb (71.7 kg)   SpO2 100%   BMI 24.75 kg/m²    Constitutional:  Well developed, well nourished, no acute distress   HENT:  Atraumatic, moist mucus membranes  Neck/Lymphatics: supple, no JVD, no swollen nodes  Respiratory:   Nonlabored breathing. Rate 18. Lungs CTAB, no retractions   Cardiovascular:  Rate regular, normal Rhythm,  no murmurs/rubs/gallops. No carotid bruits or murmurs heard in carotids. No JVD  GI:  Soft, nontender, normal bowel sounds  Musculoskeletal:    There is no edema, asymmetry, or calf / thigh tenderness bilaterally. No cyanosis. No cool or pale-appearing limb. Distal cap refill and pulses intact bilateral upper and lower extremities  Bilateral upper and lower extremity ROM intact without pain or obvious deficit  Integument:  Skin is warm and dry, no petechiae   Neurologic:  Alert & oriented, no slurred speech  Psych: Pleasant affect, no hallucinations      EKG    See supervising physician's note for EKG interpretations.     LABS:  Results for orders placed or performed during the hospital encounter of 04/06/22   CBC with Auto Differential   Result Value Ref Range    WBC 9.3 4.0 - 10.5 K/CU MM    RBC 2.75 (L) 4.2 - 5.4 M/CU MM    Hemoglobin 8.8 (L) 12.5 - 16.0 GM/DL    Hematocrit 29.9 (L) 37 - 47 %    .7 (H) 78 - 100 FL    MCH 32.0 (H) 27 - 31 PG    MCHC 29.4 (L) 32.0 - 36.0 %    RDW 13.9 11.7 - 14.9 %    Platelets 838 374 - 367 K/CU MM    MPV 10.8 7.5 - 11.1 FL    Differential Type AUTOMATED DIFFERENTIAL     Segs Relative 58.6 36 - 66 %    Lymphocytes % 30.3 24 - 44 %    Monocytes % 7.0 (H) 0 - 4 %    Eosinophils % 2.5 0 - 3 %    Basophils % 1.0 0 - 1 %    Segs Absolute 5.4 K/CU MM    Lymphocytes Absolute 2.8 K/CU MM    Monocytes Absolute 0.7 K/CU MM    Eosinophils Absolute 0.2 K/CU MM    Basophils Absolute 0.1 K/CU MM    Nucleated RBC % 0.0 %    Total Nucleated RBC 0.0 K/CU MM    Total Immature Neutrophil 0.06 K/CU MM    Immature Neutrophil % 0.6 (H) 0 - 0.43 %   Comprehensive Metabolic Panel   Result Value Ref Range    Sodium 137 135 - 145 MMOL/L    Potassium 3.9 3.5 - 5.1 MMOL/L    Chloride 104 99 - 110 mMol/L    CO2 21 21 - 32 MMOL/L    BUN 28 (H) 6 - 23 MG/DL    CREATININE 1.0 0.6 - 1.1 MG/DL    Glucose 123 (H) 70 - 99 MG/DL    Calcium 8.8 8.3 - 10.6 MG/DL    Albumin 4.4 3.4 - 5.0 GM/DL    Total Protein 6.7 6.4 - 8.2 GM/DL    Total Bilirubin 0.1 0.0 - 1.0 MG/DL    ALT 17 10 - 40 U/L    AST 18 15 - 37 IU/L    Alkaline Phosphatase 56 40 - 129 IU/L    GFR Non- 57 (L) >60 mL/min/1.73m2    GFR African American >60 >60 mL/min/1.73m2    Anion Gap 12 4 - 16   Troponin   Result Value Ref Range    Troponin T 0.010 (H) <0.01 NG/ML   D-Dimer, Quantitative   Result Value Ref Range    D-Dimer, Quant <200 <230 NG/mL(DDU)   Brain Natriuretic Peptide   Result Value Ref Range    Pro-.5 (H) <300 PG/ML   Magnesium   Result Value Ref Range    Magnesium 2.1 1.8 - 2.4 mg/dl   Lipase   Result Value Ref Range    Lipase 51 13 - 60 IU/L   EKG 12 Lead   Result Value Ref Range    Ventricular Rate 88 BPM    Atrial Rate 88 BPM    P-R Interval 150 ms    QRS Duration 80 ms    Q-T Interval 364 ms    QTc Calculation (Bazett) 440 ms    P Axis 66 degrees    R Axis 61 degrees    T Axis 223 degrees    Diagnosis       Normal sinus rhythm  Possible Left atrial enlargement  ST & T wave abnormality, consider inferolateral ischemia  Abnormal ECG  When compared with ECG of 23-JUL-2021 12:15,  T wave inversion now evident in Anterior leads     EKG 12 Lead   Result Value Ref Range    Ventricular Rate 81 BPM    Atrial Rate 81 BPM    P-R Interval 150 ms    QRS Duration 80 ms    Q-T Interval 354 ms    QTc Calculation (Bazett) 411 ms    P Axis 58 degrees    R Axis 57 degrees    T Axis 216 degrees    Diagnosis       Normal sinus rhythm  Possible Left atrial enlargement  ST & T wave abnormality, consider inferolateral ischemia  Abnormal ECG  When compared with ECG of 06-APR-2022 14:06,  No significant change was found     TYPE AND SCREEN   Result Value Ref Range    ABO/Rh A POSITIVE     Antibody Screen NEGATIVE      RADIOLOGY/PROCEDURES    CT HEAD WO CONTRAST    Result Date: 4/6/2022  EXAMINATION: CT OF THE HEAD WITHOUT CONTRAST  4/6/2022 3:27 pm TECHNIQUE: CT of the head was performed without the administration of intravenous contrast. Dose modulation, iterative reconstruction, and/or weight based adjustment of the mA/kV was utilized to reduce the radiation dose to as low as reasonably achievable. COMPARISON: 03/01/2020 HISTORY: ORDERING SYSTEM PROVIDED HISTORY: closed head injury on [facility] TECHNOLOGIST PROVIDED HISTORY: Reason for exam:->closed head injury on AC Has a \"code stroke\" or \"stroke alert\" been called? ->No Decision Support Exception - unselect if not a suspected or confirmed emergency medical condition->Emergency Medical Condition ([place]) Reason for Exam: CLOSED HEAD INJURY ON AC;LOC FINDINGS: BRAIN/VENTRICLES: There is no acute intracranial hemorrhage, mass effect or midline shift. No abnormal extra-axial fluid collection. The gray-white differentiation is maintained without evidence of an acute infarct. There is no evidence of hydrocephalus. Chronic lacunar infarcts in the left basal ganglia.  ORBITS: The visualized portion of the orbits demonstrate no acute abnormality. SINUSES: The visualized paranasal sinuses and mastoid air cells demonstrate no acute abnormality. SOFT TISSUES/SKULL:  No acute abnormality of the visualized skull or soft tissues. 1. No acute intracranial abnormality. 2. Chronic lacunar infarcts in the left basal ganglia. CT CERVICAL SPINE WO CONTRAST    Result Date: 4/6/2022  EXAMINATION: CT OF THE CERVICAL SPINE WITHOUT CONTRAST 4/6/2022 3:27 pm TECHNIQUE: CT of the cervical spine was performed without the administration of intravenous contrast. Multiplanar reformatted images are provided for review. Dose modulation, iterative reconstruction, and/or weight based adjustment of the mA/kV was utilized to reduce the radiation dose to as low as reasonably achievable. COMPARISON: None. HISTORY: ORDERING SYSTEM PROVIDED HISTORY: fall, closed head injury TECHNOLOGIST PROVIDED HISTORY: Reason for exam:->fall, closed head injury Decision Support Exception - unselect if not a suspected or confirmed emergency medical condition->Emergency Medical Condition ([place]) Reason for Exam: fall, closed head injury,LOC FINDINGS: BONES/ALIGNMENT: There is no acute fracture or traumatic malalignment. DEGENERATIVE CHANGES: No significant degenerative changes. SOFT TISSUES: There is carotid atherosclerosis. No prevertebral edema. No acute abnormality of the cervical spine. XR CHEST PORTABLE    Result Date: 4/6/2022  EXAMINATION: ONE XRAY VIEW OF THE CHEST 4/6/2022 2:20 pm COMPARISON: Chest radiograph 07/23/2021. HISTORY: ORDERING SYSTEM PROVIDED HISTORY: Chest pain TECHNOLOGIST PROVIDED HISTORY: Reason for exam:->Chest pain Reason for Exam: Chest pain Additional signs and symptoms: NA Relevant Medical/Surgical History: DIABETES, HYPERTENSION FINDINGS: Status post median sternotomy. The cardiomediastinal silhouette is within normal limits. No pneumothorax, vascular congestion, consolidation, or pleural effusion is identified.   No acute osseous abnormality. No acute process. ED COURSE & MEDICAL DECISION MAKING      Patient presents as above. Emergent etiologies considered. Patient seen and examined. Patient is presented emergency department today via private vehicle with a syncopal and collapse episode, 4 days of epigastrium/chest pain. Does admit that she had some dark-colored stools, has a history of upper GI bleeds. Is not anticoagulated. Does also have history of significant coronary artery disease. Patient is hemodynamically stable, her EKG is showing pronounced ST elevations in the anterior lateral leads, troponin was slightly detectable. Chest x-ray acutely negative. D-dimer negative. Did consult cardiology, podiatry did come by and see the patient, will continue fluids. Will provide Protonix. Will get GI involved as well. Her hemoglobin was stable at 8.8. At this point patient will be admitted to the medicine team, will be further monitored and evaluated. Vital signs and nursing notes reviewed during ED course. All pertinent Lab data and radiographic results reviewed with patient at bedside. The patient and/or the family were informed of the results of any tests/labs/imaging, the treatment plan, and time was allotted to answer questions. Clinical  IMPRESSION    1. Syncope and collapse    2. Chest pain, unspecified type    3. Abdominal pain, epigastric    4. Anemia, unspecified type          Comment: Please note this report has been produced using speech recognition software and may contain errors related to that system including errors in grammar, punctuation, and spelling, as well as words and phrases that may be inappropriate. If there are any questions or concerns please feel free to contact the dictating provider for clarification.                         ADAMARIS Smyth  04/06/22 0849

## 2022-04-06 NOTE — CONSULTS
Dictated -[id number]  Hx of CAD and CABG  Admit for now  Syncope possible orthostatic    Electronically signed by Anatoly Owen MD on 4/6/22 at 5:17 PM EDT  '

## 2022-04-06 NOTE — ED NOTES
[address] [facility] hospitalist     Alize Wynne  04/06/22 17 [facility] group returned call      Alize Wynne  04/06/22 2929

## 2022-04-06 NOTE — ED TRIAGE NOTES
Pt presents to ED from home for interrmitent chest pain x 4 days. Pt also had a syncopal episode this morning around 0745 states she possibly hit her head on the carpet but is unsure. Pt is on blood thinners.  Pt is A&O x3

## 2022-04-07 ENCOUNTER — ANESTHESIA (OUTPATIENT)
Dept: ENDOSCOPY | Age: 57
End: 2022-04-07
Payer: COMMERCIAL

## 2022-04-07 ENCOUNTER — ANESTHESIA EVENT (OUTPATIENT)
Dept: ENDOSCOPY | Age: 57
End: 2022-04-07
Payer: COMMERCIAL

## 2022-04-07 VITALS
RESPIRATION RATE: 14 BRPM | TEMPERATURE: 97.7 F | OXYGEN SATURATION: 96 % | DIASTOLIC BLOOD PRESSURE: 50 MMHG | SYSTOLIC BLOOD PRESSURE: 94 MMHG

## 2022-04-07 LAB
ANION GAP SERPL CALCULATED.3IONS-SCNC: 9 MMOL/L (ref 4–16)
BUN BLDV-MCNC: 19 MG/DL (ref 6–23)
CALCIUM SERPL-MCNC: 8.1 MG/DL (ref 8.3–10.6)
CHLORIDE BLD-SCNC: 115 MMOL/L (ref 99–110)
CO2: 20 MMOL/L (ref 21–32)
CREAT SERPL-MCNC: 0.8 MG/DL (ref 0.6–1.1)
EKG ATRIAL RATE: 73 BPM
EKG ATRIAL RATE: 75 BPM
EKG ATRIAL RATE: 81 BPM
EKG ATRIAL RATE: 88 BPM
EKG DIAGNOSIS: NORMAL
EKG P AXIS: 58 DEGREES
EKG P AXIS: 64 DEGREES
EKG P AXIS: 66 DEGREES
EKG P AXIS: 67 DEGREES
EKG P-R INTERVAL: 150 MS
EKG P-R INTERVAL: 162 MS
EKG Q-T INTERVAL: 350 MS
EKG Q-T INTERVAL: 354 MS
EKG Q-T INTERVAL: 364 MS
EKG Q-T INTERVAL: 398 MS
EKG QRS DURATION: 78 MS
EKG QRS DURATION: 80 MS
EKG QRS DURATION: 80 MS
EKG QRS DURATION: 86 MS
EKG QTC CALCULATION (BAZETT): 390 MS
EKG QTC CALCULATION (BAZETT): 411 MS
EKG QTC CALCULATION (BAZETT): 438 MS
EKG QTC CALCULATION (BAZETT): 440 MS
EKG R AXIS: 51 DEGREES
EKG R AXIS: 57 DEGREES
EKG R AXIS: 58 DEGREES
EKG R AXIS: 61 DEGREES
EKG T AXIS: 196 DEGREES
EKG T AXIS: 207 DEGREES
EKG T AXIS: 216 DEGREES
EKG T AXIS: 223 DEGREES
EKG VENTRICULAR RATE: 73 BPM
EKG VENTRICULAR RATE: 75 BPM
EKG VENTRICULAR RATE: 81 BPM
EKG VENTRICULAR RATE: 88 BPM
GFR AFRICAN AMERICAN: >60 ML/MIN/1.73M2
GFR NON-AFRICAN AMERICAN: >60 ML/MIN/1.73M2
GLUCOSE BLD-MCNC: 103 MG/DL (ref 70–99)
GLUCOSE BLD-MCNC: 107 MG/DL (ref 70–99)
GLUCOSE BLD-MCNC: 108 MG/DL (ref 70–99)
GLUCOSE BLD-MCNC: 120 MG/DL (ref 70–99)
GLUCOSE BLD-MCNC: 131 MG/DL (ref 70–99)
GLUCOSE BLD-MCNC: 92 MG/DL (ref 70–99)
HCT VFR BLD CALC: 24.5 % (ref 37–47)
HEMOGLOBIN: 7.4 GM/DL (ref 12.5–16)
MCH RBC QN AUTO: 31.4 PG (ref 27–31)
MCHC RBC AUTO-ENTMCNC: 30.2 % (ref 32–36)
MCV RBC AUTO: 103.8 FL (ref 78–100)
PDW BLD-RTO: 14 % (ref 11.7–14.9)
PLATELET # BLD: 203 K/CU MM (ref 140–440)
PMV BLD AUTO: 10.8 FL (ref 7.5–11.1)
POTASSIUM SERPL-SCNC: 4.3 MMOL/L (ref 3.5–5.1)
RBC # BLD: 2.36 M/CU MM (ref 4.2–5.4)
SODIUM BLD-SCNC: 144 MMOL/L (ref 135–145)
TROPONIN T: <0.01 NG/ML
WBC # BLD: 6.7 K/CU MM (ref 4–10.5)

## 2022-04-07 PROCEDURE — 94761 N-INVAS EAR/PLS OXIMETRY MLT: CPT

## 2022-04-07 PROCEDURE — 3700000000 HC ANESTHESIA ATTENDED CARE: Performed by: INTERNAL MEDICINE

## 2022-04-07 PROCEDURE — 2709999900 HC NON-CHARGEABLE SUPPLY: Performed by: INTERNAL MEDICINE

## 2022-04-07 PROCEDURE — 93010 ELECTROCARDIOGRAM REPORT: CPT | Performed by: INTERNAL MEDICINE

## 2022-04-07 PROCEDURE — 84484 ASSAY OF TROPONIN QUANT: CPT

## 2022-04-07 PROCEDURE — 96376 TX/PRO/DX INJ SAME DRUG ADON: CPT

## 2022-04-07 PROCEDURE — 85045 AUTOMATED RETICULOCYTE COUNT: CPT

## 2022-04-07 PROCEDURE — 3700000001 HC ADD 15 MINUTES (ANESTHESIA): Performed by: INTERNAL MEDICINE

## 2022-04-07 PROCEDURE — 93005 ELECTROCARDIOGRAM TRACING: CPT | Performed by: NURSE PRACTITIONER

## 2022-04-07 PROCEDURE — 85014 HEMATOCRIT: CPT

## 2022-04-07 PROCEDURE — G0378 HOSPITAL OBSERVATION PER HR: HCPCS

## 2022-04-07 PROCEDURE — 83550 IRON BINDING TEST: CPT

## 2022-04-07 PROCEDURE — 6360000002 HC RX W HCPCS: Performed by: NURSE PRACTITIONER

## 2022-04-07 PROCEDURE — 85027 COMPLETE CBC AUTOMATED: CPT

## 2022-04-07 PROCEDURE — 93308 TTE F-UP OR LMTD: CPT

## 2022-04-07 PROCEDURE — 3609017100 HC EGD: Performed by: INTERNAL MEDICINE

## 2022-04-07 PROCEDURE — 2500000003 HC RX 250 WO HCPCS

## 2022-04-07 PROCEDURE — 80048 BASIC METABOLIC PNL TOTAL CA: CPT

## 2022-04-07 PROCEDURE — 82746 ASSAY OF FOLIC ACID SERUM: CPT

## 2022-04-07 PROCEDURE — 2580000003 HC RX 258: Performed by: NURSE PRACTITIONER

## 2022-04-07 PROCEDURE — 36415 COLL VENOUS BLD VENIPUNCTURE: CPT

## 2022-04-07 PROCEDURE — 6370000000 HC RX 637 (ALT 250 FOR IP): Performed by: NURSE PRACTITIONER

## 2022-04-07 PROCEDURE — 6370000000 HC RX 637 (ALT 250 FOR IP): Performed by: STUDENT IN AN ORGANIZED HEALTH CARE EDUCATION/TRAINING PROGRAM

## 2022-04-07 PROCEDURE — 83540 ASSAY OF IRON: CPT

## 2022-04-07 PROCEDURE — 82962 GLUCOSE BLOOD TEST: CPT

## 2022-04-07 PROCEDURE — 85025 COMPLETE CBC W/AUTO DIFF WBC: CPT

## 2022-04-07 PROCEDURE — C9113 INJ PANTOPRAZOLE SODIUM, VIA: HCPCS | Performed by: NURSE PRACTITIONER

## 2022-04-07 PROCEDURE — 6360000002 HC RX W HCPCS

## 2022-04-07 PROCEDURE — 82728 ASSAY OF FERRITIN: CPT

## 2022-04-07 PROCEDURE — 82607 VITAMIN B-12: CPT

## 2022-04-07 RX ORDER — LANOLIN ALCOHOL/MO/W.PET/CERES
3 CREAM (GRAM) TOPICAL ONCE
Status: COMPLETED | OUTPATIENT
Start: 2022-04-07 | End: 2022-04-07

## 2022-04-07 RX ORDER — FOLIC ACID 1 MG/1
1 TABLET ORAL DAILY
Status: DISCONTINUED | OUTPATIENT
Start: 2022-04-07 | End: 2022-04-08 | Stop reason: HOSPADM

## 2022-04-07 RX ORDER — LIDOCAINE HYDROCHLORIDE 20 MG/ML
INJECTION, SOLUTION EPIDURAL; INFILTRATION; INTRACAUDAL; PERINEURAL PRN
Status: DISCONTINUED | OUTPATIENT
Start: 2022-04-07 | End: 2022-04-07 | Stop reason: SDUPTHER

## 2022-04-07 RX ORDER — LANOLIN ALCOHOL/MO/W.PET/CERES
1000 CREAM (GRAM) TOPICAL DAILY
Status: DISCONTINUED | OUTPATIENT
Start: 2022-04-07 | End: 2022-04-08 | Stop reason: HOSPADM

## 2022-04-07 RX ORDER — PROPOFOL 10 MG/ML
INJECTION, EMULSION INTRAVENOUS PRN
Status: DISCONTINUED | OUTPATIENT
Start: 2022-04-07 | End: 2022-04-07 | Stop reason: SDUPTHER

## 2022-04-07 RX ORDER — LANOLIN ALCOHOL/MO/W.PET/CERES
3 CREAM (GRAM) TOPICAL NIGHTLY PRN
Status: DISCONTINUED | OUTPATIENT
Start: 2022-04-07 | End: 2022-04-08 | Stop reason: HOSPADM

## 2022-04-07 RX ADMIN — FENOFIBRATE 160 MG: 160 TABLET ORAL at 10:32

## 2022-04-07 RX ADMIN — FOLIC ACID 1 MG: 1 TABLET ORAL at 17:37

## 2022-04-07 RX ADMIN — SODIUM CHLORIDE, PRESERVATIVE FREE 10 ML: 5 INJECTION INTRAVENOUS at 21:05

## 2022-04-07 RX ADMIN — RANOLAZINE 1000 MG: 500 TABLET, FILM COATED, EXTENDED RELEASE ORAL at 21:04

## 2022-04-07 RX ADMIN — PANTOPRAZOLE SODIUM 40 MG: 40 INJECTION, POWDER, FOR SOLUTION INTRAVENOUS at 08:52

## 2022-04-07 RX ADMIN — Medication 3 MG: at 00:30

## 2022-04-07 RX ADMIN — MELATONIN 3 MG: at 21:04

## 2022-04-07 RX ADMIN — GABAPENTIN 300 MG: 300 CAPSULE ORAL at 21:04

## 2022-04-07 RX ADMIN — LIDOCAINE HYDROCHLORIDE 100 MG: 20 INJECTION, SOLUTION EPIDURAL; INFILTRATION; INTRACAUDAL; PERINEURAL at 14:12

## 2022-04-07 RX ADMIN — GABAPENTIN 300 MG: 300 CAPSULE ORAL at 10:32

## 2022-04-07 RX ADMIN — BUSPIRONE HYDROCHLORIDE 30 MG: 15 TABLET ORAL at 21:04

## 2022-04-07 RX ADMIN — PROPOFOL 140 MG: 10 INJECTION, EMULSION INTRAVENOUS at 14:12

## 2022-04-07 RX ADMIN — ROSUVASTATIN CALCIUM 40 MG: 20 TABLET, FILM COATED ORAL at 21:04

## 2022-04-07 RX ADMIN — OXYCODONE AND ACETAMINOPHEN 2 TABLET: 5; 325 TABLET ORAL at 18:37

## 2022-04-07 RX ADMIN — CITALOPRAM HYDROBROMIDE 20 MG: 20 TABLET ORAL at 10:32

## 2022-04-07 RX ADMIN — RANOLAZINE 1000 MG: 500 TABLET, FILM COATED, EXTENDED RELEASE ORAL at 10:32

## 2022-04-07 RX ADMIN — OXYCODONE AND ACETAMINOPHEN 2 TABLET: 5; 325 TABLET ORAL at 12:02

## 2022-04-07 RX ADMIN — PANTOPRAZOLE SODIUM 40 MG: 40 INJECTION, POWDER, FOR SOLUTION INTRAVENOUS at 21:04

## 2022-04-07 RX ADMIN — CYANOCOBALAMIN TAB 1000 MCG 1000 MCG: 1000 TAB at 17:37

## 2022-04-07 ASSESSMENT — PAIN SCALES - GENERAL
PAINLEVEL_OUTOF10: 7
PAINLEVEL_OUTOF10: 1
PAINLEVEL_OUTOF10: 4
PAINLEVEL_OUTOF10: 8

## 2022-04-07 ASSESSMENT — PAIN DESCRIPTION - DESCRIPTORS: DESCRIPTORS: BURNING

## 2022-04-07 ASSESSMENT — PAIN - FUNCTIONAL ASSESSMENT: PAIN_FUNCTIONAL_ASSESSMENT: 0-10

## 2022-04-07 NOTE — OP NOTE
Operative Note      Patient: Gloria Ordaz  YOB: 1965  MRN: [id number]    Date of Procedure: 4/7/2022    [facility]      BRIEF OP REPORT:    Impression:    1) normal esophagus   2) stomach coated with flecks of blood that was easily washed after this there was an ulcer that was seen in the antrum approximately 8 mm in size deep cratered clean base no active bleed rest of the stomach nondiagnostic   3) duodenum normal        Suggest:   1) continue proton pump inhibitor twice a day   2) soft diet   3) keep hemoglobin more than    We will check for H. pylori antibiotics if positive will treat for H. pylori   Full EGD/COLONOSCOPY report available by going to \"chart review\" then \"procedures\" then  \"EGD/Colonoscopy\"  then \"View Endoscopy Report\"   Electronically signed by Dmitry Terrlel MD on 4/7/2022 at 2:33 PM

## 2022-04-07 NOTE — CARE COORDINATION
Chart reviewed. Pt is from home and appears independent. Pt has PCP and RX coverage. No DC needs identified. Please consult CM if any needs arise.

## 2022-04-07 NOTE — CONSULTS
[address] [place] [address], [address] [place] [address]                                  CONSULTATION    PATIENT NAME: Pedro Newsome                     :        1965  MED REC NO:   [id number]                          ROOM:       [id number]  ACCOUNT NO:   [de-identified]                           ADMIT DATE: 2022  PROVIDER:     Lemont Cheadle, MD    CONSULT DATE:  2022    INDICATION:  Syncope. HISTORY OF PRESENT ILLNESS:  This is a 68-year-old female patient with a  history of coronary artery disease, status post CABG done, and had  three-vessel disease. Her last heart catheterization was done in  2019. KEANE to LAD was patent. SVG to OM was patent. SVG to  diagonal was patent. The patient has extensive native vessel coronary  artery disease present. History of hypertension, hyperlipidemia, and  diabetes present. She is an ex-smoker. She uses some kind of tobacco  for now, but does not smoke. History of depression present. She comes  to the hospital with having syncopal episode present. The patient also  has peripheral vascular disease. She had a femoral endarterectomy  performed and patch was placed. The patient states she was at home and she has been dizzy, lightheaded. She got up today and then she had a syncopal episode. She was then  brought to the ER. Right now, she is stable. She has EKG, sinus  rhythm, some ST changes noted in the lateral leads present. PAST MEDICAL HISTORY:  Coronary artery disease, status post CABG done,  LIMA to LAD, SVG to OM, SVG to diagonal.  Last heart catheterization was  done in 2019. All the grafts were patent. Extensive native coronary  artery disease present. History of right common femoral artery patch  placement, peripheral vascular disease.     PAST SURGICAL HISTORY:  Three-vessel bypass surgery, LIMA to LAD, SVG to  OM, SVG to diagonal.  Left rib removal.  Gallbladder surgery,  tonsillectomy, back surgery, right common femoral endarterectomy  performed and right carotid endarterectomy performed. SOCIAL HISTORY:  She uses tobacco.  No alcohol use. 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 ALLERGIES:  Multiple. See the list.    MEDICATIONS:  She is on Coreg, lisinopril, Lasix, Brilinta, Xarelto. PHYSICAL EXAMINATION:  GENERAL:  The patient is awake, alert, and answering questions, not in  acute distress. VITAL SIGNS:  Temperature afebrile, pulse is 70, blood pressure is  94/46. HEENT:  Head is atraumatic. Pupils are equal and reactive to light. LUNGS:  Clear to auscultation. No wheezing or rhonchi. HEART:  Regular rate and rhythm. ABDOMEN:  Soft and nontender. Bowel sounds are present. No  hepatosplenomegaly or guarding. EXTREMITIES:  No cyanosis or clubbing noted. NEUROLOGIC:  Cranial nerves II through XII grossly intact. LABORATORY DATA:  BUN is 28, creatinine 1.0. Troponin is elevated, but  not positive, normal.  Hemoglobin 8.8, platelet count is 351,172. CT of the cervical spine is negative. CT of the head is negative. EKG  shows sinus rhythm with some ST depression noted in the lateral leads  present. IMPRESSION:  This is a 79-year-old female patient with a history of  significant coronary artery disease, status post CABG done, history of  diabetes, comes in with having syncopal episode. Most likely this is  dehydration related. For right now, at this time, we will admit the  patient to the hospital and watch her H and H. She did have GI bleed  she said back in May of last year, for which she needed scope. I think  this was done by Dr. Rain Melgar. We will make further recommendations based  on the hospital course.         Gunner Luna MD    D: 04/06/2022 17:16:33       T: 04/06/2022 21:04:18     ROEL/[email or web address] [email or web address]  Job#: [id number]     Doc#: [id number]    CC:

## 2022-04-07 NOTE — CONSULTS
[facility] Gastroenterology  Gastroenterology Consultation    2022  8:05 AM    Patient:    Zita Leyden  : 1965   61 y.o. MRN: [id number]  Admitted: 2022  2:06 PM ATT: Jacinta Zamora MD   [id number]  AdmitDx: Syncope and collapse [R55]  Abdominal pain, epigastric [R10.13]  Chest pain [R07.9]  Anemia, unspecified type [D64.9]  Chest pain, unspecified type [R07.9]  PCP: Nilda Babcock DO    Reason for Consult:  Syncope, GI Bleed     Requesting Physician:  Jacinta Zamora MD      History Obtained From:  Patient and review of all records    HISTORY OF PRESENT ILLNESS:                The patient is a 61 y.o. female with significant past medical history as below who presents with above mentioned causes in reason for consult. Presented to [facility] for chest pain . Per pt epigastric pain sharp stabbing started Sunay , Worse with eating. Noted to have melanotic stools. Hx CABAG. Last took blood thinners yesterday.      History of EGD 21 mild antral gastritis biopsy for H. pylori done                2) severe erosive duodenitis in duodenal.  Rest of duodenum normal                3) mild esophagitis rest of study normal    Denies ASA   denies NSAIDs  Anti-platelet therapy xarelto and brilinta     Smoker 1 PPD  Denies Alcohol intake    Past Medical History:        Diagnosis Date    Acid reflux     CAD (coronary artery disease)     Sees Dr. Dolly Flowers    COPD (chronic obstructive [facility] disease) ([facility]     \"have mild COPD- no pulmonologist\"    Depression     Diabetes mellitus ([facility] Dx     Eczema of hand     Hx of migraines     HX OTHER MEDICAL     \"difficulty IV stick\"    Hyperlipidemia     Hypertension     follows with Dr Marianna Gallagher Kidney stones 's    \"Passed One Kidney Stone\"    PVD (peripheral vascular disease) ([facility]     Wears glasses        Past Surgical History:        Procedure Laterality Date    APPENDECTOMY  79    Done During [facility]    BACK SURGERY  Last Done 9-11/2013    \"4 Lower Back Surgeries, 2 Rods, 4 Pins At L5, S1 Put In During Last Surgery\"    BONE RESECTION, RIB      \"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- surgery 2013- ok since then\"    BREAST BIOPSY Bilateral 1990's-2000's    Benign    CARDIAC CATHETERIZATION      per pt \"had heart cath 5/10/2019\"   [address]  5-4-12    CABG (3 Bypasses)   [address]    \"took with appendix and hyster    COLONOSCOPY  last one 2014    Dr. Briseida Bragg      6/28/2018 total of 9    DENTAL SURGERY  2000's    \"Dental Implants Upper And Lower\"    FEMORAL ENDARTERECTOMY Right 6/11/2019    RIGHT FEMORAL ENDARTERECTOMY WITH CORMATRIX PATCH performed by Dillan Anna MD at [address] [place] [address]    Appendectomy Also Done    OTHER SURGICAL HISTORY Left 4/4/2013    Left transaxillary 1st rib resection    OTHER SURGICAL HISTORY      peripheral angiography 6/6/2019    TONSILLECTOMY AND ADENOIDECTOMY  1971    UPPER GASTROINTESTINAL ENDOSCOPY N/A 7/24/2021    EGD BIOPSY performed by Luz Maria Perez MD at [address] [place] [address]      \"5/22/2019\"they went in and unblocked the femoral artery on right side and then in again 6/6/2019\"put dye in -  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\"         Current Medications:    Medications    Scheduled Medications:    aluminum & magnesium hydroxide-simethicone  30 mL Oral Once    lidocaine viscous hcl  15 mL Mouth/Throat Once    busPIRone  30 mg Oral BID    citalopram  20 mg Oral Daily    fenofibrate  160 mg Oral Daily    gabapentin  300 mg Oral BID    rosuvastatin  40 mg Oral Nightly    ranolazine  1,000 mg Oral BID    sodium chloride flush  5-40 mL IntraVENous 2 times per day    insulin lispro  0-12 Units SubCUTAneous TID WC    insulin lispro  0-6 Units SubCUTAneous Nightly    pantoprazole  40 mg IntraVENous BID     PRN Medications: oxyCODONE-acetaminophen, sodium chloride flush, sodium chloride, ondansetron **OR** ondansetron, acetaminophen **OR** acetaminophen, polyethylene glycol, glucose, dextrose, glucagon (rDNA), dextrose      Allergies:  Erythromycin, Lyrica [pregabalin], Codeine, Imitrex [sumatriptan], Ketorolac tromethamine, Prochlorperazine edisylate, Tape [adhesive tape], and Ultram [tramadol]    Social History:   TOBACCO:   reports that she quit smoking about 2 years ago. Her smoking use included cigarettes. She has a 17.50 pack-year smoking history. She has never used smokeless tobacco.  ETOH:   reports no history of alcohol use. Family History:       Problem Relation Age of Onset    Diabetes Mother     High Blood Pressure Mother     Asthma Mother     High Cholesterol Mother     High Blood Pressure Father     High Cholesterol Father     Asthma Father     Heart Disease Father         mvp    Asthma Brother     Mental Illness Son     Early Death Son 25        \"Suicide\"    Mental Illness Daughter         \"Bipolar\"   s. REVIEW OF SYSTEMS:    The positive ROS will be identified in bold, otherwise ROS are negative     CONSTITUTIONAL:  Neg   Recent weight changes, fatigue, fever, chills or night sweats  MOUTH/THROAT:  Neg  bleeding gums, hoarseness or sore throat. RESPIRATORY:   Neg SOB, wheeze, cough, sputum, hemoptysis or bronchitis  CARDIOVASCULAR:  Neg chest pain, palpitations, dyspnea on exertion, orthopnea, paroxysmal nocturnal dyspnea or edema  GASTROINTESTINAL:  SEE HPI    HEMATOLOGIC/LYMPHATIC:  Neg  Anemia, bleeding tendency  MUSCULOSKELETAL:    New myalgias, bone pain, joint pain, swelling or stiffness and has had change in gait. NEUROLOGICAL:  Neg  Loss of Consciousness, memory loss, forgetfulness, periods of confusion, difficulty concentrating, seizures, decline in intellect, nervousness, insomina, aphasia or dysarthria. SKIN:  Neg  skin or hair changes, and has no itching, rashes, or sores.   PSYCHIATRIC:  Neg depression, personality changes, anxiety. ENDOCRINE:  Neg polydipsia, polyuria, abnormal weight changes, heat /cold intolerance. ALL/IMM:  Neg reactions to drugs other than listed    PHYSICAL EXAM:      Vitals:    /62   Pulse 76   Temp 98.2 °F (36.8 °C) (Oral)   Resp 16   Ht 5' 7\" (1.702 m)   Wt 170 lb 10.2 oz (77.4 kg)   SpO2 91%   BMI 26.73 kg/m²     General Appearance:    Alert, cooperative, no distress, appears stated age   HEENT:    Normocephalic, atraumatic, Conjunctiva clear   Neck:   Supple, symmetrical, trachea midline   Lungs:     Clear to auscultation bilaterally, respirations unlabored   Chest Wall:    No tenderness or deformity    Heart:    Regular rate and rhythm, S1 and S2 normal   Abdomen:     Soft, non-tender, bowel sounds active all four quadrants,     no masses, no organomegaly, no ascites    Rectal:    Deferred   Extremities:   Extremities normal, atraumatic, no cyanosis or edema   Pulses:   2+ and symmetric all extremities   Skin:   Skin color pale, texture, turgor normal, no rashes or lesions       Neurologic:   No focal deficits, moving all four extremities            DATA:    ABGs:   Lab Results   Component Value Date    PO2ART 300 05/05/2012     CBC:   Recent Labs     04/06/22  1424 04/07/22  0159   WBC 9.3 6.7   HGB 8.8* 7.4*    203     BMP:    Recent Labs     04/06/22  1424 04/07/22  0159    144   K 3.9 4.3    115*   CO2 21 20*   BUN 28* 19   CREATININE 1.0 0.8   GLUCOSE 123* 92     Magnesium:   Lab Results   Component Value Date    MG 2.1 04/06/2022     Hepatic:   Recent Labs     04/06/22  1424   AST 18   ALT 17   BILITOT 0.1   ALKPHOS 56     Recent Labs     04/06/22  1424   LIPASE 51     Recent Labs     04/06/22 2102   PROTIME 11.0*   INR 0.85     No results for input(s): PTT in the last 72 hours. Lipids: No results for input(s): CHOL, HDL in the last 72 hours.     Invalid input(s): LDLCALCU  INR:   Recent Labs     04/06/22 2102   INR 0.85     TSH: No results found for: TSH  No intake or output data in the 24 hours ending 04/07/22 0805   sodium chloride 75 mL/hr at 04/06/22 2028    sodium chloride      dextrose         Imaging Studies:reviewed      IMPRESSION:      Patient Active Problem List   Diagnosis Code    TOS (thoracic outlet syndrome) G54.0    Coronary artery disease I25.10    Major depressive disorder, recurrent episode with anxious distress ([facility] F33.9    Diabetes mellitus ([facility] E11.9    Hyperlipidemia E78.5    Essential hypertension I10    Tobacco dependence F17.200    Overweight (BMI 25.0-29. 9) E66.3    Chronic midline low back pain with right-sided sciatica M54.41, G89.29    Acute left-sided low back pain with left-sided sciatica M54.42    Unstable angina pectoris due to coronary arteriosclerosis ([facility]) I25.110    Change in mole D22.9    Eczema of hand L30.9    Arterial occlusion I70.90    PVD (peripheral vascular disease) ([facility]) I73.9    Femoral artery occlusion ([facility]) I70.209    COPD (chronic obstructive pulmonary disease) ([facility]) J44.9    CAD (coronary artery disease) I25.10    Acid reflux K21.9    Depression F32. A    Hx of migraines Z86.69    Hypertension I10    Kidney stones N20.0    Chest pain R07.9       ASSESSMENT:  Syncope, GI Bleed   Hgb 7.4  Melana   May be PUD, or AVM     RECOMMENDATIONS:  Anemia Panel   Serial H&H   Transfuse to keep hgb > 7.0  HOLD anticoagulants, last took yesterday am   PPI BID    NPO   DIAGNOSTIC EGD today, consent signed in chart     Discussed plan of care with patient and RN    Patient clinical, biochemical, and radiological information discussed with Dr. Kadi Hummel. He agrees with the assessment and plan.      Cyril Carrillo, APRN - CNP, CNP  4/7/2022  8:05 AM     Melena  Drop in hemoglobin  Epigastric abdominal pain  Somewhat tenderness in the epigastrium on abdominal exam  Agree with keep hemoglobin more than 7  Hold anticoagulants  EGD today      I have seen and examined this patient personally, and independently of the nurse practitioner. The plan was developed mutually at the time of the visit with the patient. Teetee Kelly and myself have spoken with patient, nursing staff and provided written and verbal instructions .     The above note has been reviewed and I agree with the Assessment,  Diagnosis, and Treatment plan as suggested by Teetee Kelly CNP      [address] [place] [address] gastroenterology

## 2022-04-07 NOTE — PROGRESS NOTES
Daily Progress Note     Pt. Awake, alert and feeling ok this am  HR stable, NSR, BP stable  No CP this am, no SOB    Stable from cardiac stand  Hx of CAD medical treatment  Orthostatic BP stable  For EGD today  Hold AC for now   She needs to stay on antiplatelets   Significant hx of CAD     GI bleed    Hgb at 7.4    Dark stools noted at home    Holding brilinta and xarelto that she was on at home    Going for EGD today    Chest pain    Hx of CAD s/p CABG in 2019    Off blood thinners now d/t anemia     Trop elevated initially but trended back down- no ACS likely d/t anemia    CP is chronic and stable    Echo ordered to ensure no WMA     Monitor for now    Will follow  Appreciate GI recs    [facility]-12/5/19  IMPRESSION:  1. EDP is normal.  2.  Left system was not injected, is known to be occluded. 3.  Right coronary artery is mid 100% occluded. 4.  SVG to diagonal is patent. 5.  SVG to OM is patent. 6.  LIMA to LAD is widely patent. The LAD supplies the collateral to  right coronary artery. PAST MEDICAL HISTORY:  Coronary artery disease, status post CABG done,  LIMA to LAD, SVG to OM, SVG to diagonal.  Last heart catheterization was  done in 12/2019. All the grafts were patent. Extensive native coronary  artery disease present. History of right common femoral artery patch  placement, peripheral vascular disease.     PAST SURGICAL HISTORY:  Three-vessel bypass surgery, LIMA to LAD, SVG to  OM, SVG to diagonal.  Left rib removal.  Gallbladder surgery,  tonsillectomy, back surgery, right common femoral endarterectomy  performed and right carotid endarterectomy performed.     SOCIAL HISTORY:  She uses tobacco.  No alcohol use. 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 See the list.     MEDICATIONS:  She is on Coreg, lisinopril, Lasix, Brilinta, Xarelto.   Objective:   /62   Pulse 62   Temp 98.2 °F (36.8 °C) (Oral)   Resp 16   Ht 5' 7\" (1.702 m)   Wt 170 lb 10.2 oz (77.4 kg)   SpO2 91%   BMI 26.73 kg/m²   No intake or output data in the 24 hours ending 04/07/22 0942    Medications:   Scheduled Meds:   aluminum & magnesium hydroxide-simethicone  30 mL Oral Once    lidocaine viscous hcl  15 mL Mouth/Throat Once    busPIRone  30 mg Oral BID    citalopram  20 mg Oral Daily    fenofibrate  160 mg Oral Daily    gabapentin  300 mg Oral BID    rosuvastatin  40 mg Oral Nightly    ranolazine  1,000 mg Oral BID    sodium chloride flush  5-40 mL IntraVENous 2 times per day    insulin lispro  0-12 Units SubCUTAneous TID WC    insulin lispro  0-6 Units SubCUTAneous Nightly    pantoprazole  40 mg IntraVENous BID      Infusions:   sodium chloride 75 mL/hr at 04/06/22 2028    sodium chloride      dextrose        PRN Meds:  oxyCODONE-acetaminophen, sodium chloride flush, sodium chloride, ondansetron **OR** ondansetron, acetaminophen **OR** acetaminophen, polyethylene glycol, glucose, dextrose, glucagon (rDNA), dextrose       Physical Exam:  Vitals:    04/07/22 0847   BP:    Pulse: 62   Resp:    Temp:    SpO2:         General: AAO, NAD  Chest: Nontender  Cardiac: First and Second Heart Sounds are Normal, No Murmurs or Gallops noted  Lungs:Clear to auscultation and percussion. Abdomen: Soft, NT, ND, +BS  Extremities: No clubbing, no edema  Vascular:  Equal 2+ peripheral pulses.         Lab Data:  CBC:   Recent Labs     04/06/22  1424 04/07/22  0159   WBC 9.3 6.7   HGB 8.8* 7.4*   HCT 29.9* 24.5*   .7* 103.8*    203     BMP:   Recent Labs     04/06/22  1424 04/07/22  0159    144   K 3.9 4.3    115*   CO2 21 20*   BUN 28* 19   CREATININE 1.0 0.8     LIVER PROFILE:   Recent Labs     04/06/22  1424   AST 18   ALT 17   LIPASE 51   BILITOT 0.1   ALKPHOS 56     PT/INR:   Recent Labs     04/06/22  2102   PROTIME 11.0*   INR 0.85     APTT:   Recent Labs     04/06/22 2102   APTT 30.6     BNP:  No results for input(s): BNP in the last 72 hours.      Assessment:  Patient Active Problem List    Diagnosis Date Noted    TOS (thoracic outlet syndrome) 04/04/2013    Unstable angina pectoris due to coronary arteriosclerosis ([address] 06/10/2018    Acute left-sided low back pain with left-sided sciatica 01/15/2018    Chronic midline low back pain with right-sided sciatica 10/06/2017    Tobacco dependence 02/27/2017    Overweight (BMI 25.0-29.9) 02/27/2017    Hyperlipidemia 02/09/2016    Essential hypertension 02/09/2016    Diabetes mellitus ([address] 12/08/2013    Coronary artery disease 10/08/2013    Major depressive disorder, recurrent episode with anxious distress ([address] 10/08/2013    Chest pain 08/28/2020    COPD (chronic obstructive pulmonary disease) ([address]     CAD (coronary artery disease)     Acid reflux     Depression     Hx of migraines     Hypertension     Kidney stones     Femoral artery occlusion ([address] 06/11/2019    PVD (peripheral vascular disease) ([address] 05/23/2019    Arterial occlusion 05/22/2019    Eczema of hand     Change in mole 10/31/2018       Electronically signed by Juanjose Renteria PA-C on 4/7/2022 at 9:42 AM    Electronically signed by Michelle Luke MD on 4/7/22 at 12:23 PM EDT

## 2022-04-07 NOTE — PROGRESS NOTES
Hospitalist Progress Note      Name:  Agnel Almazan /Age/Sex: 1965  (61 y.o. female)   MRN & CSN:  [id number] & [id number] Admission Date/Time: 2022  2:06 PM   Location:  ENDO/NONE PCP:  [facility] Day: 2    Assessment and Plan:     Angel Almazan is a 61 y.o. female who presents with Chest pain and syncopal episode with collapse.     Assessment and plan     Atypical chest pain, resolved  Syncope  CAD s/p CABG,   Chest pain-free at the moment  Trop trended down  Follow-up Echo  Last Echo 2018 with EF 50 to 49%, grade 2 diastolic heart failure. Holding [facility] for now for EGD  Continue antiplatelets   Continue to monitor on Telemetry  Monitor orthostatic vitals  Follow-up Carotid Doppler ultrasound      Acute blood loss anemia, macrocytic  Suspected GI bleed  Ongoing melena. Holding Brilinta and Xarelto. Baseline hemoglobin 10-12; today 7.4  GI planning EGD today  CBC daily  Continue Protonix 40 mg IV twice daily  Add B12 and folate pills      Diabetes Mellitus type II  Sugars under control  Continue insulin regimen  last Hgb A1C-5.8  hypoglycemia management protocol      Hypotension  Essential Hypertension  Resume carvedilol and lisinopril when blood pressure stabilizes     Chronic lower back pain  on Percocet and gabapentin. Diet Diet NPO   DVT Prophylaxis [x] Lovenox, []  Heparin, [] SCDs, [] Ambulation   GI Prophylaxis [x] PPI,  [] H2 Blocker,  [] Carafate,  [] Diet/Tube Feeds   Code Status Full Code   Disposition Patient requires continued admission due to ongoing melena and GI work-up  The anticipated discharge is in less than 24 hours. MDM [] Low, [] Moderate,[x]  High       SUBJECTIVE:  Patient seen and examined at bedside. Alert and oriented x3. comfortable. Feeling better than yesterday. No acute distress. complaining of dark stools. Denies shortness of breath, chest pain, palpitations, fever, nausea, vomiting, diarrhea, headache or urinary symptoms. Hemoglobin 7.4 this morning. going for EGD. Ten point ROS reviewed negative, unless as noted above    Objective:   No intake or output data in the 24 hours ending 04/07/22 1315   Vitals:   Vitals:    04/07/22 1309   BP: (!) 116/45   Pulse:    Resp:    Temp:    SpO2:      Physical Exam:   GEN Awake female, sitting upright in bed in no apparent distress. Appears given age. EYES Pupils are equally round. No scleral erythema, discharge, or conjunctivitis. HENT Mucous membranes are moist. Oral pharynx without exudates, no evidence of thrush. NECK Supple, no apparent thyromegaly or masses. RESP Clear to auscultation, no wheezes, rales or rhonchi. Symmetric chest movement while on room air. CARDIO/VASC S1/S2 auscultated. Regular rate without appreciable murmurs, rubs, or gallops. No JVD or carotid bruits. Peripheral pulses equal bilaterally and palpable. No peripheral edema. GI Abdomen is soft without significant tenderness, masses, or guarding. Bowel sounds are normoactive. Rectal exam deferred.  No costovertebral angle tenderness. Normal appearing external genitalia. Vera catheter is not present. HEME/LYMPH No palpable cervical lymphadenopathy and no hepatosplenomegaly. No petechiae or ecchymoses. MSK No gross joint deformities. SKIN Normal coloration, warm, dry. NEURO Cranial nerves appear grossly intact, normal speech, no lateralizing weakness. PSYCH Awake, alert, oriented x 4. Affect appropriate.     Medications:   Medications:    aluminum & magnesium hydroxide-simethicone  30 mL Oral Once    lidocaine viscous hcl  15 mL Mouth/Throat Once    busPIRone  30 mg Oral BID    citalopram  20 mg Oral Daily    fenofibrate  160 mg Oral Daily    gabapentin  300 mg Oral BID    rosuvastatin  40 mg Oral Nightly    ranolazine  1,000 mg Oral BID    sodium chloride flush  5-40 mL IntraVENous 2 times per day    insulin lispro  0-12 Units SubCUTAneous TID     insulin lispro  0-6 Units SubCUTAneous Nightly    pantoprazole  40 mg IntraVENous BID      Infusions:    sodium chloride 75 mL/hr at 04/06/22 2028    sodium chloride      dextrose       PRN Meds: oxyCODONE-acetaminophen, 2 tablet, Q6H PRN  sodium chloride flush, 5-40 mL, PRN  sodium chloride, , PRN  ondansetron, 4 mg, Q8H PRN   Or  ondansetron, 4 mg, Q6H PRN  acetaminophen, 650 mg, Q6H PRN   Or  acetaminophen, 650 mg, Q6H PRN  polyethylene glycol, 17 g, Daily PRN  glucose, 15 g, PRN  dextrose, 12.5 g, PRN  glucagon (rDNA), 1 mg, PRN  dextrose, 100 mL/hr, PRN          Electronically signed by Kadie Bragg MD on 4/7/2022 at 1:15 PM

## 2022-04-07 NOTE — ANESTHESIA POSTPROCEDURE EVALUATION
Department of Anesthesiology  Postprocedure Note    Patient: Robyn Dexter  MRN: [id number]  YOB: 1965  Date of evaluation: 4/7/2022  Time:  2:19 PM     Procedure Summary     Date: 04/07/22 Room / Location: [address]    Anesthesia Start: 1400 Anesthesia Stop: 6344    Procedure: EGD DIAGNOSTIC ONLY (N/A ) Diagnosis: (Abdominal pain)    Surgeons: Anthony Swan MD Responsible Provider: Darshana Dumont DO    Anesthesia Type: MAC ASA Status: 3          Anesthesia Type: MAC    Jonh Phase I:      Jonh Phase II:      Last vitals: Reviewed and per EMR flowsheets.        Anesthesia Post Evaluation    Patient location during evaluation: bedside  Patient participation: complete - patient participated  Level of consciousness: awake and alert  Pain score: 0  Airway patency: patent  Nausea & Vomiting: no nausea and no vomiting  Complications: no  Cardiovascular status: blood pressure returned to baseline and hemodynamically stable  Respiratory status: acceptable, spontaneous ventilation and room air

## 2022-04-07 NOTE — ANESTHESIA PRE PROCEDURE
Department of Anesthesiology  Preprocedure Note       Name:  Jermaine Marshall   Age:  61 y.o.  :  1965                                          MRN:  [id number]         Date:  2022      Surgeon: Jennifer Peace):  Negin Tomas MD    Procedure: Procedure(s):  EGD DIAGNOSTIC ONLY    Medications prior to admission:   Prior to Admission medications    Medication Sig Start Date End Date Taking? Authorizing Provider   rosuvastatin (CRESTOR) 40 MG tablet Take 40 mg by mouth at bedtime   Yes Historical Provider, MD   empagliflozin (JARDIANCE) 10 MG tablet Take 10 mg by mouth daily   Yes Historical Provider, MD   oxyCODONE-acetaminophen (PERCOCET)  MG per tablet Take 1 tablet by mouth every 6 hours as needed for Pain. Yes Historical Provider, MD   ranolazine (RANEXA) 1000 MG extended release tablet Take 1,000 mg by mouth 2 times daily    Yes Historical Provider, MD   fenofibrate (TRICOR) 145 MG tablet Take 145 mg by mouth nightly 19  Yes Historical Provider, MD   gabapentin (NEURONTIN) 100 MG capsule Take 300 mg by mouth in the morning and at bedtime.   20  Yes Historical Provider, MD   lisinopril (PRINIVIL;ZESTRIL) 5 MG tablet Take 5 mg by mouth daily  19  Yes Historical Provider, MD   furosemide (LASIX) 20 MG tablet Take 20 mg by mouth daily 19  Yes Historical Provider, MD   citalopram (CELEXA) 40 MG tablet Take 20 mg by mouth daily  19  Yes Historical Provider, MD   cyclobenzaprine (FLEXERIL) 10 MG tablet Take 10 mg by mouth 3 times daily as needed 19  Yes Historical Provider, MD   rivaroxaban (XARELTO) 2.5 MG TABS tablet Take 1 tablet by mouth 2 times daily 19  Yes ADAMARIS Lomas   busPIRone (BUSPAR) 15 MG tablet Take 15 mg by mouth 3 times daily  Patient taking differently: Take 30 mg by mouth in the morning and at bedtime  18  Yes Lizeth Wells MD   carvedilol (COREG) 12.5 MG tablet Take 1 tablet by mouth 2 times daily (with meals) 18  Yes Albert Dow MD   clonazePAM (KLONOPIN) 1 MG tablet Take 1 mg by mouth daily as needed. 2/10/20   Historical Provider, MD   nitroGLYCERIN (NITROSTAT) 0.4 MG SL tablet up to max of 3 total doses.  If no relief after 1 dose, call 911. 5/12/19   Dorothy Samuels MD   ticagrelor (BRILINTA) 90 MG TABS tablet Take 1 tablet by mouth 2 times daily 6/13/18 2/25/20  Inés Sumner MD   albuterol sulfate HFA (PROAIR HFA) 108 (90 Base) MCG/ACT inhaler Inhale 2 puffs into the lungs every 6 hours as needed for Wheezing 1/29/18   Faye Pace MD       Current medications:    Current Facility-Administered Medications   Medication Dose Route Frequency Provider Last Rate Last Admin    aluminum & magnesium hydroxide-simethicone (MAALOX) 200-200-20 MG/5ML suspension 30 mL  30 mL Oral Once ADAMARIS Gil        lidocaine viscous hcl (XYLOCAINE) 2 % solution 15 mL  15 mL Mouth/Throat Once ADAMARIS Fairchild        0.9 % sodium chloride infusion   IntraVENous Continuous Brooklynn Stinson MD 75 mL/hr at 04/06/22 2028 New Bag at 04/06/22 2028    busPIRone (BUSPAR) tablet 30 mg  30 mg Oral BID SARI Hernandez CNP   30 mg at 04/06/22 2038    citalopram (CELEXA) tablet 20 mg  20 mg Oral Daily SARI Hernandez CNP        fenofibrate (TRIGLIDE) tablet 160 mg  160 mg Oral Daily SARI Hernandez CNP   160 mg at 04/06/22 2038    gabapentin (NEURONTIN) capsule 300 mg  300 mg Oral BID SARI Hernandez CNP   300 mg at 04/06/22 2038    rosuvastatin (CRESTOR) tablet 40 mg  40 mg Oral Nightly SARI Hernandez CNP   40 mg at 04/06/22 2038    ranolazine (RANEXA) extended release tablet 1,000 mg  1,000 mg Oral BID SARI Hernandez CNP   1,000 mg at 04/06/22 2038    oxyCODONE-acetaminophen (PERCOCET) 5-325 MG per tablet 2 tablet  2 tablet Oral Q6H PRN SARI Hernandez CNP   2 tablet at 04/06/22 2038    sodium chloride flush 0.9 % injection 5-40 mL  5-40 mL IntraVENous 2 times per day SARI Hernandez - CNP   10 mL at 04/06/22 2051    sodium chloride flush 0.9 % injection 5-40 mL  5-40 mL IntraVENous PRN SARI Abraham CNP   10 mL at 04/06/22 2038    0.9 % sodium chloride infusion   IntraVENous PRN SARI Abraham CNP        ondansetron (ZOFRAN-ODT) disintegrating tablet 4 mg  4 mg Oral Q8H PRN SARI Abraham CNP        Or    ondansetron [facility]) injection 4 mg  4 mg IntraVENous Q6H PRN SARI Abraham CNP        acetaminophen (TYLENOL) tablet 650 mg  650 mg Oral Q6H PRN SARI Abraham CNP        Or    acetaminophen (TYLENOL) suppository 650 mg  650 mg Rectal Q6H PRN SARI Abraham CNP        polyethylene glycol (GLYCOLAX) packet 17 g  17 g Oral Daily PRN SARI Abraham CNP        glucose (GLUTOSE) 40 % oral gel 15 g  15 g Oral PRN SARI Abraham CNP        dextrose 50 % IV solution  12.5 g IntraVENous PRN SARI Abraham CNP        glucagon (rDNA) injection 1 mg  1 mg IntraMUSCular PRN SARI Abraham CNP        dextrose 5 % solution  100 mL/hr IntraVENous PRN SARI Abraham CNP        insulin lispro (HUMALOG) injection vial 0-12 Units  0-12 Units SubCUTAneous TID WC SARI Abraham CNP        insulin lispro (HUMALOG) injection vial 0-6 Units  0-6 Units SubCUTAneous Nightly SARI Abraham CNP        pantoprazole (PROTONIX) injection 40 mg  40 mg IntraVENous BID SARI Abraham CNP   40 mg at 04/07/22 1599       Allergies:     Allergies   Allergen Reactions    Erythromycin Hives and Nausea And Vomiting    Lyrica [Pregabalin] Swelling    Codeine Palpitations    Imitrex [Sumatriptan] Anxiety     \"Makes Me Hyper\"    Ketorolac Tromethamine Other (See Comments)     \"Rapid Heart Rate\"    Prochlorperazine Edisylate Nausea And Vomiting    Tape [place] Tape] Itching     Blisters    Ultram [Tramadol] Itching       Problem List:    Patient Active Problem List   Diagnosis Code    TOS (thoracic outlet syndrome) G54.0    Coronary artery disease I25.10    Major depressive disorder, recurrent episode with anxious distress ([address] F33.9    Diabetes mellitus ([address] E11.9    Hyperlipidemia E78.5    Essential hypertension I10    Tobacco dependence F17.200    Overweight (BMI 25.0-29. 9) E66.3    Chronic midline low back pain with right-sided sciatica M54.41, G89.29    Acute left-sided low back pain with left-sided sciatica M54.42    Unstable angina pectoris due to coronary arteriosclerosis ([facility]) I25.110    Change in mole D22.9    Eczema of hand L30.9    Arterial occlusion I70.90    PVD (peripheral vascular disease) ([facility]) I73.9    Femoral artery occlusion ([facility]) I70.209    COPD (chronic obstructive pulmonary disease) ([facility]) J44.9    CAD (coronary artery disease) I25.10    Acid reflux K21.9    Depression F32. A    Hx of migraines Z86.69    Hypertension I10    Kidney stones N20.0    Chest pain R07.9       Past Medical History:        Diagnosis Date    Acid reflux     CAD (coronary artery disease)     Sees Dr. Yvonne Dupont    COPD [facility] disease) [facility]     \"have mild COPD- no pulmonologist\"    Depression     Diabetes mellitus ([address] Dx 2001    Eczema of hand     Hx of migraines     HX OTHER MEDICAL     \"difficulty IV stick\"    Hyperlipidemia     Hypertension     follows with Dr Brenda Garcia Kidney stones 2000's    \"Passed One Kidney Stone\"    PVD (peripheral vascular disease) ([facility]     Wears glasses        Past Surgical History:        Procedure Laterality Date   [address] During TOSHA    BACK SURGERY  Last Done 9-11/2013    \"4 Lower Back Surgeries, 2 Rods, 4 Pins At L5, S1 Put In During Last Surgery\"    BONE RESECTION, RIB      \"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- surgery 2013- ok since then\"    BREAST BIOPSY Bilateral 1990's-2000's    Benign    CARDIAC CATHETERIZATION      per pt \"had heart cath 5/10/2019\"   [address]  5-4-12    CABG (3 Bypasses)    CHOLECYSTECTOMY      \"took with appendix and hyster    COLONOSCOPY  last one     Dr. Hilario Mcgraw      2018 total of 9    DENTAL SURGERY  2000's    \"Dental Implants Upper And Lower\"    FEMORAL ENDARTERECTOMY Right 2019    RIGHT FEMORAL ENDARTERECTOMY WITH CORMATRIX PATCH performed by Felix Pearl MD at [address] [place] [address]    Appendectomy Also Done    OTHER SURGICAL HISTORY Left 2013    Left transaxillary 1st rib resection    OTHER SURGICAL HISTORY      peripheral angiography 2019    TONSILLECTOMY AND ADENOIDECTOMY  1971    UPPER GASTROINTESTINAL ENDOSCOPY N/A 2021    EGD BIOPSY performed by Hemalatha Robins MD at [address]      \"2019\"they went in and unblocked the femoral artery on right side and then in again 2019\"put dye in -  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\"       Social History:    Social History     Tobacco Use    Smoking status: Former Smoker     Packs/day: 0.50     Years: 35.00     Pack years: 17.50     Types: Cigarettes     Quit date: 2019     Years since quittin.7    Smokeless tobacco: Never Used   Substance Use Topics    Alcohol use: No     Alcohol/week: 0.0 standard drinks     Comment: \"quit drinking in - use to drink 5 days per week- average use to drink 6 beers each time\"                                Counseling given: Not Answered      Vital Signs (Current):   Vitals:    22 0427 22 0530 22 0749 22 0847   BP: (!) 107/53  131/62    Pulse:  70 76 62   Resp: 15  16    Temp: 35.8 °C (96.5 °F)  36.8 °C (98.2 °F)    TempSrc: Oral  Oral    SpO2: 91%  91%    Weight:  170 lb 10.2 oz (77.4 kg)     Height:                                                  BP Readings from Last 3 Encounters:   22 131/62   21 (!) 141/72   21 (!) 137/57       NPO Status: Time of last liquid consumption: 1200 Time of last solid consumption: 1200                        Date of last liquid consumption: 04/06/22                        Date of last solid food consumption: 04/06/22    BMI:   Wt Readings from Last 3 Encounters:   04/07/22 170 lb 10.2 oz (77.4 kg)   07/25/21 169 lb 12.8 oz (77 kg)   09/07/20 160 lb (72.6 kg)     Body mass index is 26.73 kg/m².     CBC:   Lab Results   Component Value Date    WBC 6.7 04/07/2022    RBC 2.36 04/07/2022    HGB 7.4 04/07/2022    HCT 24.5 04/07/2022    .8 04/07/2022    RDW 14.0 04/07/2022     04/07/2022       CMP:   Lab Results   Component Value Date     04/07/2022    K 4.3 04/07/2022     04/07/2022    CO2 20 04/07/2022    BUN 19 04/07/2022    CREATININE 0.8 04/07/2022    GFRAA >60 04/07/2022    LABGLOM >60 04/07/2022    GLUCOSE 92 04/07/2022    PROT 6.7 04/06/2022    PROT 7.8 02/08/2013    CALCIUM 8.1 04/07/2022    BILITOT 0.1 04/06/2022    ALKPHOS 56 04/06/2022    AST 18 04/06/2022    ALT 17 04/06/2022       POC Tests:   Recent Labs     04/07/22  0821   POCGLU 103*       Coags:   Lab Results   Component Value Date    PROTIME 11.0 04/06/2022    PROTIME 11.8 05/09/2012    INR 0.85 04/06/2022    APTT 30.6 04/06/2022       HCG (If Applicable): No results found for: PREGTESTUR, PREGSERUM, HCG, HCGQUANT     ABGs:   Lab Results   Component Value Date    PO2ART 300 05/05/2012    BEART 2 05/05/2012        Type & Screen (If Applicable):  No results found for: LABABO, LABRH    Drug/Infectious Status (If Applicable):  Lab Results   Component Value Date    HEPCAB NON REACTIVE 03/24/2017       COVID-19 Screening (If Applicable):   Lab Results   Component Value Date    COVID19 NOT DETECTED 07/24/2021           Anesthesia Evaluation  Patient summary reviewed no history of anesthetic complications:   Airway: Mallampati: II  TM distance: >3 FB   Neck ROM: full  Mouth opening: > = 3 FB Dental:          Pulmonary:   (+) COPD:                            ROS comment: Former Smoker - 17.5 pack years  Quit Smokin19       Cardiovascular:  Exercise tolerance: good (>4 METS),   (+) hypertension:, CAD:, CABG/stent:, hyperlipidemia         Beta Blocker:  Dose within 24 Hrs      ROS comment:   Chest pain    Hx of CAD s/p CABG in 2019    Off blood thinners now d/t anemia     Trop elevated initially but trended back down- no ACS likely d/t anemia    CP is chronic and stable    [facility]-19  IMPRESSION:  1.  EDP is normal.  2.  Left system was not injected, is known to be occluded. 3.  Right coronary artery is mid 100% occluded. 4.  SVG to diagonal is patent. 5.  SVG to OM is patent. 6.  LIMA to LAD is widely patent.  The LAD supplies the collateral to  right coronary artery. Neuro/Psych:   (+) headaches: migraine headaches, depression/anxiety             GI/Hepatic/Renal:   (+) GERD:, renal disease: kidney stones,          ROS comment: GI Bleed. Endo/Other:    (+) DiabetesType II DM, , blood dyscrasia: anemia and anticoagulation therapy:., .                 Abdominal:             Vascular:   + PVD, aortic or cerebral, . Other Findings:           Anesthesia Plan      MAC     ASA 3       Induction: intravenous. Anesthetic plan and risks discussed with patient. Plan discussed with CRNA. Pre Anesthesia Evaluation complete. Anesthesia plan, risks, benefits, alternatives, and personal involved discussed with patient. Patients and/or legal guardian verbalized an understanding  and agreed to proceed.   Joy Roth DO  2022

## 2022-04-07 NOTE — PROGRESS NOTES
Echo attempted at this time. Patient refusing echo until after endo procedure today. Will attempt again this afternoon.

## 2022-04-08 VITALS
HEART RATE: 80 BPM | BODY MASS INDEX: 27.65 KG/M2 | DIASTOLIC BLOOD PRESSURE: 54 MMHG | HEIGHT: 67 IN | TEMPERATURE: 97.5 F | RESPIRATION RATE: 15 BRPM | SYSTOLIC BLOOD PRESSURE: 96 MMHG | WEIGHT: 176.15 LBS | OXYGEN SATURATION: 99 %

## 2022-04-08 LAB
ANION GAP SERPL CALCULATED.3IONS-SCNC: 8 MMOL/L (ref 4–16)
BUN BLDV-MCNC: 14 MG/DL (ref 6–23)
CALCIUM SERPL-MCNC: 8.5 MG/DL (ref 8.3–10.6)
CHLORIDE BLD-SCNC: 109 MMOL/L (ref 99–110)
CO2: 22 MMOL/L (ref 21–32)
CREAT SERPL-MCNC: 0.8 MG/DL (ref 0.6–1.1)
GFR AFRICAN AMERICAN: >60 ML/MIN/1.73M2
GFR NON-AFRICAN AMERICAN: >60 ML/MIN/1.73M2
GLUCOSE BLD-MCNC: 101 MG/DL (ref 70–99)
GLUCOSE BLD-MCNC: 125 MG/DL (ref 70–99)
GLUCOSE BLD-MCNC: 93 MG/DL (ref 70–99)
HCT VFR BLD CALC: 24 % (ref 37–47)
HEMOGLOBIN: 7.4 GM/DL (ref 12.5–16)
MCH RBC QN AUTO: 31.6 PG (ref 27–31)
MCHC RBC AUTO-ENTMCNC: 30.8 % (ref 32–36)
MCV RBC AUTO: 102.6 FL (ref 78–100)
PDW BLD-RTO: 13.8 % (ref 11.7–14.9)
PLATELET # BLD: 244 K/CU MM (ref 140–440)
PMV BLD AUTO: 10.7 FL (ref 7.5–11.1)
POTASSIUM SERPL-SCNC: 4.2 MMOL/L (ref 3.5–5.1)
RBC # BLD: 2.34 M/CU MM (ref 4.2–5.4)
SODIUM BLD-SCNC: 139 MMOL/L (ref 135–145)
WBC # BLD: 5 K/CU MM (ref 4–10.5)

## 2022-04-08 PROCEDURE — 2580000003 HC RX 258: Performed by: STUDENT IN AN ORGANIZED HEALTH CARE EDUCATION/TRAINING PROGRAM

## 2022-04-08 PROCEDURE — 96366 THER/PROPH/DIAG IV INF ADDON: CPT

## 2022-04-08 PROCEDURE — 85027 COMPLETE CBC AUTOMATED: CPT

## 2022-04-08 PROCEDURE — C9113 INJ PANTOPRAZOLE SODIUM, VIA: HCPCS | Performed by: NURSE PRACTITIONER

## 2022-04-08 PROCEDURE — 96375 TX/PRO/DX INJ NEW DRUG ADDON: CPT

## 2022-04-08 PROCEDURE — 82962 GLUCOSE BLOOD TEST: CPT

## 2022-04-08 PROCEDURE — 6360000002 HC RX W HCPCS: Performed by: NURSE PRACTITIONER

## 2022-04-08 PROCEDURE — 96365 THER/PROPH/DIAG IV INF INIT: CPT

## 2022-04-08 PROCEDURE — 85014 HEMATOCRIT: CPT

## 2022-04-08 PROCEDURE — 80048 BASIC METABOLIC PNL TOTAL CA: CPT

## 2022-04-08 PROCEDURE — 85018 HEMOGLOBIN: CPT

## 2022-04-08 PROCEDURE — G0378 HOSPITAL OBSERVATION PER HR: HCPCS

## 2022-04-08 PROCEDURE — 96376 TX/PRO/DX INJ SAME DRUG ADON: CPT

## 2022-04-08 PROCEDURE — 6360000002 HC RX W HCPCS: Performed by: STUDENT IN AN ORGANIZED HEALTH CARE EDUCATION/TRAINING PROGRAM

## 2022-04-08 PROCEDURE — 36415 COLL VENOUS BLD VENIPUNCTURE: CPT

## 2022-04-08 PROCEDURE — 6370000000 HC RX 637 (ALT 250 FOR IP): Performed by: STUDENT IN AN ORGANIZED HEALTH CARE EDUCATION/TRAINING PROGRAM

## 2022-04-08 PROCEDURE — 6370000000 HC RX 637 (ALT 250 FOR IP): Performed by: NURSE PRACTITIONER

## 2022-04-08 RX ORDER — METOCLOPRAMIDE HYDROCHLORIDE 5 MG/ML
10 INJECTION INTRAMUSCULAR; INTRAVENOUS ONCE
Status: COMPLETED | OUTPATIENT
Start: 2022-04-08 | End: 2022-04-08

## 2022-04-08 RX ORDER — OMEPRAZOLE 40 MG/1
40 CAPSULE, DELAYED RELEASE ORAL 2 TIMES DAILY
Qty: 120 CAPSULE | Refills: 0 | Status: SHIPPED | OUTPATIENT
Start: 2022-04-08 | End: 2022-06-07

## 2022-04-08 RX ORDER — FOLIC ACID 1 MG/1
1 TABLET ORAL DAILY
Qty: 30 TABLET | Refills: 0 | Status: SHIPPED | OUTPATIENT
Start: 2022-04-09

## 2022-04-08 RX ADMIN — METOCLOPRAMIDE 10 MG: 5 INJECTION, SOLUTION INTRAMUSCULAR; INTRAVENOUS at 11:15

## 2022-04-08 RX ADMIN — PANTOPRAZOLE SODIUM 40 MG: 40 INJECTION, POWDER, FOR SOLUTION INTRAVENOUS at 09:11

## 2022-04-08 RX ADMIN — FENOFIBRATE 160 MG: 160 TABLET ORAL at 09:11

## 2022-04-08 RX ADMIN — RANOLAZINE 1000 MG: 500 TABLET, FILM COATED, EXTENDED RELEASE ORAL at 09:12

## 2022-04-08 RX ADMIN — FOLIC ACID 1 MG: 1 TABLET ORAL at 09:11

## 2022-04-08 RX ADMIN — GABAPENTIN 300 MG: 300 CAPSULE ORAL at 09:12

## 2022-04-08 RX ADMIN — IRON SUCROSE 300 MG: 20 INJECTION, SOLUTION INTRAVENOUS at 10:31

## 2022-04-08 RX ADMIN — CYANOCOBALAMIN TAB 1000 MCG 1000 MCG: 1000 TAB at 09:12

## 2022-04-08 RX ADMIN — OXYCODONE AND ACETAMINOPHEN 2 TABLET: 5; 325 TABLET ORAL at 03:15

## 2022-04-08 RX ADMIN — OXYCODONE AND ACETAMINOPHEN 2 TABLET: 5; 325 TABLET ORAL at 09:11

## 2022-04-08 RX ADMIN — CITALOPRAM HYDROBROMIDE 20 MG: 20 TABLET ORAL at 09:12

## 2022-04-08 RX ADMIN — BUSPIRONE HYDROCHLORIDE 30 MG: 15 TABLET ORAL at 09:12

## 2022-04-08 ASSESSMENT — PAIN SCALES - GENERAL
PAINLEVEL_OUTOF10: 7
PAINLEVEL_OUTOF10: 0
PAINLEVEL_OUTOF10: 7

## 2022-04-08 NOTE — CARE COORDINATION
Reviewed meds and discharge instructions. Confirmed followup appts patient needs to make. Confirmed do not take xarelto and brilinta until appt with cardio in 2 weeks. No questions at this time.     Meagan Rivera RN

## 2022-04-08 NOTE — PROGRESS NOTES
Daily Progress Note    Pt. Awake, alert and feeling ok this am  HR stable, NSR, BP stable  No CP this am, no SOB    Doing ok  Had EGD yesterday showed some bleeding  CAD and CABG -keep on Brilianta and hold AC  Orthostatic BP-adjust meds  Will give iron, she may need blood also  Holding all BP meds at present due to low BP    GI bleed    Hgb at 7.4    Dark stools noted at home    Holding brilinta and xarelto that she was on at home    EGD done and ulcer noted- on PPI now    Tx. Per GI-based on Hx she was taking a lot of NSAIDS at home- possibly the cause     Chest pain    Hx of CAD s/p CABG in 2019    Off blood thinners now d/t anemia     Trop elevated initially but trended back down- no ACS likely d/t anemia    CP is chronic and stable    Echo ordered to ensure no WMA     Monitor for now-will hold thinners until cleared by GI     Will follow  Appreciate GI recs     Echo --Summary   This is a limited echocardiogram.   Left ventricular systolic function is normal.   Ejection fraction is visually estimated at 55%. No evidence of any pericardial effusion. [facility]-12/5/19  IMPRESSION:  1.  EDP is normal.  2.  Left system was not injected, is known to be occluded. 3.  Right coronary artery is mid 100% occluded. 4.  SVG to diagonal is patent. 5.  SVG to OM is patent.   10.  KEANE to LAD is widely patent.  The LAD supplies the collateral to  right coronary artery.     PAST MEDICAL HISTORY:  Coronary artery disease, status post CABG done,  LIMA to LAD, SVG to OM, SVG to diagonal.  Last heart catheterization was  done in 12/2019.  All the grafts were patent.  Extensive native coronary  artery disease present.  History of right common femoral artery patch  placement, peripheral vascular disease.     PAST SURGICAL HISTORY:  Three-vessel bypass surgery, LIMA to LAD, SVG to  OM, SVG to diagonal.  Left rib removal.  Gallbladder surgery,  tonsillectomy, back surgery, right common femoral endarterectomy  performed and right carotid endarterectomy performed.     SOCIAL HISTORY:  She uses tobacco.  No alcohol use.  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  See the list.     MEDICATIONS: Ashley Roy is on Coreg, lisinopril, Lasix, Brilinta, Xarelto. Objective:   BP (!) 113/53   Pulse 77   Temp 98.2 °F (36.8 °C) (Oral)   Resp 15   Ht 5' 7\" (1.702 m)   Wt 176 lb 2.4 oz (79.9 kg)   SpO2 98%   BMI 27.59 kg/m²   No intake or output data in the 24 hours ending 04/08/22 0955    Medications:   Scheduled Meds:   iron sucrose  300 mg IntraVENous Once    vitamin B-12  1,000 mcg Oral Daily    folic acid  1 mg Oral Daily    aluminum & magnesium hydroxide-simethicone  30 mL Oral Once    lidocaine viscous hcl  15 mL Mouth/Throat Once    busPIRone  30 mg Oral BID    citalopram  20 mg Oral Daily    fenofibrate  160 mg Oral Daily    gabapentin  300 mg Oral BID    rosuvastatin  40 mg Oral Nightly    ranolazine  1,000 mg Oral BID    sodium chloride flush  5-40 mL IntraVENous 2 times per day    insulin lispro  0-12 Units SubCUTAneous TID WC    insulin lispro  0-6 Units SubCUTAneous Nightly    pantoprazole  40 mg IntraVENous BID      Infusions:   sodium chloride      dextrose        PRN Meds:  melatonin, oxyCODONE-acetaminophen, sodium chloride flush, sodium chloride, ondansetron **OR** ondansetron, acetaminophen **OR** acetaminophen, polyethylene glycol, glucose, dextrose, glucagon (rDNA), dextrose       Physical Exam:  Vitals:    04/08/22 0725   BP: (!) 113/53   Pulse: 77   Resp:    Temp: 98.2 °F (36.8 °C)   SpO2: 98%        General: AAO, NAD  Chest: Nontender  Cardiac: First and Second Heart Sounds are Normal, No Murmurs or Gallops noted  Lungs:Clear to auscultation and percussion. Abdomen: Soft, NT, ND, +BS  Extremities: No clubbing, no edema  Vascular:  Equal 2+ peripheral pulses.         Lab Data:  CBC:   Recent Labs     04/07/22  0159 04/07/22  1218 04/08/22  0746   WBC 6.7 5.4 5.0 HGB 7.4* 7.5* 7.4*   HCT 24.5* 25.2* 24.0*   .8* 107.7* 102.6*    224 244     BMP:   Recent Labs     04/06/22  1424 04/07/22  0159 04/08/22  0746    144 139   K 3.9 4.3 4.2    115* 109   CO2 21 20* 22   BUN 28* 19 14   CREATININE 1.0 0.8 0.8     LIVER PROFILE:   Recent Labs     04/06/22  1424   AST 18   ALT 17   LIPASE 51   BILITOT 0.1   ALKPHOS 56     PT/INR:   Recent Labs     04/06/22 2102   PROTIME 11.0*   INR 0.85     APTT:   Recent Labs     04/06/22 2102   APTT 30.6     BNP:  No results for input(s): BNP in the last 72 hours.       Assessment:  Patient Active Problem List    Diagnosis Date Noted    TOS (thoracic outlet syndrome) 04/04/2013    Unstable angina pectoris due to coronary arteriosclerosis ([address] 06/10/2018    Acute left-sided low back pain with left-sided sciatica 01/15/2018    Chronic midline low back pain with right-sided sciatica 10/06/2017    Tobacco dependence 02/27/2017    Overweight (BMI 25.0-29.9) 02/27/2017    Hyperlipidemia 02/09/2016    Essential hypertension 02/09/2016    Diabetes mellitus ([address] 12/08/2013    Coronary artery disease 10/08/2013    Major depressive disorder, recurrent episode with anxious distress ([address] 10/08/2013    Chest pain 08/28/2020    COPD (chronic obstructive pulmonary disease) ([address]     CAD (coronary artery disease)     Acid reflux     Depression     Hx of migraines     Hypertension     Kidney stones     Femoral artery occlusion ([address] 06/11/2019    PVD (peripheral vascular disease) ([address] 05/23/2019    Arterial occlusion 05/22/2019    Eczema of hand     Change in mole 10/31/2018       Electronically signed by Moriah Castellanos PA-C on 4/8/2022 at 9:55 AM    Electronically signed by Adri Read MD on 4/8/22 at 10:22 AM EDT

## 2022-04-08 NOTE — DISCHARGE SUMMARY
Discharge Summary    Name:  Jordana Bailey /Age/Sex: 1965  (61 y.o. female)   MRN & CSN:  [id number] & [id number] Admission Date/Time: 2022  2:06 PM   Attending:  Shirlene Corey MD Discharging Physician: Shirlene Corey MD     Hospital Course:        Kyler Fernández is a 61 y. o. female who presents with Chest pain and syncopal episode with collapse.     Assessment and plan     Atypical chest pain, resolved  Syncope  CAD s/p CABG, 2019  Chest pain-free at the moment  Trop trended down  Echo with EF 91%, IDWEK 6 diastolic heart failure. Hold Xarelto until seen by cardiology in clinic  Continue Brilinta  No events on Telemetry  Bilateral carotid Doppler ultrasound with no significant stenosis  Follow-up with cardiology within 10 days     Acute blood loss anemia, macrocytic  Suspected GI bleed  melena as a stop  Holding Brilinta and Xarelto; resume Brilinta on discharge  Baseline hemoglobin 10-12; today 7.4  EGD showed small gastric ulcer  CBC within a week and follow-up with PCP  Continue  omeprazole 40 mg twice daily for 8 weeks  S/p IV Venofer 300 mg x 2  Continue B12 and folate pills  Follow-up with GI within 3 weeks for colonoscopy      Diabetes Mellitus type II  Sugars under control  Continue medications  last Hgb A1C-5.8     Hypotension  Essential Hypertension  Borderline BP  On carvedilol and lisinopril; adjust the dose as appropriate     Chronic lower back pain  on Percocet and gabapentin. The patient expressed appropriate understanding of and agreement with the discharge recommendations, medications, and plan.      Consults this admission:  IP CONSULT TO CARDIOLOGY  IP CONSULT TO HOSPITALIST  IP CONSULT TO GI    Discharge Instruction:   Follow up appointments: Cardiology within 10 days, GI in 3 weeks  Primary care physician: within 1 week    Diet:  cardiac diet and diabetic diet   Activity: activity as tolerated  Disposition: Discharged to:   [x]Home, []HHC, []SNF, []Acute Rehab, []Hospice   Condition on discharge: Stable    Discharge Medications:        Medication List      START taking these medications    cyanocobalamin 1000 MCG tablet  Take 1 tablet by mouth once a week for 4 doses     folic acid 1 MG tablet  Commonly known as: FOLVITE  Take 1 tablet by mouth daily  Start taking on: April 9, 2022     omeprazole 40 MG delayed release capsule  Commonly known as: PRILOSEC  Take 1 capsule by mouth in the morning and at bedtime        CHANGE how you take these medications    busPIRone 15 MG tablet  Commonly known as: BUSPAR  Take 15 mg by mouth 3 times daily  What changed:   how much to take  when to take this        CONTINUE taking these medications    albuterol sulfate  (90 Base) MCG/ACT inhaler  Commonly known as: ProAir HFA  Inhale 2 puffs into the lungs every 6 hours as needed for Wheezing     carvedilol 12.5 MG tablet  Commonly known as: COREG  Take 1 tablet by mouth 2 times daily (with meals)     citalopram 40 MG tablet  Commonly known as: CELEXA     clonazePAM 1 MG tablet  Commonly known as: KLONOPIN     Crestor 40 MG tablet  Generic drug: rosuvastatin     fenofibrate 145 MG tablet  Commonly known as: TRICOR     furosemide 20 MG tablet  Commonly known as: LASIX     gabapentin 100 MG capsule  Commonly known as: NEURONTIN     Jardiance 10 MG tablet  Generic drug: empagliflozin     lisinopril 5 MG tablet  Commonly known as: PRINIVIL;ZESTRIL     nitroGLYCERIN 0.4 MG SL tablet  Commonly known as: NITROSTAT  up to max of 3 total doses. If no relief after 1 dose, call 911.      oxyCODONE-acetaminophen  MG per tablet  Commonly known as: PERCOCET     ranolazine 1000 MG extended release tablet  Commonly known as: RANEXA     rivaroxaban 2.5 MG Tabs tablet  Commonly known as: Xarelto  Take 1 tablet by mouth 2 times daily     ticagrelor 90 MG Tabs tablet  Commonly known as: BRILINTA  Take 1 tablet by mouth 2 times daily        STOP taking these medications    cyclobenzaprine 10 MG tablet  Commonly known as: FLEXERIL           Where to Get Your Medications      These medications were sent to [address] [place] [address] [place] [phone] - F [phone]  [address] [place], [place] [address]    Phone: [phone]   cyanocobalamin 8119 MCG tablet  folic acid 1 MG tablet  omeprazole 40 MG delayed release capsule         Objective Findings at Discharge:   BP (!) 96/54   Pulse 80   Temp 97.5 °F (36.4 °C)   Resp 15   Ht 5' 7\" (1.702 m)   Wt 176 lb 2.4 oz (79.9 kg)   SpO2 99%   BMI 27.59 kg/m²            PHYSICAL EXAM  GEN Awake female, sitting upright in bed in no apparent distress. Appears given age. EYES Pupils are equally round. No scleral erythema, discharge, or conjunctivitis. HENT Mucous membranes are moist. Oral pharynx without exudates, no evidence of thrush. NECK Supple, no apparent thyromegaly or masses. RESP Clear to auscultation, no wheezes, rales or rhonchi. Symmetric chest movement while on room air. CARDIO/VASC S1/S2 auscultated. Regular rate without appreciable murmurs, rubs, or gallops. No JVD or carotid bruits. Peripheral pulses equal bilaterally and palpable. No peripheral edema. GI Abdomen is soft without significant tenderness, masses, or guarding. Bowel sounds are normoactive. Rectal exam deferred.  No costovertebral angle tenderness. Normal appearing external genitalia. Vera catheter is not present. HEME/LYMPH No palpable cervical lymphadenopathy and no hepatosplenomegaly. No petechiae or ecchymoses. MSK No gross joint deformities. SKIN Normal coloration, warm, dry. NEURO Cranial nerves appear grossly intact, normal speech, no lateralizing weakness. PSYCH Awake, alert, oriented x 4. Affect appropriate.     BMP/CBC  Recent Labs     04/06/22  1424 04/06/22  1424 04/07/22  0159 04/07/22  1218 04/08/22  0746     --  144  --  139   K 3.9  --  4.3  --  4.2     --  115*  --  109   CO2 21  --  20*  --  22   BUN 28*  --  19  --  14 CREATININE 1.0  --  0.8  --  0.8   WBC 9.3   < > 6.7 5.4 5.0   HCT 29.9*   < > 24.5* 25.2* 24.0*      < > 203 224 244    < > = values in this interval not displayed.        IMAGING:  As above    Discharge Time of 35 minutes    Electronically signed by Angelica Rooney MD on 4/8/2022 at 2:04 PM

## 2022-04-12 ENCOUNTER — APPOINTMENT (OUTPATIENT)
Dept: GENERAL RADIOLOGY | Age: 57
DRG: 246 | End: 2022-04-12
Payer: COMMERCIAL

## 2022-04-12 ENCOUNTER — HOSPITAL ENCOUNTER (INPATIENT)
Age: 57
LOS: 3 days | Discharge: HOME | DRG: 246 | End: 2022-04-15
Attending: EMERGENCY MEDICINE | Admitting: INTERNAL MEDICINE
Payer: COMMERCIAL

## 2022-04-12 DIAGNOSIS — J44.1 COPD EXACERBATION (HCC): Primary | ICD-10-CM

## 2022-04-12 DIAGNOSIS — I21.4 NON-ST ELEVATION MI (NSTEMI) (HCC): ICD-10-CM

## 2022-04-12 DIAGNOSIS — I50.31 ACUTE DIASTOLIC CONGESTIVE HEART FAILURE (HCC): ICD-10-CM

## 2022-04-12 DIAGNOSIS — R07.9 CHEST PAIN, UNSPECIFIED TYPE: ICD-10-CM

## 2022-04-12 LAB
ACTIVATED CLOTTING TIME, LOW RANGE: 133 SEC
ACTIVATED CLOTTING TIME, LOW RANGE: 139 SEC
ACTIVATED CLOTTING TIME, LOW RANGE: 222 SEC
ACTIVATED CLOTTING TIME, LOW RANGE: >400 SEC
ALBUMIN SERPL-MCNC: 4.2 GM/DL (ref 3.4–5)
ALP BLD-CCNC: 57 IU/L (ref 40–129)
ALT SERPL-CCNC: 20 U/L (ref 10–40)
ANION GAP SERPL CALCULATED.3IONS-SCNC: 9 MMOL/L (ref 4–16)
APTT: 29.4 SECONDS (ref 25.1–37.1)
AST SERPL-CCNC: 51 IU/L (ref 15–37)
BACTERIA: NEGATIVE /HPF
BASE EXCESS MIXED: 0.1 (ref 0–2.3)
BASE EXCESS MIXED: 7.5 (ref 0–2.3)
BASOPHILS ABSOLUTE: 0.1 K/CU MM
BASOPHILS RELATIVE PERCENT: 0.4 % (ref 0–1)
BILIRUB SERPL-MCNC: 0.1 MG/DL (ref 0–1)
BILIRUBIN URINE: NEGATIVE MG/DL
BLOOD, URINE: NEGATIVE
BUN BLDV-MCNC: 23 MG/DL (ref 6–23)
CALCIUM SERPL-MCNC: 8.8 MG/DL (ref 8.3–10.6)
CHLORIDE BLD-SCNC: 107 MMOL/L (ref 99–110)
CLARITY: CLEAR
CO2: 26 MMOL/L (ref 21–32)
COLOR: YELLOW
COMMENT: ABNORMAL
COMMENT: ABNORMAL
CREAT SERPL-MCNC: 1.1 MG/DL (ref 0.6–1.1)
DIFFERENTIAL TYPE: ABNORMAL
EKG ATRIAL RATE: 124 BPM
EKG ATRIAL RATE: 90 BPM
EKG ATRIAL RATE: 92 BPM
EKG DIAGNOSIS: NORMAL
EKG P AXIS: 46 DEGREES
EKG P AXIS: 60 DEGREES
EKG P AXIS: 60 DEGREES
EKG P-R INTERVAL: 120 MS
EKG P-R INTERVAL: 154 MS
EKG P-R INTERVAL: 156 MS
EKG Q-T INTERVAL: 338 MS
EKG Q-T INTERVAL: 378 MS
EKG Q-T INTERVAL: 382 MS
EKG QRS DURATION: 86 MS
EKG QRS DURATION: 90 MS
EKG QRS DURATION: 90 MS
EKG QTC CALCULATION (BAZETT): 462 MS
EKG QTC CALCULATION (BAZETT): 472 MS
EKG QTC CALCULATION (BAZETT): 485 MS
EKG R AXIS: 58 DEGREES
EKG R AXIS: 58 DEGREES
EKG R AXIS: 68 DEGREES
EKG T AXIS: -78 DEGREES
EKG T AXIS: 127 DEGREES
EKG T AXIS: 172 DEGREES
EKG VENTRICULAR RATE: 124 BPM
EKG VENTRICULAR RATE: 90 BPM
EKG VENTRICULAR RATE: 92 BPM
EOSINOPHILS ABSOLUTE: 0.2 K/CU MM
EOSINOPHILS RELATIVE PERCENT: 1.9 % (ref 0–3)
GFR AFRICAN AMERICAN: >60 ML/MIN/1.73M2
GFR NON-AFRICAN AMERICAN: 51 ML/MIN/1.73M2
GLUCOSE BLD-MCNC: 117 MG/DL (ref 70–99)
GLUCOSE BLD-MCNC: 137 MG/DL (ref 70–99)
GLUCOSE BLD-MCNC: 143 MG/DL (ref 70–99)
GLUCOSE, URINE: ABNORMAL MG/DL
HCO3 VENOUS: 27.6 MMOL/L (ref 19–25)
HCO3 VENOUS: 34.1 MMOL/L (ref 19–25)
HCT VFR BLD CALC: 27 % (ref 37–47)
HEMOGLOBIN: 8 GM/DL (ref 12.5–16)
IMMATURE NEUTROPHIL %: 0.7 % (ref 0–0.43)
INR BLD: 0.85 INDEX
KETONES, URINE: NEGATIVE MG/DL
LEUKOCYTE ESTERASE, URINE: NEGATIVE
LYMPHOCYTES ABSOLUTE: 1.6 K/CU MM
LYMPHOCYTES RELATIVE PERCENT: 13.3 % (ref 24–44)
MCH RBC QN AUTO: 32 PG (ref 27–31)
MCHC RBC AUTO-ENTMCNC: 29.6 % (ref 32–36)
MCV RBC AUTO: 108 FL (ref 78–100)
MONOCYTES ABSOLUTE: 0.8 K/CU MM
MONOCYTES RELATIVE PERCENT: 6.4 % (ref 0–4)
NITRITE URINE, QUANTITATIVE: NEGATIVE
NUCLEATED RBC %: 0.2 %
O2 SAT, VEN: 69.8 % (ref 50–70)
O2 SAT, VEN: 94.5 % (ref 50–70)
PCO2, VEN: 55 MMHG (ref 38–52)
PCO2, VEN: 56 MMHG (ref 38–52)
PDW BLD-RTO: 15.9 % (ref 11.7–14.9)
PH VENOUS: 7.3 (ref 7.32–7.42)
PH VENOUS: 7.4 (ref 7.32–7.42)
PH, URINE: 6 (ref 5–8)
PLATELET # BLD: 330 K/CU MM (ref 140–440)
PMV BLD AUTO: 10.6 FL (ref 7.5–11.1)
PO2, VEN: 228 MMHG (ref 28–48)
PO2, VEN: 43 MMHG (ref 28–48)
POTASSIUM SERPL-SCNC: 3.9 MMOL/L (ref 3.5–5.1)
PRO-BNP: 1778 PG/ML
PROCALCITONIN: 0.03
PROTEIN UA: NEGATIVE MG/DL
PROTHROMBIN TIME: 10.9 SECONDS (ref 11.7–14.5)
RBC # BLD: 2.5 M/CU MM (ref 4.2–5.4)
RBC URINE: 1 /HPF (ref 0–6)
SEGMENTED NEUTROPHILS ABSOLUTE COUNT: 9.4 K/CU MM
SEGMENTED NEUTROPHILS RELATIVE PERCENT: 77.3 % (ref 36–66)
SODIUM BLD-SCNC: 142 MMOL/L (ref 135–145)
SPECIFIC GRAVITY UA: 1.02 (ref 1–1.03)
SQUAMOUS EPITHELIAL: <1 /HPF
TOTAL IMMATURE NEUTOROPHIL: 0.09 K/CU MM
TOTAL NUCLEATED RBC: 0 K/CU MM
TOTAL PROTEIN: 6.4 GM/DL (ref 6.4–8.2)
TRICHOMONAS: ABNORMAL /HPF
TROPONIN T: 0.98 NG/ML
TROPONIN T: 1.17 NG/ML
UROBILINOGEN, URINE: 0.2 MG/DL (ref 0.2–1)
WBC # BLD: 12.2 K/CU MM (ref 4–10.5)
WBC UA: 5 /HPF (ref 0–5)

## 2022-04-12 PROCEDURE — 80053 COMPREHEN METABOLIC PANEL: CPT

## 2022-04-12 PROCEDURE — 86900 BLOOD TYPING SEROLOGIC ABO: CPT

## 2022-04-12 PROCEDURE — 6370000000 HC RX 637 (ALT 250 FOR IP): Performed by: EMERGENCY MEDICINE

## 2022-04-12 PROCEDURE — 6360000002 HC RX W HCPCS

## 2022-04-12 PROCEDURE — 86922 COMPATIBILITY TEST ANTIGLOB: CPT

## 2022-04-12 PROCEDURE — 2500000003 HC RX 250 WO HCPCS

## 2022-04-12 PROCEDURE — 84145 PROCALCITONIN (PCT): CPT

## 2022-04-12 PROCEDURE — 96375 TX/PRO/DX INJ NEW DRUG ADDON: CPT

## 2022-04-12 PROCEDURE — 6370000000 HC RX 637 (ALT 250 FOR IP): Performed by: INTERNAL MEDICINE

## 2022-04-12 PROCEDURE — 2709999900 HC NON-CHARGEABLE SUPPLY

## 2022-04-12 PROCEDURE — C1894 INTRO/SHEATH, NON-LASER: HCPCS

## 2022-04-12 PROCEDURE — 6360000002 HC RX W HCPCS: Performed by: INTERNAL MEDICINE

## 2022-04-12 PROCEDURE — 94660 CPAP INITIATION&MGMT: CPT

## 2022-04-12 PROCEDURE — 93459 L HRT ART/GRFT ANGIO: CPT

## 2022-04-12 PROCEDURE — C9113 INJ PANTOPRAZOLE SODIUM, VIA: HCPCS | Performed by: INTERNAL MEDICINE

## 2022-04-12 PROCEDURE — 6360000002 HC RX W HCPCS: Performed by: EMERGENCY MEDICINE

## 2022-04-12 PROCEDURE — 85025 COMPLETE CBC W/AUTO DIFF WBC: CPT

## 2022-04-12 PROCEDURE — 82805 BLOOD GASES W/O2 SATURATION: CPT

## 2022-04-12 PROCEDURE — 94761 N-INVAS EAR/PLS OXIMETRY MLT: CPT

## 2022-04-12 PROCEDURE — 85347 COAGULATION TIME ACTIVATED: CPT

## 2022-04-12 PROCEDURE — C1887 CATHETER, GUIDING: HCPCS

## 2022-04-12 PROCEDURE — 82962 GLUCOSE BLOOD TEST: CPT

## 2022-04-12 PROCEDURE — B2181ZZ FLUOROSCOPY OF LEFT INTERNAL MAMMARY BYPASS GRAFT USING LOW OSMOLAR CONTRAST: ICD-10-PCS | Performed by: INTERNAL MEDICINE

## 2022-04-12 PROCEDURE — C1769 GUIDE WIRE: HCPCS

## 2022-04-12 PROCEDURE — 94664 DEMO&/EVAL PT USE INHALER: CPT

## 2022-04-12 PROCEDURE — 027035Z DILATION OF CORONARY ARTERY, ONE ARTERY WITH TWO DRUG-ELUTING INTRALUMINAL DEVICES, PERCUTANEOUS APPROACH: ICD-10-PCS | Performed by: INTERNAL MEDICINE

## 2022-04-12 PROCEDURE — 81001 URINALYSIS AUTO W/SCOPE: CPT

## 2022-04-12 PROCEDURE — C1874 STENT, COATED/COV W/DEL SYS: HCPCS

## 2022-04-12 PROCEDURE — 6360000004 HC RX CONTRAST MEDICATION

## 2022-04-12 PROCEDURE — 4A023N7 MEASUREMENT OF CARDIAC SAMPLING AND PRESSURE, LEFT HEART, PERCUTANEOUS APPROACH: ICD-10-PCS | Performed by: INTERNAL MEDICINE

## 2022-04-12 PROCEDURE — 36569 INSJ PICC 5 YR+ W/O IMAGING: CPT

## 2022-04-12 PROCEDURE — 86850 RBC ANTIBODY SCREEN: CPT

## 2022-04-12 PROCEDURE — 93005 ELECTROCARDIOGRAM TRACING: CPT | Performed by: INTERNAL MEDICINE

## 2022-04-12 PROCEDURE — 85730 THROMBOPLASTIN TIME PARTIAL: CPT

## 2022-04-12 PROCEDURE — 96374 THER/PROPH/DIAG INJ IV PUSH: CPT

## 2022-04-12 PROCEDURE — 99285 EMERGENCY DEPT VISIT HI MDM: CPT

## 2022-04-12 PROCEDURE — 6370000000 HC RX 637 (ALT 250 FOR IP): Performed by: NURSE PRACTITIONER

## 2022-04-12 PROCEDURE — 2000000000 HC ICU R&B

## 2022-04-12 PROCEDURE — 93308 TTE F-UP OR LMTD: CPT

## 2022-04-12 PROCEDURE — B2121ZZ FLUOROSCOPY OF SINGLE CORONARY ARTERY BYPASS GRAFT USING LOW OSMOLAR CONTRAST: ICD-10-PCS | Performed by: INTERNAL MEDICINE

## 2022-04-12 PROCEDURE — 85610 PROTHROMBIN TIME: CPT

## 2022-04-12 PROCEDURE — 2700000000 HC OXYGEN THERAPY PER DAY

## 2022-04-12 PROCEDURE — 2500000003 HC RX 250 WO HCPCS: Performed by: INTERNAL MEDICINE

## 2022-04-12 PROCEDURE — 76937 US GUIDE VASCULAR ACCESS: CPT

## 2022-04-12 PROCEDURE — 93005 ELECTROCARDIOGRAM TRACING: CPT | Performed by: EMERGENCY MEDICINE

## 2022-04-12 PROCEDURE — 94640 AIRWAY INHALATION TREATMENT: CPT

## 2022-04-12 PROCEDURE — C9113 INJ PANTOPRAZOLE SODIUM, VIA: HCPCS | Performed by: EMERGENCY MEDICINE

## 2022-04-12 PROCEDURE — 2580000003 HC RX 258: Performed by: INTERNAL MEDICINE

## 2022-04-12 PROCEDURE — 93010 ELECTROCARDIOGRAM REPORT: CPT | Performed by: INTERNAL MEDICINE

## 2022-04-12 PROCEDURE — 86901 BLOOD TYPING SEROLOGIC RH(D): CPT

## 2022-04-12 PROCEDURE — 71045 X-RAY EXAM CHEST 1 VIEW: CPT

## 2022-04-12 PROCEDURE — C9604 PERC D-E COR REVASC T CABG S: HCPCS

## 2022-04-12 PROCEDURE — 83880 ASSAY OF NATRIURETIC PEPTIDE: CPT

## 2022-04-12 PROCEDURE — 84484 ASSAY OF TROPONIN QUANT: CPT

## 2022-04-12 PROCEDURE — 2580000003 HC RX 258: Performed by: NURSE PRACTITIONER

## 2022-04-12 PROCEDURE — C1751 CATH, INF, PER/CENT/MIDLINE: HCPCS

## 2022-04-12 RX ORDER — NOREPINEPHRINE BIT/0.9 % NACL 16MG/250ML
1-100 INFUSION BOTTLE (ML) INTRAVENOUS CONTINUOUS
Status: DISCONTINUED | OUTPATIENT
Start: 2022-04-12 | End: 2022-04-13

## 2022-04-12 RX ORDER — SODIUM CHLORIDE 9 MG/ML
25 INJECTION, SOLUTION INTRAVENOUS PRN
Status: DISCONTINUED | OUTPATIENT
Start: 2022-04-12 | End: 2022-04-15 | Stop reason: HOSPADM

## 2022-04-12 RX ORDER — ACETAMINOPHEN 325 MG/1
650 TABLET ORAL EVERY 6 HOURS PRN
Status: DISCONTINUED | OUTPATIENT
Start: 2022-04-12 | End: 2022-04-12 | Stop reason: SDUPTHER

## 2022-04-12 RX ORDER — FUROSEMIDE 10 MG/ML
40 INJECTION INTRAMUSCULAR; INTRAVENOUS ONCE
Status: DISCONTINUED | OUTPATIENT
Start: 2022-04-12 | End: 2022-04-12

## 2022-04-12 RX ORDER — HEPARIN SODIUM 10000 [USP'U]/100ML
800 INJECTION, SOLUTION INTRAVENOUS CONTINUOUS
Status: DISCONTINUED | OUTPATIENT
Start: 2022-04-12 | End: 2022-04-12

## 2022-04-12 RX ORDER — METHYLPREDNISOLONE SODIUM SUCCINATE 40 MG/ML
40 INJECTION, POWDER, LYOPHILIZED, FOR SOLUTION INTRAMUSCULAR; INTRAVENOUS ONCE
Status: COMPLETED | OUTPATIENT
Start: 2022-04-12 | End: 2022-04-12

## 2022-04-12 RX ORDER — LANOLIN ALCOHOL/MO/W.PET/CERES
6 CREAM (GRAM) TOPICAL NIGHTLY PRN
Status: DISCONTINUED | OUTPATIENT
Start: 2022-04-12 | End: 2022-04-15 | Stop reason: HOSPADM

## 2022-04-12 RX ORDER — POTASSIUM CHLORIDE 7.45 MG/ML
10 INJECTION INTRAVENOUS PRN
Status: DISCONTINUED | OUTPATIENT
Start: 2022-04-12 | End: 2022-04-15 | Stop reason: HOSPADM

## 2022-04-12 RX ORDER — LANOLIN ALCOHOL/MO/W.PET/CERES
6 CREAM (GRAM) TOPICAL NIGHTLY PRN
Status: DISCONTINUED | OUTPATIENT
Start: 2022-04-13 | End: 2022-04-12

## 2022-04-12 RX ORDER — PANTOPRAZOLE SODIUM 40 MG/10ML
40 INJECTION, POWDER, LYOPHILIZED, FOR SOLUTION INTRAVENOUS ONCE
Status: COMPLETED | OUTPATIENT
Start: 2022-04-12 | End: 2022-04-12

## 2022-04-12 RX ORDER — SODIUM CHLORIDE 0.9 % (FLUSH) 0.9 %
5-40 SYRINGE (ML) INJECTION PRN
Status: DISCONTINUED | OUTPATIENT
Start: 2022-04-12 | End: 2022-04-15 | Stop reason: HOSPADM

## 2022-04-12 RX ORDER — HEPARIN SODIUM 10000 [USP'U]/100ML
500 INJECTION, SOLUTION INTRAVENOUS CONTINUOUS
Status: DISCONTINUED | OUTPATIENT
Start: 2022-04-12 | End: 2022-04-13

## 2022-04-12 RX ORDER — ACETAMINOPHEN 325 MG/1
325 TABLET ORAL EVERY 6 HOURS PRN
Status: DISCONTINUED | OUTPATIENT
Start: 2022-04-12 | End: 2022-04-15 | Stop reason: HOSPADM

## 2022-04-12 RX ORDER — ASPIRIN 81 MG/1
324 TABLET, CHEWABLE ORAL ONCE
Status: COMPLETED | OUTPATIENT
Start: 2022-04-12 | End: 2022-04-12

## 2022-04-12 RX ORDER — ALBUTEROL SULFATE 90 UG/1
2 AEROSOL, METERED RESPIRATORY (INHALATION) EVERY 6 HOURS PRN
Status: DISCONTINUED | OUTPATIENT
Start: 2022-04-12 | End: 2022-04-15 | Stop reason: HOSPADM

## 2022-04-12 RX ORDER — ESCITALOPRAM OXALATE 10 MG/1
20 TABLET ORAL DAILY
Status: DISCONTINUED | OUTPATIENT
Start: 2022-04-13 | End: 2022-04-15 | Stop reason: HOSPADM

## 2022-04-12 RX ORDER — ATROPINE SULFATE 0.1 MG/ML
INJECTION INTRAVENOUS
Status: DISCONTINUED
Start: 2022-04-12 | End: 2022-04-12 | Stop reason: WASHOUT

## 2022-04-12 RX ORDER — ROSUVASTATIN CALCIUM 20 MG/1
40 TABLET, COATED ORAL NIGHTLY
Status: DISCONTINUED | OUTPATIENT
Start: 2022-04-12 | End: 2022-04-15 | Stop reason: HOSPADM

## 2022-04-12 RX ORDER — CLONAZEPAM 0.5 MG/1
1 TABLET ORAL DAILY PRN
Status: DISCONTINUED | OUTPATIENT
Start: 2022-04-12 | End: 2022-04-12

## 2022-04-12 RX ORDER — BUSPIRONE HYDROCHLORIDE 15 MG/1
30 TABLET ORAL 2 TIMES DAILY
Status: DISCONTINUED | OUTPATIENT
Start: 2022-04-12 | End: 2022-04-15 | Stop reason: HOSPADM

## 2022-04-12 RX ORDER — PANTOPRAZOLE SODIUM 40 MG/10ML
40 INJECTION, POWDER, LYOPHILIZED, FOR SOLUTION INTRAVENOUS 2 TIMES DAILY
Status: DISCONTINUED | OUTPATIENT
Start: 2022-04-12 | End: 2022-04-15 | Stop reason: HOSPADM

## 2022-04-12 RX ORDER — IPRATROPIUM BROMIDE AND ALBUTEROL SULFATE 2.5; .5 MG/3ML; MG/3ML
1 SOLUTION RESPIRATORY (INHALATION) ONCE
Status: DISCONTINUED | OUTPATIENT
Start: 2022-04-12 | End: 2022-04-12

## 2022-04-12 RX ORDER — GABAPENTIN 300 MG/1
300 CAPSULE ORAL 2 TIMES DAILY
Status: DISCONTINUED | OUTPATIENT
Start: 2022-04-12 | End: 2022-04-15 | Stop reason: HOSPADM

## 2022-04-12 RX ORDER — FUROSEMIDE 10 MG/ML
INJECTION INTRAMUSCULAR; INTRAVENOUS
Status: COMPLETED
Start: 2022-04-12 | End: 2022-04-12

## 2022-04-12 RX ORDER — ACETAMINOPHEN 650 MG/1
650 SUPPOSITORY RECTAL EVERY 6 HOURS PRN
Status: DISCONTINUED | OUTPATIENT
Start: 2022-04-12 | End: 2022-04-15 | Stop reason: HOSPADM

## 2022-04-12 RX ORDER — ASPIRIN 81 MG/1
81 TABLET, CHEWABLE ORAL DAILY
Status: DISCONTINUED | OUTPATIENT
Start: 2022-04-13 | End: 2022-04-15 | Stop reason: HOSPADM

## 2022-04-12 RX ORDER — OXYCODONE HYDROCHLORIDE 5 MG/1
10 TABLET ORAL EVERY 6 HOURS PRN
Status: DISCONTINUED | OUTPATIENT
Start: 2022-04-12 | End: 2022-04-15 | Stop reason: HOSPADM

## 2022-04-12 RX ORDER — ALBUTEROL SULFATE 90 UG/1
2 AEROSOL, METERED RESPIRATORY (INHALATION) ONCE
Status: COMPLETED | OUTPATIENT
Start: 2022-04-12 | End: 2022-04-12

## 2022-04-12 RX ORDER — IPRATROPIUM BROMIDE AND ALBUTEROL SULFATE 2.5; .5 MG/3ML; MG/3ML
1 SOLUTION RESPIRATORY (INHALATION)
Status: DISCONTINUED | OUTPATIENT
Start: 2022-04-12 | End: 2022-04-12

## 2022-04-12 RX ORDER — FOLIC ACID 1 MG/1
1 TABLET ORAL DAILY
Status: DISCONTINUED | OUTPATIENT
Start: 2022-04-12 | End: 2022-04-15 | Stop reason: HOSPADM

## 2022-04-12 RX ORDER — INSULIN GLARGINE 100 [IU]/ML
5 INJECTION, SOLUTION SUBCUTANEOUS DAILY
Status: DISCONTINUED | OUTPATIENT
Start: 2022-04-13 | End: 2022-04-14

## 2022-04-12 RX ORDER — IPRATROPIUM BROMIDE AND ALBUTEROL SULFATE 2.5; .5 MG/3ML; MG/3ML
1 SOLUTION RESPIRATORY (INHALATION) ONCE
Status: COMPLETED | OUTPATIENT
Start: 2022-04-12 | End: 2022-04-12

## 2022-04-12 RX ORDER — ASPIRIN 81 MG/1
81 TABLET, CHEWABLE ORAL DAILY
Status: DISCONTINUED | OUTPATIENT
Start: 2022-04-13 | End: 2022-04-12 | Stop reason: SDUPTHER

## 2022-04-12 RX ORDER — SODIUM CHLORIDE 9 MG/ML
INJECTION, SOLUTION INTRAVENOUS CONTINUOUS
Status: DISCONTINUED | OUTPATIENT
Start: 2022-04-12 | End: 2022-04-13

## 2022-04-12 RX ORDER — SODIUM CHLORIDE 0.9 % (FLUSH) 0.9 %
5-40 SYRINGE (ML) INJECTION EVERY 12 HOURS SCHEDULED
Status: DISCONTINUED | OUTPATIENT
Start: 2022-04-12 | End: 2022-04-15 | Stop reason: HOSPADM

## 2022-04-12 RX ORDER — ATROPINE SULFATE 0.4 MG/ML
0.5 AMPUL (ML) INJECTION
Status: ACTIVE | OUTPATIENT
Start: 2022-04-12 | End: 2022-04-12

## 2022-04-12 RX ORDER — POLYETHYLENE GLYCOL 3350 17 G/17G
17 POWDER, FOR SOLUTION ORAL DAILY PRN
Status: DISCONTINUED | OUTPATIENT
Start: 2022-04-12 | End: 2022-04-15 | Stop reason: HOSPADM

## 2022-04-12 RX ORDER — CITALOPRAM 20 MG/1
20 TABLET ORAL DAILY
Status: DISCONTINUED | OUTPATIENT
Start: 2022-04-12 | End: 2022-04-12

## 2022-04-12 RX ORDER — LORAZEPAM 1 MG/1
1 TABLET ORAL ONCE
Status: COMPLETED | OUTPATIENT
Start: 2022-04-12 | End: 2022-04-12

## 2022-04-12 RX ORDER — LANOLIN ALCOHOL/MO/W.PET/CERES
1000 CREAM (GRAM) TOPICAL WEEKLY
Status: DISCONTINUED | OUTPATIENT
Start: 2022-04-12 | End: 2022-04-15 | Stop reason: HOSPADM

## 2022-04-12 RX ORDER — SODIUM CHLORIDE 9 MG/ML
INJECTION, SOLUTION INTRAVENOUS PRN
Status: DISCONTINUED | OUTPATIENT
Start: 2022-04-12 | End: 2022-04-15 | Stop reason: HOSPADM

## 2022-04-12 RX ORDER — OXYCODONE AND ACETAMINOPHEN 10; 325 MG/1; MG/1
1 TABLET ORAL EVERY 6 HOURS PRN
Status: DISCONTINUED | OUTPATIENT
Start: 2022-04-12 | End: 2022-04-12 | Stop reason: CLARIF

## 2022-04-12 RX ORDER — MAGNESIUM SULFATE IN WATER 40 MG/ML
2000 INJECTION, SOLUTION INTRAVENOUS PRN
Status: DISCONTINUED | OUTPATIENT
Start: 2022-04-12 | End: 2022-04-15 | Stop reason: HOSPADM

## 2022-04-12 RX ORDER — FUROSEMIDE 10 MG/ML
40 INJECTION INTRAMUSCULAR; INTRAVENOUS ONCE
Status: COMPLETED | OUTPATIENT
Start: 2022-04-12 | End: 2022-04-12

## 2022-04-12 RX ORDER — FENOFIBRATE 160 MG/1
160 TABLET ORAL DAILY
Status: DISCONTINUED | OUTPATIENT
Start: 2022-04-12 | End: 2022-04-15 | Stop reason: HOSPADM

## 2022-04-12 RX ORDER — RANOLAZINE 500 MG/1
1000 TABLET, EXTENDED RELEASE ORAL 2 TIMES DAILY
Status: DISCONTINUED | OUTPATIENT
Start: 2022-04-12 | End: 2022-04-15 | Stop reason: HOSPADM

## 2022-04-12 RX ORDER — ACETAMINOPHEN 325 MG/1
650 TABLET ORAL EVERY 4 HOURS PRN
Status: DISCONTINUED | OUTPATIENT
Start: 2022-04-12 | End: 2022-04-15 | Stop reason: HOSPADM

## 2022-04-12 RX ORDER — NITROGLYCERIN 0.4 MG/1
0.4 TABLET SUBLINGUAL EVERY 5 MIN PRN
Status: DISCONTINUED | OUTPATIENT
Start: 2022-04-12 | End: 2022-04-15 | Stop reason: HOSPADM

## 2022-04-12 RX ADMIN — FOLIC ACID 1 MG: 1 TABLET ORAL at 15:15

## 2022-04-12 RX ADMIN — ALBUTEROL SULFATE 2 PUFF: 90 AEROSOL, METERED RESPIRATORY (INHALATION) at 05:51

## 2022-04-12 RX ADMIN — PANTOPRAZOLE SODIUM 40 MG: 40 INJECTION, POWDER, FOR SOLUTION INTRAVENOUS at 18:36

## 2022-04-12 RX ADMIN — HEPARIN SODIUM 12 UNITS/KG/HR: 10000 INJECTION, SOLUTION INTRAVENOUS at 21:07

## 2022-04-12 RX ADMIN — HEPARIN SODIUM 800 UNITS/HR: 10000 INJECTION, SOLUTION INTRAVENOUS at 11:09

## 2022-04-12 RX ADMIN — FUROSEMIDE 40 MG: 10 INJECTION, SOLUTION INTRAVENOUS at 11:14

## 2022-04-12 RX ADMIN — RANOLAZINE 1000 MG: 500 TABLET, FILM COATED, EXTENDED RELEASE ORAL at 15:15

## 2022-04-12 RX ADMIN — TICAGRELOR 90 MG: 90 TABLET ORAL at 20:18

## 2022-04-12 RX ADMIN — IPRATROPIUM BROMIDE AND ALBUTEROL SULFATE 1 AMPULE: .5; 3 SOLUTION RESPIRATORY (INHALATION) at 05:52

## 2022-04-12 RX ADMIN — ACETAMINOPHEN 325 MG: 325 TABLET ORAL at 17:22

## 2022-04-12 RX ADMIN — BUSPIRONE HYDROCHLORIDE 30 MG: 15 TABLET ORAL at 15:15

## 2022-04-12 RX ADMIN — SODIUM CHLORIDE, PRESERVATIVE FREE 10 ML: 5 INJECTION INTRAVENOUS at 20:16

## 2022-04-12 RX ADMIN — FENOFIBRATE 160 MG: 160 TABLET ORAL at 15:15

## 2022-04-12 RX ADMIN — METHYLPREDNISOLONE SODIUM SUCCINATE 40 MG: 40 INJECTION, POWDER, FOR SOLUTION INTRAMUSCULAR; INTRAVENOUS at 06:54

## 2022-04-12 RX ADMIN — SODIUM CHLORIDE, PRESERVATIVE FREE 10 ML: 5 INJECTION INTRAVENOUS at 21:04

## 2022-04-12 RX ADMIN — Medication 2 PUFF: at 08:47

## 2022-04-12 RX ADMIN — PANTOPRAZOLE SODIUM 40 MG: 40 INJECTION, POWDER, FOR SOLUTION INTRAVENOUS at 06:53

## 2022-04-12 RX ADMIN — TICAGRELOR 180 MG: 90 TABLET ORAL at 11:01

## 2022-04-12 RX ADMIN — OXYCODONE HYDROCHLORIDE 10 MG: 5 TABLET ORAL at 20:19

## 2022-04-12 RX ADMIN — OXYCODONE HYDROCHLORIDE 10 MG: 5 TABLET ORAL at 15:41

## 2022-04-12 RX ADMIN — ALBUTEROL SULFATE 2 PUFF: 90 AEROSOL, METERED RESPIRATORY (INHALATION) at 08:46

## 2022-04-12 RX ADMIN — FUROSEMIDE 40 MG: 10 INJECTION INTRAMUSCULAR; INTRAVENOUS at 11:14

## 2022-04-12 RX ADMIN — BUSPIRONE HYDROCHLORIDE 30 MG: 15 TABLET ORAL at 20:16

## 2022-04-12 RX ADMIN — ROSUVASTATIN CALCIUM 40 MG: 20 TABLET, COATED ORAL at 20:18

## 2022-04-12 RX ADMIN — RANOLAZINE 1000 MG: 500 TABLET, FILM COATED, EXTENDED RELEASE ORAL at 20:18

## 2022-04-12 RX ADMIN — LORAZEPAM 1 MG: 1 TABLET ORAL at 12:02

## 2022-04-12 RX ADMIN — ASPIRIN 81 MG 324 MG: 81 TABLET ORAL at 08:02

## 2022-04-12 RX ADMIN — CYANOCOBALAMIN TAB 1000 MCG 1000 MCG: 1000 TAB at 15:22

## 2022-04-12 RX ADMIN — SODIUM CHLORIDE, PRESERVATIVE FREE 10 ML: 5 INJECTION INTRAVENOUS at 09:19

## 2022-04-12 RX ADMIN — MELATONIN 6 MG: at 22:52

## 2022-04-12 RX ADMIN — GABAPENTIN 300 MG: 300 CAPSULE ORAL at 20:18

## 2022-04-12 ASSESSMENT — PAIN DESCRIPTION - DESCRIPTORS
DESCRIPTORS: ACHING;DISCOMFORT
DESCRIPTORS: DISCOMFORT;CONSTANT
DESCRIPTORS: DISCOMFORT;CONSTANT

## 2022-04-12 ASSESSMENT — PAIN DESCRIPTION - FREQUENCY
FREQUENCY: CONTINUOUS

## 2022-04-12 ASSESSMENT — PAIN DESCRIPTION - PAIN TYPE
TYPE: ACUTE PAIN;SURGICAL PAIN

## 2022-04-12 ASSESSMENT — PAIN SCALES - GENERAL
PAINLEVEL_OUTOF10: 9
PAINLEVEL_OUTOF10: 0
PAINLEVEL_OUTOF10: 9
PAINLEVEL_OUTOF10: 0
PAINLEVEL_OUTOF10: 8

## 2022-04-12 ASSESSMENT — ENCOUNTER SYMPTOMS
VOMITING: 0
COUGH: 1
ABDOMINAL PAIN: 1
SORE THROAT: 0
EYE DISCHARGE: 0
RHINORRHEA: 0
EYE PAIN: 0
NAUSEA: 0
SHORTNESS OF BREATH: 1
BACK PAIN: 0

## 2022-04-12 ASSESSMENT — PAIN DESCRIPTION - ORIENTATION: ORIENTATION: RIGHT

## 2022-04-12 ASSESSMENT — PAIN - FUNCTIONAL ASSESSMENT
PAIN_FUNCTIONAL_ASSESSMENT: ACTIVITIES ARE NOT PREVENTED
PAIN_FUNCTIONAL_ASSESSMENT: ACTIVITIES ARE NOT PREVENTED

## 2022-04-12 ASSESSMENT — PAIN DESCRIPTION - ONSET
ONSET: PROGRESSIVE
ONSET: PROGRESSIVE

## 2022-04-12 ASSESSMENT — PAIN DESCRIPTION - LOCATION
LOCATION: BACK;GROIN

## 2022-04-12 ASSESSMENT — PAIN SCALES - WONG BAKER
WONGBAKER_NUMERICALRESPONSE: 0
WONGBAKER_NUMERICALRESPONSE: 0

## 2022-04-12 ASSESSMENT — PAIN DESCRIPTION - PROGRESSION: CLINICAL_PROGRESSION: NOT CHANGED

## 2022-04-12 NOTE — H&P
History and Physical      Name:  Aaron Torres /Age/Sex: 1965  (61 y.o. female)   MRN & CSN:  [id number] & [id number] Admission Date/Time: 2022  5:47 AM   Location:  ED[facility]/ED-[facility]: [facility] Day: 1           Assessment and Plan:   # NSTEMI/Hx CAD s/p CABG - C 2019 showed Patent KEANE to LAD, patent SVG to OM and diagonal with extensive native vessel CAD, Limited TTE 22 showed normal EF 55%, no wall motion abnormality mentioned. EKG with ST depression and t wave inversion. Pt denies cp. Cardiology consulted in ED. Anticoag and repeat TTE as per Cardiology recs. Continue to trend trops, continue anti-platelets, statin, beta-blocker as BP allows, nitrates  # Acute Diastolic Heart failure suspected - Elevated BNP, mild edema on CXR, trending trops, given 40mg lasix IV in ED, further IV diuresis as per Cardiology recs, beta-blocker as BP allows, holding acei for now. Currently being managed on Bipap wean as tolerated, daily wts, strict I/O, cardiac diet. # Acute respiratory failure with hypercapnia in setting of above - Pt being managed on Bipap with improvement in symptoms, monitor repeat VBG's, continue pulse ox monitoring  # Mild Acute kidney injury - GFR 51, Baseline creat 0.8. Potassium CO2 wnl. Hold ACEI for now. AVOID NSAIDS, nephrotoxins, keep MAP> 65, renal dose medications for current GFR, RFP in am  # Leukocytosis -likely stress induced, patient afebrile, no focal infiltrate on CXR.  Will check pro-demetria and UA, repeat CBC in am      Other Chronic medical conditions - Resume home medications unless contraindicated  # Essential HTN - BP trending low, SBP currently 105, holding anti-hypertensives for now, considering vasopressors  # Diabetes Mellitus II- Monitor BG AC/HS, manage on basal and ssi, ADA diet  # COPD - not in exacerbation, no bronchospasms noted on exam, resume bronchodilators  # Chronic anemia with macrocytosis -Hx of melena status post EGD last week with findings of 8 mm size deep cratered antrum ulcer; no active bleeding. Patient is to follow-up with GI for colonoscopy. Has been holding Xarelto. Hemoglobin 8.0 which is improved from 7.4 4/8/22. We will continue patient's PPI, oral folate and B12, monitor  # PAD - managed on anti-platelets, xarelto, statin. Pt's Theta Primus has been on hold  # Tobacco abuse - Pt reports quitting last week  # Chronic anxiety/depression - resume home anxiolytics/antidepressants  # Chronic back pain - resume home pain regimen          Present on Admission:   NSTEMI (non-ST elevated myocardial infarction) ([facility] [address]             Diet Diet NPO   DVT Prophylaxis [x] Lovenox, []  Heparin, [] SCDs, [] Ambulation  [] Long term AC   GI Prophylaxis [x] PPI,  [] H2 Blocker,  [] Carafate,  [] Diet/Tube Feeds   Code Status Full code   Disposition Admit to step down . Patient plans to return home upon discharge         Chief Complaint: Shortness of Breath      History obtained from patient in E HR. History of Present Illness:   Samson Strickland is a 61 y.o. female  with history of CAD status post CABG, essential hypertension, diabetes mellitus, COPD, PVD, chronic anemia, tobacco abuse among other co-morbidities who presents with shortness of breath. The patient reports shortness of breath since 5 AM this morning with associated nausea and sweatiness. She denies chest pain. She reports orthopnea. She states laying flat worsened her shortness of breath. She denies lower extremity swelling or leg pain. She denies fever chills cough. She denies prior history of these type symptoms. She was recently hospitalized last week for chest pain and syncopal episode. No significant stenosis on Carotid US. She was found to be anemic with macrocytosis and reportedly had melena prior to hospitalization however had no further episodes with holding her antiplatelets as well as Xarelto during her stay.   She did receive IV Venofer transfusion as well as oral B12 and folate. She did undergo EGD with no active bleeding found; was noted to have a deep cratered antrum ulcer. It was recommended for her to continue on PPI and follow-up with GI on an outpatient basis for colonoscopy. Her troponin was elevated initially however trended back down. She was evaluated by Cardiology. Her echocardiogram did not note wall motion abnormality. Her symptoms were felt to be due to her anemia. She denies any further bloody or black stools. Since discharge she was doing well until this morning. She denies any other acute symptoms. She was evaluated in the emergency department. She presented afebrile pulse 82 respirations 18 blood pressure 135/114 initially, subsequent systolic BP ranging 52Z to low 100s O2 sat 100% on room air. Chest x-ray performed showed mild edema. VBG showed pH of 7.30 PCO2 56, PO2 43 O2 sats 69.8. Troponin 0.978 BNP 1778. EKG showed normal sinus rhythm with ST depression and T wave abnormality. Chemistry panel showed sodium 142 potassium 3.9 chloride 107 CO2 26 BUN 23 creatinine 1.1 GFR 51 glucose 117. WBC 12.2, hemoglobin 8.0, hematocrit 27.0, platelets 220. The patient was placed on Bipap and dosed with Solumedrol 40 mg IV. Cardiology was consulted. The patient has been admitted for further management. Ten point ROS: reviewed and are negative, unless as noted in above HPI. Objective:   No intake or output data in the 24 hours ending 04/12/22 0913     Vitals:   Vitals:    04/12/22 0802 04/12/22 0806 04/12/22 0807 04/12/22 0854   BP: (!) 96/59      Pulse:  104 101    Resp:  28 24 18   Temp:  97.4 °F (36.3 °C)     TempSrc:  Oral     SpO2: 98% 95% 94%    Weight:       Height:           Physical Exam: 04/12/22     GEN -Awake  appearing female, in NAD. Appears given age. EYES -anicteric, conjunctiva pink. HENT -Head is normocephalic, atraumatic. MM are moist. No evidence of thrush.   NECK -Supple, no apparent thyromegaly or masses. RESP -CTA, no wheezes, rales or rhonchi. Symmetric chest movement   C/V -S1/S2 auscultated. RRR without appreciable M/R/G. No JVD. Cap refill <3 sec. no peripheral edema. GI -Abdomen is soft non distended, non tender to palpation. + BS. No masses or guarding.  -No CVA/ flank tenderness. LYMPH- No petechiae or ecchymoses. MS -No gross joint deformities. SKIN -Normal coloration, warm, dry. NEURO-Cranial nerves appear grossly intact, normal speech, no lateralizing weakness. PSYC-Awake, alert, oriented x 4. Appropriate affect. Past Medical History:      Past Medical History:   Diagnosis Date    Acid reflux     CAD (coronary artery disease)     Sees Dr. Oswaldo Marshall    COPD (chronic obstructive pulmonary disease) ([facility] [address]     \"have mild COPD- no pulmonologist\"    Depression     Diabetes mellitus ([facility] [address] Dx 2001    Eczema of hand     Hx of migraines     HX OTHER MEDICAL     \"difficulty IV stick\"    Hyperlipidemia     Hypertension     follows with Dr Juana Reyes Kidney stones 2000's    \"Passed One Kidney Stone\"    PVD (peripheral vascular disease) ([facility] [address]     Wears glasses        PMH reviewed  Past Surgical  History:    has a past surgical history that includes Dental surgery (2000's); Breast biopsy (Bilateral, 1990's-2000's); Tonsillectomy and adenoidectomy (1971); Hysterectomy, total abdominal (1988); Appendectomy (1988); other surgical history (Left, 4/4/2013); Bone Resection, Rib; Coronary angioplasty with stent; back surgery (Last Done 9-11/2013); Cholecystectomy (1988); Colonoscopy (last one 2014); Cardiac surgery (5-4-12); Cardiac catheterization; other surgical history; vascular surgery; FEMORAL ENDARTERECTOMY (Right, 6/11/2019); Upper gastrointestinal endoscopy (N/A, 7/24/2021); and Upper gastrointestinal endoscopy (N/A, 4/7/2022).     Surgical history reviewed  Family  History:   family history includes Asthma in her brother, father, and mother; Diabetes in her mother; Early Death (age of onset: 25) in her son; Heart Disease in her father; High Blood Pressure in her father and mother; High Cholesterol in her father and mother; Mental Illness in her daughter and son. Family history reviewed  Social History:     Social History     Socioeconomic History    Marital status:      Spouse name: None    Number of children: None    Years of education: None    Highest education level: None   Occupational History    None   Tobacco Use    Smoking status: Former Smoker     Packs/day: 0.50     Years: 35.00     Pack years: 17.50     Types: Cigarettes     Quit date: 2019     Years since quittin.8    Smokeless tobacco: Never Used   Vaping Use    Vaping Use: Never used   Substance and Sexual Activity    Alcohol use: No     Alcohol/week: 0.0 standard drinks     Comment: \"quit drinking in - use to drink 5 days per week- average use to drink 6 beers each time\"    Drug use: No    Sexual activity: Yes     Partners: Male   Other Topics Concern    None   Social History Narrative    None     Social Determinants of Health     Financial Resource Strain:     Difficulty of Paying Living Expenses: Not on file   Food Insecurity:     Worried About Running Out of Food in the Last Year: Not on file    Judit of Food in the Last Year: Not on file   Transportation Needs:     Lack of Transportation (Medical): Not on file    Lack of Transportation (Non-Medical):  Not on file   Physical Activity:     Days of Exercise per Week: Not on file    Minutes of Exercise per Session: Not on file   Stress:     Feeling of Stress : Not on file   Social Connections:     Frequency of Communication with Friends and Family: Not on file    Frequency of Social Gatherings with Friends and Family: Not on file    Attends Islam Services: Not on file    Active Member of Clubs or Organizations: Not on file    Attends Club or Organization Meetings: Not on file    Marital Status: Not on file   Intimate Partner Violence:     Fear of Current or Ex-Partner: Not on file    Emotionally Abused: Not on file    Physically Abused: Not on file    Sexually Abused: Not on file   Housing Stability:     Unable to Pay for Housing in the Last Year: Not on file    Number of Rl in the Last Year: Not on file    Unstable Housing in the Last Year: Not on file      reports that she quit smoking about 2 years ago. Her smoking use included cigarettes. She has a 17.50 pack-year smoking history. She has never used smokeless tobacco.   reports no history of alcohol use. reports no history of drug use. Social history reviewed  Allergies: Allergies   Allergen Reactions    Erythromycin Hives and Nausea And Vomiting    Lyrica [Pregabalin] Swelling    Codeine Palpitations    Imitrex [Sumatriptan] Anxiety     \"Makes Me Hyper\"    Ketorolac Tromethamine Other (See Comments)     \"Rapid Heart Rate\"    Prochlorperazine Edisylate Nausea And Vomiting    Tape Ivory Guarneri Tape] Itching     Blisters    Ultram [Tramadol] Itching       Home Medications:     Prior to Admission medications    Medication Sig Start Date End Date Taking? Authorizing Provider   omeprazole (PRILOSEC) 40 MG delayed release capsule Take 1 capsule by mouth in the morning and at bedtime 4/8/22 6/7/22  Iftikhar Albarado MD   folic acid (FOLVITE) 1 MG tablet Take 1 tablet by mouth daily 4/9/22   Iftikhar Albarado MD   vitamin B-12 1000 MCG tablet Take 1 tablet by mouth once a week for 4 doses 4/8/22 4/30/22  Iftikhar Albarado MD   rosuvastatin (CRESTOR) 40 MG tablet Take 40 mg by mouth at bedtime    Historical Provider, MD   empagliflozin (JARDIANCE) 10 MG tablet Take 10 mg by mouth daily    Historical Provider, MD   oxyCODONE-acetaminophen (PERCOCET)  MG per tablet Take 1 tablet by mouth every 6 hours as needed for Pain.     Historical Provider, MD   ranolazine (RANEXA) 1000 MG extended release tablet Take 1,000 mg by mouth 2 times daily     Historical Provider, MD fenofibrate (TRICOR) 145 MG tablet Take 145 mg by mouth nightly 11/23/19   Historical Provider, MD   gabapentin (NEURONTIN) 100 MG capsule Take 300 mg by mouth in the morning and at bedtime. 1/29/20   Historical Provider, MD   clonazePAM (KLONOPIN) 1 MG tablet Take 1 mg by mouth daily as needed. 2/10/20   Historical Provider, MD   lisinopril (PRINIVIL;ZESTRIL) 5 MG tablet Take 5 mg by mouth daily  11/23/19   Historical Provider, MD   furosemide (LASIX) 20 MG tablet Take 20 mg by mouth daily 11/25/19   Historical Provider, MD   citalopram (CELEXA) 40 MG tablet Take 20 mg by mouth daily  5/14/19   Historical Provider, MD   nitroGLYCERIN (NITROSTAT) 0.4 MG SL tablet up to max of 3 total doses.  If no relief after 1 dose, call 911. 5/12/19   Carina Chan MD   rivaroxaban (XARELTO) 2.5 MG TABS tablet Take 1 tablet by mouth 2 times daily 5/7/19   ADAMARIS Lopez   busPIRone (BUSPAR) 15 MG tablet Take 15 mg by mouth 3 times daily  Patient taking differently: Take 30 mg by mouth in the morning and at bedtime  11/13/18   Francisco Huddleston MD   carvedilol (COREG) 12.5 MG tablet Take 1 tablet by mouth 2 times daily (with meals) 6/29/18   Alba Hwang MD   ticagrelor (BRILINTA) 90 MG TABS tablet Take 1 tablet by mouth 2 times daily 6/13/18 2/25/20  Arlina Severe, MD   albuterol sulfate HFA (PROAIR HFA) 108 (90 Base) MCG/ACT inhaler Inhale 2 puffs into the lungs every 6 hours as needed for Wheezing 1/29/18   Francisco Huddleston MD         Medications:   Medications:    ipratropium-albuterol  1 ampule Inhalation Once    furosemide  40 mg IntraVENous Once    sodium chloride flush  5-40 mL IntraVENous 2 times per day    [START ON 4/13/2022] aspirin  81 mg Oral Daily      Infusions:    sodium chloride       PRN Meds: sodium chloride flush, 5-40 mL, PRN  sodium chloride, , PRN  acetaminophen, 650 mg, Q6H PRN   Or  acetaminophen, 650 mg, Q6H PRN  polyethylene glycol, 17 g, Daily PRN  potassium chloride, 10 mEq, PRN  magnesium sulfate, 2,000 mg, PRN  nitroGLYCERIN, 0.4 mg, Q5 Min PRN        Data:     Laboratory this visit:  Reviewed  Recent Labs     04/12/22  0649   WBC 12.2*   HGB 8.0*   HCT 27.0*         Recent Labs     04/12/22  0649      K 3.9      CO2 26   BUN 23   CREATININE 1.1     Recent Labs     04/12/22  0649   AST 51*   ALT 20   BILITOT 0.1   ALKPHOS 57     No results for input(s): INR in the last 72 hours. Radiology this visit:  Reviewed. Echocardiogram limited    Result Date: 4/7/2022  Transthoracic Echocardiography Report (TTE)  Demographics   Patient Name       Janel DONOHUE    Date of Study       04/07/2022   Date of Birth      1965        Gender              Female   Age                61 year(s)        Race                   Patient Number     [id number]        Room Number         0076   Visit Number       [id number]   Corporate ID       [id number]   Accession Number   [id number]        Monica Bhatti T   Ordering Physician Janie Thomas MD  Interpreting        Janie Thomas MD                                       Physician  Procedure Type of Study   TTE procedure:ECHOCARDIOGRAM LIMITED. Procedure Date Date: 04/07/2022 Start: 03:12 PM Study Location: Portable Technical Quality: Adequate visualization Indications:Family history of CAD and Syncope. Patient Status: Routine Height: 67 inches Weight: 170 pounds BSA: 1.89 m2 BMI: 26.63 kg/m2 HR: 68 bpm BP: 94/50 mmHg  Conclusions   Summary  This is a limited echocardiogram.  Left ventricular systolic function is normal.  Ejection fraction is visually estimated at 55%. No evidence of any pericardial effusion.    Signature   ------------------------------------------------------------------  Electronically signed by Janie Thomas MD (Interpreting  physician) on 04/07/2022 at 03:55 PM  ------------------------------------------------------------------ no evidence of hydrocephalus. Chronic lacunar infarcts in the left basal ganglia. ORBITS: The visualized portion of the orbits demonstrate no acute abnormality. SINUSES: The visualized paranasal sinuses and mastoid air cells demonstrate no acute abnormality. SOFT TISSUES/SKULL:  No acute abnormality of the visualized skull or soft tissues. 1. No acute intracranial abnormality. 2. Chronic lacunar infarcts in the left basal ganglia. CT CERVICAL SPINE WO CONTRAST    Result Date: 4/6/2022  EXAMINATION: CT OF THE CERVICAL SPINE WITHOUT CONTRAST 4/6/2022 3:27 pm TECHNIQUE: CT of the cervical spine was performed without the administration of intravenous contrast. Multiplanar reformatted images are provided for review. Dose modulation, iterative reconstruction, and/or weight based adjustment of the mA/kV was utilized to reduce the radiation dose to as low as reasonably achievable. COMPARISON: None. HISTORY: ORDERING SYSTEM PROVIDED HISTORY: fall, closed head injury TECHNOLOGIST PROVIDED HISTORY: Reason for exam:->fall, closed head injury Decision Support Exception - unselect if not a suspected or confirmed emergency medical condition->Emergency Medical Condition (MA) Reason for Exam: fall, closed head injury,LOC FINDINGS: BONES/ALIGNMENT: There is no acute fracture or traumatic malalignment. DEGENERATIVE CHANGES: No significant degenerative changes. SOFT TISSUES: There is carotid atherosclerosis. No prevertebral edema. No acute abnormality of the cervical spine. XR CHEST PORTABLE    Result Date: 4/12/2022  EXAMINATION: ONE XRAY VIEW OF THE CHEST 4/12/2022 6:04 am COMPARISON: April 6, 2022 HISTORY: ORDERING SYSTEM PROVIDED HISTORY: shortness of breath TECHNOLOGIST PROVIDED HISTORY: Reason for exam:->shortness of breath Reason for Exam: sob Additional signs and symptoms: sob FINDINGS: No lines or tubes. Stable cardiomediastinal silhouette. Mild edema is present. No dense consolidation or pleural effusion. No pneumothorax. No acute or aggressive osseous lesion. 1. Mild edema. XR CHEST PORTABLE    Result Date: 4/6/2022  EXAMINATION: ONE XRAY VIEW OF THE CHEST 4/6/2022 2:20 pm COMPARISON: Chest radiograph 07/23/2021. HISTORY: ORDERING SYSTEM PROVIDED HISTORY: Chest pain TECHNOLOGIST PROVIDED HISTORY: Reason for exam:->Chest pain Reason for Exam: Chest pain Additional signs and symptoms: NA Relevant Medical/Surgical History: DIABETES, HYPERTENSION FINDINGS: Status post median sternotomy. The cardiomediastinal silhouette is within normal limits. No pneumothorax, vascular congestion, consolidation, or pleural effusion is identified. No acute osseous abnormality. No acute process. VL DUP CAROTID BILATERAL    Result Date: 4/6/2022  EXAMINATION: ULTRASOUND EVALUATION OF THE CAROTID ARTERIES 4/6/2022 TECHNIQUE: Duplex ultrasound using B-mode/gray scaled imaging, Doppler spectral analysis and color flow Doppler was obtained of the carotid arteries. COMPARISON: None. HISTORY: ORDERING SYSTEM PROVIDED HISTORY: syncope TECHNOLOGIST PROVIDED HISTORY: Reason for exam:->syncope Reason for Exam: Syncope FINDINGS: Gray scale and color and spectral Doppler ultrasound examination of the carotid arteries was performed. The bilateral common carotid, and cervical internal carotid and external carotid arteries are patent with appropriate arterial waveforms and flow direction. There is moderate calcified plaque formation within the bilateral common carotid and proximal internal carotid arteries. The peak systolic velocity measurements within the bilateral common carotid and external carotid arteries are within normal limits. The peak systolic velocities within the internal carotid arteries are mildly abnormally elevated bilaterally, as are the peak end-diastolic velocities. These measure 166.6 cm/s and 42.0 cm/s, respectively, on the right and 149.9 cm/s and 43.7 cm/s, respectively, on the left.  The internal carotid to common carotid ratios are within normal limits bilaterally, measuring 1.50 on the right and 1.00 on the left which argues against the presence of hemodynamically significant carotid artery stenosis by velocity criteria. The vertebral arteries are patent and demonstrate appropriate antegrade flow direction. Moderate calcified bilateral carotid artery plaque formation. However, there is no hemodynamically significant carotid stenosis, as the degree of luminal narrowing measures at or just less than 50% bilaterally. Measurements of carotid stenosis are based on velocity parameters that correlate the residual internal carotid artery diameter with Luis American Symptomatic Carotid Endarterectomy Trial (NASCET) based stenosis levels.            EKG this visit:  personally reviewed         Electronically signed by SARI Morley CNP on 4/12/2022 at 9:13 AM

## 2022-04-12 NOTE — PROGRESS NOTES
Patient return from cath lab with sheath noted in left groin. Verbal orders received from Dr Eduardo Lora to check ACT @ 1600 and remove sheath when ACT below 170. New orders also received to restart heparin @ 500 units 2 hours after sheath pulled. Orders noted at this time.

## 2022-04-12 NOTE — ED PROVIDER NOTES
Emergency [address] EMERGENCY DEPARTMENT    Patient: Yulia Spain  MRN: [id number]  : 1965  Date of Evaluation: 2022  ED Provider: Meghan Denise MD    Chief Complaint       Chief Complaint   Patient presents with    Shortness of Breath     [address] is a 61 y.o. female who presents to the emergency department for evaluation of shortness of breath. Patient reports been usual state of health until approximate 5:00 this morning when she woke up feeling short of breath. Patient denies fevers. No cough. No no swelling of legs or feet no history of DVTs or PEs and no chest pain. Patient says she has never felt short of breath like this in the past.  States that she quit smoking on Monday. Does report that she been hospitalized recently for syncope and found to have had a GI bleed at that time. States that she has been on different medications since being discharged. Patient denies dark stools or hematemesis abdominal pain or chest pain at this time. Patient is not on supplemental oxygen at home. ROS:     At least 10 systems reviewed and otherwise acutely negative except as in the [address]     Past History     Past Medical History:   Diagnosis Date    Acid reflux     CAD (coronary artery disease)     Sees Dr. Kristi Gonzales COPD (chronic obstructive pulmonary disease) ([address]     \"have mild COPD- no pulmonologist\"    Depression     Diabetes mellitus ([address] Dx     Eczema of hand     Hx of migraines     HX OTHER MEDICAL     \"difficulty IV stick\"    Hyperlipidemia     Hypertension     follows with Dr Paloma Oshea Kidney stones 's    \"Passed One Kidney Stone\"    PVD (peripheral vascular disease) ([address]     Wears glasses      Past Surgical History:   Procedure Laterality Date    APPENDECTOMY  79    [facility] During [facility]    BACK SURGERY  Last Done -2013    \"4 Lower Back Surgeries, 2 Rods, 4 Pins At L5, S1 Put In During Last Surgery\"    BONE RESECTION, RIB      \"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 Nimisha Chiu      2018 total of 9    DENTAL SURGERY  's    \"Dental Implants Upper And Lower\"    FEMORAL ENDARTERECTOMY Right 2019    RIGHT FEMORAL ENDARTERECTOMY WITH CORMATRIX PATCH performed by Felix Pearl MD at [address]    Appendectomy Also Done    OTHER SURGICAL HISTORY Left 2013    Left transaxillary 1st rib resection    OTHER SURGICAL HISTORY      peripheral angiography 2019    TONSILLECTOMY AND ADENOIDECTOMY  1971    UPPER GASTROINTESTINAL ENDOSCOPY N/A 2021    EGD BIOPSY performed by Hemalatha Robins MD at [place] [address] N/A 2022    EGD DIAGNOSTIC ONLY performed by Hemalatha Robins MD at [address] [place] [address]      \"2019\"they went in and unblocked the femoral artery on right side and then in again 2019\"put dye in -  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\"     Social History     Socioeconomic History    Marital status:      Spouse name: None    Number of children: None    Years of education: None    Highest education level: None   Occupational History    None   Tobacco Use    Smoking status: Former Smoker     Packs/day: 0.50     Years: 35.00     Pack years: 17.50     Types: Cigarettes     Quit date: 2019     Years since quittin.8    Smokeless tobacco: Never Used   Vaping Use    Vaping Use: Never used   Substance and Sexual Activity    Alcohol use: No     Alcohol/week: 0.0 standard drinks     Comment: \"quit drinking in - use to drink 5 days per week- average use to drink 6 beers each time\"    Drug use: No    Sexual activity: Yes     Partners: Male   Other Topics Concern    None   Social History Narrative    None     Social Determinants of Health     Financial Resource Strain:     Difficulty of Paying Living Expenses: Not on file   Food Insecurity:     Worried About Running Out of Food in the Last Year: Not on file    Judit of Food in the Last Year: Not on file   Transportation Needs:     Lack of Transportation (Medical): Not on file    Lack of Transportation (Non-Medical):  Not on file   Physical Activity:     Days of Exercise per Week: Not on file    Minutes of Exercise per Session: Not on file   Stress:     Feeling of Stress : Not on file   Social Connections:     Frequency of Communication with Friends and Family: Not on file    Frequency of Social Gatherings with Friends and Family: Not on file    Attends [employer] Services: Not on file    Active Member of [address] [place] [address] or Organizations: Not on file    Attends Club or Organization Meetings: Not on file    Marital Status: Not on file   Intimate Partner Violence:     Fear of Current or Ex-Partner: Not on file    Emotionally Abused: Not on file    Physically Abused: Not on file    Sexually Abused: Not on file   Housing Stability:     Unable to Pay for Housing in the Last Year: Not on file    Number of [place] in the Last Year: Not on file    Unstable Housing in the Last Year: Not on file       Medications/Allergies     Previous Medications    ALBUTEROL SULFATE HFA (PROAIR HFA) 108 (90 BASE) MCG/ACT INHALER    Inhale 2 puffs into the lungs every 6 hours as needed for Wheezing    BUSPIRONE (BUSPAR) 15 MG TABLET    Take 15 mg by mouth 3 times daily    CARVEDILOL (COREG) 12.5 MG TABLET    Take 1 tablet by mouth 2 times daily (with meals)    CITALOPRAM (CELEXA) 40 MG TABLET    Take 20 mg by mouth daily     CLONAZEPAM (KLONOPIN) 1 MG TABLET    Take 1 mg by mouth daily as needed. EMPAGLIFLOZIN (JARDIANCE) 10 MG TABLET    Take 10 mg by mouth daily    FENOFIBRATE (TRICOR) 145 MG TABLET    Take 145 mg by mouth nightly    FOLIC ACID (FOLVITE) 1 MG TABLET    Take 1 tablet by mouth daily    FUROSEMIDE (LASIX) 20 MG TABLET    Take 20 mg by mouth daily    GABAPENTIN (NEURONTIN) 100 MG CAPSULE    Take 300 mg by mouth in the morning and at bedtime. LISINOPRIL (PRINIVIL;ZESTRIL) 5 MG TABLET    Take 5 mg by mouth daily     NITROGLYCERIN (NITROSTAT) 0.4 MG SL TABLET    up to max of 3 total doses. If no relief after 1 dose, call 911. OMEPRAZOLE (PRILOSEC) 40 MG DELAYED RELEASE CAPSULE    Take 1 capsule by mouth in the morning and at bedtime    OXYCODONE-ACETAMINOPHEN (PERCOCET)  MG PER TABLET    Take 1 tablet by mouth every 6 hours as needed for Pain. RANOLAZINE (RANEXA) 1000 MG EXTENDED RELEASE TABLET    Take 1,000 mg by mouth 2 times daily     RIVAROXABAN (XARELTO) 2.5 MG TABS TABLET    Take 1 tablet by mouth 2 times daily    ROSUVASTATIN (CRESTOR) 40 MG TABLET    Take 40 mg by mouth at bedtime    TICAGRELOR (BRILINTA) 90 MG TABS TABLET    Take 1 tablet by mouth 2 times daily    VITAMIN B-12 1000 MCG TABLET    Take 1 tablet by mouth once a week for 4 doses     Allergies   Allergen Reactions    Erythromycin Hives and Nausea And Vomiting    Lyrica [Pregabalin] Swelling    Codeine Palpitations    Imitrex [Sumatriptan] Anxiety     \"Makes Me Hyper\"    Ketorolac Tromethamine Other (See Comments)     \"Rapid Heart Rate\"    Prochlorperazine Edisylate Nausea And Vomiting    Tape [Adhesive Tape] Itching     Blisters    Ultram [Tramadol] Itching        Physical Exam       ED Triage Vitals   BP Temp Temp src Pulse Resp SpO2 Height Weight   04/12/22 0604 -- -- 04/12/22 0547 04/12/22 0547 04/12/22 0547 04/12/22 0639 04/12/22 0639   (!) 135/114   82 18 100 % 5' 7\" (1.702 m) 176 lb (79.8 kg)     GENERAL APPEARANCE: Awake and alert. Cooperative. No acute distress. HEAD: Normocephalic. Atraumatic. EYES: Sclera anicteric. Pupils equal round reactive to light extraocular movements are intact  ENT: Tolerates saliva. No trismus. Moist mucous membranes  NECK: Supple. Trachea midline. No meningismus  CARDIO: RRR. Radial pulse 2+. No murmurs rubs or gallops appreciated  LUNGS: Respirations unlabored. CTAB. No accessory muscle usage noted. No wheezes rales rhonchi or stridor. ABDOMEN: Soft. Non-distended. Non-tender. No tenderness in right upper quadrant or right lower quadrant to deep palpation  EXTREMITIES: No acute deformities. No unilateral leg swelling or tenderness behind either one of calves  SKIN: Warm and dry. No erythema edema or rashes appreciated  NEUROLOGICAL:  Cranial nerves II through XII grossly intact. No gross facial drooping. Moves all 4 extremities spontaneously. PSYCHIATRIC: Normal mood. Alert and oriented x3. No reported active suicidality or homicidality.     Diagnostics   Labs:  Results for orders placed or performed during the hospital encounter of 04/12/22   Comprehensive Metabolic Panel w/ Reflex to MG   Result Value Ref Range    Sodium 142 135 - 145 MMOL/L    Potassium 3.9 3.5 - 5.1 MMOL/L    Chloride 107 99 - 110 mMol/L    CO2 26 21 - 32 MMOL/L    BUN 23 6 - 23 MG/DL    CREATININE 1.1 0.6 - 1.1 MG/DL    Glucose 117 (H) 70 - 99 MG/DL    Calcium 8.8 8.3 - 10.6 MG/DL    Albumin 4.2 3.4 - 5.0 GM/DL    Total Protein 6.4 6.4 - 8.2 GM/DL    Total Bilirubin 0.1 0.0 - 1.0 MG/DL    ALT 20 10 - 40 U/L    AST 51 (H) 15 - 37 IU/L    Alkaline Phosphatase 57 40 - 129 IU/L    GFR Non- 51 (L) >60 mL/min/1.73m2    GFR African American >60 >60 mL/min/1.73m2    Anion Gap 9 4 - 16   CBC with Auto Differential   Result Value Ref Range    WBC 12.2 (H) 4.0 - 10.5 K/CU MM    RBC 2.50 (L) 4.2 - 5.4 M/CU MM    Hemoglobin 8.0 (L) 12.5 - 16.0 GM/DL    Hematocrit 27.0 (L) 37 - 47 %    .0 (H) 78 - 100 FL    MCH 32.0 (H) 27 - 31 PG    MCHC 29.6 (L) 32.0 - 36.0 %    RDW 15.9 (H) 11.7 - 14.9 %    Platelets 963 262 - 901 K/CU MM    MPV 10.6 7.5 - 11.1 FL    Differential Type AUTOMATED DIFFERENTIAL     Segs Relative 77.3 (H) 36 - 66 %    Lymphocytes % 13.3 (L) 24 - 44 %    Monocytes % 6.4 (H) 0 - 4 %    Eosinophils % 1.9 0 - 3 %    Basophils % 0.4 0 - 1 %    Segs Absolute 9.4 K/CU MM    Lymphocytes Absolute 1.6 K/CU MM    Monocytes Absolute 0.8 K/CU MM    Eosinophils Absolute 0.2 K/CU MM    Basophils Absolute 0.1 K/CU MM    Nucleated RBC % 0.2 %    Total Nucleated RBC 0.0 K/CU MM    Total Immature Neutrophil 0.09 K/CU MM    Immature Neutrophil % 0.7 (H) 0 - 0.43 %   Blood Gas, Venous   Result Value Ref Range    pH, Chip 7.30 (L) 7.32 - 7.42    pCO2, Chip 56 (H) 38 - 52 mmHG    pO2, Chip 43 28 - 48 mmHG    Base Exc, Mixed . 1 0 - 2.3    HCO3, Venous 27.6 (H) 19 - 25 MMOL/L    O2 Sat, Chip 69.8 50 - 70 %    Comment VBG    Troponin   Result Value Ref Range    Troponin T 0.978 (HH) <0.01 NG/ML   Brain Natriuretic Peptide   Result Value Ref Range    Pro-BNP 1,778 (H) <300 PG/ML   Troponin   Result Value Ref Range    Troponin T 1.170 (HH) <0.01 NG/ML   Protime/INR & PTT   Result Value Ref Range    Protime 10.9 (L) 11.7 - 14.5 SECONDS    INR 0.85 INDEX    aPTT 29.4 25.1 - 37.1 SECONDS   EKG 12 Lead   Result Value Ref Range    Ventricular Rate 92 BPM    Atrial Rate 92 BPM    P-R Interval 156 ms    QRS Duration 86 ms    Q-T Interval 382 ms    QTc Calculation (Bazett) 472 ms    P Axis 60 degrees    R Axis 58 degrees    T Axis 172 degrees    Diagnosis       Normal sinus rhythm  Marked ST abnormality, possible inferior subendocardial injury  Marked ST abnormality, possible anterior subendocardial injury  Abnormal ECG  When compared with ECG of 07-APR-2022 07:31,  ST now depressed in Anterolateral leads  T wave inversion more evident in Anterior leads     EKG 12 Lead   Result Value Ref Range    Ventricular Rate 90 BPM    Atrial Rate 90 BPM    P-R Interval 154 ms    QRS Duration 90 ms    Q-T Interval 378 ms    QTc Calculation (Bazett) 462 ms    P Axis 60 degrees    R Axis 58 degrees    T Axis 127 degrees    Diagnosis       Normal sinus rhythm  Possible Left atrial enlargement  ST & T wave abnormality, consider inferior ischemia  ST & T wave abnormality, consider anterolateral ischemia  Prolonged QT  Abnormal ECG  When compared with ECG of 12-APR-2022 08:40,  ST less depressed in Inferior leads  ST less depressed in Anterolateral leads  T wave inversion less evident in Anterior leads     TYPE AND SCREEN   Result Value Ref Range    ABO/Rh A POSITIVE     Antibody Screen NEGATIVE      Radiographs:  XR CHEST PORTABLE    Result Date: 4/12/2022  EXAMINATION: ONE XRAY VIEW OF THE CHEST 4/12/2022 6:04 am COMPARISON: April 6, 2022 HISTORY: ORDERING SYSTEM PROVIDED HISTORY: shortness of breath TECHNOLOGIST PROVIDED HISTORY: Reason for exam:->shortness of breath Reason for Exam: sob Additional signs and symptoms: sob FINDINGS: No lines or tubes. Stable cardiomediastinal silhouette. Mild edema is present. No dense consolidation or pleural effusion. No pneumothorax. No acute or aggressive osseous lesion. 1. Mild edema. Procedures/EKG:   Patient's EKG is interpreted by me sinus rhythm rate 92   no ST elevation patient does have ST depression throughout the inferior and lateral leads which are more pronounced compared to patient's previous EKG from April 7, 2022    Patient's repeat EKG is interpreted by me sinus tachycardia rate 124   patient continues to have ST depression in 2 3 aVF as well as V3 through V6 in the same pattern from previous EKG no ST elevation appreciated.     Patient's third EKG after being on BiPAP as interpreted by me sinus rhythm rate 90   patient continues to have slight ST depression in 2 3 and aVF as well as V3 through V6 no ST elevation patient's EKG does appear to be close to baseline I appreciate no acute ischemia or infarction on this EKG improved compared to patient's earlier EKGs    ED Course and MDM   In brief, Jordana Bailey is a 61 y.o. female who presented to the emergency department for evaluation of shortness of breath. Based on patient's history and physical would be concern for possible COPD exacerbation of the possibilities do include congestive heart failure's. Patient had been recently hospitalized for GI bleed does report that she is unsure which one of her medications have been changed but she does not think that she is on any blood thinners anymore. I did review patient's imaging studies and laboratory work as noted above. She does have an elevated BNP as well as elevated troponin of 0.978. Patient had a echo performed last week by her cardiologist Dr. Nelida Chvaez. That showed ejection fraction of 55% at that time with no regurgitation and no pericardial effusion. Patient continues to deny any chest pain. She was given aspirin    I discussed the case with cardiology, Dr. Sherrie Dukes, who is covering for her primary cardiologist.  Reviewed patient's images I did laboratory work clinical presentation as well as the EKG and the EKG changes noted when compared to patient's previous EKG from April 7. He requested the patient be admitted to the hospitalist and then they will follow along in consult. Patient was referred to hospitalist for admission at this time ([phone]). My reevaluation patient says that she was still feeling dyspneic. She was given extra DuoNeb. She had labored breathing and was started on BiPAP. Patient continues to deny any chest pain or discomfort she just says she cannot breathe. I discussed the case with hospital service, Heriberto Smith. Reviewed imaging studies laboratory work as well as patient's clinical presentation and the fact that we are switching the patient over to BiPAP and the increased work in breathing.   Discussed with her my conversation with her cardiologist as well as review of patient's stress echo performed last week. She did recommend giving the patient dose dose of Lasix in addition to the BiPAP and patient will need to be admitted to the stepdown unit. I did reevaluate the patient after being on BiPAP. She is feeling significantly better. She is no longer tachycardic or tachypneic oxygen saturation is 99% heart rate is gone down to 91 she feels significantly better at this time. Patient continues to deny any chest pain or discomfort. I reviewed the plan of admissions to patient and daughter at bedside. I have reviewed the findings with him and the recommendation for admission and evaluation by cardiology and hospitalist.  I attempted to answer all the question best my ability they do feel comfortable with the plan at this time. Patient was seen and evaluated by her cardiologist in the emergency department. He would like to take the patient to the cardiac catheterization laboratory. He recommend the patient receive Lasix. [place] the patient would benefit from being on a heparin drip and admission to the intensive care unit as patient does have a history of having peptic ulcer disease with a GI bleed which is why she was off of her anticoagulation over the past month. PICC line was placed for additional access.   I discussed the case with intensive care physician reviewed imaging studies laboratory work as well as recommendations from cardiology he will admit the patient for continuation of care and treatment at this time    ED Medication Orders (From admission, onward)    Start Ordered     Status Ordering Provider    04/13/22 0900 04/12/22 0905  aspirin chewable tablet 81 mg  DAILY         Acknowledged AMY MENDEZ    04/13/22 0900 04/12/22 0953  insulin glargine (LANTUS) injection vial 5 Units  DAILY         Acknowledged AMY MENDEZ    04/13/22 0900 04/12/22 1131  escitalopram (LEXAPRO) tablet 20 mg  DAILY         Acknowledged Austin May V    04/12/22 2100 04/12/22 1041 rosuvastatin (CRESTOR) tablet 40 mg  Nightly         Acknowledged AMY MENDEZ    04/12/22 2100 04/12/22 1041  ticagrelor (BRILINTA) tablet 90 mg  2 TIMES DAILY         Acknowledged AMY MENDEZ    04/12/22 2100 04/12/22 1041  gabapentin (NEURONTIN) capsule 300 mg  2 times daily         Acknowledged AMY MENDEZ    04/12/22 1800 04/12/22 0948  pantoprazole (PROTONIX) injection 40 mg  2 TIMES DAILY         Acknowledged AMY MENDEZ    04/12/22 1300 04/12/22 1041  fenofibrate (TRIGLIDE) tablet 160 mg  DAILY         Acknowledged AMY MENDEZ    04/12/22 1200 04/12/22 0954  insulin lispro (HUMALOG) injection vial 0-6 Units  EVERY 6 HOURS         Acknowledged AMY MENDEZ    04/12/22 1200 80/32/99 3618  folic acid (FOLVITE) tablet 1 mg  DAILY         Acknowledged AMY MENDEZ    04/12/22 1200 04/12/22 1041  ranolazine (RANEXA) extended release tablet 1,000 mg  2 TIMES DAILY         Acknowledged AMY MENDEZ    04/12/22 1200 04/12/22 1041  vitamin B-12 (CYANOCOBALAMIN) tablet 1,000 mcg  WEEKLY         Acknowledged AMY MENDEZ    04/12/22 1200 04/12/22 1041  busPIRone (BUSPAR) tablet 30 mg  2 times daily         Acknowledged AMY MENDEZ    04/12/22 1120 04/12/22 1121  acetaminophen (TYLENOL) tablet 325 mg  EVERY 6 HOURS PRN        \"And\" Linked Group Details    Acknowledged AMY MENDEZ    04/12/22 1120 04/12/22 1121  oxyCODONE (ROXICODONE) immediate release tablet 10 mg  EVERY 6 HOURS PRN        \"And\" Linked Group Details    Acknowledged AMY MENDEZ    04/12/22 1041 04/12/22 1041  albuterol sulfate  (90 Base) MCG/ACT inhaler 2 puff  EVERY 6 HOURS PRN        Question:  Initiate RT Bronchodilator Protocol  Answer:   Yes    Acknowledged AMY MENDEZ    04/12/22 1030 04/12/22 1020  heparin 25,000 unit in sodium chloride 0.45% 250 mL (premix) infusion  CONTINUOUS         Last MAR action: New Bag - by Zaire Noriega on 04/12/22 at [address] 04/12/22 1030 04/12/22 1020  ticagrelor (BRILINTA) tablet 180 mg  ONCE         Last MAR action: Given - by Lionel Wagner on 04/12/22 at [address]    04/12/22 1030 04/12/22 1021  furosemide (LASIX) injection 40 mg  ONCE         Last MAR action: Given - by Lionel Wagner on 04/12/22 at [address]    04/12/22 1030 04/12/22 1023  norepinephrine (LEVOPHED) 16 mg in sodium chloride 0.9 % 250 mL infusion (non-weight-based)  CONTINUOUS        Question Answer Comment   Titrate Infusion?  Yes    Initial Infusion Dose: 5 mcg/min    Goal of Therapy is: MAP greater than 65 mmHg    Contact Provider if: Patient is receiving the maximum dose and is not achieving the goal of therapy        Acknowledged [address]    04/12/22 0915 04/12/22 0905  sodium chloride flush 0.9 % injection 5-40 mL  2 times per day         Last MAR action: Given - by Lionel Wagner on 04/12/22 at [facility] [place]    04/12/22 0905 04/12/22 0905  acetaminophen (TYLENOL) tablet 650 mg  EVERY 6 HOURS PRN        \"Or\" Linked Group Details    Acknowledged AMY MENDEZ    04/12/22 0905 04/12/22 0905  acetaminophen (TYLENOL) suppository 650 mg  EVERY 6 HOURS PRN        \"Or\" Linked Group Details    Acknowledged AMY MENDEZ    04/12/22 0905 04/12/22 0905  sodium chloride flush 0.9 % injection 5-40 mL  PRN         Acknowledged AMY MENDEZ    04/12/22 0905 04/12/22 0905  0.9 % sodium chloride infusion  PRN         Acknowledged AMY MENDEZ    04/12/22 0905 04/12/22 0905  polyethylene glycol (GLYCOLAX) packet 17 g  DAILY PRN         Acknowledged AMY MENDEZ    04/12/22 0905 04/12/22 0905  potassium chloride 10 mEq/100 mL IVPB (Peripheral Line)  PRN         Acknowledged AMY MENDEZ    04/12/22 0905 04/12/22 0905  magnesium sulfate 2000 mg in 50 mL IVPB premix  PRN         Acknowledged AMY MENDEZ    04/12/22 0905 04/12/22 0905  nitroGLYCERIN (NITROSTAT) SL tablet 0.4 mg  EVERY 5 MIN PRN         Acknowledged AMY MENDEZ    04/12/22 0830 04/12/22 0816  albuterol sulfate  (90 Base) MCG/ACT inhaler 2 puff  ONCE        Question:  Initiate RT Bronchodilator Protocol  Answer:  No    Last MAR action: Given - by Teja Muro on 04/12/22 at [address] [facility]    04/12/22 0830 04/12/22 0816  ipratropium (ATROVENT HFA) 17 MCG/ACT inhaler 2 puff  ONCE        Question:  Initiate RT Bronchodilator Protocol  Answer:  No    Last MAR action: Given - by Teja Muro on 04/12/22 [facility]    04/12/22 0800 04/12/22 0756  aspirin chewable tablet 324 mg  ONCE         Last MAR action: Given - by Michelle Lehman on 04/12/22 at [address] [place] [address]    04/12/22 0600 04/12/22 0550  albuterol sulfate  (90 Base) MCG/ACT inhaler 2 puff  ONCE        Question:  Initiate RT Bronchodilator Protocol  Answer:  Yes    Last MAR action: Given - by Shreyas Tineo on 04/12/22 at [facility]    04/12/22 0600 04/12/22 0551  methylPREDNISolone sodium (SOLU-MEDROL) injection 40 mg  ONCE         Last MAR action: Given - by Maged Ferrell on 04/12/22 at [address] [place] [address]    04/12/22 0600 04/12/22 0552  ipratropium-albuterol (DUONEB) nebulizer solution 1 ampule  ONCE        Question:  Initiate RT Bronchodilator Protocol  Answer:  Yes    Last MAR action: Given - by Shreyas Tineo on 04/12/22 at [facility]    04/12/22 0600 04/12/22 0553  pantoprazole (PROTONIX) injection 40 mg  ONCE         Last MAR action: Given - by Maged Ferrell on 04/12/22 at [address] [place] [address]          Total critical care time today provided was at least 47 minutes. This excludes seperately billable procedure. Critical care time provided for acute respiratory failure with hypoxemia and non-ST elevation MI that required close evaluation and/or intervention with concern for patient decompensation. Final Impression      1. COPD exacerbation ([facility]    2. Acute diastolic congestive heart failure ([facility]    3.  Non-ST elevation MI (NSTEMI) [facility]      DISPOSITION           This patient was cared for in the setting of the COVID-19 pandemic, with nationwide stress on resources and staffing.     (Please note that portions of this note may have been completed with a voice recognition program. Efforts were made to edit the dictations but occasionally words are mis-transcribed.)    Grzegorz Louis MD  [id number] MD  04/12/22 [id number]

## 2022-04-12 NOTE — ED NOTES
This nurse into patients room r/t patient moaning. Patient noted to be in bed on all fours, diaphoretic, rocking back and fourth reporting that she \"can't breath\". Dr. Rj Olguin notified and to bedside. Repeat EKG complete and given to Dr. Rj Olguin. RT called for BiPAP at this time.       Teresita Meade RN  04/12/22 0461

## 2022-04-12 NOTE — CONSULTS
Dictation [id number]  Came in for new on set SOB and mild CP  Initial labs showed Elevated troponin and EKG showed ST Segment depression  NSTEMI  Troponin 1.170  BNP 1778  Has hx of CAD in the past, with hx of CABG x3 and multiple stenting  Last cath was 2019  Currently on bipap  Had limited echo a week ago, will get complete to look at right sided pressure  hemoglobin low but stable; On brilinta and baby aspirin  Was on low dose Xarelto, but this has been being held since 4/6/2022 d/t low hemoglobin   Will look at getting heart cath complete once able to tolerate from respiratory standpoint    Echo 4/7/2022  Summary   This is a limited echocardiogram.   Left ventricular systolic function is normal.   Ejection fraction is visually estimated at 55%. No evidence of any pericardial effusion. [facility] 12/5/2019  IMPRESSION:  1. EDP is normal.  2.  Left system was not injected, is known to be occluded. 3.  Right coronary artery is mid 100% occluded. 4.  SVG to diagonal is patent. 5.  SVG to OM is patent. 6.  LIMA to LAD is widely patent. The LAD supplies the collateral to  right coronary artery.     Patient seen and examined agree with above assessment and plan  Non-STEMI type II but will need cardiac cath once stable from pulmonary standpoint

## 2022-04-12 NOTE — ED NOTES
Patient requesting something for anxiety prior to cath lab. Per Nina, verbal orders for Ativan 1mg PO now.       Drew Kilpatrick RN  04/12/22 [id number]

## 2022-04-12 NOTE — ED NOTES
This nurse called and spoke with Dr. Nelida Chavez regarding patient. Grant Jessica reports that he will be down to see patient.       Elio Aragon RN  04/12/22 1560

## 2022-04-12 NOTE — ED NOTES
Per Dr. Julian Hodges, hold off on giving patient lasix at this time d/t SBP in the 90's.       Ronaldo Giang, FADUMO  04/12/22 [id number]

## 2022-04-12 NOTE — PROGRESS NOTES
L femoral 6 fr. sheath removed & manual pressure applied. Hemostasis obtained. Manual pressure held per FADUMO Castellanos x 20 minutes. Tegederm DSD applied to site. No hematoma noted. No bleeding noted. Pt. instructed on post sheath removal care/restrictions and verbalized an understanding.     Pedal/tibial pulses +3  VS WNL

## 2022-04-12 NOTE — PROGRESS NOTES
04/12/22 0854   NIV Type   $NIV $Daily Charge   NIV Started/Stopped On   Mode Bilevel   Mask Type Full face mask   Mask Size Small   Settings/Measurements   IPAP 12 cmH20   CPAP/EPAP 5 cmH2O   Rate Ordered 12   Resp 18   FiO2  50 %   Vt Exhaled 625 mL   Minute Volume 11.7 Liters   Mask Leak (lpm) 17 lpm   Comfort Level Good   Using Accessory Muscles No   Alarm Settings   Alarms On Y   Press Low Alarm 5 cmH2O   High Pressure Alarm 20 cmH2O   Apnea (secs) 20 secs   Resp Rate Low Alarm 13

## 2022-04-12 NOTE — CONSULTS
Consult completed. Procedure/rationale explained to pt including benefits vs potential risks/complications; questions answered & consent obtained. #6Fr Triple Lumen ArrowG+clary Blue Advance PICC initiated to RUE Basilic Vein using sterile, UltraSound-guided technique without difficulty/complications. Positioning verified via VPSg4 & 3CG with appropriate P-wave changes, US cathedral waves, and blue bullseye noted. Sterile dressing with SkinPrep, StatLock Securing Device, BioPatch, SwabCaps, and Limb Precautions band applied. Pt tolerated well & denies other c/o or needs. Primary RN notified.

## 2022-04-12 NOTE — ED NOTES
Patient noted with continuous expiratory wheezing. Dr. Banks Mask notified.       Pieter Fierro, RN  04/12/22 4180

## 2022-04-12 NOTE — ED NOTES
Cardiology PA at bedside signing consent with patient at this time.       FADUMO Mccarthy  04/12/22 7472

## 2022-04-12 NOTE — ED NOTES
XR CHEST PORTABLE    Status: Preliminary result       Order Providers    Lynnette Dakin, MD            Reading Providers    Read Date Phone Pager   Ori Lorenzo Apr 12, 2022  6:09 [phone]        XR CHEST PORTABLE: Patient Communication     Not Released  Not seen     Radiation Dose Estimates    No radiation information found for this patient  Narrative   EXAMINATION:   ONE XRAY VIEW OF THE CHEST       4/12/2022 6:04 am       COMPARISON:   April 6, 2022       HISTORY:   ORDERING SYSTEM PROVIDED HISTORY: shortness of breath   TECHNOLOGIST PROVIDED HISTORY:   Reason for exam:->shortness of breath   Reason for Exam: sob   Additional signs and symptoms: sob       FINDINGS:   No lines or tubes.  Stable cardiomediastinal silhouette.  Mild edema is   present.  No dense consolidation or pleural effusion.  No pneumothorax.       No acute or aggressive osseous lesion.           Impression   1. Mild edema.           Pieter Fierro RN  04/12/22 3392

## 2022-04-12 NOTE — PROGRESS NOTES
Cath today  Start on heparin drip  Start on pressors  Give lasix  Place a martinez  Give Brilianta 180mg   Repeat echo   Will need ICU       Echo--4/22   Summary   This is a limited echocardiogram.   Left ventricular systolic function is normal.   Ejection fraction is visually estimated at 55%. No evidence of any pericardial effusion. PAST MEDICAL HISTORY:  Coronary artery disease, status post CABG done,  LIMA to LAD, SVG to OM, SVG to diagonal.  Last heart catheterization was  done in 12/2019. All the grafts were patent. Extensive native coronary  artery disease present.   History of right common femoral artery patch  placement, peripheral vascular disease.     PAST SURGICAL HISTORY:  Three-vessel bypass surgery, LIMA to LAD, SVG to  OM, SVG to diagonal.  Left rib removal.  Gallbladder surgery,  tonsillectomy, back surgery, right common femoral endarterectomy  performed and right carotid endarterectomy performed.       Electronically signed by Riley Gómez MD on 4/12/22 at 10:26 AM EDT

## 2022-04-12 NOTE — CONSULTS
[address] [place] [address], [address] [facility]                                  CONSULTATION    PATIENT NAME: Daphne Tobin                     :        1965  MED REC NO:   [id number]                          ROOM:  ACCOUNT NO:   [de-identified]                           ADMIT DATE: 2022  PROVIDER:     Jeremiah Peterson    CONSULT DATE:  2022    INDICATION:  Non-STEMI. HISTORY OF PRESENT ILLNESS:  The patient is a 26-year-old female who  presented to the emergency department with new onset shortness of breath  that started last night where she felt like she just could not breathe. When coming into the emergency department, she was placed on BiPAP. Initial labs showed troponin at 0.978 and then the repeat one was at  1.179 and then EKG showed that there is ST segment depression in the  anterior lateral leads and T-wave inversion in the anterior leads,  non-STEMI. The patient has a significant history of CAD in the past  requiring cardiac catheterizations and CABG in the past.  She has a  total of 9 stents. She does have mild chest pain mostly with breathing  and feeling better on BiPAP. Denies any sort of palpitations. No edema  present at this time. She is in normal sinus rhythm on telemetry. Blood pressure is stable. She had chest x-ray done, it showed mild  edema. Limited echo was done on  that showed ejection fraction was  at 55%. Last heart cath was in 2019, at that time EDP was normal,  left system was not injected because of known occlusions. The right  coronary artery mid was 100% occluded. SVG to diag was patent. SVG to  OM was patent. LIMA to LAD was widely patent. The LAD fills with right  collateral to RCA at that time. PAST MEDICAL HISTORY:  Acid reflux, CAD, COPD, depression, diabetes,  eczema, migraines, hypertension, hyperlipidemia, kidney stones, PVD.     PAST SURGICAL HISTORY: Appendectomy, back surgery, bone resection in  the ribs, breast biopsy, cardiac catheterization, CABG x3,  cholecystectomy, femoral endarterectomy. SOCIAL HISTORY:  She is a former smoker. Does not drink any alcohol. Does not use any illicit drugs. ALLERGIES:  She is allergic to LYRICA, CODEINE, ERYTHROMYCIN, IMITREX,  KETOROLAC, PROCHLORPERAZINE, TRAMADOL, ADHESIVE TAPE. HOME MEDICATIONS:  Prilosec, folic acid, vitamin U46, Crestor,  Jardiance, Percocet, Ranexa, TriCor, gabapentin, Klonopin, lisinopril,  Lasix, Celexa, Xarelto, BuSpar, Coreg, Brilinta. REVIEW OF SYSTEMS:  A 10-point review of systems is otherwise negative  unless noted above in HPI. PHYSICAL EXAMINATION:  GENERAL:  The patient is awake, alert, answering questions  appropriately, in mild respiratory distress on BiPAP. VITAL SIGNS:  The patient is afebrile at 97.4, respirations are 21,  pulse is 91, blood pressure 97/69, she is 94% on 50 FiO2 on BiPAP. HEENT:  Head is normocephalic, atraumatic. Pupils are equal and  reactive to light. CHEST:  Equal in expansion. LUNGS:  Clear to auscultation. CARDIAC:  Heart rate and rhythm regular. No clicks, gallops or murmurs  noted. ABDOMEN:  Abdomen is soft, nontender. Bowel sounds are present in all  four quadrants. No hepatosplenomegaly or guarding noted. EXTREMITIES:  No cyanosis, clubbing, or edema noted. NEUROLOGICAL:  Cranial nerves II through XII are grossly intact. LABORATORY DATA:  Troponin 1.170. BNP 1778. Potassium at 3.9.  _____. Hemoglobin 8.0, white blood cells 12.2. Gases showed pH of 7.30, pCO2  56. IMPRESSION:  The patient is a 59-year-old female who has a strong  history of CAD, presents to the emergency department with elevated  troponin, NSTEMI. She does present also with ST segment depression. Currently on BiPAP. PLAN:  At this time, we will continue to work on respiratory status.    Once respiratory status is improved, we will look at doing left heart  cath for non-STEMI. She had a limited echo done on the 7th. We will  get complete echo to better evaluate, especially if any pulmonary  pressure coming from the lungs. We will continue to follow alongside  course and make recommendations when appropriate. This plan was  discussed with Dr. Micheline Kiser.     Agree with above  Plan for cath     Manjeet Bonilla    D: 04/12/2022 9:54:45       T: 04/12/2022 14:15:49     [email or web address]  Job#: [id number]     Doc#: [id number]    CC:

## 2022-04-12 NOTE — PLAN OF CARE
Problem: Cardiac:  Goal: Ability to maintain an adequate cardiac output will improve  Description: Ability to maintain an adequate cardiac output will improve  Outcome: Ongoing  Goal: Hemodynamic stability will improve  Description: Hemodynamic stability will improve  Outcome: Ongoing  Goal: Diagnostic test results will improve  Description: Diagnostic test results will improve  Outcome: Ongoing     Problem: Health Behavior:  Goal: Ability to identify changes in lifestyle to reduce recurrence of condition will improve  Description: Ability to identify changes in lifestyle to reduce recurrence of condition will improve  Outcome: Ongoing     Problem: Nutritional:  Goal: Ability to identify appropriate dietary choices will improve  Description: Ability to identify appropriate dietary choices will improve  Outcome: Ongoing     Problem: Respiratory:  Goal: Ability to maintain adequate ventilation will improve  Description: Ability to maintain adequate ventilation will improve  Outcome: Ongoing     Problem: Sensory:  Goal: Pain level will decrease  Description: Pain level will decrease  Outcome: Ongoing     Problem: Pain:  Goal: Pain level will decrease  Description: Pain level will decrease  Outcome: Ongoing  Goal: Control of acute pain  Description: Control of acute pain  Outcome: Ongoing

## 2022-04-12 NOTE — ED NOTES
Patient admitted to room 3103. Report called to Unknown Kehr, RN.      Shay Padilla RN  04/12/22 1655

## 2022-04-12 NOTE — OP NOTE
Operative Note      Patient: Robyn Dexter  YOB: 1965  MRN: [id number]    Date of Procedure:  4/12/22  PRE-OP DX =NSTEMI AND UNSTABLE ANGINA    Post-Op Diagnosis: Same    Estimated Blood Loss (mL): less than 50     Complications: None    Electronically signed by Juli Valdes MD on 4/12/2022 at 2:25 PM     DICTATED --[id number]  LEFT MAIN PATENT  % PROXIMAL  % PROXIMAL  RCA KNOW TO BE OCCLUDED DID NOT INJECT  LIMA TO LAD PATENT  SVG TO DIAGONAL 100% OCCLUDED  SVG TO OM-OSTIAL 80% TO 0% EMI RESOLUTE 2.5X15MM STENT, AND PROXIMAL 80% TO 0% EMI RESOLUTE 2.5X18MM STENT  LVEDP 20  ASA AND BRILIANTA AND HEPARIN  NO CLOSURE DEVICE  LEFT GROIN ACCESS  NO COMPLICATIONS    Electronically signed by Juli Valdes MD on 4/12/22 at 2:35 PM EDT

## 2022-04-12 NOTE — ED PROVIDER NOTES
[facility] ICU  EMERGENCY DEPARTMENT ENCOUNTER      Pt Name: Noe Moffett  MRN: [id number]  [place] 1965  Date of evaluation: 4/12/2022  Provider: Michele Alexandra MD    CHIEF COMPLAINT       Chief Complaint   Patient presents with    Shortness of Breath         HISTORY OF PRESENT ILLNESS      Noe Moffett is a 61 y.o. female who presents to the emergency department  for   Chief Complaint   Patient presents with    Shortness of Breath       54-year-old female presents from home with hypoxia and shortness of breath. She endorses a history of COPD. She does not have home oxygen requirement. She recent hospitalization for multiple issues including acute blood loss anemia. She was found on the scope to have a gastric ulcer. She was on Xarelto and Brilinta at that time. Her Xarelto was held. According to notes, she did resume her Brilinta upon discharge. She is discharged home on April 8, 2022. She reports that early this morning she woke up very short of breath. She comes emergency department by EMS. EMS reports upon finding her in her home that she was hypoxic in the low 80s on room air. She was placed on supplemental oxygen and brought to the emergency department for evaluation. She denies any melena hematochezia. She does endorse some mild epigastric abdominal pain. She is speaking full sentences. She does not have any chest pain. GCS of 15. Nursing Notes, Triage Notes & Vital Signs were reviewed. REVIEW OF SYSTEMS    (2-9 systems for level 4, 10 or more for level 5)     Review of Systems   Constitutional: Negative for chills and fever. HENT: Negative for congestion, rhinorrhea and sore throat. Eyes: Negative for pain and discharge. Respiratory: Positive for cough and shortness of breath. Cardiovascular: Negative for chest pain. Gastrointestinal: Positive for abdominal pain. Negative for nausea and vomiting. Endocrine: Negative for polydipsia and polyuria. Genitourinary: Negative for dysuria and flank pain. Musculoskeletal: Negative for back pain and neck pain. Skin: Negative for pallor and wound. Neurological: Negative for dizziness, seizures, facial asymmetry, light-headedness, numbness and headaches. Psychiatric/Behavioral: Negative for confusion. Except as noted above the remainder of the review of systems was reviewed and negative. PAST MEDICAL HISTORY     Past Medical History:   Diagnosis Date    Acid reflux     CAD (coronary artery disease)     Sees Dr. Gerson Shahid    COPD (chronic obstructive pulmonary disease) ([facility]     \"have mild COPD- no pulmonologist\"    Depression     Diabetes mellitus ([facility] Dx 2001    Eczema of hand     Hx of migraines     HX OTHER MEDICAL     \"difficulty IV stick\"    Hyperlipidemia     Hypertension     follows with Dr Melanie Marquez Kidney stones 2000's    \"Passed One Kidney Stone\"    PVD (peripheral vascular disease) ([facility]     Wears glasses        Prior to Admission medications    Medication Sig Start Date End Date Taking? Authorizing Provider   omeprazole (PRILOSEC) 40 MG delayed release capsule Take 1 capsule by mouth in the morning and at bedtime 4/8/22 6/7/22  uHmberto Waterman MD   folic acid (FOLVITE) 1 MG tablet Take 1 tablet by mouth daily 4/9/22   Humberto Waterman MD   vitamin B-12 1000 MCG tablet Take 1 tablet by mouth once a week for 4 doses 4/8/22 4/30/22  Humberto Waterman MD   rosuvastatin (CRESTOR) 40 MG tablet Take 40 mg by mouth at bedtime    Historical Provider, MD   empagliflozin (JARDIANCE) 10 MG tablet Take 10 mg by mouth daily    Historical Provider, MD   oxyCODONE-acetaminophen (PERCOCET)  MG per tablet Take 1 tablet by mouth every 6 hours as needed for Pain.     Historical Provider, MD   ranolazine (RANEXA) 1000 MG extended release tablet Take 1,000 mg by mouth 2 times daily     Historical Provider, MD   fenofibrate (TRICOR) 145 MG tablet Take 145 mg by mouth nightly 11/23/19   Historical Provider, MD   gabapentin (NEURONTIN) 100 MG capsule Take 300 mg by mouth in the morning and at bedtime. 1/29/20   Historical Provider, MD   clonazePAM (KLONOPIN) 1 MG tablet Take 1 mg by mouth daily as needed. 2/10/20   Historical Provider, MD   lisinopril (PRINIVIL;ZESTRIL) 5 MG tablet Take 5 mg by mouth daily  11/23/19   Historical Provider, MD   furosemide (LASIX) 20 MG tablet Take 20 mg by mouth daily 11/25/19   Historical Provider, MD   citalopram (CELEXA) 40 MG tablet Take 20 mg by mouth daily  5/14/19   Historical Provider, MD   nitroGLYCERIN (NITROSTAT) 0.4 MG SL tablet up to max of 3 total doses. If no relief after 1 dose, call 911. 5/12/19   Genesis Delgado MD   rivaroxaban (XARELTO) 2.5 MG TABS tablet Take 1 tablet by mouth 2 times daily 5/7/19   ADAMARIS King   busPIRone (BUSPAR) 15 MG tablet Take 15 mg by mouth 3 times daily  Patient taking differently: Take 30 mg by mouth in the morning and at bedtime  11/13/18   Linnea Dunn MD   carvedilol (COREG) 12.5 MG tablet Take 1 tablet by mouth 2 times daily (with meals) 6/29/18   Gamal Hutchinson MD   ticagrelor (BRILINTA) 90 MG TABS tablet Take 1 tablet by mouth 2 times daily 6/13/18 2/25/20  Donavon Alford MD   albuterol sulfate HFA (PROAIR HFA) 108 (90 Base) MCG/ACT inhaler Inhale 2 puffs into the lungs every 6 hours as needed for Wheezing 1/29/18   Linnea Dunn MD        Patient Active Problem List   Diagnosis    TOS (thoracic outlet syndrome)    Coronary artery disease    Major depressive disorder, recurrent episode with anxious distress ([facility]    Diabetes mellitus ([facility] [address]    Hyperlipidemia    Essential hypertension    Tobacco dependence    Overweight (BMI 25.0-29. 9)    Chronic midline low back pain with right-sided sciatica    Acute left-sided low back pain with left-sided sciatica    Unstable angina pectoris due to coronary arteriosclerosis ([facility] [address]    Change in mole    Eczema of hand    Arterial occlusion    PVD (peripheral vascular disease) ([facility]    Femoral artery occlusion ([facility]    COPD (chronic obstructive pulmonary disease) (HCC)    CAD (coronary artery disease)    Acid reflux    Depression    Hx of migraines    Hypertension    Kidney stones    Chest pain    NSTEMI (non-ST elevated myocardial infarction) ([facility]         SURGICAL HISTORY       Past Surgical History:   Procedure Laterality Date    APPENDECTOMY  79    Done During TOSHA    BACK SURGERY  Last Done 9-11/2013    \"4 Lower Back Surgeries, 2 Rods, 4 Pins At L5, S1 Put In During Last Surgery\"    BONE RESECTION, RIB      \"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- surgery 2013- ok since then\"    BREAST BIOPSY Bilateral 1990's-2000's    Benign    CARDIAC CATHETERIZATION      per pt \"had heart cath 5/10/2019\"   [address]  5-4-12    [facility]3 Bypasses)   [facility]    \"took with appendix and hyster    COLONOSCOPY  last one 2014    Dr. Oskar Smith      6/28/2018 total of 9    DENTAL SURGERY  2000's    \"Dental Implants Upper And Lower\"    FEMORAL ENDARTERECTOMY Right 6/11/2019    RIGHT FEMORAL ENDARTERECTOMY WITH CORMATRIX PATCH performed by Bear Engel MD at [address]    Appendectomy Also Done    OTHER SURGICAL HISTORY Left 4/4/2013    Left transaxillary 1st rib resection    OTHER SURGICAL HISTORY      peripheral angiography 6/6/2019    TONSILLECTOMY AND ADENOIDECTOMY  1971    UPPER GASTROINTESTINAL ENDOSCOPY N/A 7/24/2021    EGD BIOPSY performed by Anthony Swan MD at [facility] N/A 4/7/2022    EGD DIAGNOSTIC ONLY performed by Anthony Swan MD at [address] [place] [address]      \"5/22/2019\"they went in and unblocked the femoral artery on right side and then in again 6/6/2019\"put dye in -  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\"         CURRENT MEDICATIONS       Current Discharge Medication List      CONTINUE these medications which have NOT CHANGED    Details   omeprazole (PRILOSEC) 40 MG delayed release capsule Take 1 capsule by mouth in the morning and at bedtime  Qty: 120 capsule, Refills: 0      folic acid (FOLVITE) 1 MG tablet Take 1 tablet by mouth daily  Qty: 30 tablet, Refills: 0      vitamin B-12 1000 MCG tablet Take 1 tablet by mouth once a week for 4 doses  Qty: 4 tablet, Refills: 0      rosuvastatin (CRESTOR) 40 MG tablet Take 40 mg by mouth at bedtime      empagliflozin (JARDIANCE) 10 MG tablet Take 10 mg by mouth daily      oxyCODONE-acetaminophen (PERCOCET)  MG per tablet Take 1 tablet by mouth every 6 hours as needed for Pain.      ranolazine (RANEXA) 1000 MG extended release tablet Take 1,000 mg by mouth 2 times daily       fenofibrate (TRICOR) 145 MG tablet Take 145 mg by mouth nightly  Refills: 5      gabapentin (NEURONTIN) 100 MG capsule Take 300 mg by mouth in the morning and at bedtime. clonazePAM (KLONOPIN) 1 MG tablet Take 1 mg by mouth daily as needed. lisinopril (PRINIVIL;ZESTRIL) 5 MG tablet Take 5 mg by mouth daily   Refills: 5      furosemide (LASIX) 20 MG tablet Take 20 mg by mouth daily  Refills: 11      citalopram (CELEXA) 40 MG tablet Take 20 mg by mouth daily   Refills: 2      nitroGLYCERIN (NITROSTAT) 0.4 MG SL tablet up to max of 3 total doses. If no relief after 1 dose, call 911.   Qty: 25 tablet, Refills: 3      rivaroxaban (XARELTO) 2.5 MG TABS tablet Take 1 tablet by mouth 2 times daily  Qty: 60 tablet, Refills: 0      busPIRone (BUSPAR) 15 MG tablet Take 15 mg by mouth 3 times daily  Qty: 90 tablet, Refills: 1    Associated Diagnoses: Major depressive disorder, recurrent episode with anxious distress (HCC)      carvedilol (COREG) 12.5 MG tablet Take 1 tablet by mouth 2 times daily (with meals)  Qty: 60 tablet, Refills: 0      ticagrelor (BRILINTA) 90 MG TABS tablet Take 1 tablet by mouth 2 times daily  Qty: 60 tablet, Refills: 0      albuterol sulfate HFA (PROAIR HFA) 108 (90 Base) MCG/ACT inhaler Inhale 2 puffs into the lungs every 6 hours as needed for Wheezing  Qty: 2 Inhaler, Refills: 0             ALLERGIES     Erythromycin, Lyrica [pregabalin], Codeine, Imitrex [sumatriptan], Ketorolac tromethamine, Prochlorperazine edisylate, Tape [adhesive tape], and Ultram [tramadol]    FAMILY HISTORY       Family History   Problem Relation Age of Onset    Diabetes Mother     High Blood Pressure Mother     Asthma Mother     High Cholesterol Mother     High Blood Pressure Father     High Cholesterol Father     Asthma Father     Heart Disease Father         mvp    Asthma Brother     Mental Illness Son     Early Death Son 25        \"Suicide\"    Mental Illness Daughter         \"Bipolar\"          SOCIAL HISTORY       Social History     Socioeconomic History    Marital status:      Spouse name: None    Number of children: None    Years of education: None    Highest education level: None   Occupational History    None   Tobacco Use    Smoking status: Former Smoker     Packs/day: 0.50     Years: 35.00     Pack years: 17.50     Types: Cigarettes     Quit date: 2019     Years since quittin.8    Smokeless tobacco: Never Used   Vaping Use    Vaping Use: Never used   Substance and Sexual Activity    Alcohol use: No     Alcohol/week: 0.0 standard drinks     Comment: \"quit drinking in - use to drink 5 days per week- average use to drink 6 beers each time\"    Drug use: No    Sexual activity: Yes     Partners: Male   Other Topics Concern    None   Social History Narrative    None     Social Determinants of Health     Financial Resource Strain:     Difficulty of Paying Living Expenses: Not on file   Food Insecurity:     Worried About Running Out of Food in the Last Year: Not on file    Judit of Food in the Last Year: Not on file   Transportation Needs:     Lack of Transportation (Medical): Not on file    Lack of Transportation (Non-Medical):  Not on file   Physical Activity:     Days of Exercise per Week: Not on file    Minutes of Exercise per Session: Not on file   Stress:     Feeling of Stress : Not on file   Social Connections:     Frequency of Communication with Friends and Family: Not on file    Frequency of Social Gatherings with Friends and Family: Not on file    Attends [employer] Services: Not on file    Active Member of [address] [place] [address] or Organizations: Not on file    Attends Club or Organization Meetings: Not on file    Marital Status: Not on file   Intimate Partner Violence:     Fear of Current or Ex-Partner: Not on file    Emotionally Abused: Not on file    Physically Abused: Not on file    Sexually Abused: Not on file   Housing Stability:     Unable to Pay for Housing in the Last Year: Not on file    Number of [place] in the Last Year: Not on file    Unstable Housing in the Last Year: Not on file       SCREENINGS    Tj Coma Scale  Eye Opening: Spontaneous  Best Verbal Response: Oriented  Best Motor Response: Obeys commands  Tj Coma Scale Score: 15          PHYSICAL EXAM    (up to 7 for level 4, 8 or more for level 5)     ED Triage Vitals [04/12/22 0547]   BP Temp Temp src Pulse Resp SpO2 Height Weight   -- -- -- 82 18 100 % -- --       Physical Exam  Vitals reviewed. Constitutional:       Appearance: She is not ill-appearing or toxic-appearing. HENT:      Head: Normocephalic and atraumatic. Nose: No congestion or rhinorrhea. Mouth/Throat:      Pharynx: No pharyngeal swelling or oropharyngeal exudate. Eyes:      Extraocular Movements: Extraocular movements intact. Pupils: Pupils are equal, round, and reactive to light. Cardiovascular:      Rate and Rhythm: Normal rate. Pulmonary:      Effort: Pulmonary effort is normal.      Breath sounds: Decreased breath sounds present. No wheezing. Comments: Decreased breath sounds bilaterally; no retractions  Chest:      Chest wall: No deformity or tenderness.    Abdominal: Palpations: Abdomen is soft. There is no mass. Tenderness: There is abdominal tenderness. Comments: Mild epigastric abdominal tenderness   Musculoskeletal:         General: Normal range of motion. Cervical back: Normal range of motion and neck supple. Right lower leg: No tenderness. No edema. Left lower leg: No tenderness. No edema. Skin:     General: Skin is warm. Capillary Refill: Capillary refill takes less than 2 seconds. Neurological:      General: No focal deficit present. Mental Status: She is alert and oriented to person, place, and time.          DIAGNOSTIC RESULTS     Labs Reviewed   COMPREHENSIVE METABOLIC PANEL W/ REFLEX TO MG FOR LOW K - Abnormal; Notable for the following components:       Result Value    Glucose 117 (*)     AST 51 (*)     GFR Non- 51 (*)     All other components within normal limits   CBC WITH AUTO DIFFERENTIAL - Abnormal; Notable for the following components:    WBC 12.2 (*)     RBC 2.50 (*)     Hemoglobin 8.0 (*)     Hematocrit 27.0 (*)     .0 (*)     MCH 32.0 (*)     MCHC 29.6 (*)     RDW 15.9 (*)     Segs Relative 77.3 (*)     Lymphocytes % 13.3 (*)     Monocytes % 6.4 (*)     Immature Neutrophil % 0.7 (*)     All other components within normal limits   BLOOD GAS, VENOUS - Abnormal; Notable for the following components:    pH, Chip 7.30 (*)     pCO2, Chip 56 (*)     HCO3, Venous 27.6 (*)     All other components within normal limits   TROPONIN - Abnormal; Notable for the following components:    Troponin T 0.978 (*)     All other components within normal limits   BRAIN NATRIURETIC PEPTIDE - Abnormal; Notable for the following components:    Pro-BNP 1,778 (*)     All other components within normal limits   TROPONIN - Abnormal; Notable for the following components:    Troponin T 1.170 (*)     All other components within normal limits   URINALYSIS WITH REFLEX TO CULTURE - Abnormal; Notable for the following components: Color, UA YELLOW (*)     All other components within normal limits   BLOOD GAS, VENOUS - Abnormal; Notable for the following components:    pCO2, Chip 55 (*)     pO2, Chip 228 (*)     Base Exc, Mixed 7.5 (*)     HCO3, Venous 34.1 (*)     O2 Sat, Chip 94.5 (*)     All other components within normal limits   PROTIME/INR & PTT - Abnormal; Notable for the following components:    Protime 10.9 (*)     All other components within normal limits   POCT GLUCOSE - Abnormal; Notable for the following components:    POC Glucose 137 (*)     All other components within normal limits   PROCALCITONIN   APTT   CBC   COMPREHENSIVE METABOLIC PANEL W/ REFLEX TO MG FOR LOW K   HEMOGLOBIN A1C   LIPID PANEL   CBC   BASIC METABOLIC PANEL   POC ACT-LR   POCT GLUCOSE   POCT GLUCOSE   POCT GLUCOSE   POCT GLUCOSE   TYPE AND SCREEN        RADIOLOGY:     Non-plain film images such as CT, Ultrasound and MRI are read by the radiologist. Plain radiographic images are visualized and preliminarily interpreted by the emergency physician. Interpretation per the Radiologist below, if available at the time of this note:    XR CHEST PORTABLE   Preliminary Result   1. Mild edema.                ED BEDSIDE ULTRASOUND:   Performed by ED Physician Amber Arellano MD       LABS:  Labs Reviewed   COMPREHENSIVE METABOLIC PANEL W/ REFLEX TO MG FOR LOW K - Abnormal; Notable for the following components:       Result Value    Glucose 117 (*)     AST 51 (*)     GFR Non- 51 (*)     All other components within normal limits   CBC WITH AUTO DIFFERENTIAL - Abnormal; Notable for the following components:    WBC 12.2 (*)     RBC 2.50 (*)     Hemoglobin 8.0 (*)     Hematocrit 27.0 (*)     .0 (*)     MCH 32.0 (*)     MCHC 29.6 (*)     RDW 15.9 (*)     Segs Relative 77.3 (*)     Lymphocytes % 13.3 (*)     Monocytes % 6.4 (*)     Immature Neutrophil % 0.7 (*)     All other components within normal limits   BLOOD GAS, VENOUS - Abnormal; Notable for the following components:    pH, Chip 7.30 (*)     pCO2, Chip 56 (*)     HCO3, Venous 27.6 (*)     All other components within normal limits   TROPONIN - Abnormal; Notable for the following components:    Troponin T 0.978 (*)     All other components within normal limits   BRAIN NATRIURETIC PEPTIDE - Abnormal; Notable for the following components:    Pro-BNP 1,778 (*)     All other components within normal limits   TROPONIN - Abnormal; Notable for the following components:    Troponin T 1.170 (*)     All other components within normal limits   URINALYSIS WITH REFLEX TO CULTURE - Abnormal; Notable for the following components:    Color, UA YELLOW (*)     All other components within normal limits   BLOOD GAS, VENOUS - Abnormal; Notable for the following components:    pCO2, Chip 55 (*)     pO2, Chip 228 (*)     Base Exc, Mixed 7.5 (*)     HCO3, Venous 34.1 (*)     O2 Sat, Chip 94.5 (*)     All other components within normal limits   PROTIME/INR & PTT - Abnormal; Notable for the following components:    Protime 10.9 (*)     All other components within normal limits   POCT GLUCOSE - Abnormal; Notable for the following components:    POC Glucose 137 (*)     All other components within normal limits   PROCALCITONIN   APTT   CBC   COMPREHENSIVE METABOLIC PANEL W/ REFLEX TO MG FOR LOW K   HEMOGLOBIN A1C   LIPID PANEL   CBC   BASIC METABOLIC PANEL   POC ACT-LR   POCT GLUCOSE   POCT GLUCOSE   POCT GLUCOSE   POCT GLUCOSE   TYPE AND SCREEN       All other labs were within normal range or not returned as of this dictation.     EMERGENCY DEPARTMENT COURSE and DIFFERENTIAL DIAGNOSIS/MDM:   Vitals:    Vitals:    04/12/22 1603 04/12/22 1732 04/12/22 1800 04/12/22 1803   BP:  (!) 91/56 (!) 86/58 (!) 91/54   Pulse: 80 82 79 78   Resp: 22 19 20 19   Temp:       TempSrc:       SpO2: 94%  100% 98%   Weight:       Height:               MDM  Number of Diagnoses or Management Options  Acute diastolic congestive heart failure (HCC)  COPD exacerbation ([facility]  Non-ST elevation MI (NSTEMI) ([facility]  Diagnosis management comments: 54-year-old female with COPD but no home oxygen requirement presents with hypoxia and shortness of breath. Symptoms started acutely at 330 in the morning. She presents by EMS. She reportedly was hypoxic in the low 80s for EMS was placed on supplemental oxygen. She endorses a recent hospitalization for anemia. According to records, she was found to have a gastric ulcer at that time. She had previously been on Brilinta as well as Xarelto. Her Xarelto was held. She presents emergency department speaking full sentences. Breathing treatments steroids and labs ordered. Work-up is pending at this time. She will be followed by oncoming team who will follow work-up and make final disposition.      -  Patient seen and evaluated in the emergency department. -  Triage and nursing notes reviewed and incorporated. -  Old chart records reviewed and incorporated. -  Work-up included:  See above  -  Results discussed with patient. REASSESSMENT          CRITICAL CARE TIME       Total critical care time today provided was 15 minutes. This excludes seperately billable procedure. Critical care time provided for respiratory failure that required close evaluation and/or intervention with concern for patient decompensation. This excludes seperately billable procedures and family discussion time. Critical care time provided for obtaining history, conducting a physical exam, performing and monitoring interventions, ordering, collecting and interpreting tests, and establishing medical decision-making. There was a potential for life/limb threatening pathology requiring close evaluation and intervention with concern for patient decompensation.     CONSULTS:  IP CONSULT TO CARDIOLOGY  IP CONSULT TO HOSPITALIST  IP CONSULT TO CRITICAL CARE  IP CONSULT TO IV TEAM  IP CONSULT TO CARDIAC REHAB    PROCEDURES:  None performed unless otherwise noted below     Procedures        FINAL IMPRESSION      1. COPD exacerbation ([facility]    2. Acute diastolic congestive heart failure ([facility]    3. Non-ST elevation MI (NSTEMI) ([facility]          DISPOSITION/PLAN   DISPOSITION        PATIENT REFERRED TO:  No follow-up provider specified. DISCHARGE MEDICATIONS:  Current Discharge Medication List          ED Provider Disposition Time  DISPOSITION        Appropriate personal protective equipment was worn during the patient's evaluation. These included surgical, eye protection, surgical mask or in 95 respirator and gloves. The patient was also placed in a surgical mask for the prevention of possible spread of respiratory viral illnesses. The Patient was instructed to read the package inserts with any medication that was prescribed. Major potential reactions and medication interactions were discussed. The Patient understands that there are numerous possible adverse reactions not covered. The patient was also instructed to arrange follow-up with his or her primary care provider for review of any pending labwork or incidental findings on any radiology results that were obtained. All efforts were made to discuss any incidental findings that require further monitoring. Controlled Substances Monitoring:     No flowsheet data found.     (Please note that portions of this note were completed with a voice recognition program.  Efforts were made to edit the dictations but occasionally words are mis-transcribed.)    Wyatt Ambrosio MD (electronically signed)  Attending Emergency Physician           Wyatt Ambrosio MD  04/12/22 0121

## 2022-04-12 NOTE — ED NOTES
Patient now laying in bed on back, reports BiPAP is helping with SOB. Daughter is at bedside.       Melani Magallanes RN  04/12/22 3932

## 2022-04-13 LAB
ALBUMIN SERPL-MCNC: 3.4 GM/DL (ref 3.4–5)
ALP BLD-CCNC: 49 IU/L (ref 40–128)
ALT SERPL-CCNC: 35 U/L (ref 10–40)
ANION GAP SERPL CALCULATED.3IONS-SCNC: 10 MMOL/L (ref 4–16)
APTT: 51.6 SECONDS (ref 25.1–37.1)
AST SERPL-CCNC: 135 IU/L (ref 15–37)
BILIRUB SERPL-MCNC: 0.2 MG/DL (ref 0–1)
BUN BLDV-MCNC: 25 MG/DL (ref 6–23)
CALCIUM SERPL-MCNC: 8.7 MG/DL (ref 8.3–10.6)
CHLORIDE BLD-SCNC: 112 MMOL/L (ref 99–110)
CHOLESTEROL: 128 MG/DL
CO2: 23 MMOL/L (ref 21–32)
CREAT SERPL-MCNC: 0.8 MG/DL (ref 0.6–1.1)
EKG ATRIAL RATE: 78 BPM
EKG DIAGNOSIS: NORMAL
EKG P AXIS: 54 DEGREES
EKG P-R INTERVAL: 162 MS
EKG Q-T INTERVAL: 362 MS
EKG QRS DURATION: 88 MS
EKG QTC CALCULATION (BAZETT): 412 MS
EKG R AXIS: 65 DEGREES
EKG T AXIS: 238 DEGREES
EKG VENTRICULAR RATE: 78 BPM
GFR AFRICAN AMERICAN: >60 ML/MIN/1.73M2
GFR NON-AFRICAN AMERICAN: >60 ML/MIN/1.73M2
GLUCOSE BLD-MCNC: 118 MG/DL (ref 70–99)
GLUCOSE BLD-MCNC: 119 MG/DL (ref 70–99)
GLUCOSE BLD-MCNC: 139 MG/DL (ref 70–99)
GLUCOSE BLD-MCNC: 181 MG/DL (ref 70–99)
GLUCOSE BLD-MCNC: 185 MG/DL (ref 70–99)
HCT VFR BLD CALC: 25.4 % (ref 37–47)
HDLC SERPL-MCNC: 52 MG/DL
HEMOGLOBIN: 7.5 GM/DL (ref 12.5–16)
LDL CHOLESTEROL CALCULATED: 45 MG/DL
MCH RBC QN AUTO: 31.8 PG (ref 27–31)
MCHC RBC AUTO-ENTMCNC: 29.5 % (ref 32–36)
MCV RBC AUTO: 107.6 FL (ref 78–100)
PDW BLD-RTO: 16.1 % (ref 11.7–14.9)
PLATELET # BLD: 314 K/CU MM (ref 140–440)
PMV BLD AUTO: 10.3 FL (ref 7.5–11.1)
POTASSIUM SERPL-SCNC: 4 MMOL/L (ref 3.5–5.1)
RBC # BLD: 2.36 M/CU MM (ref 4.2–5.4)
SODIUM BLD-SCNC: 145 MMOL/L (ref 135–145)
TOTAL PROTEIN: 5.5 GM/DL (ref 6.4–8.2)
TRIGL SERPL-MCNC: 153 MG/DL
TROPONIN T: 2.42 NG/ML
WBC # BLD: 15.9 K/CU MM (ref 4–10.5)

## 2022-04-13 PROCEDURE — 93010 ELECTROCARDIOGRAM REPORT: CPT | Performed by: INTERNAL MEDICINE

## 2022-04-13 PROCEDURE — 80053 COMPREHEN METABOLIC PANEL: CPT

## 2022-04-13 PROCEDURE — 6360000002 HC RX W HCPCS: Performed by: INTERNAL MEDICINE

## 2022-04-13 PROCEDURE — 36592 COLLECT BLOOD FROM PICC: CPT

## 2022-04-13 PROCEDURE — 6370000000 HC RX 637 (ALT 250 FOR IP): Performed by: INTERNAL MEDICINE

## 2022-04-13 PROCEDURE — C9113 INJ PANTOPRAZOLE SODIUM, VIA: HCPCS | Performed by: INTERNAL MEDICINE

## 2022-04-13 PROCEDURE — 80048 BASIC METABOLIC PNL TOTAL CA: CPT

## 2022-04-13 PROCEDURE — 83036 HEMOGLOBIN GLYCOSYLATED A1C: CPT

## 2022-04-13 PROCEDURE — 2000000000 HC ICU R&B

## 2022-04-13 PROCEDURE — 82962 GLUCOSE BLOOD TEST: CPT

## 2022-04-13 PROCEDURE — 85027 COMPLETE CBC AUTOMATED: CPT

## 2022-04-13 PROCEDURE — 80061 LIPID PANEL: CPT

## 2022-04-13 PROCEDURE — 2580000003 HC RX 258: Performed by: INTERNAL MEDICINE

## 2022-04-13 PROCEDURE — 85730 THROMBOPLASTIN TIME PARTIAL: CPT

## 2022-04-13 PROCEDURE — 93005 ELECTROCARDIOGRAM TRACING: CPT | Performed by: INTERNAL MEDICINE

## 2022-04-13 PROCEDURE — 84484 ASSAY OF TROPONIN QUANT: CPT

## 2022-04-13 RX ORDER — METHYLPREDNISOLONE SODIUM SUCCINATE 40 MG/ML
40 INJECTION, POWDER, LYOPHILIZED, FOR SOLUTION INTRAMUSCULAR; INTRAVENOUS EVERY 8 HOURS
Status: DISCONTINUED | OUTPATIENT
Start: 2022-04-13 | End: 2022-04-15 | Stop reason: HOSPADM

## 2022-04-13 RX ORDER — SUCRALFATE 1 G/1
1 TABLET ORAL EVERY 6 HOURS SCHEDULED
Status: DISCONTINUED | OUTPATIENT
Start: 2022-04-13 | End: 2022-04-13

## 2022-04-13 RX ORDER — SUCRALFATE 1 G/1
1 TABLET ORAL EVERY 8 HOURS SCHEDULED
Status: DISCONTINUED | OUTPATIENT
Start: 2022-04-13 | End: 2022-04-15 | Stop reason: HOSPADM

## 2022-04-13 RX ORDER — METOPROLOL SUCCINATE 25 MG/1
25 TABLET, EXTENDED RELEASE ORAL DAILY
Status: DISCONTINUED | OUTPATIENT
Start: 2022-04-13 | End: 2022-04-15 | Stop reason: HOSPADM

## 2022-04-13 RX ORDER — GUAIFENESIN 600 MG/1
600 TABLET, EXTENDED RELEASE ORAL 2 TIMES DAILY
Status: DISCONTINUED | OUTPATIENT
Start: 2022-04-13 | End: 2022-04-15 | Stop reason: HOSPADM

## 2022-04-13 RX ORDER — LEVOFLOXACIN 5 MG/ML
750 INJECTION, SOLUTION INTRAVENOUS EVERY 24 HOURS
Status: DISCONTINUED | OUTPATIENT
Start: 2022-04-13 | End: 2022-04-14

## 2022-04-13 RX ORDER — MIDODRINE HYDROCHLORIDE 5 MG/1
10 TABLET ORAL
Status: DISCONTINUED | OUTPATIENT
Start: 2022-04-13 | End: 2022-04-15 | Stop reason: HOSPADM

## 2022-04-13 RX ADMIN — BUSPIRONE HYDROCHLORIDE 30 MG: 15 TABLET ORAL at 20:46

## 2022-04-13 RX ADMIN — ROSUVASTATIN CALCIUM 40 MG: 20 TABLET, COATED ORAL at 20:47

## 2022-04-13 RX ADMIN — SUCRALFATE 1 G: 1 TABLET ORAL at 14:31

## 2022-04-13 RX ADMIN — MIDODRINE HYDROCHLORIDE 10 MG: 5 TABLET ORAL at 13:15

## 2022-04-13 RX ADMIN — GUAIFENESIN 600 MG: 600 TABLET, EXTENDED RELEASE ORAL at 20:48

## 2022-04-13 RX ADMIN — RANOLAZINE 1000 MG: 500 TABLET, FILM COATED, EXTENDED RELEASE ORAL at 20:48

## 2022-04-13 RX ADMIN — FOLIC ACID 1 MG: 1 TABLET ORAL at 09:10

## 2022-04-13 RX ADMIN — MIDODRINE HYDROCHLORIDE 10 MG: 5 TABLET ORAL at 17:07

## 2022-04-13 RX ADMIN — GUAIFENESIN 600 MG: 600 TABLET, EXTENDED RELEASE ORAL at 09:00

## 2022-04-13 RX ADMIN — SUCRALFATE 1 G: 1 TABLET ORAL at 23:07

## 2022-04-13 RX ADMIN — TICAGRELOR 90 MG: 90 TABLET ORAL at 09:10

## 2022-04-13 RX ADMIN — OXYCODONE HYDROCHLORIDE 10 MG: 5 TABLET ORAL at 21:56

## 2022-04-13 RX ADMIN — SODIUM CHLORIDE, PRESERVATIVE FREE 10 ML: 5 INJECTION INTRAVENOUS at 09:00

## 2022-04-13 RX ADMIN — MELATONIN 6 MG: at 23:07

## 2022-04-13 RX ADMIN — SODIUM CHLORIDE: 9 INJECTION, SOLUTION INTRAVENOUS at 09:30

## 2022-04-13 RX ADMIN — INSULIN LISPRO 1 UNITS: 100 INJECTION, SOLUTION INTRAVENOUS; SUBCUTANEOUS at 12:25

## 2022-04-13 RX ADMIN — GABAPENTIN 300 MG: 300 CAPSULE ORAL at 09:10

## 2022-04-13 RX ADMIN — RANOLAZINE 1000 MG: 500 TABLET, FILM COATED, EXTENDED RELEASE ORAL at 09:00

## 2022-04-13 RX ADMIN — INSULIN GLARGINE 5 UNITS: 100 INJECTION, SOLUTION SUBCUTANEOUS at 09:14

## 2022-04-13 RX ADMIN — TICAGRELOR 90 MG: 90 TABLET ORAL at 20:48

## 2022-04-13 RX ADMIN — ASPIRIN 81 MG 81 MG: 81 TABLET ORAL at 09:10

## 2022-04-13 RX ADMIN — METHYLPREDNISOLONE SODIUM SUCCINATE 40 MG: 40 INJECTION, POWDER, FOR SOLUTION INTRAMUSCULAR; INTRAVENOUS at 17:07

## 2022-04-13 RX ADMIN — PANTOPRAZOLE SODIUM 40 MG: 40 INJECTION, POWDER, FOR SOLUTION INTRAVENOUS at 09:12

## 2022-04-13 RX ADMIN — PANTOPRAZOLE SODIUM 40 MG: 40 INJECTION, POWDER, FOR SOLUTION INTRAVENOUS at 20:50

## 2022-04-13 RX ADMIN — LEVOFLOXACIN 750 MG: 5 INJECTION, SOLUTION INTRAVENOUS at 17:14

## 2022-04-13 RX ADMIN — OXYCODONE HYDROCHLORIDE 10 MG: 5 TABLET ORAL at 10:32

## 2022-04-13 RX ADMIN — GABAPENTIN 300 MG: 300 CAPSULE ORAL at 20:49

## 2022-04-13 RX ADMIN — FENOFIBRATE 160 MG: 160 TABLET ORAL at 09:10

## 2022-04-13 RX ADMIN — BUSPIRONE HYDROCHLORIDE 30 MG: 15 TABLET ORAL at 09:09

## 2022-04-13 RX ADMIN — IRON SUCROSE 300 MG: 20 INJECTION, SOLUTION INTRAVENOUS at 13:03

## 2022-04-13 RX ADMIN — SODIUM CHLORIDE, PRESERVATIVE FREE 10 ML: 5 INJECTION INTRAVENOUS at 20:00

## 2022-04-13 RX ADMIN — ESCITALOPRAM OXALATE 20 MG: 10 TABLET ORAL at 09:10

## 2022-04-13 ASSESSMENT — PAIN DESCRIPTION - PROGRESSION
CLINICAL_PROGRESSION: GRADUALLY IMPROVING

## 2022-04-13 ASSESSMENT — PAIN SCALES - WONG BAKER

## 2022-04-13 ASSESSMENT — PAIN SCALES - GENERAL
PAINLEVEL_OUTOF10: 0
PAINLEVEL_OUTOF10: 2
PAINLEVEL_OUTOF10: 0
PAINLEVEL_OUTOF10: 9
PAINLEVEL_OUTOF10: 8

## 2022-04-13 ASSESSMENT — PAIN DESCRIPTION - LOCATION
LOCATION: BACK
LOCATION: BACK

## 2022-04-13 ASSESSMENT — PAIN DESCRIPTION - ORIENTATION: ORIENTATION: UPPER

## 2022-04-13 ASSESSMENT — PAIN DESCRIPTION - PAIN TYPE
TYPE: ACUTE PAIN
TYPE: CHRONIC PAIN

## 2022-04-13 ASSESSMENT — PAIN DESCRIPTION - DESCRIPTORS
DESCRIPTORS: ACHING
DESCRIPTORS: ACHING

## 2022-04-13 ASSESSMENT — PAIN DESCRIPTION - FREQUENCY
FREQUENCY: INTERMITTENT
FREQUENCY: INTERMITTENT

## 2022-04-13 ASSESSMENT — PAIN DESCRIPTION - ONSET
ONSET: ON-GOING
ONSET: ON-GOING

## 2022-04-13 NOTE — PLAN OF CARE
Problem: Cardiac:  Goal: Ability to maintain an adequate cardiac output will improve  Description: Ability to maintain an adequate cardiac output will improve  4/12/2022 2322 by Mariano Mojica RN  Outcome: Ongoing  4/12/2022 1643 by Brigitte Gamino  Outcome: Ongoing  Goal: Hemodynamic stability will improve  Description: Hemodynamic stability will improve  4/12/2022 2322 by Mariano Mojica RN  Outcome: Ongoing  4/12/2022 1643 by Brigitte Gamino  Outcome: Ongoing  Goal: Diagnostic test results will improve  Description: Diagnostic test results will improve  4/12/2022 2322 by Mariano Mojica RN  Outcome: Ongoing  4/12/2022 1643 by Brigitte Gamino  Outcome: Ongoing     Problem: Health Behavior:  Goal: Ability to identify changes in lifestyle to reduce recurrence of condition will improve  Description: Ability to identify changes in lifestyle to reduce recurrence of condition will improve  4/12/2022 2322 by Mariano Mojica RN  Outcome: Met This Shift  4/12/2022 1643 by Brigitte Gamino  Outcome: Ongoing     Problem: Nutritional:  Goal: Ability to identify appropriate dietary choices will improve  Description: Ability to identify appropriate dietary choices will improve  4/12/2022 2322 by Mariano Mojica RN  Outcome: Met This Shift  4/12/2022 1643 by Brigitte Gamino  Outcome: Ongoing     Problem: Respiratory:  Goal: Ability to maintain adequate ventilation will improve  Description: Ability to maintain adequate ventilation will improve  4/12/2022 2322 by Mariano Mojica RN  Outcome: Met This Shift  4/12/2022 1643 by Brigitte Gamino  Outcome: Ongoing     Problem: Sensory:  Goal: Pain level will decrease  Description: Pain level will decrease  4/12/2022 2322 by Mariano Mojica RN  Outcome: Ongoing  4/12/2022 1643 by Brigitte Gamino  Outcome: Ongoing     Problem: Pain:  Goal: Pain level will decrease  Description: Pain level will decrease  4/12/2022 2322 by Mariano Mojica RN  Outcome: Ongoing  4/12/2022 1643 by Avila Swain  Outcome: Ongoing  Goal: Control of acute pain  Description: Control of acute pain  4/12/2022 2322 by Ada Neumann RN  Outcome: Ongoing  4/12/2022 1643 by Avila Swain  Outcome: Ongoing

## 2022-04-13 NOTE — PROGRESS NOTES
Seen by cardiac rehab status post PTCA. Patient  awake, alert and sitting up in chair with family at bedside. I introduced myself as the cardiac rehab nurse as well as introducing her to the [address].   I explained to her that this program is a customized out patient program of exercise and education. I explained to the patient that Cardiac Rehab is designed to help improve your hearts future. Cardiac rehab is a medically supervised program designed to improve your cardiovascular health through educating about nutrition, exercise, and stress release. Teaching done on A&P of coronary arteries, location of lesions, formation of CAD, symptoms of heart disease, and on procedure performed. Stressed to her the importance of compliance with antiplatelet therapy. Discussed risk factor identification and modification. Teaching done on cardiac diet. Explained the benefits of regular exercise program and stressed the need for a long term commitment to heart healthy practices in controlling this chronic disease. Patient viewing video of the [facility] program overview at this time.

## 2022-04-13 NOTE — CARE COORDINATION
Intern Blanche Brooks reviewed pt chart. Pt is from home with spouse. Pt has insurance and PCP. Into pt room. Pt lives in a one story house with a small step into the home. She lives with her spouse. She has the following DME: walker, w/c, and cane. She typically does not need DME. Pt advised CM she cannot afford her medications at home. Pt is on her spouses insurance. Pt spouse works at [employer]. Pt works part time at [employer]. CM asked pt if she smokes, pt advised 4 days prior to admission she quit smoking. Pt has a daughter and a grandson. CM provided coupons for [employer] and [employer] and a goodrx card. CM explained about [facility] brand insulin, Relion. CM unable to find coupon for Jardiance. CM advised pt to speak to her doctor regarding this medication and see if they have samples.

## 2022-04-13 NOTE — PROGRESS NOTES
Hospital Medicine: Progress Note     Haley Tarango  1965         Admit Date: 4/12/2022   Primary Care Physician:  Doroteo Blake,     /61   Pulse 82   Temp 98 °F (36.7 °C) (Oral)   Resp 22   Ht 5' 7\" (1.702 m)   Wt 176 lb (79.8 kg)   SpO2 97%   BMI 27.57 kg/m²     Assessment and Plan:    25-year-old female with background history of CAD and COPD who presented with shortness of breath. She had ST depression on twelve-lead EKG. She underwent LHC with placement of EMI x2. Doing well postop. NSTEMI:  Status post LHC with PCI and EMI x2. No chest pain at present . Continue DAPT. Appreciate cardiology input. 2D echo with normal LV systolic function. EF of 50%. Mild lateral hypokinesis. Continue medical management. COPD:  Complaint of shortness of breath and cough. Agree with empiric antibiotics and steroids. Bronchodilators as needed. Acute hypoxic respiratory failure:  Likely due to COPD exacerbation. Continue O2 support and bronchodilators. Anemia:  History of peptic ulcer disease. Hb is cautiously holding at 7.5. Continue PPI. Monitor closely. Transfuse if Hb less than 7. Discharge plan: Continue management as above. Plan discharge when SOB better and if able to wean off oxygen. Interval History:    She was seen and examined at bedside, awake and alert, in no acute distress. Complained of cough and SOB. Physical exam:  General appearance: Resting in bed, no apparent distress. Intermittent cough. Head/EENT: ncat, perrl, eomi, mmm. Neck: supple, no meningismus or thyromegaly. Chest: symmetric with equal expansion, no retractions. Lungs: Faint bilateral rhonchi, no egophony. Heart: normal s1s2, no added sounds. Abdomen: soft, +ve bowel sound,non tender, no guarding or rebound. Extremities: no c/c/e, good ROM x 4. Pulses: palpable and strong bilaterally.   Skin: warm and dry.  Neurologic: no focal deficits. Data Review:     CBC: Recent Labs     04/12/22  0649 04/13/22  0554   WBC 12.2* 15.9*   HGB 8.0* 7.5*    314     [place]. Panel:    Recent Labs     04/12/22  0649 04/13/22  0554    145   K 3.9 4.0    112*   CO2 26 23   BUN 23 25*   CREATININE 1.1 0.8   GLUCOSE 117* 118*     Hepatic:   Recent Labs     04/12/22  0649 04/13/22  0554   AST 51* 135*   ALT 20 35   BILITOT 0.1 0.2   ALKPHOS 57 49     Lipids:   Recent Labs     04/13/22  0554   CHOL 128   HDL 52     ABGs:   Lab Results   Component Value Date    PO2ART 300 05/05/2012     INR:   Recent Labs     04/12/22  0649   INR 0.85        Meds:    guaiFENesin  600 mg Oral BID    iron sucrose  300 mg IntraVENous Daily    sucralfate  1 g Oral 3 times per day    metoprolol succinate  25 mg Oral Daily    midodrine  10 mg Oral TID WC    sodium chloride flush  5-40 mL IntraVENous 2 times per day    pantoprazole  40 mg IntraVENous BID    insulin glargine  5 Units SubCUTAneous Daily    insulin lispro  0-6 Units SubCUTAneous Q6H    fenofibrate  160 mg Oral Daily    folic acid  1 mg Oral Daily    ranolazine  1,000 mg Oral BID    rosuvastatin  40 mg Oral Nightly    ticagrelor  90 mg Oral BID    cyanocobalamin  1,000 mcg Oral Weekly    gabapentin  300 mg Oral BID    busPIRone  30 mg Oral BID    escitalopram  20 mg Oral Daily    sodium chloride flush  5-40 mL IntraVENous 2 times per day    aspirin  81 mg Oral Daily     Mayur Manning MD    Note: Computer voice recognition system was used in parts of this documentation. There is a possibility of sound alike word errors inherent to this technology that may be missed during proof-reading and may alter the context of this note.

## 2022-04-13 NOTE — PROGRESS NOTES
Daily Progress Note  Awake and alert; feeling better  Denies CP, Still having some SOB  BP and HR stable; still on pressor  NSR on tele  Cath site healing well  Increase activity    Nstemi    Troponin elevated up to 1.179    EKG showed new ST segment depression and inverted T-wave    Went to cath lab yesterday and stent was placed in SVG to OM graft    Pt. Tolerated it well    Groin site healing well    On DAPT    Has strong hx of CAD in the past; was on low dose Xarelto in the past to for CAD however holding this at this time d/t anemia    Heparin gtt turned off    Continue on Crestor 40mg    No betablocker d/t hypotension    Continue Ranexa    HFpEF    BNP elevated    IV lasix given in HR    Holding BB and ACE d/t hypotension    Acute respiratory failure    Still on 3L NC, however much improved from yesterday    Hypercapnia yesterday    Was placed on bipap in ER    Anemia    Hgb 7.5 today    Hx of GI bleed    Protonix BID and Carafate    Will cont' to trend H&H      Echo 4/12/2022    Summary   This is a limited echocardiogram.   Left ventricular systolic function is low normal.   Ejection fraction is visually estimated at 50%. Mild lateral hypokinesis. Trace tricuspid regurgitation; RVSP: normal.   No evidence of any pericardial effusion.     [facility] 4/12/2022  LEFT MAIN PATENT  % PROXIMAL  % PROXIMAL  RCA KNOW TO BE OCCLUDED DID NOT INJECT  LIMA TO LAD PATENT  SVG TO DIAGONAL 100% OCCLUDED  SVG TO OM-OSTIAL 80% TO 0% EMI RESOLUTE 2.5X15MM STENT, AND PROXIMAL 80% TO 0% EMI RESOLUTE 2.5X18MM STENT  LVEDP 20  ASA AND BRILIANTA AND HEPARIN  NO CLOSURE DEVICE  LEFT GROIN ACCESS  NO COMPLICATIONS    PAST MEDICAL HISTORY:  Acid reflux, CAD, COPD, depression, diabetes,  eczema, migraines, hypertension, hyperlipidemia, kidney stones, PVD.     PAST SURGICAL HISTORY:  Appendectomy, back surgery, bone resection in  the ribs, breast biopsy, cardiac catheterization, CABG x3,  cholecystectomy, femoral endarterectomy.     SOCIAL HISTORY:  She is a former smoker. Does not drink any alcohol. Does not use any illicit drugs.     ALLERGIES:  She is allergic to LYRICA, CODEINE, ERYTHROMYCIN, IMITREX,  KETOROLAC, PROCHLORPERAZINE, TRAMADOL, ADHESIVE TAPE.     HOME MEDICATIONS:  Prilosec, folic acid, vitamin I13, Crestor,  Jardiance, Percocet, Ranexa, TriCor, gabapentin, Klonopin, lisinopril,  Lasix, Celexa, Xarelto, BuSpar, Coreg, Brilinta. Objective:   /60   Pulse 87   Temp 98.1 °F (36.7 °C) (Oral)   Resp 18   Ht 5' 7\" (1.702 m)   Wt 176 lb (79.8 kg)   SpO2 98%   BMI 27.57 kg/m²     Intake/Output Summary (Last 24 hours) at 4/13/2022 1050  Last data filed at 4/12/2022 1732  Gross per 24 hour   Intake 300 ml   Output 1450 ml   Net -1150 ml       Medications:   Scheduled Meds:   sucralfate  1 g Oral 4 times per day    guaiFENesin  600 mg Oral BID    sodium chloride flush  5-40 mL IntraVENous 2 times per day    pantoprazole  40 mg IntraVENous BID    insulin glargine  5 Units SubCUTAneous Daily    insulin lispro  0-6 Units SubCUTAneous Q6H    fenofibrate  160 mg Oral Daily    folic acid  1 mg Oral Daily    ranolazine  1,000 mg Oral BID    rosuvastatin  40 mg Oral Nightly    ticagrelor  90 mg Oral BID    cyanocobalamin  1,000 mcg Oral Weekly    gabapentin  300 mg Oral BID    busPIRone  30 mg Oral BID    escitalopram  20 mg Oral Daily    sodium chloride flush  5-40 mL IntraVENous 2 times per day    aspirin  81 mg Oral Daily      Infusions:   sodium chloride      norepinephrine 3 mcg/min (04/13/22 0846)    sodium chloride 50 mL/hr at 04/13/22 0930    sodium chloride        PRN Meds:  sodium chloride flush, sodium chloride, [DISCONTINUED] acetaminophen **OR** acetaminophen, polyethylene glycol, potassium chloride, magnesium sulfate, nitroGLYCERIN, albuterol sulfate HFA, oxyCODONE **AND** acetaminophen, sodium chloride flush, sodium chloride, acetaminophen, melatonin       Physical Exam:  Vitals:    04/13/22 0800   BP:    Pulse: 87   Resp:    Temp:    SpO2:         General: AAO, NAD  Chest: Nontender  Cardiac: First and Second Heart Sounds are Normal, No Murmurs or Gallops noted  Lungs:Clear to auscultation and percussion. Abdomen: Soft, NT, ND, +BS  Extremities: No clubbing, no edema  Vascular:  Equal 2+ peripheral pulses. Lab Data:  CBC:   Recent Labs     04/12/22  0649 04/13/22  0554   WBC 12.2* 15.9*   HGB 8.0* 7.5*   HCT 27.0* 25.4*   .0* 107.6*    314     BMP:   Recent Labs     04/12/22  0649 04/13/22  0554    145   K 3.9 4.0    112*   CO2 26 23   BUN 23 25*   CREATININE 1.1 0.8     LIVER PROFILE:   Recent Labs     04/12/22  0649 04/13/22  0554   AST 51* 135*   ALT 20 35   BILITOT 0.1 0.2   ALKPHOS 57 49     PT/INR:   Recent Labs     04/12/22  0649   PROTIME 10.9*   INR 0.85     APTT:   Recent Labs     04/12/22  0649 04/13/22  0554   APTT 29.4 51.6*     BNP:  No results for input(s): BNP in the last 72 hours.       Assessment:  Patient Active Problem List    Diagnosis Date Noted    TOS (thoracic outlet syndrome) 04/04/2013    NSTEMI (non-ST elevated myocardial infarction) ([address] 04/12/2022    Chest pain 08/28/2020    COPD [facility]ch[facility] obstructive pulmonary disease[facility]     CAD (coronary artery disease)     Acid reflux     Depression     Hx of migraines     Hypertension     Kidney stones     Femoral artery occlusion ([address] 06/11/2019    PVD (peripheral vascular disease) ([address] 05/23/2019    Arterial occlusion 05/22/2019    Eczema of hand     Change in mole 10/31/2018    Unstable angina pectoris due to coronary arteriosclerosis ([address] 06/10/2018    Acute left-sided low back pain with left-sided sciatica 01/15/2018    Chronic midline low back pain with right-sided sciatica 10/06/2017    Tobacco dependence 02/27/2017    Overweight (BMI 25.0-29.9) 02/27/2017    Hyperlipidemia 02/09/2016    Essential hypertension 02/09/2016    Diabetes mellitus ([facility] 12/08/2013    Coronary artery disease 10/08/2013    Major depressive disorder, recurrent episode with anxious distress ([facility] 10/08/2013       Electronically signed by SARI Christie CNP on 4/13/2022 at 10:50 AM  Patient seen and examined agree with above assessment and plan no complication from the angioplasty

## 2022-04-13 NOTE — PROGRESS NOTES
Cynthea Cheadle BSN RN clinical  for [facility] SURGICAL [facility] RN students 07-19:00 this date.

## 2022-04-13 NOTE — PROCEDURES
[address] [place] [address], [address] [facility]                            CARDIAC CATHETERIZATION    PATIENT NAME: Wolf Corona                     :        1965  MED REC NO:   [id number]                          ROOM:       2110  ACCOUNT NO:   [de-identified]                           ADMIT DATE: 2022  PROVIDER:     Leda Lucia MD    DATE OF PROCEDURE:  2022    INDICATION:  Unstable angina, non-STEMI. This is a 68-year-old female patient brought to the cath lab today. Informed consent was obtained from the patient. The patient was prepped  and draped in the usual sterile fashion. The patient was injected with  20 mL of 2% lidocaine in the left femoral artery region. Using a  micropuncture needle, the left femoral artery was cannulized and a  4-[place] sheath was placed in the left femoral artery. The entire  procedure was done using a guidewire, sheath was flushed in between  procedure. Using a pigtail catheter, EDP was measured. EDP was around 20 to 22  mmHg. Using AR-MOD catheter, SVG graft to the OM was performed. SVG graft to  OM shows an ostial 80% stenosis present. There is dampening of the  pressure, and proximal zone of the graft also has 80% stenosis present. Rest of the graft has mild disease noted. It fills the OM branch. There is a DONNY-3 flow present and blushing of myocardial present. The patient had another SVG graft to diagonal what appears to be  in-stent stenosis present, 100% occluded. LIMA angiography was performed. LIMA to LAD is widely patent. It fills  the diagonal branches and it fills retrogradely into the native LAD  septal branches. There is also collateral to the right coronary artery. Right coronary artery is known to be occluded. Using a JL-4, 4-[place] catheter, left coronary angiography was  performed.   Left coronary angiogram revealed the left main is patent,  but after that LAD is 100% occluded, circ is 100% occluded. IMPRESSION:  1. Left main is patent, but LAD and circ both are 100% occluded. The  right coronary artery is known to be occluded. 2.  KEANE to LAD is patent. 3.  SVG to OM graft, ostial 80% to 90% stenosis present, and the  proximal portion of the graft has 80% stenosis present. 4.  SVG to diagonal graft appears to be occluded. The plan is to proceed with intervention of the SVG graft to the OM. The patient's 4-[place] sheath was exchanged for a 6-[place] sheath. The  patient was anticoagulated with heparin. ACT was kept greater than 250. The patient had received the Brilinta 180 mg this morning and aspirin  325 mg this morning. Then, using an AL-1 guide, SVG graft was engaged. A [address] wire was  crossed into the circ, and proximal lesion was stented with a  drug-eluting stent, Resolute 2.5 x 18, and ostial lesion was stented  with a Resolute 2.5 x 15 mm stent. Both stents were deployed at high  pressure. Final angiogram showed DONNY-3 flow noted. No dissection,  perforation, or distal embolization present. No other grafts were  visualized. No closure device was placed. IMPRESSION:  1. Left main is patent. 2.  LAD is proximally 100% occluded. 3.  Circ is proximally 100% occluded. 4.  Right coronary artery is known to be occluded. 5.  KEANE to LAD is patent. 6.  SVG to OM, ostial 80% stenosis, stented with a drug-eluting stent,  Resolute 2.5 x 15 mm stent and the proximal portion of the graft is  stented with a 2.5 x 18 mm stent. 7.  SVG graft to diagonal appears to be occluded. At this time, optimize medical treatment. The patient tolerated the  procedure well. No complications noted.     Blood loss 30cc  Cardiac rehab consulted     Olivia Morales MD    D: 04/12/2022 14:29:41       T: 04/12/2022 20:21:24     [email or web address]  Job#: [id number]     Doc#: [id number]    CC:

## 2022-04-14 LAB
ALBUMIN SERPL-MCNC: 3.5 GM/DL (ref 3.4–5)
ALP BLD-CCNC: 47 IU/L (ref 40–129)
ALT SERPL-CCNC: 22 U/L (ref 10–40)
ANION GAP SERPL CALCULATED.3IONS-SCNC: 7 MMOL/L (ref 4–16)
AST SERPL-CCNC: 33 IU/L (ref 15–37)
BASOPHILS ABSOLUTE: 0 K/CU MM
BASOPHILS RELATIVE PERCENT: 0.1 % (ref 0–1)
BILIRUB SERPL-MCNC: 0.2 MG/DL (ref 0–1)
BILIRUBIN DIRECT: 0.2 MG/DL (ref 0–0.3)
BILIRUBIN, INDIRECT: 0 MG/DL (ref 0–0.7)
BUN BLDV-MCNC: 23 MG/DL (ref 6–23)
CALCIUM SERPL-MCNC: 8.8 MG/DL (ref 8.3–10.6)
CHLORIDE BLD-SCNC: 111 MMOL/L (ref 99–110)
CO2: 23 MMOL/L (ref 21–32)
CREAT SERPL-MCNC: 0.8 MG/DL (ref 0.6–1.1)
DIFFERENTIAL TYPE: ABNORMAL
EKG ATRIAL RATE: 97 BPM
EKG DIAGNOSIS: NORMAL
EKG P AXIS: 64 DEGREES
EKG P-R INTERVAL: 142 MS
EKG Q-T INTERVAL: 372 MS
EKG QRS DURATION: 84 MS
EKG QTC CALCULATION (BAZETT): 472 MS
EKG R AXIS: 84 DEGREES
EKG T AXIS: -35 DEGREES
EKG VENTRICULAR RATE: 97 BPM
EOSINOPHILS ABSOLUTE: 0 K/CU MM
EOSINOPHILS RELATIVE PERCENT: 0 % (ref 0–3)
ESTIMATED AVERAGE GLUCOSE: 100 MG/DL
GFR AFRICAN AMERICAN: >60 ML/MIN/1.73M2
GFR NON-AFRICAN AMERICAN: >60 ML/MIN/1.73M2
GLUCOSE BLD-MCNC: 132 MG/DL (ref 70–99)
GLUCOSE BLD-MCNC: 153 MG/DL (ref 70–99)
GLUCOSE BLD-MCNC: 168 MG/DL (ref 70–99)
GLUCOSE BLD-MCNC: 192 MG/DL (ref 70–99)
GLUCOSE BLD-MCNC: 207 MG/DL (ref 70–99)
GLUCOSE BLD-MCNC: 211 MG/DL (ref 70–99)
GLUCOSE BLD-MCNC: 261 MG/DL (ref 70–99)
HBA1C MFR BLD: 5.1 % (ref 4.2–6.3)
HCT VFR BLD CALC: 24.7 % (ref 37–47)
HCT VFR BLD CALC: 32.4 % (ref 37–47)
HEMOGLOBIN: 10.1 GM/DL (ref 12.5–16)
HEMOGLOBIN: 7.1 GM/DL (ref 12.5–16)
IMMATURE NEUTROPHIL %: 0.9 % (ref 0–0.43)
LYMPHOCYTES ABSOLUTE: 0.6 K/CU MM
LYMPHOCYTES RELATIVE PERCENT: 4.9 % (ref 24–44)
MAGNESIUM: 2 MG/DL (ref 1.8–2.4)
MCH RBC QN AUTO: 30.9 PG (ref 27–31)
MCHC RBC AUTO-ENTMCNC: 28.7 % (ref 32–36)
MCV RBC AUTO: 107.4 FL (ref 78–100)
MONOCYTES ABSOLUTE: 0.4 K/CU MM
MONOCYTES RELATIVE PERCENT: 2.9 % (ref 0–4)
NUCLEATED RBC %: 0 %
PDW BLD-RTO: 15.4 % (ref 11.7–14.9)
PLATELET # BLD: 293 K/CU MM (ref 140–440)
PMV BLD AUTO: 10.8 FL (ref 7.5–11.1)
POTASSIUM SERPL-SCNC: 4.2 MMOL/L (ref 3.5–5.1)
PRO-BNP: 4406 PG/ML
RBC # BLD: 2.3 M/CU MM (ref 4.2–5.4)
SEGMENTED NEUTROPHILS ABSOLUTE COUNT: 11.1 K/CU MM
SEGMENTED NEUTROPHILS RELATIVE PERCENT: 91.2 % (ref 36–66)
SODIUM BLD-SCNC: 141 MMOL/L (ref 135–145)
TOTAL IMMATURE NEUTOROPHIL: 0.11 K/CU MM
TOTAL NUCLEATED RBC: 0 K/CU MM
TOTAL PROTEIN: 5.7 GM/DL (ref 6.4–8.2)
WBC # BLD: 12.2 K/CU MM (ref 4–10.5)

## 2022-04-14 PROCEDURE — 80053 COMPREHEN METABOLIC PANEL: CPT

## 2022-04-14 PROCEDURE — 83880 ASSAY OF NATRIURETIC PEPTIDE: CPT

## 2022-04-14 PROCEDURE — 6370000000 HC RX 637 (ALT 250 FOR IP): Performed by: INTERNAL MEDICINE

## 2022-04-14 PROCEDURE — P9016 RBC LEUKOCYTES REDUCED: HCPCS

## 2022-04-14 PROCEDURE — 6370000000 HC RX 637 (ALT 250 FOR IP): Performed by: STUDENT IN AN ORGANIZED HEALTH CARE EDUCATION/TRAINING PROGRAM

## 2022-04-14 PROCEDURE — 85025 COMPLETE CBC W/AUTO DIFF WBC: CPT

## 2022-04-14 PROCEDURE — 82962 GLUCOSE BLOOD TEST: CPT

## 2022-04-14 PROCEDURE — 6360000002 HC RX W HCPCS: Performed by: INTERNAL MEDICINE

## 2022-04-14 PROCEDURE — 2580000003 HC RX 258: Performed by: INTERNAL MEDICINE

## 2022-04-14 PROCEDURE — 36430 TRANSFUSION BLD/BLD COMPNT: CPT

## 2022-04-14 PROCEDURE — 82248 BILIRUBIN DIRECT: CPT

## 2022-04-14 PROCEDURE — 93010 ELECTROCARDIOGRAM REPORT: CPT | Performed by: INTERNAL MEDICINE

## 2022-04-14 PROCEDURE — 85018 HEMOGLOBIN: CPT

## 2022-04-14 PROCEDURE — 85014 HEMATOCRIT: CPT

## 2022-04-14 PROCEDURE — C9113 INJ PANTOPRAZOLE SODIUM, VIA: HCPCS | Performed by: INTERNAL MEDICINE

## 2022-04-14 PROCEDURE — 94761 N-INVAS EAR/PLS OXIMETRY MLT: CPT

## 2022-04-14 PROCEDURE — 1200000000 HC SEMI PRIVATE

## 2022-04-14 PROCEDURE — 83735 ASSAY OF MAGNESIUM: CPT

## 2022-04-14 PROCEDURE — 86850 RBC ANTIBODY SCREEN: CPT

## 2022-04-14 PROCEDURE — 2580000003 HC RX 258: Performed by: STUDENT IN AN ORGANIZED HEALTH CARE EDUCATION/TRAINING PROGRAM

## 2022-04-14 PROCEDURE — 2700000000 HC OXYGEN THERAPY PER DAY

## 2022-04-14 RX ORDER — SODIUM CHLORIDE, SODIUM LACTATE, POTASSIUM CHLORIDE, AND CALCIUM CHLORIDE .6; .31; .03; .02 G/100ML; G/100ML; G/100ML; G/100ML
500 INJECTION, SOLUTION INTRAVENOUS ONCE
Status: COMPLETED | OUTPATIENT
Start: 2022-04-14 | End: 2022-04-14

## 2022-04-14 RX ORDER — FUROSEMIDE 10 MG/ML
40 INJECTION INTRAMUSCULAR; INTRAVENOUS ONCE
Status: COMPLETED | OUTPATIENT
Start: 2022-04-14 | End: 2022-04-14

## 2022-04-14 RX ORDER — OXYCODONE HYDROCHLORIDE 5 MG/1
5 TABLET ORAL ONCE
Status: COMPLETED | OUTPATIENT
Start: 2022-04-14 | End: 2022-04-14

## 2022-04-14 RX ORDER — LEVOFLOXACIN 5 MG/ML
500 INJECTION, SOLUTION INTRAVENOUS EVERY 24 HOURS
Status: DISCONTINUED | OUTPATIENT
Start: 2022-04-14 | End: 2022-04-15 | Stop reason: HOSPADM

## 2022-04-14 RX ORDER — SODIUM CHLORIDE 9 MG/ML
INJECTION, SOLUTION INTRAVENOUS PRN
Status: DISCONTINUED | OUTPATIENT
Start: 2022-04-14 | End: 2022-04-15 | Stop reason: HOSPADM

## 2022-04-14 RX ADMIN — METHYLPREDNISOLONE SODIUM SUCCINATE 40 MG: 40 INJECTION, POWDER, FOR SOLUTION INTRAMUSCULAR; INTRAVENOUS at 00:29

## 2022-04-14 RX ADMIN — OXYCODONE HYDROCHLORIDE 10 MG: 5 TABLET ORAL at 22:58

## 2022-04-14 RX ADMIN — SODIUM CHLORIDE, PRESERVATIVE FREE 10 ML: 5 INJECTION INTRAVENOUS at 08:32

## 2022-04-14 RX ADMIN — FENOFIBRATE 160 MG: 160 TABLET ORAL at 08:32

## 2022-04-14 RX ADMIN — INSULIN LISPRO 2 UNITS: 100 INJECTION, SOLUTION INTRAVENOUS; SUBCUTANEOUS at 18:40

## 2022-04-14 RX ADMIN — MIDODRINE HYDROCHLORIDE 10 MG: 5 TABLET ORAL at 08:31

## 2022-04-14 RX ADMIN — INSULIN LISPRO 1 UNITS: 100 INJECTION, SOLUTION INTRAVENOUS; SUBCUTANEOUS at 06:07

## 2022-04-14 RX ADMIN — BUSPIRONE HYDROCHLORIDE 30 MG: 15 TABLET ORAL at 20:20

## 2022-04-14 RX ADMIN — MIDODRINE HYDROCHLORIDE 10 MG: 5 TABLET ORAL at 11:46

## 2022-04-14 RX ADMIN — ROSUVASTATIN CALCIUM 40 MG: 20 TABLET, COATED ORAL at 20:20

## 2022-04-14 RX ADMIN — RANOLAZINE 1000 MG: 500 TABLET, FILM COATED, EXTENDED RELEASE ORAL at 08:31

## 2022-04-14 RX ADMIN — SODIUM CHLORIDE, PRESERVATIVE FREE 10 ML: 5 INJECTION INTRAVENOUS at 22:20

## 2022-04-14 RX ADMIN — BUSPIRONE HYDROCHLORIDE 30 MG: 15 TABLET ORAL at 08:31

## 2022-04-14 RX ADMIN — SODIUM CHLORIDE, POTASSIUM CHLORIDE, SODIUM LACTATE AND CALCIUM CHLORIDE 500 ML: 600; 310; 30; 20 INJECTION, SOLUTION INTRAVENOUS at 01:00

## 2022-04-14 RX ADMIN — PANTOPRAZOLE SODIUM 40 MG: 40 INJECTION, POWDER, FOR SOLUTION INTRAVENOUS at 22:13

## 2022-04-14 RX ADMIN — TICAGRELOR 90 MG: 90 TABLET ORAL at 20:20

## 2022-04-14 RX ADMIN — ACETAMINOPHEN 650 MG: 325 TABLET ORAL at 15:32

## 2022-04-14 RX ADMIN — SUCRALFATE 1 G: 1 TABLET ORAL at 05:24

## 2022-04-14 RX ADMIN — INSULIN LISPRO 1 UNITS: 100 INJECTION, SOLUTION INTRAVENOUS; SUBCUTANEOUS at 00:25

## 2022-04-14 RX ADMIN — GABAPENTIN 300 MG: 300 CAPSULE ORAL at 20:19

## 2022-04-14 RX ADMIN — METHYLPREDNISOLONE SODIUM SUCCINATE 40 MG: 40 INJECTION, POWDER, FOR SOLUTION INTRAMUSCULAR; INTRAVENOUS at 08:31

## 2022-04-14 RX ADMIN — MIDODRINE HYDROCHLORIDE 10 MG: 5 TABLET ORAL at 16:49

## 2022-04-14 RX ADMIN — RANOLAZINE 1000 MG: 500 TABLET, FILM COATED, EXTENDED RELEASE ORAL at 20:20

## 2022-04-14 RX ADMIN — OXYCODONE HYDROCHLORIDE 10 MG: 5 TABLET ORAL at 16:48

## 2022-04-14 RX ADMIN — TICAGRELOR 90 MG: 90 TABLET ORAL at 08:32

## 2022-04-14 RX ADMIN — ESCITALOPRAM OXALATE 20 MG: 10 TABLET ORAL at 08:32

## 2022-04-14 RX ADMIN — LEVOFLOXACIN 500 MG: 5 INJECTION, SOLUTION INTRAVENOUS at 16:57

## 2022-04-14 RX ADMIN — ACETAMINOPHEN 325 MG: 325 TABLET ORAL at 20:18

## 2022-04-14 RX ADMIN — METHYLPREDNISOLONE SODIUM SUCCINATE 40 MG: 40 INJECTION, POWDER, FOR SOLUTION INTRAMUSCULAR; INTRAVENOUS at 16:48

## 2022-04-14 RX ADMIN — GABAPENTIN 300 MG: 300 CAPSULE ORAL at 08:31

## 2022-04-14 RX ADMIN — ASPIRIN 81 MG 81 MG: 81 TABLET ORAL at 08:32

## 2022-04-14 RX ADMIN — FOLIC ACID 1 MG: 1 TABLET ORAL at 08:32

## 2022-04-14 RX ADMIN — INSULIN LISPRO 3 UNITS: 100 INJECTION, SOLUTION INTRAVENOUS; SUBCUTANEOUS at 11:41

## 2022-04-14 RX ADMIN — SODIUM CHLORIDE, PRESERVATIVE FREE 10 ML: 5 INJECTION INTRAVENOUS at 00:23

## 2022-04-14 RX ADMIN — OXYCODONE HYDROCHLORIDE 10 MG: 5 TABLET ORAL at 12:20

## 2022-04-14 RX ADMIN — FUROSEMIDE 40 MG: 10 INJECTION, SOLUTION INTRAMUSCULAR; INTRAVENOUS at 17:56

## 2022-04-14 RX ADMIN — OXYCODONE HYDROCHLORIDE 10 MG: 5 TABLET ORAL at 05:24

## 2022-04-14 RX ADMIN — OXYCODONE HYDROCHLORIDE 5 MG: 5 TABLET ORAL at 22:13

## 2022-04-14 RX ADMIN — GUAIFENESIN 600 MG: 600 TABLET, EXTENDED RELEASE ORAL at 20:21

## 2022-04-14 RX ADMIN — GUAIFENESIN 600 MG: 600 TABLET, EXTENDED RELEASE ORAL at 08:31

## 2022-04-14 RX ADMIN — MELATONIN 6 MG: at 22:58

## 2022-04-14 RX ADMIN — SUCRALFATE 1 G: 1 TABLET ORAL at 20:20

## 2022-04-14 RX ADMIN — SODIUM CHLORIDE, PRESERVATIVE FREE 10 ML: 5 INJECTION INTRAVENOUS at 21:50

## 2022-04-14 RX ADMIN — IRON SUCROSE 300 MG: 20 INJECTION, SOLUTION INTRAVENOUS at 08:41

## 2022-04-14 RX ADMIN — SUCRALFATE 1 G: 1 TABLET ORAL at 15:29

## 2022-04-14 RX ADMIN — PANTOPRAZOLE SODIUM 40 MG: 40 INJECTION, POWDER, FOR SOLUTION INTRAVENOUS at 08:31

## 2022-04-14 ASSESSMENT — PAIN SCALES - GENERAL
PAINLEVEL_OUTOF10: 5
PAINLEVEL_OUTOF10: 7
PAINLEVEL_OUTOF10: 0
PAINLEVEL_OUTOF10: 8
PAINLEVEL_OUTOF10: 8
PAINLEVEL_OUTOF10: 0
PAINLEVEL_OUTOF10: 8
PAINLEVEL_OUTOF10: 7
PAINLEVEL_OUTOF10: 8
PAINLEVEL_OUTOF10: 3
PAINLEVEL_OUTOF10: 7
PAINLEVEL_OUTOF10: 8

## 2022-04-14 ASSESSMENT — PAIN DESCRIPTION - LOCATION
LOCATION: BACK

## 2022-04-14 ASSESSMENT — PAIN DESCRIPTION - ORIENTATION
ORIENTATION: UPPER
ORIENTATION: UPPER

## 2022-04-14 ASSESSMENT — PAIN DESCRIPTION - DESCRIPTORS
DESCRIPTORS: ACHING
DESCRIPTORS: ACHING

## 2022-04-14 ASSESSMENT — PAIN DESCRIPTION - PAIN TYPE
TYPE: CHRONIC PAIN

## 2022-04-14 ASSESSMENT — PAIN DESCRIPTION - FREQUENCY
FREQUENCY: CONTINUOUS
FREQUENCY: INTERMITTENT

## 2022-04-14 ASSESSMENT — PAIN DESCRIPTION - ONSET: ONSET: ON-GOING

## 2022-04-14 NOTE — PROGRESS NOTES
Pt c/o headache since the blood transfusion started. Vitals sign stable. Afebrile. No respiratory distress. Message sent to Dr Izzy Lewis, informed him of vitals, and pts symptoms. He stated to keeping infusion going and monitor pt. Pts pain med not due until 1830 for next dose. Ok per Dr Marcia Beckford to go ahead and give her roxicodone early. Pain meds given.  Vitals remain stable

## 2022-04-14 NOTE — PROGRESS NOTES
Pt transferred to [phone] with all belongings including 2 pillows, phone and , states she can notify her family of room change.

## 2022-04-14 NOTE — PROGRESS NOTES
4 Eyes Skin Assessment     NAME:  Waleska Fernández  YOB: 1965  MEDICAL RECORD NUMBER:  [id number]    The patient is being assess for  Transfer to New Unit    I agree that 2 RN's have performed a thorough Head to Toe Skin Assessment on the patient. ALL assessment sites listed below have been assessed. Areas assessed by both nurses:    Head, Face, Ears, Shoulders, Back, Chest, Arms, Elbows, Hands, Sacrum. Buttock, Coccyx, Ischium and Legs. Feet and Heels        Does the Patient have a Wound?  No noted wound(s)       Adrian Prevention initiated:  No   Wound Care Orders initiated:  No    Pressure Injury (Stage 3,4, Unstageable, DTI, NWPT, and Complex wounds) if present place consult order under [de-identified] No    New and Established Ostomies if present place consult order under : No      Nurse 1 eSignature: Electronically signed by Tosha Arredondo RN on 4/14/22 at 6:22 PM EDT    **SHARE this note so that the co-signing nurse is able to place an eSignature**    Nurse 2 eSignature: [place], [place]

## 2022-04-14 NOTE — PLAN OF CARE
CBC this morning showed Hb of 7.2. In the setting of recent ACS and known history of CAD will transfuse with 2 units of PRBC today. I discussed with her at bedside and she is agreeable to blood transfusion. Plan discharge to home after blood transfusion likely in a.m. May move out of the ICU to telemetry.

## 2022-04-14 NOTE — PROGRESS NOTES
Daily Progress Note  Awake and alert; Feeling well  Denies any CP or SOB  Cath site healing well  BP low but stable; holding metoprolol  Off pressors  Cont' to ween O2; will need outpatient sleep study  Will talk to her about cardiac rehab outpatient  Increase activity     Feeling better, cough improved dyspnea improved  Heart rate is stable but BP is low  ACS-s/p PCI of SVG graft, keep on antiplatelets  Off AC due to hx of bleeding  Bronchitis improving  Possible home soon-increase activity   Hx of smoking and diabetes   Check labs      Nstemi    Troponin elevated up to 1.179    EKG showed new ST segment depression and inverted T-wave    Went to cath lab 4/12/2022 and stent was placed in SVG to OM graft    Pt. Tolerated it well    Groin site healing well    On DAPT    Has strong hx of CAD in the past; was on low dose Xarelto in the past to for CAD however holding this at this time d/t anemia    Heparin gtt turned off    Continue on Crestor 40mg    No betablocker d/t hypotension    Continue Ranexa     HFpEF    BNP elevated    IV lasix given in EF    Holding BB and ACE d/t hypotension     Acute respiratory failure    Still on 3L NC, however much improved from yesterday    Ween as able    Hypercapnia on admission    Was placed on bipap in ER    Will need outpatient Sleep study     Anemia    Hgb 7.5 last checked    Hx of GI bleed    Protonix BID and Carafate    IV iron    Will cont' to trend H&H        Echo 4/12/2022    Summary   This is a limited echocardiogram.   Left ventricular systolic function is low normal.   Ejection fraction is visually estimated at 50%.    Mild lateral hypokinesis.   Trace tricuspid regurgitation; RVSP: normal.   No evidence of any pericardial effusion.     [facility] 4/12/2022  LEFT MAIN PATENT  % PROXIMAL  % PROXIMAL  RCA KNOW TO BE OCCLUDED DID NOT INJECT  LIMA TO LAD PATENT  SVG TO DIAGONAL 100% OCCLUDED  SVG TO OM-OSTIAL 80% TO 0% EMI RESOLUTE 2.5X15MM STENT, AND PROXIMAL 80% TO 0% EMI RESOLUTE 2.5X18MM STENT  LVEDP 20  ASA AND BRILIANTA AND HEPARIN  NO CLOSURE DEVICE  LEFT GROIN ACCESS  NO COMPLICATIONS     PAST MEDICAL HISTORY:  Acid reflux, CAD, COPD, depression, diabetes,  eczema, migraines, hypertension, hyperlipidemia, kidney stones, PVD.     PAST SURGICAL HISTORY:  Appendectomy, back surgery, bone resection in  the ribs, breast biopsy, cardiac catheterization, CABG x3,  cholecystectomy, femoral endarterectomy.     SOCIAL HISTORY: Sangeeta Vera is a former smoker.  Does not drink any alcohol.    Does not use any illicit drugs.     ALLERGIES:  She is allergic to LYRICA, CODEINE, ERYTHROMYCIN, IMITREX,  KETOROLAC, PROCHLORPERAZINE, TRAMADOL, ADHESIVE TAPE.     HOME MEDICATIONS:  Prilosec, folic acid, vitamin K18, Crestor,  Jardiance, Percocet, Ranexa, TriCor, gabapentin, Klonopin, lisinopril,  Lasix, Celexa, Xarelto, BuSpar, Coreg, Brilinta  Objective:   BP (!) 99/54   Pulse 87   Temp 98.3 °F (36.8 °C) (Oral)   Resp 23   Ht 5' 7\" (1.702 m)   Wt 176 lb 2.4 oz (79.9 kg)   SpO2 99%   BMI 27.59 kg/m²   No intake or output data in the 24 hours ending 04/14/22 1103    Medications:   Scheduled Meds:   levofloxacin  500 mg IntraVENous Q24H    guaiFENesin  600 mg Oral BID    sucralfate  1 g Oral 3 times per day    metoprolol succinate  25 mg Oral Daily    midodrine  10 mg Oral TID WC    methylPREDNISolone  40 mg IntraVENous Q8H    sodium chloride flush  5-40 mL IntraVENous 2 times per day    pantoprazole  40 mg IntraVENous BID    insulin glargine  5 Units SubCUTAneous Daily    insulin lispro  0-6 Units SubCUTAneous Q6H    fenofibrate  160 mg Oral Daily    folic acid  1 mg Oral Daily    ranolazine  1,000 mg Oral BID    rosuvastatin  40 mg Oral Nightly    ticagrelor  90 mg Oral BID    cyanocobalamin  1,000 mcg Oral Weekly    gabapentin  300 mg Oral BID    busPIRone  30 mg Oral BID    escitalopram  20 mg Oral Daily    sodium chloride flush  5-40 mL IntraVENous 2 times per day    aspirin  81 mg Oral Daily      Infusions:   sodium chloride      sodium chloride        PRN Meds:  sodium chloride flush, sodium chloride, [DISCONTINUED] acetaminophen **OR** acetaminophen, polyethylene glycol, potassium chloride, magnesium sulfate, nitroGLYCERIN, albuterol sulfate HFA, oxyCODONE **AND** acetaminophen, sodium chloride flush, sodium chloride, acetaminophen, melatonin       Physical Exam:  Vitals:    04/14/22 1030   BP: (!) 99/54   Pulse: 87   Resp: 23   Temp:    SpO2: 99%        General: AAO, NAD  Chest: Nontender  Cardiac: First and Second Heart Sounds are Normal, No Murmurs or Gallops noted  Lungs:Clear to auscultation and percussion. Abdomen: Soft, NT, ND, +BS  Extremities: No clubbing, no edema  Vascular:  Equal 2+ peripheral pulses. Lab Data:  CBC:   Recent Labs     04/12/22  0649 04/13/22  0554   WBC 12.2* 15.9*   HGB 8.0* 7.5*   HCT 27.0* 25.4*   .0* 107.6*    314     BMP:   Recent Labs     04/12/22  0649 04/13/22  0554    145   K 3.9 4.0    112*   CO2 26 23   BUN 23 25*   CREATININE 1.1 0.8     LIVER PROFILE:   Recent Labs     04/12/22  0649 04/13/22  0554   AST 51* 135*   ALT 20 35   BILITOT 0.1 0.2   ALKPHOS 57 49     PT/INR:   Recent Labs     04/12/22  0649   PROTIME 10.9*   INR 0.85     APTT:   Recent Labs     04/12/22  0649 04/13/22  0554   APTT 29.4 51.6*     BNP:  No results for input(s): BNP in the last 72 hours.       Assessment:  Patient Active Problem List    Diagnosis Date Noted    TOS (thoracic outlet syndrome) 04/04/2013    [facility]non-ST elevated [facility] infarction) [facility] 04/12/2022    Chest pain 08/28/2020    COPD (chronic obstructive pulmonary disease) ([facility]     CAD (coronary artery disease)     Acid reflux     Depression     Hx of migraines     Hypertension     Kidney stones     Femoral artery occlusion ([facility] 06/11/2019    PVD (peripheral vascular disease) ([facility] 05/23/2019    Arterial occlusion 05/22/2019    Eczema of hand  Change in mole 10/31/2018    Unstable angina pectoris due to coronary arteriosclerosis ([facility] 06/10/2018    Acute left-sided low back pain with left-sided sciatica 01/15/2018    Chronic midline low back pain with right-sided sciatica 10/06/2017    Tobacco dependence 02/27/2017    Overweight (BMI 25.0-29.9) 02/27/2017    Hyperlipidemia 02/09/2016    Essential hypertension 02/09/2016    Diabetes mellitus ([facility] 12/08/2013    Coronary artery disease 10/08/2013    Major depressive disorder, recurrent episode with anxious distress ([facility] 10/08/2013       Electronically signed by SARI Mackey CNP on 4/14/2022 at 11:03 AM     Electronically signed by Adri Read MD on 4/14/22 at 12:12 PM EDT

## 2022-04-14 NOTE — CARE COORDINATION
Follow up visit with pt. Pt continues to plan for discharge to home. Referred to Med assist to assist pt in securing an assistance available for her jardiance ( program). Pt denies any other needs or concerns for discharge.

## 2022-04-14 NOTE — PROGRESS NOTES
Hospital Medicine: Progress Note     Robyn Dexter  1965         Admit Date: 4/12/2022   Primary Care Physician:  Parrish Hernández, DO    BP (!) 100/49   Pulse 80   Temp 98.3 °F (36.8 °C) (Oral)   Resp 18   Ht 5' 7\" (1.702 m)   Wt 176 lb 2.4 oz (79.9 kg)   SpO2 99%   BMI 27.59 kg/m²     Assessment and Plan:    58-year-old female with background history of CAD and COPD who presented with shortness of breath. She had ST depression on twelve-lead EKG. She underwent LHC with placement of EMI x2. Doing well postop. NSTEMI:  Status post LHC with PCI and EMI x2. PCI of SVG graft. No chest pain at present . Continue DAPT. 2D echo with normal LV systolic function. EF of 50%. Mild lateral hypokinesis. Continue medical management. COPD:  Cough and SOB improved. Continue antibiotics and steroid. Bronchodilators prn. Acute hypoxic respiratory failure:  Likely due to COPD exacerbation. Oxygenation is improved. Continue O2 support and bronchodilators. Anemia:  History of peptic ulcer disease. Hb is cautiously holding at 7.5. Continue PPI. Repeat CBC today. Discharge plan: Discharge to home this afternoon if HB holding. Interval History:    Examined at bedside, awake and alert, in no acute distress. Nursing reported lower BP earlier which is now better. She has no SOB or dizziness, cough is improved. Physical exam:  General appearance: Up in bed, no acute distress. Head/EENT: ncat, perrl, eomi, mmm. Neck: supple, no meningismus or thyromegaly. Chest: symmetric with equal expansion. Lungs: Faint bilateral rhonchi, no egophony. Heart: normal s1s2, no added sounds. Abdomen: soft, +ve bowel sound,non tender, no guarding or rebound. Extremities: no c/c/e, good ROM x 4. Pulses: palpable and strong bilaterally. Skin: warm and dry. Neurologic: no focal deficits.     Data Review:     CBC:   Recent Labs 04/12/22  0649 04/13/22  0554   WBC 12.2* 15.9*   HGB 8.0* 7.5*    314     Eure. Panel:    Recent Labs     04/12/22  0649 04/13/22  0554    145   K 3.9 4.0    112*   CO2 26 23   BUN 23 25*   CREATININE 1.1 0.8   GLUCOSE 117* 118*     Hepatic:   Recent Labs     04/12/22  0649 04/13/22  0554   AST 51* 135*   ALT 20 35   BILITOT 0.1 0.2   ALKPHOS 57 49     Lipids:   Recent Labs     04/13/22  0554   CHOL 128   HDL 52     ABGs:   Lab Results   Component Value Date    PO2ART 300 05/05/2012     INR:   Recent Labs     04/12/22  0649   INR 0.85        Meds:    levofloxacin  500 mg IntraVENous Q24H    guaiFENesin  600 mg Oral BID    sucralfate  1 g Oral 3 times per day    metoprolol succinate  25 mg Oral Daily    midodrine  10 mg Oral TID WC    methylPREDNISolone  40 mg IntraVENous Q8H    sodium chloride flush  5-40 mL IntraVENous 2 times per day    pantoprazole  40 mg IntraVENous BID    insulin glargine  5 Units SubCUTAneous Daily    insulin lispro  0-6 Units SubCUTAneous Q6H    fenofibrate  160 mg Oral Daily    folic acid  1 mg Oral Daily    ranolazine  1,000 mg Oral BID    rosuvastatin  40 mg Oral Nightly    ticagrelor  90 mg Oral BID    cyanocobalamin  1,000 mcg Oral Weekly    gabapentin  300 mg Oral BID    busPIRone  30 mg Oral BID    escitalopram  20 mg Oral Daily    sodium chloride flush  5-40 mL IntraVENous 2 times per day    aspirin  81 mg Oral Daily     Erenest Baumgarten, MD    Note: Computer voice recognition system was used in parts of this documentation. There is a possibility of sound alike word errors inherent to this technology that may be missed during proof-reading and may alter the context of this note.

## 2022-04-14 NOTE — PROGRESS NOTES
Pt ambulated 450 ft around the unit, highest  with slight SOB, O2 sat remained in the high 90's, no pain or discomfort. Pt tolerated well.

## 2022-04-14 NOTE — PROGRESS NOTES
Pt BP soft this am, Dr. Apodaca Real notified. Hold Metoprolol, change sites of BP cuff and monitor.

## 2022-04-14 NOTE — CARE COORDINATION
Received a call from [address] [place] [address] at [facility] regarding pt's needs. Pt has [employer] and we have never helped pt out before. Ana Manning advised that pt was given coupons for [employer], doctor has agreed to give pt samples, and pt was told about goodrx. Pt will also be put on Jardiance, which she will probably need assistance with that. Ana Manning requested we mail pt an application. She will go on our referral list and I will continue to monitor for d/c assistance. [employer] application, [employer], [employer] put in the mail tray. Once we receive pt's application and required documentation, we can she if she is eligible for our voucher assistance or any pharmaceutical assistance. Cesar Lomax

## 2022-04-14 NOTE — FLOWSHEET NOTE
04/14/22 1630   Vital Signs   Temp 97.7 °F (36.5 °C)   Temp Source Oral   Pulse 72   Heart Rate Source Telemetry   Resp 14   /61   BP Location Right lower arm   MAP (mmHg) 75   Patient Position Lying left side   Level of Consciousness Alert (0)   MEWS Score 0   Patient Currently in Pain Yes   Oxygen Therapy   SpO2 99 %

## 2022-04-15 VITALS
TEMPERATURE: 94.5 F | HEIGHT: 67 IN | SYSTOLIC BLOOD PRESSURE: 116 MMHG | DIASTOLIC BLOOD PRESSURE: 57 MMHG | BODY MASS INDEX: 26.62 KG/M2 | RESPIRATION RATE: 13 BRPM | WEIGHT: 169.6 LBS | HEART RATE: 56 BPM | OXYGEN SATURATION: 99 %

## 2022-04-15 LAB
ABO/RH: NORMAL
ANTIBODY SCREEN: NEGATIVE
COMPONENT: NORMAL
COMPONENT: NORMAL
CROSSMATCH RESULT: NORMAL
CROSSMATCH RESULT: NORMAL
STATUS: NORMAL
STATUS: NORMAL
TRANSFUSION STATUS: NORMAL
TRANSFUSION STATUS: NORMAL
UNIT DIVISION: 0
UNIT DIVISION: 0
UNIT NUMBER: NORMAL
UNIT NUMBER: NORMAL

## 2022-04-15 PROCEDURE — 2580000003 HC RX 258: Performed by: INTERNAL MEDICINE

## 2022-04-15 PROCEDURE — 6360000002 HC RX W HCPCS: Performed by: INTERNAL MEDICINE

## 2022-04-15 PROCEDURE — 6370000000 HC RX 637 (ALT 250 FOR IP): Performed by: INTERNAL MEDICINE

## 2022-04-15 PROCEDURE — C9113 INJ PANTOPRAZOLE SODIUM, VIA: HCPCS | Performed by: INTERNAL MEDICINE

## 2022-04-15 PROCEDURE — P9016 RBC LEUKOCYTES REDUCED: HCPCS

## 2022-04-15 RX ORDER — SUCRALFATE 1 G/1
1 TABLET ORAL EVERY 8 HOURS SCHEDULED
Qty: 21 TABLET | Refills: 0 | Status: SHIPPED | OUTPATIENT
Start: 2022-04-15 | End: 2022-04-22

## 2022-04-15 RX ORDER — ASPIRIN 81 MG/1
81 TABLET, CHEWABLE ORAL DAILY
Qty: 30 TABLET | Refills: 3 | Status: SHIPPED | OUTPATIENT
Start: 2022-04-16

## 2022-04-15 RX ORDER — CLONAZEPAM 1 MG/1
1 TABLET ORAL DAILY PRN
Qty: 7 TABLET | Refills: 0 | Status: SHIPPED | OUTPATIENT
Start: 2022-04-15 | End: 2022-04-22

## 2022-04-15 RX ORDER — ALBUTEROL SULFATE 90 UG/1
2 AEROSOL, METERED RESPIRATORY (INHALATION) EVERY 6 HOURS PRN
Qty: 1 EACH | Refills: 1 | Status: SHIPPED | OUTPATIENT
Start: 2022-04-15

## 2022-04-15 RX ADMIN — METHYLPREDNISOLONE SODIUM SUCCINATE 40 MG: 40 INJECTION, POWDER, FOR SOLUTION INTRAMUSCULAR; INTRAVENOUS at 09:09

## 2022-04-15 RX ADMIN — PANTOPRAZOLE SODIUM 40 MG: 40 INJECTION, POWDER, FOR SOLUTION INTRAVENOUS at 09:09

## 2022-04-15 RX ADMIN — SODIUM CHLORIDE, PRESERVATIVE FREE 10 ML: 5 INJECTION INTRAVENOUS at 09:00

## 2022-04-15 RX ADMIN — BUSPIRONE HYDROCHLORIDE 30 MG: 15 TABLET ORAL at 08:56

## 2022-04-15 RX ADMIN — ESCITALOPRAM OXALATE 20 MG: 10 TABLET ORAL at 08:56

## 2022-04-15 RX ADMIN — ASPIRIN 81 MG 81 MG: 81 TABLET ORAL at 08:56

## 2022-04-15 RX ADMIN — GUAIFENESIN 600 MG: 600 TABLET, EXTENDED RELEASE ORAL at 08:55

## 2022-04-15 RX ADMIN — GABAPENTIN 300 MG: 300 CAPSULE ORAL at 08:56

## 2022-04-15 RX ADMIN — SUCRALFATE 1 G: 1 TABLET ORAL at 12:57

## 2022-04-15 RX ADMIN — OXYCODONE HYDROCHLORIDE 10 MG: 5 TABLET ORAL at 09:01

## 2022-04-15 RX ADMIN — FOLIC ACID 1 MG: 1 TABLET ORAL at 08:56

## 2022-04-15 RX ADMIN — INSULIN LISPRO 1 UNITS: 100 INJECTION, SOLUTION INTRAVENOUS; SUBCUTANEOUS at 01:12

## 2022-04-15 RX ADMIN — METOPROLOL SUCCINATE 25 MG: 25 TABLET, EXTENDED RELEASE ORAL at 08:56

## 2022-04-15 RX ADMIN — TICAGRELOR 90 MG: 90 TABLET ORAL at 08:56

## 2022-04-15 RX ADMIN — METHYLPREDNISOLONE SODIUM SUCCINATE 40 MG: 40 INJECTION, POWDER, FOR SOLUTION INTRAMUSCULAR; INTRAVENOUS at 01:11

## 2022-04-15 RX ADMIN — MIDODRINE HYDROCHLORIDE 10 MG: 5 TABLET ORAL at 08:56

## 2022-04-15 RX ADMIN — FENOFIBRATE 160 MG: 160 TABLET ORAL at 08:56

## 2022-04-15 RX ADMIN — RANOLAZINE 1000 MG: 500 TABLET, FILM COATED, EXTENDED RELEASE ORAL at 08:56

## 2022-04-15 ASSESSMENT — PAIN SCALES - GENERAL
PAINLEVEL_OUTOF10: 4
PAINLEVEL_OUTOF10: 7

## 2022-04-15 NOTE — PROGRESS NOTES
Daily Progress Note    No acute events overnight   Denies CP or SOB  BP low but stable; remain off of BB and ACEi  Off AC given hx of bleeding   Hgb 10.1 this AM sp 2 units  Cleared  from cardiac standpoint for dc  CAD and  CABG and NSTEMI s/p PCI of SVG to om-keep on Brilianta and ASA   No AC due to anemia hx   Anemia improved post transfusion   Keep on current meds  F/u in office next week  Need life style changes     NSTEMI   Troponin elevated up to 1.179   EKG showed new ST segment depression and inverted    T-wave   Went to cath lab 4/12/2022 and stent was placed in SVG to    OM graft   Pt. Tolerated it well   Groin site healing well   On DAPT   Has strong hx of CAD in the past; was on low dose Xarelto    in the past to for CAD however holding this at this time d/t    Anemia   Heparin gtt turned off   Continue on Crestor 40mg, fenofibrate 160 mg    No betablocker d/t hypotension   Continue Ranexa    HFpEF   BNP elevated at 4.406 on 04/14   Given Lasix 40 mg yesterday   Holding BB and ACEi 2/2 hypotension    Hypotension    On midodrine 10 mg TID    Acute Respiratory Failure   On 2 L NC; wean as able    Will need outpatient sleep study     Anemia   Hbg 10.1 sp 2 units   Hx og GIB   Protonix BId and carafate    Echo 4/12/2022    Summary   This is a limited echocardiogram.   Left ventricular systolic function is low normal.   Ejection fraction is visually estimated at 50%.    Mild lateral hypokinesis.   Trace tricuspid regurgitation; RVSP: normal.   No evidence of any pericardial effusion.     [facility] 4/12/2022  LEFT MAIN PATENT  % PROXIMAL  % PROXIMAL  RCA KNOW TO BE OCCLUDED DID NOT INJECT  LIMA TO LAD PATENT  SVG TO DIAGONAL 100% OCCLUDED  SVG TO OM-OSTIAL 80% TO 0% EMI RESOLUTE 2.5X15MM STENT, AND PROXIMAL 80% TO 0% EMI RESOLUTE 2.5X18MM STENT  LVEDP 20  ASA AND BRILIANTA AND HEPARIN  NO CLOSURE DEVICE  LEFT GROIN ACCESS  NO COMPLICATIONS    CXR 01/41/9361  Mild Edema    VL Carotid Bilateral 04/06/2022  Moderate calcified bilateral carotid artery plaque formation.  However, there   is no hemodynamically significant carotid stenosis, as the degree of luminal   narrowing measures at or just less than 50% bilaterally. PAST MEDICAL HISTORY:  Acid reflux, CAD, COPD, depression, diabetes,  eczema, migraines, hypertension, hyperlipidemia, kidney stones, PVD.     PAST SURGICAL HISTORY:  Appendectomy, back surgery, bone resection in  the ribs, breast biopsy, cardiac catheterization, CABG x3,  cholecystectomy, femoral endarterectomy.     SOCIAL HISTORY: Adrianna Rojas is a former smoker.  Does not drink any alcohol.    Does not use any illicit drugs.     ALLERGIES:  She is allergic to LYRICA, CODEINE, ERYTHROMYCIN, IMITREX,  KETOROLAC, PROCHLORPERAZINE, TRAMADOL, ADHESIVE TAPE.     HOME MEDICATIONS:  Prilosec, folic acid, vitamin Y71, Crestor,  Jardiance, Percocet, Ranexa, TriCor, gabapentin, Klonopin, lisinopril,  Lasix, Celexa, Xarelto, BuSpar, Coreg, Brilinta    Objective:   BP (!) 116/57   Pulse 56   Temp 94.5 °F (34.7 °C) (Axillary)   Resp 13   Ht 5' 7\" (1.702 m)   Wt 169 lb 9.6 oz (76.9 kg)   SpO2 99%   BMI 26.56 kg/m²     Intake/Output Summary (Last 24 hours) at 4/15/2022 1016  Last data filed at 4/14/2022 2128  Gross per 24 hour   Intake 540.83 ml   Output --   Net 540.83 ml       Medications:   Scheduled Meds:   levofloxacin  500 mg IntraVENous Q24H    guaiFENesin  600 mg Oral BID    sucralfate  1 g Oral 3 times per day    metoprolol succinate  25 mg Oral Daily    midodrine  10 mg Oral TID WC    methylPREDNISolone  40 mg IntraVENous Q8H    sodium chloride flush  5-40 mL IntraVENous 2 times per day    pantoprazole  40 mg IntraVENous BID    insulin lispro  0-6 Units SubCUTAneous Q6H    fenofibrate  160 mg Oral Daily    folic acid  1 mg Oral Daily    ranolazine  1,000 mg Oral BID    rosuvastatin  40 mg Oral Nightly    ticagrelor  90 mg Oral BID    cyanocobalamin  1,000 mcg Oral Weekly    gabapentin  300 mg Oral BID    busPIRone  30 mg Oral BID    escitalopram  20 mg Oral Daily    sodium chloride flush  5-40 mL IntraVENous 2 times per day    aspirin  81 mg Oral Daily      Infusions:   sodium chloride      sodium chloride      sodium chloride        PRN Meds:  sodium chloride, sodium chloride flush, sodium chloride, [DISCONTINUED] acetaminophen **OR** acetaminophen, polyethylene glycol, potassium chloride, magnesium sulfate, nitroGLYCERIN, albuterol sulfate HFA, oxyCODONE **AND** acetaminophen, sodium chloride flush, sodium chloride, acetaminophen, melatonin       Physical Exam:  Vitals:    04/15/22 0845   BP: (!) 116/57   Pulse:    Resp:    Temp:    SpO2:         General: AO x 3 in NAD  Chest: Nontender  Cardiac: RRR w/o murmer  Lungs:Clear to auscultation and percussion. Abdomen: Soft, NT, ND, +BS  Extremities: w/o edema or cyanosis   Vascular:  Equal 2+ peripheral pulses. Lab Data:  CBC:   Recent Labs     04/13/22  0554 04/14/22  1219 04/14/22  2320   WBC 15.9* 12.2*  --    HGB 7.5* 7.1* 10.1*   HCT 25.4* 24.7* 32.4*   .6* 107.4*  --     293  --      BMP:   Recent Labs     04/13/22  0554 04/14/22  1219    141   K 4.0 4.2   * 111*   CO2 23 23   BUN 25* 23   CREATININE 0.8 0.8     LIVER PROFILE:   Recent Labs     04/13/22  0554 04/14/22  1219   * 33   ALT 35 22   BILIDIR  --  0.2   BILITOT 0.2 0.2   ALKPHOS 49 47     PT/INR: No results for input(s): PROTIME, INR in the last 72 hours. APTT:   Recent Labs     04/13/22  0554   APTT 51.6*     BNP:  No results for input(s): BNP in the last 72 hours.       Assessment:  Patient Active Problem List    Diagnosis Date Noted    TOS (thoracic outlet syndrome) 04/04/2013    Unstable angina pectoris due to coronary arteriosclerosis ([facility] 06/10/2018    Acute left-sided low back pain with left-sided sciatica 01/15/2018    Chronic midline low back pain with right-sided sciatica 10/06/2017    Tobacco dependence

## 2022-04-15 NOTE — DISCHARGE SUMMARY
Physician Discharge Summary     Patient ID  Zita Leyden   1965  [id number]    Primary Care Doctor:  Nilda Babcock DO    Admit date: 4/12/2022   Discharge date: 4/15/2022      Admitting Physician: Clifford Grande MD   Discharge Physician: Erenest Baumgarten, MD     Discharge Diagnoses:   NSTEMI  HFpEF  PUD  Anemia    Discharge Condition:  Stable     Brief admission history and hospital course:  58-year-old female with background history of CAD and COPD who presented with shortness of breath. She had ST depression on twelve-lead EKG. She underwent LHC with placement of EMI x2.       NSTEMI: She underwent left heart cath with PCI and EMI x2. PCI of SVG graft. She was placed on DAPT. She remained CP free. 2D echocardiogram showed normal LV systolic function with mild lateral hypokinesis. LVEF was 50%. HFpEF: BNP was elevated to 719 on presentation and peaked at 1778. She underwent LHC with PCI and placement of EMI x2.  2D echocardiogram with LVEF of 50%, mild lateral hypokinesis but normal LV systolic function. She was continued on diuretics and other medical management although optimization of medical management was limited by lower BP.     COPD: She was in acute COPD exacerbation on presentation with cough and shortness of breath. She was provided with empiric antibiotics and steroids as well as bronchodilators. There was marked improvement in oxygenation.     Acute hypoxic respiratory failure: Due to COPD exacerbation. She was provided with supplemental O2. Oxygenation improved with management of COPD. She was weaned off oxygen. Anemia: She has a history of PUD. There was interval drop in hemoglobin level to 7.1 post cath. In the setting of recent ACS and known history of CAD she was transfused with 2 units of PRBC with corresponding rise in hemoglobin. She was continued on PPI on discharge.     She was discharged to home in a stable condition and was advised to follow-up with her PCP and cardiology outpatient. Consultations:  Cardiology. Physical Examination on the day of discharge: On the day of discharge, I performed a final bedside evaluation including a physical examination. I reviewed discharge recommendations with patient, instructions including activity and outpatient follow-up were given to the patient on discharge. Disposition: Home. Patient Instructions:     Medication List      START taking these medications    aspirin 81 MG chewable tablet  Take 1 tablet by mouth daily  Start taking on: April 16, 2022     sucralfate 1 GM tablet  Commonly known as: CARAFATE  Take 1 tablet by mouth every 8 hours for 7 days        CHANGE how you take these medications    busPIRone 15 MG tablet  Commonly known as: BUSPAR  Take 15 mg by mouth 3 times daily  What changed:   · how much to take  · when to take this     clonazePAM 1 MG tablet  Commonly known as: KLONOPIN  Take 1 tablet by mouth daily as needed for Anxiety for up to 7 days. What changed: reasons to take this        CONTINUE taking these medications    albuterol sulfate  (90 Base) MCG/ACT inhaler  Commonly known as: ProAir HFA  Inhale 2 puffs into the lungs every 6 hours as needed for Wheezing     citalopram 40 MG tablet  Commonly known as: CELEXA     Crestor 40 MG tablet  Generic drug: rosuvastatin     cyanocobalamin 1000 MCG tablet  Take 1 tablet by mouth once a week for 4 doses     fenofibrate 145 MG tablet  Commonly known as: TRICOR     folic acid 1 MG tablet  Commonly known as: FOLVITE  Take 1 tablet by mouth daily     gabapentin 100 MG capsule  Commonly known as: NEURONTIN     Jardiance 10 MG tablet  Generic drug: empagliflozin     nitroGLYCERIN 0.4 MG SL tablet  Commonly known as: NITROSTAT  up to max of 3 total doses. If no relief after 1 dose, call 911.      omeprazole 40 MG delayed release capsule  Commonly known as: PRILOSEC  Take 1 capsule by mouth in the morning and at bedtime     oxyCODONE-acetaminophen  MG per tablet  Commonly known as: PERCOCET     ranolazine 1000 MG extended release tablet  Commonly known as: RANEXA     ticagrelor 90 MG Tabs tablet  Commonly known as: BRILINTA  Take 1 tablet by mouth 2 times daily        STOP taking these medications    carvedilol 12.5 MG tablet  Commonly known as: COREG     furosemide 20 MG tablet  Commonly known as: LASIX     lisinopril 5 MG tablet  Commonly known as: PRINIVIL;ZESTRIL     rivaroxaban 2.5 MG Tabs tablet  Commonly known as: Xarelto           Where to Get Your Medications      These medications were sent to [address] [place] [address] [phone] - [phone]  [address] [place], [place] [id number]    Phone: [phone]   · albuterol sulfate  (90 Base) MCG/ACT inhaler  · aspirin 81 MG chewable tablet  · clonazePAM 1 MG tablet  · sucralfate 1 GM tablet       Activity: Ad guillermo. Follow-up plans/arrangement:   PCP in 5-7 days. Cardiology. Code status on discharge:  Full resuscitation. Time spent on discharge was 35 minutes. Signed: Rosamaria Arredondo MD     Note: Computer voice recognition system was used in parts of this documentation. There is a possibility of sound alike word errors inherent to this technology that may be missed during proof-reading and may alter the context of this discharge summary.

## 2022-04-15 NOTE — PROGRESS NOTES
Presented with discharge instructions. Voices understanding. No concerns voiced. No distress noted. Declined escort off unit.

## 2022-04-15 NOTE — PROGRESS NOTES
Patient c/o upper back pain and headache while receiving blood transfusion. RN notified MD. Ted Bhandari one time dose of pain medication ordered and given. Patient successfully received 2/2 unit blood transfusion. Vitals remained stable. RN will continue to monitor.

## 2022-04-15 NOTE — PROGRESS NOTES
Physician Progress Note      Raquel Dallas  [facility] #:                  [id number]  :                       1965  ADMIT DATE:       2022 5:47 AM  DISCH DATE:        4/15/2022 1:07 PM  RESPONDING  PROVIDER #:        Lynnette Clifton MD          QUERY TEXT:    Hospitalists,    Pt admitted with NSTEMI . Pt noted to have in-stent stenosis and stenosis of   CABG stenosis. If possible, please document in progress notes and discharge   summary the relationship, if any, between graft/stent stenosis and NSTEMI. The medical record reflects the following:  Risk Factors: CAD  Clinical Indicators: Per heart cath procedure note: \"The patient had another   SVG graft to diagonal what appears to be in-stent stenosis present, 100%   occluded. Left main is patent, but LAD and circ both are 100% occluded. The   right coronary artery is known to be occluded. 2.  KEANE to LAD is patent. 3.  SVG to OM graft, ostial 80% to 90% stenosis present, and the proximal   portion of the graft has 80% stenosis present. 4.  SVG to diagonal graft   appears to be occluded. \"  Treatment: labs, imaging, Cardiology consult, heart cath with EMI    Thank you,  Yen Adkins RN CDS  [phone]  Options provided:  -- NSTEMI due to in-stent stenosis  -- NSTEMI due to bypass graft stenosis  -- NSTEMI unrelated to in-stent stenosis and bypass graft occlusion  -- Other - I will add my own diagnosis  -- Disagree - Not applicable / Not valid  -- Disagree - Clinically unable to determine / Unknown  -- Refer to Clinical Documentation Reviewer    PROVIDER RESPONSE TEXT:    Provider is clinically unable to determine a response to this query. Query created by: Willy Thacker on 2022 11:57 AM      QUERY TEXT:    Hospitalists,    Patient admitted with NSTEMI and acute diastolic heart failure. Noted heart   failure dropped from subsequent Internal Med documentation.  If possible,   please document in the progress notes and discharge summary if acute diastolic   heart failure was: The medical record reflects the following:  Risk Factors: NSTEMI  Clinical Indicators: Per H&P documentation: \"Acute Diastolic Heart failure   suspected - Elevated BNP, mild edema on CXR, trending trops, given 40mg lasix   IV in ED, further IV diuresis as per Cardiology recs, beta-blocker as BP   allows, holding acei for now. Currently being managed on Bipap wean as   tolerated, daily wts, strict I/O, cardiac diet.  # Acute respiratory failure   with hypercapnia in setting of above - Pt being managed on Bipap with   improvement in symptoms, monitor repeat VBG's, continue pulse ox monitoring\"  Treatment: labs, imaging, Cardiology consult, LHC, ECHO, lasix, O2, BIPAP    Thank you,  Heike Arce RN [facility]  [phone]  Options provided:  -- Acute diastolic heart failure confirmed after study  -- Acute diastolic heart failure treated and resolved  -- Acute diastolic heart failure ruled out after study  -- Other - I will add my own diagnosis  -- Disagree - Not applicable / Not valid  -- Disagree - Clinically unable to determine / Unknown  -- Refer to Clinical Documentation Reviewer    PROVIDER RESPONSE TEXT:    HFpEF    Query created by: Xin Ireland on 4/14/2022 12:12 PM      Electronically signed by:  Armstead Siemens MD 4/15/2022 5:38 PM

## 2022-04-15 NOTE — PLAN OF CARE
Problem: Cardiac:  Goal: Ability to maintain an adequate cardiac output will improve  Description: Ability to maintain an adequate cardiac output will improve  Outcome: Completed  Goal: Hemodynamic stability will improve  Description: Hemodynamic stability will improve  Outcome: Completed  Goal: Diagnostic test results will improve  Description: Diagnostic test results will improve  Outcome: Completed     Problem: Health Behavior:  Goal: Ability to identify changes in lifestyle to reduce recurrence of condition will improve  Description: Ability to identify changes in lifestyle to reduce recurrence of condition will improve  Outcome: Completed     Problem: Nutritional:  Goal: Ability to identify appropriate dietary choices will improve  Description: Ability to identify appropriate dietary choices will improve  Outcome: Completed     Problem: Respiratory:  Goal: Ability to maintain adequate ventilation will improve  Description: Ability to maintain adequate ventilation will improve  Outcome: Completed     Problem: Sensory:  Goal: Pain level will decrease  Description: Pain level will decrease  Outcome: Completed     Problem: Pain:  Goal: Pain level will decrease  Description: Pain level will decrease  Outcome: Completed  Goal: Control of acute pain  Description: Control of acute pain  Outcome: Completed

## 2022-04-20 NOTE — PROGRESS NOTES
Physician Progress Note      [place]  CSN #:                  [id number]  :                       1965  ADMIT DATE:       2022 5:47 AM  DISCH DATE:        4/15/2022 1:07 PM  RESPONDING  PROVIDER #:        Mary Putnam MD          QUERY TEXT:    Cardiology,    Pt admitted with NSTEMI . Pt noted to have in-stent stenosis and stenosis of   CABG stenosis. If possible, please document in progress notes and discharge   summary the relationship, if any, between graft/stent stenosis and NSTEMI. The medical record reflects the following:  Risk Factors: CAD  Clinical Indicators: Per heart cath procedure note: \"The patient had another   SVG graft to diagonal what appears to be in-stent stenosis present, 100%   occluded. Left main is patent, but LAD and circ both are 100% occluded. The   right coronary artery is known to be occluded. 2.  KEANE to LAD is patent. 3.  SVG to OM graft, ostial 80% to 90% stenosis present, and the proximal   portion of the graft has 80% stenosis present. 4.  SVG to diagonal graft   appears to be occluded. \"  Treatment: labs, imaging, Cardiology consult, heart cath with EMI    Thank you,  Braulio Francois RN CDS  [phone]  Options provided:  -- NSTEMI due to in-stent stenosis  -- NSTEMI due to bypass graft stenosis  -- NSTEMI unrelated to in-stent stenosis and bypass graft occlusion  -- Other - I will add my own diagnosis  -- Disagree - Not applicable / Not valid  -- Disagree - Clinically unable to determine / Unknown  -- Refer to Clinical Documentation Reviewer    PROVIDER RESPONSE TEXT:    This patient has NSTEMI due to bypass graft stenosis.     Query created by: Tera Stone on 2022 7:06 PM      Electronically signed by:  Mary Putnam MD 2022 8:50 AM

## 2022-04-24 LAB
BASOPHILS ABSOLUTE: 0 K/CU MM
BASOPHILS RELATIVE PERCENT: 0.6 % (ref 0–1)
DIFFERENTIAL TYPE: ABNORMAL
EOSINOPHILS ABSOLUTE: 0.1 K/CU MM
EOSINOPHILS RELATIVE PERCENT: 2 % (ref 0–3)
FERRITIN: 14 NG/ML (ref 15–150)
FOLATE: 9.1 NG/ML (ref 3.1–17.5)
HCT VFR BLD CALC: 25.2 % (ref 37–47)
HEMOGLOBIN: 7.5 GM/DL (ref 12.5–16)
IMMATURE NEUTROPHIL %: 0.6 % (ref 0–0.43)
IRON: 32 UG/DL (ref 37–145)
LYMPHOCYTES ABSOLUTE: 1.6 K/CU MM
LYMPHOCYTES RELATIVE PERCENT: 29.4 % (ref 24–44)
MCH RBC QN AUTO: 32.1 PG (ref 27–31)
MCHC RBC AUTO-ENTMCNC: 29.8 % (ref 32–36)
MCV RBC AUTO: 107.7 FL (ref 78–100)
MONOCYTES ABSOLUTE: 0.4 K/CU MM
MONOCYTES RELATIVE PERCENT: 7.2 % (ref 0–4)
NUCLEATED RBC %: 0 %
PCT TRANSFERRIN: 9 % (ref 10–44)
PDW BLD-RTO: 14.7 % (ref 11.7–14.9)
PLATELET # BLD: 224 K/CU MM (ref 140–440)
PMV BLD AUTO: 10.6 FL (ref 7.5–11.1)
RBC # BLD: 2.34 M/CU MM (ref 4.2–5.4)
RETICULOCYTE COUNT PCT: 4.9 % (ref 0.2–2.2)
SEGMENTED NEUTROPHILS ABSOLUTE COUNT: 3.3 K/CU MM
SEGMENTED NEUTROPHILS RELATIVE PERCENT: 60.2 % (ref 36–66)
TOTAL IMMATURE NEUTOROPHIL: 0.03 K/CU MM
TOTAL IRON BINDING CAPACITY: 365 UG/DL (ref 250–450)
TOTAL NUCLEATED RBC: 0 K/CU MM
TOTAL RETICULOCYTE COUNT: 0.12 K/CU MM
UNSATURATED IRON BINDING CAPACITY: 333 UG/DL (ref 110–370)
VITAMIN B-12: 186.7 PG/ML (ref 211–911)
WBC # BLD: 5.4 K/CU MM (ref 4–10.5)

## 2022-11-16 ENCOUNTER — APPOINTMENT (OUTPATIENT)
Dept: CT IMAGING | Age: 57
End: 2022-11-16
Payer: COMMERCIAL

## 2022-11-16 ENCOUNTER — APPOINTMENT (OUTPATIENT)
Dept: GENERAL RADIOLOGY | Age: 57
End: 2022-11-16
Payer: COMMERCIAL

## 2022-11-16 ENCOUNTER — HOSPITAL ENCOUNTER (EMERGENCY)
Age: 57
Discharge: HOME | End: 2022-11-16
Attending: EMERGENCY MEDICINE
Payer: COMMERCIAL

## 2022-11-16 VITALS
WEIGHT: 185 LBS | SYSTOLIC BLOOD PRESSURE: 147 MMHG | RESPIRATION RATE: 14 BRPM | OXYGEN SATURATION: 97 % | HEIGHT: 66 IN | TEMPERATURE: 98.1 F | BODY MASS INDEX: 29.73 KG/M2 | DIASTOLIC BLOOD PRESSURE: 73 MMHG | HEART RATE: 70 BPM

## 2022-11-16 DIAGNOSIS — R07.89 CHEST WALL PAIN: ICD-10-CM

## 2022-11-16 DIAGNOSIS — M25.512 LEFT SHOULDER PAIN, UNSPECIFIED CHRONICITY: Primary | ICD-10-CM

## 2022-11-16 DIAGNOSIS — R07.9 CHEST PAIN, UNSPECIFIED TYPE: ICD-10-CM

## 2022-11-16 LAB
ALBUMIN SERPL-MCNC: 5 GM/DL (ref 3.4–5)
ALP BLD-CCNC: 47 IU/L (ref 40–129)
ALT SERPL-CCNC: 15 U/L (ref 10–40)
ANION GAP SERPL CALCULATED.3IONS-SCNC: 12 MMOL/L (ref 4–16)
AST SERPL-CCNC: 15 IU/L (ref 15–37)
BASOPHILS ABSOLUTE: 0.1 K/CU MM
BASOPHILS RELATIVE PERCENT: 1.1 % (ref 0–1)
BILIRUB SERPL-MCNC: 0.3 MG/DL (ref 0–1)
BUN BLDV-MCNC: 17 MG/DL (ref 6–23)
CALCIUM SERPL-MCNC: 10.1 MG/DL (ref 8.3–10.6)
CHLORIDE BLD-SCNC: 101 MMOL/L (ref 99–110)
CO2: 25 MMOL/L (ref 21–32)
CREAT SERPL-MCNC: 1.2 MG/DL (ref 0.6–1.1)
DIFFERENTIAL TYPE: ABNORMAL
EKG ATRIAL RATE: 95 BPM
EKG DIAGNOSIS: NORMAL
EKG P AXIS: 52 DEGREES
EKG P-R INTERVAL: 136 MS
EKG Q-T INTERVAL: 350 MS
EKG QRS DURATION: 84 MS
EKG QTC CALCULATION (BAZETT): 439 MS
EKG R AXIS: 42 DEGREES
EKG T AXIS: 61 DEGREES
EKG VENTRICULAR RATE: 95 BPM
EOSINOPHILS ABSOLUTE: 0.1 K/CU MM
EOSINOPHILS RELATIVE PERCENT: 2.4 % (ref 0–3)
GFR SERPL CREATININE-BSD FRML MDRD: 52 ML/MIN/1.73M2
GLUCOSE BLD-MCNC: 145 MG/DL (ref 70–99)
HCT VFR BLD CALC: 37.1 % (ref 37–47)
HEMOGLOBIN: 12.1 GM/DL (ref 12.5–16)
IMMATURE NEUTROPHIL %: 0.4 % (ref 0–0.43)
LIPASE: 29 IU/L (ref 13–60)
LYMPHOCYTES ABSOLUTE: 1.3 K/CU MM
LYMPHOCYTES RELATIVE PERCENT: 28.4 % (ref 24–44)
MCH RBC QN AUTO: 30.4 PG (ref 27–31)
MCHC RBC AUTO-ENTMCNC: 32.6 % (ref 32–36)
MCV RBC AUTO: 93.2 FL (ref 78–100)
MONOCYTES ABSOLUTE: 0.4 K/CU MM
MONOCYTES RELATIVE PERCENT: 9.5 % (ref 0–4)
NUCLEATED RBC %: 0 %
PDW BLD-RTO: 12.4 % (ref 11.7–14.9)
PLATELET # BLD: 272 K/CU MM (ref 140–440)
PMV BLD AUTO: 10 FL (ref 7.5–11.1)
POTASSIUM SERPL-SCNC: 3.7 MMOL/L (ref 3.5–5.1)
PRO-BNP: 890.9 PG/ML
RBC # BLD: 3.98 M/CU MM (ref 4.2–5.4)
SEGMENTED NEUTROPHILS ABSOLUTE COUNT: 2.7 K/CU MM
SEGMENTED NEUTROPHILS RELATIVE PERCENT: 58.2 % (ref 36–66)
SODIUM BLD-SCNC: 138 MMOL/L (ref 135–145)
TOTAL IMMATURE NEUTOROPHIL: 0.02 K/CU MM
TOTAL NUCLEATED RBC: 0 K/CU MM
TOTAL PROTEIN: 7.9 GM/DL (ref 6.4–8.2)
TROPONIN T: <0.01 NG/ML
TROPONIN T: <0.01 NG/ML
WBC # BLD: 4.6 K/CU MM (ref 4–10.5)

## 2022-11-16 PROCEDURE — 99285 EMERGENCY DEPT VISIT HI MDM: CPT

## 2022-11-16 PROCEDURE — 6360000002 HC RX W HCPCS: Performed by: EMERGENCY MEDICINE

## 2022-11-16 PROCEDURE — 71275 CT ANGIOGRAPHY CHEST: CPT

## 2022-11-16 PROCEDURE — 6370000000 HC RX 637 (ALT 250 FOR IP): Performed by: EMERGENCY MEDICINE

## 2022-11-16 PROCEDURE — 96376 TX/PRO/DX INJ SAME DRUG ADON: CPT

## 2022-11-16 PROCEDURE — 83690 ASSAY OF LIPASE: CPT

## 2022-11-16 PROCEDURE — 83880 ASSAY OF NATRIURETIC PEPTIDE: CPT

## 2022-11-16 PROCEDURE — 84484 ASSAY OF TROPONIN QUANT: CPT

## 2022-11-16 PROCEDURE — 93005 ELECTROCARDIOGRAM TRACING: CPT | Performed by: EMERGENCY MEDICINE

## 2022-11-16 PROCEDURE — 85025 COMPLETE CBC W/AUTO DIFF WBC: CPT

## 2022-11-16 PROCEDURE — 80053 COMPREHEN METABOLIC PANEL: CPT

## 2022-11-16 PROCEDURE — 96374 THER/PROPH/DIAG INJ IV PUSH: CPT

## 2022-11-16 PROCEDURE — 6360000004 HC RX CONTRAST MEDICATION: Performed by: INTERNAL MEDICINE

## 2022-11-16 PROCEDURE — 93010 ELECTROCARDIOGRAM REPORT: CPT | Performed by: INTERNAL MEDICINE

## 2022-11-16 PROCEDURE — 96375 TX/PRO/DX INJ NEW DRUG ADDON: CPT

## 2022-11-16 PROCEDURE — 71045 X-RAY EXAM CHEST 1 VIEW: CPT

## 2022-11-16 PROCEDURE — 73030 X-RAY EXAM OF SHOULDER: CPT

## 2022-11-16 RX ORDER — FENTANYL CITRATE 50 UG/ML
50 INJECTION, SOLUTION INTRAMUSCULAR; INTRAVENOUS
Status: DISCONTINUED | OUTPATIENT
Start: 2022-11-16 | End: 2022-11-16 | Stop reason: HOSPADM

## 2022-11-16 RX ORDER — CARVEDILOL 3.12 MG/1
3.12 TABLET ORAL 2 TIMES DAILY
COMMUNITY
Start: 2022-10-28

## 2022-11-16 RX ORDER — ONDANSETRON 2 MG/ML
4 INJECTION INTRAMUSCULAR; INTRAVENOUS EVERY 30 MIN PRN
Status: DISCONTINUED | OUTPATIENT
Start: 2022-11-16 | End: 2022-11-16 | Stop reason: HOSPADM

## 2022-11-16 RX ORDER — BUSPIRONE HYDROCHLORIDE 30 MG/1
30 TABLET ORAL 2 TIMES DAILY
COMMUNITY
Start: 2022-11-07

## 2022-11-16 RX ORDER — BUPROPION HYDROCHLORIDE 75 MG/1
75 TABLET ORAL DAILY
COMMUNITY
Start: 2022-10-30

## 2022-11-16 RX ORDER — ESCITALOPRAM OXALATE 20 MG/1
20 TABLET ORAL DAILY
COMMUNITY
Start: 2022-11-02

## 2022-11-16 RX ORDER — LIDOCAINE 4 G/G
1 PATCH TOPICAL DAILY
Status: DISCONTINUED | OUTPATIENT
Start: 2022-11-16 | End: 2022-11-16 | Stop reason: HOSPADM

## 2022-11-16 RX ORDER — ACETAMINOPHEN 500 MG
1000 TABLET ORAL ONCE
Status: COMPLETED | OUTPATIENT
Start: 2022-11-16 | End: 2022-11-16

## 2022-11-16 RX ORDER — FENTANYL CITRATE 50 UG/ML
25 INJECTION, SOLUTION INTRAMUSCULAR; INTRAVENOUS ONCE
Status: COMPLETED | OUTPATIENT
Start: 2022-11-16 | End: 2022-11-16

## 2022-11-16 RX ORDER — TIZANIDINE 4 MG/1
4 TABLET ORAL 3 TIMES DAILY PRN
COMMUNITY
Start: 2022-11-02

## 2022-11-16 RX ADMIN — ACETAMINOPHEN 1000 MG: 500 TABLET ORAL at 18:52

## 2022-11-16 RX ADMIN — FENTANYL CITRATE 50 MCG: 50 INJECTION INTRAMUSCULAR; INTRAVENOUS at 15:15

## 2022-11-16 RX ADMIN — FENTANYL CITRATE 25 MCG: 50 INJECTION, SOLUTION INTRAMUSCULAR; INTRAVENOUS at 18:52

## 2022-11-16 RX ADMIN — ONDANSETRON 4 MG: 2 INJECTION INTRAMUSCULAR; INTRAVENOUS at 15:15

## 2022-11-16 ASSESSMENT — PAIN SCALES - GENERAL
PAINLEVEL_OUTOF10: 9
PAINLEVEL_OUTOF10: 8
PAINLEVEL_OUTOF10: 8

## 2022-11-16 ASSESSMENT — ENCOUNTER SYMPTOMS
EYES NEGATIVE: 1
SHORTNESS OF BREATH: 1
GASTROINTESTINAL NEGATIVE: 1
ALLERGIC/IMMUNOLOGIC NEGATIVE: 1

## 2022-11-16 ASSESSMENT — PAIN DESCRIPTION - ORIENTATION: ORIENTATION: LEFT

## 2022-11-16 ASSESSMENT — PAIN - FUNCTIONAL ASSESSMENT: PAIN_FUNCTIONAL_ASSESSMENT: PREVENTS OR INTERFERES SOME ACTIVE ACTIVITIES AND ADLS

## 2022-11-16 ASSESSMENT — PAIN DESCRIPTION - DESCRIPTORS: DESCRIPTORS: BURNING

## 2022-11-16 ASSESSMENT — PAIN DESCRIPTION - LOCATION: LOCATION: SHOULDER;RIB CAGE

## 2022-11-16 NOTE — ED NOTES
Medication History  [facility]    Patient Name: Prashant Vazquez 1965     Medication history has been completed by: Latisha Lee CPhT    Source(s) of information: patient and insurance claims     Primary Care Physician: Catrachito Carranza DO     Pharmacy: [facility]    Allergies as of 11/16/2022 - Fully Reviewed 11/16/2022   Allergen Reaction Noted    Erythromycin Hives and Nausea And Vomiting 05/04/2012    Lyrica [pregabalin] Swelling 08/10/2017    Codeine Palpitations 04/04/2013    Imitrex [sumatriptan] Anxiety 05/04/2012    Ketorolac tromethamine Other (See Comments) 05/04/2012    Prochlorperazine edisylate Nausea And Vomiting 05/04/2012    Tape [adhesive tape] Itching 05/04/2012    Ultram [tramadol] Itching 08/07/2013        Prior to Admission medications    Medication Sig Start Date End Date Taking?  Authorizing Provider   Ferrous Sulfate (IRON PO) Take 1 tablet by mouth daily OTC   Yes Historical Provider, MD   Ascorbic Acid (VITAMIN C PO) Take 1 tablet by mouth daily OTC   Yes Historical Provider, MD   buPROPion (WELLBUTRIN) 75 MG tablet Take 75 mg by mouth daily 10/30/22   Historical Provider, MD   carvedilol (COREG) 3.125 MG tablet Take 3.125 mg by mouth 2 times daily 10/28/22   Historical Provider, MD   tiZANidine (ZANAFLEX) 4 MG tablet Take 4 mg by mouth 3 times daily as needed 11/2/22   Historical Provider, MD   escitalopram (LEXAPRO) 20 MG tablet Take 20 mg by mouth daily 11/2/22   Historical Provider, MD   busPIRone (BUSPAR) 30 MG tablet Take 30 mg by mouth 2 times daily 11/7/22   Historical Provider, MD   aspirin 81 MG chewable tablet Take 1 tablet by mouth daily 4/16/22   Parker Marino MD   albuterol sulfate HFA (PROAIR HFA) 108 (90 Base) MCG/ACT inhaler Inhale 2 puffs into the lungs every 6 hours as needed for Wheezing 4/15/22   Parker Marino MD   omeprazole (PRILOSEC) 40 MG delayed release capsule Take 1 capsule by mouth in the morning and at bedtime 4/8/22   Tess Gurrola MD   folic acid (FOLVITE) 1 MG tablet Take 1 tablet by mouth daily 4/9/22   Flavio Deluca MD   vitamin B-12 1000 MCG tablet Take 1 tablet by mouth once a week for 4 doses 4/8/22 4/30/22  Flavio Deluca MD   rosuvastatin (CRESTOR) 40 MG tablet Take 40 mg by mouth at bedtime    Historical Provider, MD   empagliflozin (JARDIANCE) 25 MG tablet Take 25 mg by mouth daily    Historical Provider, MD   oxyCODONE-acetaminophen (PERCOCET)  MG per tablet Take 1 tablet by mouth every 6 hours as needed for Pain. Historical Provider, MD   ranolazine (RANEXA) 1000 MG extended release tablet Take 1,000 mg by mouth 2 times daily     Historical Provider, MD   fenofibrate (TRICOR) 145 MG tablet Take 145 mg by mouth nightly 11/23/19   Historical Provider, MD   gabapentin (NEURONTIN) 400 MG capsule Take 400 mg by mouth 3 times daily. 1/29/20   Historical Provider, MD   nitroGLYCERIN (NITROSTAT) 0.4 MG SL tablet up to max of 3 total doses. If no relief after 1 dose, call 911. 5/12/19   Sada Leslie MD   ticagrelor (BRILINTA) 90 MG TABS tablet Take 1 tablet by mouth 2 times daily 6/13/18   Veronica Delcid MD     Medications added or changed (ex. new medication, dosage change, interval change, formulation change):  Gabapentin dosage change from 300 mg to 400 mg  Jardiance dosage change from 10 mg to 25 mg  Bupropion (added)  Carvedilol (added)  Tizanidine (added)  Escitalopram (added)  Iron OTC (added)  Vitamin C OTC (added)    Medications removed from list (include reason, ex. noncompliance, medication cost, therapy complete etc.):   Citalopram not taking  Clonazepam not taking  Sucralfate not taking    Comments:  Medication list reviewed with patient and insurance claims verified.   Patient states she has taken first dose of medications today  Patient states she is not taking the following medications:  Cilostazol   Clopidogrel  Pantoprazole     To my knowledge the above medication history is accurate as of 11/16/2022 4:23 PM.   Donnell Sykes Shilpa Nguyen   11/16/2022 4:23 PM

## 2022-11-16 NOTE — ED PROVIDER NOTES
[address]      TRIAGE CHIEF COMPLAINT:   Shoulder Pain (L shoulder pain started 30min prior. Cardiac Hx. Pt Anxious. Took ASA prior to arrival)      [place]:  Albino Han is a 61 y.o. female that presents complaint of left shoulder pain chest pain shortness of breath anxiety. Patient has a history of CAD. She came in today documents prior to arrival she had sudden onset of left shoulder pain left chest pain while watching television. Denies any fevers she has nausea no vomiting no coughing/shortness of breath no weakness observed tingling no trauma. No abdominal pain or back pain. No other questions or concerns. Denies history of PE she does follow cardiology. Author Cailin REVIEW OF SYSTEMS:  At least 10 systems reviewed and otherwise acutely negative except as in the [address] Review of Systems   Constitutional: Negative. HENT: Negative. Eyes: Negative. Respiratory:  Positive for shortness of breath. Cardiovascular:  Positive for chest pain. Gastrointestinal: Negative. Endocrine: Negative. Genitourinary: Negative. Musculoskeletal:  Positive for arthralgias and myalgias. Skin: Negative. Allergic/Immunologic: Negative. Neurological: Negative. Hematological: Negative. Psychiatric/Behavioral:  The patient is nervous/anxious. All other systems reviewed and are negative.     Past Medical History:   Diagnosis Date    Acid reflux     CAD (coronary artery disease)     Sees Dr. Buffy Dunn    COPD (chronic obstructive pulmonary disease) ([facility] [address]     \"have mild COPD- no pulmonologist\"    Depression     Diabetes mellitus ([facility] [address] Dx 2001    Eczema of hand     Hx of migraines     HX OTHER MEDICAL     \"difficulty IV stick\"    Hyperlipidemia     Hypertension     follows with Dr Jose R Williamson    Kidney stones 2000's    \"Passed One Kidney Stone\"    PVD (peripheral vascular disease) ([facility] [address]     Wears glasses      Past Surgical History:   Procedure Laterality Date    APPENDECTOMY  1988 Done During [address] [facility] [address] 9-11/2013    \"4 Lower Back Surgeries, 2 Rods, 4 Pins At L5, S1 Put In During Last Surgery\"    BONE RESECTION, RIB      \"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- surgery 2013- ok since then\"    BREAST BIOPSY Bilateral 1990's-2000's    Benign    CARDIAC CATHETERIZATION      per pt \"had heart cath 5/10/2019\"    CARDIAC SURGERY  5-4-12    CABG (3 Bypasses)    CHOLECYSTECTOMY  1988    \"took with appendix and hyster    COLONOSCOPY  last one 2014     [address] [facility] [address]      6/28/2018 total of 111  [facility]  2000's    \"Dental Implants Upper And Lower\"    FEMORAL ENDARTERECTOMY Right 6/11/2019    RIGHT FEMORAL ENDARTERECTOMY WITH CORMATRIX PATCH performed by Alexandr Kohli MD at [facility], [address] (CERVIX REMOVED)  1988    Appendectomy Also Done    OTHER SURGICAL HISTORY Left 4/4/2013    Left transaxillary 1st rib resection    OTHER SURGICAL HISTORY      peripheral angiography 6/6/2019    TONSILLECTOMY AND ADENOIDECTOMY  1971    UPPER GASTROINTESTINAL ENDOSCOPY N/A 7/24/2021    EGD BIOPSY performed by Noble Lozano MD at [place] [address] N/A 4/7/2022    EGD DIAGNOSTIC ONLY performed by Noble Lozano MD at [address] [place] [address]      \"5/22/2019\"they went in and unblocked the femoral artery on right side and then in again 6/6/2019\"put dye in -  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\"     Family History   Problem Relation Age of Onset    Diabetes Mother     High Blood Pressure Mother     Asthma Mother     High Cholesterol Mother     High Blood Pressure Father     High Cholesterol Father     Asthma Father     Heart Disease Father         mvp    Asthma Brother     Mental Illness Son     Early Death Son 25        \"Suicide\"    Mental Illness Daughter         \"Bipolar\"     Social History     Socioeconomic History    Marital status:      Spouse name: Not on file    Number of children: Not on file    Years of education: Not on file    Highest education level: Not on file   Occupational History    Not on file   Tobacco Use    Smoking status: Former     Packs/day: 0.50     Years: 35.00     Pack years: 17.50     Types: Cigarettes     Quit date: 6/23/2019     Years since quitting: 3.4    Smokeless tobacco: Never   Vaping Use    Vaping Use: Never used   Substance and Sexual Activity    Alcohol use: No     Alcohol/week: 0.0 standard drinks     Comment: \"quit drinking in 20152- use to drink 5 days per week- average use to drink 6 beers each time\"    Drug use: No    Sexual activity: Yes     Partners: Male   Other Topics Concern    Not on file   Social History Narrative    Not on file     Social Determinants of Health     Financial Resource Strain: Not on file   Food Insecurity: Not on file   Transportation Needs: Not on file   Physical Activity: Not on file   Stress: Not on file   Social Connections: Not on file   Intimate Partner Violence: Not on file   Housing Stability: Not on file     No current facility-administered medications for this encounter. Current Outpatient Medications   Medication Sig Dispense Refill    aspirin 81 MG chewable tablet Take 1 tablet by mouth daily 30 tablet 3    albuterol sulfate HFA (PROAIR HFA) 108 (90 Base) MCG/ACT inhaler Inhale 2 puffs into the lungs every 6 hours as needed for Wheezing 1 each 1    sucralfate (CARAFATE) 1 GM tablet Take 1 tablet by mouth every 8 hours for 7 days 21 tablet 0    clonazePAM (KLONOPIN) 1 MG tablet Take 1 tablet by mouth daily as needed for Anxiety for up to 7 days.  7 tablet 0    omeprazole (PRILOSEC) 40 MG delayed release capsule Take 1 capsule by mouth in the morning and at bedtime 539 capsule 0    folic acid (FOLVITE) 1 MG tablet Take 1 tablet by mouth daily 30 tablet 0    vitamin B-12 1000 MCG tablet Take 1 tablet by mouth once a week for 4 doses 4 tablet 0    rosuvastatin (CRESTOR) 40 MG tablet Take 40 mg by mouth at bedtime empagliflozin (JARDIANCE) 10 MG tablet Take 10 mg by mouth daily      oxyCODONE-acetaminophen (PERCOCET)  MG per tablet Take 1 tablet by mouth every 6 hours as needed for Pain.      ranolazine (RANEXA) 1000 MG extended release tablet Take 1,000 mg by mouth 2 times daily       fenofibrate (TRICOR) 145 MG tablet Take 145 mg by mouth nightly  5    gabapentin (NEURONTIN) 100 MG capsule Take 300 mg by mouth in the morning and at bedtime. citalopram (CELEXA) 40 MG tablet Take 20 mg by mouth daily   2    nitroGLYCERIN (NITROSTAT) 0.4 MG SL tablet up to max of 3 total doses. If no relief after 1 dose, call 911. 25 tablet 3    busPIRone (BUSPAR) 15 MG tablet Take 15 mg by mouth 3 times daily (Patient taking differently: Take 30 mg by mouth in the morning and at bedtime ) 90 tablet 1    ticagrelor (BRILINTA) 90 MG TABS tablet Take 1 tablet by mouth 2 times daily 60 tablet 0      Allergies   Allergen Reactions    Erythromycin Hives and Nausea And Vomiting    Lyrica [Pregabalin] Swelling    Codeine Palpitations    Imitrex [Sumatriptan] Anxiety     \"Makes Me Hyper\"    Ketorolac Tromethamine Other (See Comments)     \"Rapid Heart Rate\"    Prochlorperazine Edisylate Nausea And Vomiting    Tape [Adhesive Tape] Itching     Blisters    Ultram [Tramadol] Itching     No current facility-administered medications for this encounter. Current Outpatient Medications   Medication Sig Dispense Refill    aspirin 81 MG chewable tablet Take 1 tablet by mouth daily 30 tablet 3    albuterol sulfate HFA (PROAIR HFA) 108 (90 Base) MCG/ACT inhaler Inhale 2 puffs into the lungs every 6 hours as needed for Wheezing 1 each 1    sucralfate (CARAFATE) 1 GM tablet Take 1 tablet by mouth every 8 hours for 7 days 21 tablet 0    clonazePAM (KLONOPIN) 1 MG tablet Take 1 tablet by mouth daily as needed for Anxiety for up to 7 days.  7 tablet 0    omeprazole (PRILOSEC) 40 MG delayed release capsule Take 1 capsule by mouth in the morning and at bedtime 078 capsule 0    folic acid (FOLVITE) 1 MG tablet Take 1 tablet by mouth daily 30 tablet 0    vitamin B-12 1000 MCG tablet Take 1 tablet by mouth once a week for 4 doses 4 tablet 0    rosuvastatin (CRESTOR) 40 MG tablet Take 40 mg by mouth at bedtime      empagliflozin (JARDIANCE) 10 MG tablet Take 10 mg by mouth daily      oxyCODONE-acetaminophen (PERCOCET)  MG per tablet Take 1 tablet by mouth every 6 hours as needed for Pain.      ranolazine (RANEXA) 1000 MG extended release tablet Take 1,000 mg by mouth 2 times daily       fenofibrate (TRICOR) 145 MG tablet Take 145 mg by mouth nightly  5    gabapentin (NEURONTIN) 100 MG capsule Take 300 mg by mouth in the morning and at bedtime. citalopram (CELEXA) 40 MG tablet Take 20 mg by mouth daily   2    nitroGLYCERIN (NITROSTAT) 0.4 MG SL tablet up to max of 3 total doses. If no relief after 1 dose, call 911. 25 tablet 3    busPIRone (BUSPAR) 15 MG tablet Take 15 mg by mouth 3 times daily (Patient taking differently: Take 30 mg by mouth in the morning and at bedtime ) 90 tablet 1    ticagrelor (BRILINTA) 90 MG TABS tablet Take 1 tablet by mouth 2 times daily 60 tablet 0       Nursing Notes Reviewed    VITAL SIGNS:  ED Triage Vitals [11/16/22 1418]   Enc Vitals Group      BP (!) 167/103      Heart Rate 99      Resp 18      Temp 98.1 °F (36.7 °C)      Temp Source Oral      SpO2 97 %      Weight 185 lb (83.9 kg)      Height 5' 6\" (1.676 m)      Head Circumference       Peak Flow       Pain Score       Pain Loc       Pain Edu? Excl. in [address]? PHYSICAL EXAM:  Physical Exam  Vitals and nursing note reviewed. Constitutional:       General: She is not in acute distress. Appearance: Normal appearance. She is not ill-appearing, toxic-appearing or diaphoretic. HENT:      Right Ear: External ear normal.      Left Ear: External ear normal.      Nose: No congestion or rhinorrhea. Eyes:      General: No scleral icterus.         Right eye: No discharge. Left eye: No discharge. Extraocular Movements: Extraocular movements intact. Conjunctiva/sclera: Conjunctivae normal.   Cardiovascular:      Rate and Rhythm: Normal rate and regular rhythm. Pulses: Normal pulses. Pulmonary:      Effort: Pulmonary effort is normal. No respiratory distress. Breath sounds: Normal breath sounds. No stridor. No wheezing, rhonchi or rales. Chest:      Chest wall: Tenderness present. Abdominal:      General: Bowel sounds are normal. There is no distension. Palpations: Abdomen is soft. There is no mass. Tenderness: There is no abdominal tenderness. There is no guarding or rebound. Hernia: No hernia is present. Musculoskeletal:         General: Tenderness present. No swelling, deformity or signs of injury. Normal range of motion. Left shoulder: Tenderness present. Cervical back: Normal range of motion. No rigidity. Right lower leg: No edema. Left lower leg: No edema. Skin:     General: Skin is warm. Coloration: Skin is not jaundiced or pale. Findings: No bruising, erythema, lesion or rash. Neurological:      General: No focal deficit present. Mental Status: She is alert and oriented to person, place, and time. GCS: GCS eye subscore is 4. GCS verbal subscore is 5. GCS motor subscore is 6. Cranial Nerves: Cranial nerves 2-12 are intact. No cranial nerve deficit, dysarthria or facial asymmetry. Sensory: Sensation is intact. No sensory deficit. Motor: Motor function is intact. No weakness, tremor, atrophy, abnormal muscle tone or seizure activity. Coordination: Coordination is intact. Coordination normal.   Psychiatric:         Mood and Affect: Mood is anxious.          I have reviewed andinterpreted all of the currently available lab results from this visit (if applicable):    Results for orders placed or performed during the hospital encounter of 11/16/22   EKG 12 Lead   Result Value Ref Range    Ventricular Rate 95 BPM    Atrial Rate 95 BPM    P-R Interval 136 ms    QRS Duration 84 ms    Q-T Interval 350 ms    QTc Calculation (Bazett) 439 ms    P Axis 52 degrees    R Axis 42 degrees    T Axis 61 degrees    Diagnosis       Normal sinus rhythm  Possible Left atrial enlargement  Nonspecific ST and T wave abnormality  Abnormal ECG  When compared with ECG of 13-APR-2022 15:33,  ST no longer depressed in Inferior leads  ST less depressed in Lateral leads  Nonspecific T wave abnormality has replaced inverted T waves in Inferior leads  T wave inversion no longer evident in Anterior leads          Radiographs (if obtained):  [] The following radiograph was interpreted by myself in the absence of a radiologist:  [x] Radiologist's Report Reviewed:    CXR L shoulder    EKG (if obtained): (All EKG's are interpreted by myself in the absence of a cardiologist)    12 lead EKG per my interpretation:  Normal Sinus Rhythm 95  Axis is   Normal  QTc is  439  There is no specific T wave changes appreciated. There is no specific ST wave changes appreciated. Prior EKG to compare with was not available        Claire Knight DO  11/16/22 0574    MDM:    Patient with complaint of left-sided chest pain shoulder pain. Again 30 minutes prior to arrival she developed left-sided chest pain and shortness of breath and nausea. She appears anxious on arrival she has a history of anxiety  She denies any fevers cough abdominal pain back pain weakness numbness or tingling. On exam she does have left shoulder pain left chest wall pain that does reproduce her symptoms. There is no signs of trauma no rashes suggest shingles. I did do labs, imaging EKG all of which are negative.   I did talk to cardiology her cardiologist who came down and saw her I did do a second troponin was negative he recommends going home and seeing him in the office tomorrow PE studies otherwise negative on the left side for PE pneumothorax etc.  Patient stable discharge home given medication return precautions and follow-up information. CLINICAL IMPRESSION:  Final diagnoses:   Left shoulder pain, unspecified chronicity   Chest pain, unspecified type       (Please note that portions of this note may have been completed with a voice recognition program. Efforts were made to edit the dictations but occasionally words aremis-transcribed.)    DISPOSITION REFERRAL (if applicable):  No follow-up provider specified.     DISPOSITION MEDICATIONS (if applicable):  New Prescriptions    No medications on file          Ignacia Watson, [facility], DO  11/16/22 2008

## 2022-11-16 NOTE — CONSULTS
Dictated --[id number]  If second trop negative ok to d/c home  F/u tomorrow in office  Hx of CAD and PCI  Last cath 4/22  Last echo showed EF 50%       Cath 4/22  DICTATED --[id number]  LEFT MAIN PATENT  % PROXIMAL  % PROXIMAL  RCA KNOW TO BE OCCLUDED DID NOT INJECT  LIMA TO LAD PATENT  SVG TO DIAGONAL 100% OCCLUDED  SVG TO OM-OSTIAL 80% TO 0% EMI RESOLUTE 2.5X15MM STENT, AND PROXIMAL 80% TO 0% EMI RESOLUTE 2.5X18MM STENT  LVEDP 20  ASA AND BRILIANTA AND HEPARIN  NO CLOSURE DEVICE  LEFT GROIN ACCESS  NO COMPLICATIONS       Electronically signed by Faith Burnett MD on 11/16/22 at 4:32 PM EST

## 2022-11-17 NOTE — ED NOTES
Report received from Yonny Cardenas.  Assuming pt care at this time     Evita Asher, RN  11/16/22 1928

## 2022-11-17 NOTE — CONSULTS
[address] [place] [address], [facility]                                  CONSULTATION    PATIENT NAME: Heidi Hurtado                     :        1965  MED REC NO:   [id number]                          ROOM:       OTF01  ACCOUNT NO:   [de-identified]                           ADMIT DATE: 2022  PROVIDER:     Saima Varghese MD    CONSULT DATE:  2022    INDICATIONS:  Chest pain. HISTORY OF PRESENT ILLNESS:  This is a 74-year-old female patient with a  history of extensive coronary artery disease present. She comes into  the hospital with chest pain. She states that today, she was sitting at  home. She started having chest pain on the left side of the chest;  therefore, she came to the ER. Right now, she is pain-free. The patient had echo done back in 2022. At that time, LV function  was preserved and 50%. She did have heart catheterization done back in  2022 after elevated troponin. She was found to have left main was  patent. LAD was proximally 100% occluded. Circ proximally 100%  occluded. RCA was noted to be occluded, so we did not inject. LIMA to  LAD is patent. SVG to diagonal is occluded. SVG to OM had also 80%  stenosis, which was stented. She has two grafts, LIMA to LAD and SVG to  OM graft. Diagonal has been occluded. She has been doing relatively  well. She has quit smoking seven months ago also. Now, she started  having chest pain; therefore, she came to the hospital.    PAST MEDICAL HISTORY:  Coronary artery disease, status post CABG done. Last surgery was in 2022 and angioplasty  was done of the SVG graft. History of hypertension and hyperlipidemia. Quit smoking about seven  months ago. Peripheral vascular disease present. PAST SURGICAL HISTORY:  She has two grafts patent:  SVG to OM, which was  stent and LIMA to LAD. SVG to diagonal graft is occluded.    Her left  rib has been removed. Gallbladder surgery. Tonsillectomy. Back  surgery. Right common femoral endarterectomy was performed because of  the occlusion and a right carotid endarterectomy also was performed. SOCIAL HISTORY:  She quit smoking about seven months ago. No alcohol. She is not working anymore. She is at home. MEDICATIONS AT HOME:  She is on Coreg 3.125 mg b.i.d. which is decreased  dose. She is on iron, Wellbutrin, BuSpar, and aspirin. She is on  Brilinta 90 mg b.i.d. She is on Crestor and Jardiance and TriCor also. ALLERGIES:  Allergy to eight medications. See the list.    PHYSICAL EXAMINATION:  GENERAL:  The patient is awake, alert, and answering questions, not in  acute distress. VITAL SIGNS:  Temperature is afebrile. Pulse is 80. Blood pressure is  103/70. HEENT:  Head is atraumatic. Pupils are equal and reactive. CHEST:  Equal expansion. LUNGS:  Clear to auscultation. No wheezing or rhonchi present. HEART:  Regular rhythm. ABDOMEN:  Soft and nontender. Bowel sounds are present. No  hepatosplenomegaly or guarding appreciated. EXTREMITIES:  No cyanosis noted. NEUROLOGIC:  Cranial nerves are grossly intact. DIAGNOSTIC DATA:  EKG showed sinus rhythm. No acute ST-T changes noted. LABORATORY DATA:  BUN is 17, creatinine is 1.2. Troponin is negative. LFTs are normal.  CBC is normal.    IMPRESSION AND PLAN:  This is a 59-year-old female patient who comes in  with chest pain. She is known to have extensive coronary artery disease  and if the second troponin is negative, she can be discharged to home  with a close followup in the office tomorrow.         Prince Lujan MD    D: 11/16/2022 16:32:00       T: 11/16/2022 22:07:49     [email or web address]  Job#: [id number]     Doc#: [id number]    CC:

## 2022-11-18 RX ADMIN — IOPAMIDOL 80 ML: 755 INJECTION, SOLUTION INTRAVENOUS at 12:51

## 2023-06-28 PROBLEM — I95.0 IDIOPATHIC HYPOTENSION: Status: ACTIVE | Noted: 2023-06-28

## 2024-04-06 ENCOUNTER — APPOINTMENT (OUTPATIENT)
Facility: HOSPITAL | Age: 59
End: 2024-04-06
Payer: COMMERCIAL

## 2024-04-06 ENCOUNTER — HOSPITAL ENCOUNTER (EMERGENCY)
Facility: HOSPITAL | Age: 59
Discharge: HOME | End: 2024-04-06
Attending: EMERGENCY MEDICINE
Payer: COMMERCIAL

## 2024-04-06 VITALS
SYSTOLIC BLOOD PRESSURE: 107 MMHG | HEIGHT: 66 IN | WEIGHT: 173 LBS | BODY MASS INDEX: 27.8 KG/M2 | OXYGEN SATURATION: 98 % | TEMPERATURE: 97.7 F | DIASTOLIC BLOOD PRESSURE: 48 MMHG | RESPIRATION RATE: 18 BRPM | HEART RATE: 85 BPM

## 2024-04-06 DIAGNOSIS — N10 ACUTE PYELONEPHRITIS: ICD-10-CM

## 2024-04-06 DIAGNOSIS — N28.1 RENAL CYST: Primary | ICD-10-CM

## 2024-04-06 LAB
ALBUMIN SERPL-MCNC: 3.9 G/DL (ref 3.4–5)
ALBUMIN/GLOB SERPL: 1.2 (ref 0.8–1.7)
ALP SERPL-CCNC: 74 U/L (ref 45–117)
ALT SERPL-CCNC: 36 U/L (ref 13–56)
ANION GAP SERPL CALC-SCNC: 6 MMOL/L (ref 3–18)
APPEARANCE UR: CLEAR
AST SERPL-CCNC: 22 U/L (ref 10–38)
BASOPHILS # BLD: 0 K/UL (ref 0–0.1)
BASOPHILS NFR BLD: 1 % (ref 0–2)
BILIRUB SERPL-MCNC: 0.4 MG/DL (ref 0.2–1)
BILIRUB UR QL: NEGATIVE
BUN SERPL-MCNC: 15 MG/DL (ref 7–18)
BUN/CREAT SERPL: 12 (ref 12–20)
CALCIUM SERPL-MCNC: 9.4 MG/DL (ref 8.5–10.1)
CHLORIDE SERPL-SCNC: 114 MMOL/L (ref 100–111)
CO2 SERPL-SCNC: 24 MMOL/L (ref 21–32)
COLOR UR: YELLOW
CREAT SERPL-MCNC: 1.24 MG/DL (ref 0.6–1.3)
DIFFERENTIAL METHOD BLD: ABNORMAL
EOSINOPHIL # BLD: 0.2 K/UL (ref 0–0.4)
EOSINOPHIL NFR BLD: 4 % (ref 0–5)
EPITH CASTS URNS QL MICRO: NORMAL /LPF (ref 0–5)
ERYTHROCYTE [DISTWIDTH] IN BLOOD BY AUTOMATED COUNT: 12.7 % (ref 11.6–14.5)
GLOBULIN SER CALC-MCNC: 3.3 G/DL (ref 2–4)
GLUCOSE SERPL-MCNC: 165 MG/DL (ref 74–99)
GLUCOSE UR STRIP.AUTO-MCNC: >1000 MG/DL
HCT VFR BLD AUTO: 33.9 % (ref 35–45)
HGB BLD-MCNC: 11.4 G/DL (ref 12–16)
HGB UR QL STRIP: NEGATIVE
IMM GRANULOCYTES # BLD AUTO: 0 K/UL (ref 0–0.04)
IMM GRANULOCYTES NFR BLD AUTO: 0 % (ref 0–0.5)
KETONES UR QL STRIP.AUTO: NEGATIVE MG/DL
LEUKOCYTE ESTERASE UR QL STRIP.AUTO: NEGATIVE
LYMPHOCYTES # BLD: 1.9 K/UL (ref 0.9–3.6)
LYMPHOCYTES NFR BLD: 37 % (ref 21–52)
MCH RBC QN AUTO: 31.2 PG (ref 24–34)
MCHC RBC AUTO-ENTMCNC: 33.6 G/DL (ref 31–37)
MCV RBC AUTO: 92.9 FL (ref 78–100)
MONOCYTES # BLD: 0.5 K/UL (ref 0.05–1.2)
MONOCYTES NFR BLD: 9 % (ref 3–10)
NEUTS SEG # BLD: 2.5 K/UL (ref 1.8–8)
NEUTS SEG NFR BLD: 49 % (ref 40–73)
NITRITE UR QL STRIP.AUTO: POSITIVE
NRBC # BLD: 0 K/UL (ref 0–0.01)
NRBC BLD-RTO: 0 PER 100 WBC
PH UR STRIP: 5 (ref 5–8)
PLATELET # BLD AUTO: 204 K/UL (ref 135–420)
PMV BLD AUTO: 9.6 FL (ref 9.2–11.8)
POTASSIUM SERPL-SCNC: 3.7 MMOL/L (ref 3.5–5.5)
PROT SERPL-MCNC: 7.2 G/DL (ref 6.4–8.2)
PROT UR STRIP-MCNC: NEGATIVE MG/DL
RBC # BLD AUTO: 3.65 M/UL (ref 4.2–5.3)
SODIUM SERPL-SCNC: 144 MMOL/L (ref 136–145)
SP GR UR REFRACTOMETRY: >1.03 (ref 1–1.03)
UROBILINOGEN UR QL STRIP.AUTO: 1 EU/DL (ref 0.2–1)
WBC # BLD AUTO: 5.2 K/UL (ref 4.6–13.2)
WBC URNS QL MICRO: NORMAL /HPF (ref 0–4)

## 2024-04-06 PROCEDURE — 85025 COMPLETE CBC W/AUTO DIFF WBC: CPT

## 2024-04-06 PROCEDURE — 87088 URINE BACTERIA CULTURE: CPT

## 2024-04-06 PROCEDURE — 81001 URINALYSIS AUTO W/SCOPE: CPT

## 2024-04-06 PROCEDURE — 74176 CT ABD & PELVIS W/O CONTRAST: CPT

## 2024-04-06 PROCEDURE — 87086 URINE CULTURE/COLONY COUNT: CPT

## 2024-04-06 PROCEDURE — 99284 EMERGENCY DEPT VISIT MOD MDM: CPT

## 2024-04-06 PROCEDURE — 80053 COMPREHEN METABOLIC PANEL: CPT

## 2024-04-06 PROCEDURE — 96375 TX/PRO/DX INJ NEW DRUG ADDON: CPT

## 2024-04-06 PROCEDURE — 6360000002 HC RX W HCPCS: Performed by: EMERGENCY MEDICINE

## 2024-04-06 PROCEDURE — 96376 TX/PRO/DX INJ SAME DRUG ADON: CPT

## 2024-04-06 PROCEDURE — 2580000003 HC RX 258: Performed by: EMERGENCY MEDICINE

## 2024-04-06 PROCEDURE — 96374 THER/PROPH/DIAG INJ IV PUSH: CPT

## 2024-04-06 PROCEDURE — 87186 SC STD MICRODIL/AGAR DIL: CPT

## 2024-04-06 RX ORDER — MORPHINE SULFATE 4 MG/ML
4 INJECTION, SOLUTION INTRAMUSCULAR; INTRAVENOUS
Status: COMPLETED | OUTPATIENT
Start: 2024-04-06 | End: 2024-04-06

## 2024-04-06 RX ORDER — ONDANSETRON 2 MG/ML
4 INJECTION INTRAMUSCULAR; INTRAVENOUS
Status: COMPLETED | OUTPATIENT
Start: 2024-04-06 | End: 2024-04-06

## 2024-04-06 RX ORDER — CEFDINIR 300 MG/1
300 CAPSULE ORAL 2 TIMES DAILY
Qty: 14 CAPSULE | Refills: 0 | Status: SHIPPED | OUTPATIENT
Start: 2024-04-06 | End: 2024-04-13

## 2024-04-06 RX ORDER — 0.9 % SODIUM CHLORIDE 0.9 %
1000 INTRAVENOUS SOLUTION INTRAVENOUS ONCE
Status: COMPLETED | OUTPATIENT
Start: 2024-04-06 | End: 2024-04-06

## 2024-04-06 RX ORDER — SODIUM CHLORIDE 9 MG/ML
INJECTION, SOLUTION INTRAVENOUS CONTINUOUS
Status: DISCONTINUED | OUTPATIENT
Start: 2024-04-06 | End: 2024-04-06 | Stop reason: HOSPADM

## 2024-04-06 RX ADMIN — ONDANSETRON 4 MG: 2 INJECTION INTRAMUSCULAR; INTRAVENOUS at 14:48

## 2024-04-06 RX ADMIN — MORPHINE SULFATE 4 MG: 4 INJECTION, SOLUTION INTRAMUSCULAR; INTRAVENOUS at 14:37

## 2024-04-06 RX ADMIN — MORPHINE SULFATE 4 MG: 4 INJECTION, SOLUTION INTRAMUSCULAR; INTRAVENOUS at 13:15

## 2024-04-06 RX ADMIN — WATER 1000 MG: 1 INJECTION INTRAMUSCULAR; INTRAVENOUS; SUBCUTANEOUS at 13:17

## 2024-04-06 RX ADMIN — SODIUM CHLORIDE 1000 ML: 9 INJECTION, SOLUTION INTRAVENOUS at 13:13

## 2024-04-06 ASSESSMENT — ENCOUNTER SYMPTOMS
BACK PAIN: 1
ABDOMINAL PAIN: 0
CHEST TIGHTNESS: 0

## 2024-04-06 ASSESSMENT — PAIN SCALES - GENERAL
PAINLEVEL_OUTOF10: 6
PAINLEVEL_OUTOF10: 10
PAINLEVEL_OUTOF10: 8
PAINLEVEL_OUTOF10: 7

## 2024-04-06 ASSESSMENT — PAIN DESCRIPTION - LOCATION: LOCATION: BACK

## 2024-04-06 ASSESSMENT — PAIN - FUNCTIONAL ASSESSMENT: PAIN_FUNCTIONAL_ASSESSMENT: 0-10

## 2024-04-06 ASSESSMENT — LIFESTYLE VARIABLES
HOW MANY STANDARD DRINKS CONTAINING ALCOHOL DO YOU HAVE ON A TYPICAL DAY: PATIENT DOES NOT DRINK
HOW OFTEN DO YOU HAVE A DRINK CONTAINING ALCOHOL: NEVER

## 2024-04-06 ASSESSMENT — PAIN DESCRIPTION - ORIENTATION: ORIENTATION: LEFT

## 2024-04-06 ASSESSMENT — PAIN DESCRIPTION - DESCRIPTORS: DESCRIPTORS: ACHING

## 2024-04-06 NOTE — ED PROVIDER NOTES
EMERGENCY DEPARTMENT HISTORY AND PHYSICAL EXAM    3:42 PM      Date: 4/6/2024  Patient Name: Waleska Fernández    History of Presenting Illness     Chief Complaint   Patient presents with    Back Pain       History From: Patient    Waleska Fernández is a 58 y.o. female   With a history of chronic back pain, diabetes, coronary disease with bypass surgery, on opioid pain management from [place], history of kidney stones, migraines, depression, the presents emergency department with left-sided back pain has been progressively worsening since starting a trip from [place].  Patient got into town on Thursday and pain began on the way here and has been progressively worsening.  Patient is taking her regular pain medication is not getting any relief and notes that she has chronic pain in the right side of her back and that does not seem to issue it is the left side of the back which is bothering her today.  Patient's pain radiates to her flank and occasionally to her buttock but denies any symptoms down her leg.  Patient denies any bowel or bladder changes.  Patient is having some mild urinary urgency and frequency but not having difficulty holding it.  The patient denies any change to her bowel habits and says she does have some chronic loose stools which is unchanged.  Patient denies any fevers or chills and denies any other aggravating or alleviating factors.  Patient has quit smoking since her last cardiac event and occasionally drinks alcohol use.           Nursing Notes were all reviewed and agreed with or any disagreements were addressed in the HPI.    PCP: Nathan Lambert DO    Current Facility-Administered Medications   Medication Dose Route Frequency Provider Last Rate Last Admin    0.9 % sodium chloride infusion   IntraVENous Continuous Jordan Bro MD        cefTRIAXone (ROCEPHIN) 1,000 mg in sterile water 10 mL IV syringe  1,000 mg IntraVENous Q24H Jordan Bro MD   1,000 mg at 04/06/24 1317     Current  midline pain and more flank pain and some left upper quadrant pain.  Patient has nitrate positive urine however there is no clear pyuria this may be a sign of pyelonephritis so we will plan to treat the patient with antibiotics while obtaining a CT scan of her abdomen to look for stone disease and if reassuring plan to discharge the patient for pyelonephritis treatment.  Patient also given some morphine for breakthrough pain.         The patient has a left renal cyst that appears to be somewhat complex with some possible hemorrhage and inflammation and a right kidney stone but has no right-sided abdominal pain.  The patient has a nitrate positive urine, was given Rocephin, and suspect that this is pyelonephritis and have given information from the CT to the patient and she will be heading back home in the next 24 hours and will follow-up closely with her doctors when she does get back to her home state.  The patient was educated on signs worsening, the morphine help with her pain, and she will return if she is at all worsened or concerned.    Workup and recommendations were reviewed with the patient and all questions were answered.  The patient understands the plan and will proceed with close outpatient care.  I have encouraged the patient to return if at all worsened or concerned.   Jordan Bro, DO 3:43 PM      Patient was given the following medications:  Medications   0.9 % sodium chloride infusion (has no administration in time range)   cefTRIAXone (ROCEPHIN) 1,000 mg in sterile water 10 mL IV syringe (1,000 mg IntraVENous Given 4/6/24 1317)   morphine sulfate (PF) injection 4 mg (4 mg IntraVENous Given 4/6/24 1315)   sodium chloride 0.9 % bolus 1,000 mL (0 mLs IntraVENous Stopped 4/6/24 1502)   morphine sulfate (PF) injection 4 mg (4 mg IntraVENous Given 4/6/24 1437)   ondansetron (ZOFRAN) injection 4 mg (4 mg IntraVENous Given 4/6/24 1448)       CONSULTS: (Who and What was discussed)  None    Chronic

## 2024-04-06 NOTE — DISCHARGE INSTRUCTIONS
You have 2 cysts in your left kidney one of them looks to be inflamed and there are no kidney stones on the left side but there are some on the right.  I suspect you have a kidney infection but the cyst that you have need to be followed up with either an ultrasound or another CAT scan.  This can be done when you get back home I will put you on the strong antibiotic to get you feeling better.  If you have any worsening or you are concerned please return immediately.

## 2024-04-09 LAB
BACTERIA SPEC CULT: ABNORMAL
CC UR VC: ABNORMAL
SERVICE CMNT-IMP: ABNORMAL

## 2024-04-17 PROBLEM — N30.01 ACUTE CYSTITIS WITH HEMATURIA: Status: ACTIVE | Noted: 2024-04-17

## 2024-04-17 PROBLEM — N28.89 RENAL MASS, LEFT: Status: ACTIVE | Noted: 2024-04-17

## 2024-04-17 PROBLEM — N28.1 RENAL CYST: Status: ACTIVE | Noted: 2024-04-17

## 2024-05-09 LAB — DIABETIC RETINOPATHY: NEGATIVE

## 2024-10-28 ENCOUNTER — APPOINTMENT (OUTPATIENT)
Dept: CT IMAGING | Age: 59
DRG: 321 | End: 2024-10-28
Payer: COMMERCIAL

## 2024-10-28 ENCOUNTER — HOSPITAL ENCOUNTER (INPATIENT)
Age: 59
LOS: 1 days | Discharge: HOME | DRG: 321 | End: 2024-10-29
Attending: EMERGENCY MEDICINE | Admitting: STUDENT IN AN ORGANIZED HEALTH CARE EDUCATION/TRAINING PROGRAM
Payer: COMMERCIAL

## 2024-10-28 DIAGNOSIS — R94.31 ABNORMAL EKG: ICD-10-CM

## 2024-10-28 DIAGNOSIS — R42 DIZZINESS: ICD-10-CM

## 2024-10-28 DIAGNOSIS — R79.89 ELEVATED TROPONIN: ICD-10-CM

## 2024-10-28 DIAGNOSIS — R07.9 CHEST PAIN, UNSPECIFIED TYPE: Primary | ICD-10-CM

## 2024-10-28 DIAGNOSIS — I21.4 NSTEMI (NON-ST ELEVATED MYOCARDIAL INFARCTION) (HCC): ICD-10-CM

## 2024-10-28 DIAGNOSIS — R07.9 CHEST PAIN: ICD-10-CM

## 2024-10-28 LAB
ALBUMIN SERPL-MCNC: 4.8 G/DL (ref 3.4–5)
ALBUMIN/GLOB SERPL: 1.7 {RATIO} (ref 1.1–2.2)
ALP SERPL-CCNC: 64 U/L (ref 40–129)
ALT SERPL-CCNC: 31 U/L (ref 10–40)
ANION GAP SERPL CALCULATED.3IONS-SCNC: 14 MMOL/L (ref 9–17)
AST SERPL-CCNC: 35 U/L (ref 15–37)
BASOPHILS # BLD: 0.04 K/UL
BASOPHILS NFR BLD: 0 % (ref 0–1)
BILIRUB SERPL-MCNC: 0.6 MG/DL (ref 0–1)
BUN SERPL-MCNC: 16 MG/DL (ref 7–20)
CALCIUM SERPL-MCNC: 10.7 MG/DL (ref 8.3–10.6)
CHLORIDE SERPL-SCNC: 102 MMOL/L (ref 99–110)
CO2 SERPL-SCNC: 24 MMOL/L (ref 21–32)
CREAT SERPL-MCNC: 1.2 MG/DL (ref 0.6–1.2)
ECHO BSA: 1.92 M2
EKG ATRIAL RATE: 93 BPM
EKG DIAGNOSIS: NORMAL
EKG P AXIS: 68 DEGREES
EKG P-R INTERVAL: 162 MS
EKG Q-T INTERVAL: 398 MS
EKG QRS DURATION: 76 MS
EKG QTC CALCULATION (BAZETT): 494 MS
EKG R AXIS: 34 DEGREES
EKG T AXIS: 184 DEGREES
EKG VENTRICULAR RATE: 93 BPM
EOSINOPHIL # BLD: 0.12 K/UL
EOSINOPHILS RELATIVE PERCENT: 1 % (ref 0–3)
ERYTHROCYTE [DISTWIDTH] IN BLOOD BY AUTOMATED COUNT: 14 % (ref 11.7–14.9)
GFR, ESTIMATED: 46 ML/MIN/1.73M2
GLUCOSE BLD-MCNC: 90 MG/DL (ref 74–99)
GLUCOSE SERPL-MCNC: 133 MG/DL (ref 74–99)
HCT VFR BLD AUTO: 40 % (ref 37–47)
HGB BLD-MCNC: 12.9 G/DL (ref 12.5–16)
IMM GRANULOCYTES # BLD AUTO: 0.04 K/UL
IMM GRANULOCYTES NFR BLD: 0 %
INR PPP: 0.9
LYMPHOCYTES NFR BLD: 1.27 K/UL
LYMPHOCYTES RELATIVE PERCENT: 13 % (ref 24–44)
MCH RBC QN AUTO: 30.9 PG (ref 27–31)
MCHC RBC AUTO-ENTMCNC: 32.3 G/DL (ref 32–36)
MCV RBC AUTO: 95.7 FL (ref 78–100)
MONOCYTES NFR BLD: 0.56 K/UL
MONOCYTES NFR BLD: 6 % (ref 0–4)
NEUTROPHILS NFR BLD: 79 % (ref 36–66)
NEUTS SEG NFR BLD: 7.73 K/UL
PLATELET # BLD AUTO: 235 K/UL (ref 140–440)
PMV BLD AUTO: 10 FL (ref 7.5–11.1)
POTASSIUM SERPL-SCNC: 4.4 MMOL/L (ref 3.5–5.1)
PROT SERPL-MCNC: 7.7 G/DL (ref 6.4–8.2)
PROTHROMBIN TIME: 12.3 SEC (ref 11.7–14.5)
RBC # BLD AUTO: 4.18 M/UL (ref 4.2–5.4)
SODIUM SERPL-SCNC: 140 MMOL/L (ref 136–145)
TROPONIN I SERPL HS-MCNC: 17 NG/L (ref 0–14)
TROPONIN I SERPL HS-MCNC: 37 NG/L (ref 0–14)
TSH SERPL DL<=0.05 MIU/L-ACNC: 2.13 UIU/ML (ref 0.27–4.2)
WBC OTHER # BLD: 9.8 K/UL (ref 4–10.5)

## 2024-10-28 PROCEDURE — 74174 CTA ABD&PLVS W/CONTRAST: CPT

## 2024-10-28 PROCEDURE — 6360000002 HC RX W HCPCS: Performed by: INTERNAL MEDICINE

## 2024-10-28 PROCEDURE — 6360000004 HC RX CONTRAST MEDICATION: Performed by: INTERNAL MEDICINE

## 2024-10-28 PROCEDURE — 2580000003 HC RX 258: Performed by: INTERNAL MEDICINE

## 2024-10-28 PROCEDURE — 6360000002 HC RX W HCPCS: Performed by: EMERGENCY MEDICINE

## 2024-10-28 PROCEDURE — 85025 COMPLETE CBC W/AUTO DIFF WBC: CPT

## 2024-10-28 PROCEDURE — 80053 COMPREHEN METABOLIC PANEL: CPT

## 2024-10-28 PROCEDURE — 2060000000 HC ICU INTERMEDIATE R&B

## 2024-10-28 PROCEDURE — C1769 GUIDE WIRE: HCPCS | Performed by: INTERNAL MEDICINE

## 2024-10-28 PROCEDURE — 96374 THER/PROPH/DIAG INJ IV PUSH: CPT

## 2024-10-28 PROCEDURE — 94761 N-INVAS EAR/PLS OXIMETRY MLT: CPT

## 2024-10-28 PROCEDURE — C1887 CATHETER, GUIDING: HCPCS | Performed by: INTERNAL MEDICINE

## 2024-10-28 PROCEDURE — 70450 CT HEAD/BRAIN W/O DYE: CPT

## 2024-10-28 PROCEDURE — 84443 ASSAY THYROID STIM HORMONE: CPT

## 2024-10-28 PROCEDURE — B2111ZZ FLUOROSCOPY OF MULTIPLE CORONARY ARTERIES USING LOW OSMOLAR CONTRAST: ICD-10-PCS | Performed by: INTERNAL MEDICINE

## 2024-10-28 PROCEDURE — 93005 ELECTROCARDIOGRAM TRACING: CPT | Performed by: EMERGENCY MEDICINE

## 2024-10-28 PROCEDURE — 82962 GLUCOSE BLOOD TEST: CPT

## 2024-10-28 PROCEDURE — 6370000000 HC RX 637 (ALT 250 FOR IP): Performed by: EMERGENCY MEDICINE

## 2024-10-28 PROCEDURE — C1760 CLOSURE DEV, VASC: HCPCS | Performed by: INTERNAL MEDICINE

## 2024-10-28 PROCEDURE — B2151ZZ FLUOROSCOPY OF LEFT HEART USING LOW OSMOLAR CONTRAST: ICD-10-PCS | Performed by: INTERNAL MEDICINE

## 2024-10-28 PROCEDURE — C1725 CATH, TRANSLUMIN NON-LASER: HCPCS | Performed by: INTERNAL MEDICINE

## 2024-10-28 PROCEDURE — 6360000004 HC RX CONTRAST MEDICATION: Performed by: EMERGENCY MEDICINE

## 2024-10-28 PROCEDURE — 93010 ELECTROCARDIOGRAM REPORT: CPT | Performed by: INTERNAL MEDICINE

## 2024-10-28 PROCEDURE — C1874 STENT, COATED/COV W/DEL SYS: HCPCS | Performed by: INTERNAL MEDICINE

## 2024-10-28 PROCEDURE — 70498 CT ANGIOGRAPHY NECK: CPT

## 2024-10-28 PROCEDURE — 84484 ASSAY OF TROPONIN QUANT: CPT

## 2024-10-28 PROCEDURE — 93459 L HRT ART/GRFT ANGIO: CPT | Performed by: INTERNAL MEDICINE

## 2024-10-28 PROCEDURE — 2709999900 HC NON-CHARGEABLE SUPPLY: Performed by: INTERNAL MEDICINE

## 2024-10-28 PROCEDURE — 6370000000 HC RX 637 (ALT 250 FOR IP): Performed by: STUDENT IN AN ORGANIZED HEALTH CARE EDUCATION/TRAINING PROGRAM

## 2024-10-28 PROCEDURE — 85610 PROTHROMBIN TIME: CPT

## 2024-10-28 PROCEDURE — C1894 INTRO/SHEATH, NON-LASER: HCPCS | Performed by: INTERNAL MEDICINE

## 2024-10-28 PROCEDURE — 027034Z DILATION OF CORONARY ARTERY, ONE ARTERY WITH DRUG-ELUTING INTRALUMINAL DEVICE, PERCUTANEOUS APPROACH: ICD-10-PCS | Performed by: INTERNAL MEDICINE

## 2024-10-28 PROCEDURE — 92928 PRQ TCAT PLMT NTRAC ST 1 LES: CPT | Performed by: INTERNAL MEDICINE

## 2024-10-28 PROCEDURE — 4A023N7 MEASUREMENT OF CARDIAC SAMPLING AND PRESSURE, LEFT HEART, PERCUTANEOUS APPROACH: ICD-10-PCS | Performed by: INTERNAL MEDICINE

## 2024-10-28 PROCEDURE — 99285 EMERGENCY DEPT VISIT HI MDM: CPT

## 2024-10-28 PROCEDURE — 2500000003 HC RX 250 WO HCPCS: Performed by: INTERNAL MEDICINE

## 2024-10-28 PROCEDURE — B2121ZZ FLUOROSCOPY OF SINGLE CORONARY ARTERY BYPASS GRAFT USING LOW OSMOLAR CONTRAST: ICD-10-PCS | Performed by: INTERNAL MEDICINE

## 2024-10-28 DEVICE — STENT ONYXNG30015UX ONYX 3.00X15RX
Type: IMPLANTABLE DEVICE | Status: FUNCTIONAL
Brand: ONYX FRONTIER™

## 2024-10-28 RX ORDER — MIDAZOLAM HYDROCHLORIDE 1 MG/ML
INJECTION, SOLUTION INTRAMUSCULAR; INTRAVENOUS PRN
Status: DISCONTINUED | OUTPATIENT
Start: 2024-10-28 | End: 2024-10-28 | Stop reason: HOSPADM

## 2024-10-28 RX ORDER — MAGNESIUM SULFATE IN WATER 40 MG/ML
2000 INJECTION, SOLUTION INTRAVENOUS PRN
Status: DISCONTINUED | OUTPATIENT
Start: 2024-10-28 | End: 2024-10-29 | Stop reason: HOSPADM

## 2024-10-28 RX ORDER — LORAZEPAM 2 MG/ML
1 INJECTION INTRAMUSCULAR ONCE
Status: COMPLETED | OUTPATIENT
Start: 2024-10-28 | End: 2024-10-28

## 2024-10-28 RX ORDER — ONDANSETRON 2 MG/ML
4 INJECTION INTRAMUSCULAR; INTRAVENOUS EVERY 6 HOURS PRN
Status: DISCONTINUED | OUTPATIENT
Start: 2024-10-28 | End: 2024-10-29 | Stop reason: HOSPADM

## 2024-10-28 RX ORDER — POLYETHYLENE GLYCOL 3350 17 G/17G
17 POWDER, FOR SOLUTION ORAL DAILY PRN
Status: DISCONTINUED | OUTPATIENT
Start: 2024-10-28 | End: 2024-10-29 | Stop reason: HOSPADM

## 2024-10-28 RX ORDER — SODIUM CHLORIDE 9 MG/ML
INJECTION, SOLUTION INTRAVENOUS CONTINUOUS
Status: DISCONTINUED | OUTPATIENT
Start: 2024-10-28 | End: 2024-10-29

## 2024-10-28 RX ORDER — ONDANSETRON 4 MG/1
4 TABLET, ORALLY DISINTEGRATING ORAL EVERY 8 HOURS PRN
Status: DISCONTINUED | OUTPATIENT
Start: 2024-10-28 | End: 2024-10-29 | Stop reason: HOSPADM

## 2024-10-28 RX ORDER — GLIPIZIDE 10 MG/1
10 TABLET ORAL DAILY
Status: ON HOLD | COMMUNITY
End: 2024-10-29 | Stop reason: HOSPADM

## 2024-10-28 RX ORDER — ASPIRIN 325 MG
TABLET, DELAYED RELEASE (ENTERIC COATED) ORAL PRN
Status: DISCONTINUED | OUTPATIENT
Start: 2024-10-28 | End: 2024-10-28 | Stop reason: HOSPADM

## 2024-10-28 RX ORDER — ACETAMINOPHEN 650 MG/1
650 SUPPOSITORY RECTAL EVERY 6 HOURS PRN
Status: DISCONTINUED | OUTPATIENT
Start: 2024-10-28 | End: 2024-10-29 | Stop reason: HOSPADM

## 2024-10-28 RX ORDER — SODIUM CHLORIDE 0.9 % (FLUSH) 0.9 %
5-40 SYRINGE (ML) INJECTION EVERY 12 HOURS SCHEDULED
Status: DISCONTINUED | OUTPATIENT
Start: 2024-10-28 | End: 2024-10-29 | Stop reason: HOSPADM

## 2024-10-28 RX ORDER — IOPAMIDOL 755 MG/ML
75 INJECTION, SOLUTION INTRAVASCULAR
Status: COMPLETED | OUTPATIENT
Start: 2024-10-28 | End: 2024-10-28

## 2024-10-28 RX ORDER — RANOLAZINE 500 MG/1
1000 TABLET, EXTENDED RELEASE ORAL 2 TIMES DAILY
Status: DISCONTINUED | OUTPATIENT
Start: 2024-10-28 | End: 2024-10-29 | Stop reason: HOSPADM

## 2024-10-28 RX ORDER — GLUCAGON 1 MG/ML
1 KIT INJECTION PRN
Status: DISCONTINUED | OUTPATIENT
Start: 2024-10-28 | End: 2024-10-29 | Stop reason: HOSPADM

## 2024-10-28 RX ORDER — ALBUTEROL SULFATE 90 UG/1
2 INHALANT RESPIRATORY (INHALATION) EVERY 6 HOURS PRN
Status: DISCONTINUED | OUTPATIENT
Start: 2024-10-28 | End: 2024-10-29 | Stop reason: HOSPADM

## 2024-10-28 RX ORDER — SODIUM CHLORIDE 9 MG/ML
INJECTION, SOLUTION INTRAVENOUS PRN
Status: DISCONTINUED | OUTPATIENT
Start: 2024-10-28 | End: 2024-10-29 | Stop reason: HOSPADM

## 2024-10-28 RX ORDER — ASPIRIN 81 MG/1
81 TABLET, CHEWABLE ORAL DAILY
Status: DISCONTINUED | OUTPATIENT
Start: 2024-10-29 | End: 2024-10-29 | Stop reason: HOSPADM

## 2024-10-28 RX ORDER — HEPARIN SODIUM 200 [USP'U]/100ML
INJECTION, SOLUTION INTRAVENOUS PRN
Status: DISCONTINUED | OUTPATIENT
Start: 2024-10-28 | End: 2024-10-28 | Stop reason: HOSPADM

## 2024-10-28 RX ORDER — NITROGLYCERIN 0.4 MG/1
0.4 TABLET SUBLINGUAL EVERY 5 MIN PRN
Status: DISCONTINUED | OUTPATIENT
Start: 2024-10-28 | End: 2024-10-29 | Stop reason: HOSPADM

## 2024-10-28 RX ORDER — ESCITALOPRAM OXALATE 10 MG/1
20 TABLET ORAL DAILY
Status: DISCONTINUED | OUTPATIENT
Start: 2024-10-29 | End: 2024-10-29 | Stop reason: HOSPADM

## 2024-10-28 RX ORDER — ACETAMINOPHEN 325 MG/1
650 TABLET ORAL EVERY 4 HOURS PRN
Status: DISCONTINUED | OUTPATIENT
Start: 2024-10-28 | End: 2024-10-29 | Stop reason: HOSPADM

## 2024-10-28 RX ORDER — OXYCODONE AND ACETAMINOPHEN 5; 325 MG/1; MG/1
2 TABLET ORAL EVERY 4 HOURS PRN
Status: DISCONTINUED | OUTPATIENT
Start: 2024-10-28 | End: 2024-10-29 | Stop reason: HOSPADM

## 2024-10-28 RX ORDER — FENTANYL CITRATE 50 UG/ML
INJECTION, SOLUTION INTRAMUSCULAR; INTRAVENOUS PRN
Status: DISCONTINUED | OUTPATIENT
Start: 2024-10-28 | End: 2024-10-28 | Stop reason: HOSPADM

## 2024-10-28 RX ORDER — INSULIN LISPRO 100 [IU]/ML
0-8 INJECTION, SOLUTION INTRAVENOUS; SUBCUTANEOUS
Status: DISCONTINUED | OUTPATIENT
Start: 2024-10-28 | End: 2024-10-29 | Stop reason: HOSPADM

## 2024-10-28 RX ORDER — ACETAMINOPHEN 325 MG/1
650 TABLET ORAL EVERY 6 HOURS PRN
Status: DISCONTINUED | OUTPATIENT
Start: 2024-10-28 | End: 2024-10-28 | Stop reason: SDUPTHER

## 2024-10-28 RX ORDER — SODIUM CHLORIDE 0.9 % (FLUSH) 0.9 %
5-40 SYRINGE (ML) INJECTION PRN
Status: DISCONTINUED | OUTPATIENT
Start: 2024-10-28 | End: 2024-10-29 | Stop reason: HOSPADM

## 2024-10-28 RX ORDER — ROSUVASTATIN CALCIUM 20 MG/1
40 TABLET, COATED ORAL NIGHTLY
Status: DISCONTINUED | OUTPATIENT
Start: 2024-10-28 | End: 2024-10-29 | Stop reason: HOSPADM

## 2024-10-28 RX ORDER — DEXTROSE MONOHYDRATE 100 MG/ML
INJECTION, SOLUTION INTRAVENOUS CONTINUOUS PRN
Status: DISCONTINUED | OUTPATIENT
Start: 2024-10-28 | End: 2024-10-29 | Stop reason: HOSPADM

## 2024-10-28 RX ORDER — IOPAMIDOL 755 MG/ML
INJECTION, SOLUTION INTRAVASCULAR PRN
Status: DISCONTINUED | OUTPATIENT
Start: 2024-10-28 | End: 2024-10-28 | Stop reason: HOSPADM

## 2024-10-28 RX ORDER — PANTOPRAZOLE SODIUM 40 MG/1
40 TABLET, DELAYED RELEASE ORAL
Status: DISCONTINUED | OUTPATIENT
Start: 2024-10-29 | End: 2024-10-29 | Stop reason: HOSPADM

## 2024-10-28 RX ORDER — ENOXAPARIN SODIUM 100 MG/ML
40 INJECTION SUBCUTANEOUS DAILY
Status: DISCONTINUED | OUTPATIENT
Start: 2024-10-29 | End: 2024-10-29 | Stop reason: HOSPADM

## 2024-10-28 RX ORDER — 0.9 % SODIUM CHLORIDE 0.9 %
500 INTRAVENOUS SOLUTION INTRAVENOUS ONCE
Status: DISCONTINUED | OUTPATIENT
Start: 2024-10-28 | End: 2024-10-29 | Stop reason: HOSPADM

## 2024-10-28 RX ORDER — SODIUM CHLORIDE 9 MG/ML
INJECTION, SOLUTION INTRAVENOUS CONTINUOUS PRN
Status: COMPLETED | OUTPATIENT
Start: 2024-10-28 | End: 2024-10-28

## 2024-10-28 RX ORDER — HEPARIN SODIUM 10000 [USP'U]/ML
INJECTION, SOLUTION INTRAVENOUS; SUBCUTANEOUS PRN
Status: DISCONTINUED | OUTPATIENT
Start: 2024-10-28 | End: 2024-10-28 | Stop reason: HOSPADM

## 2024-10-28 RX ADMIN — IOPAMIDOL 75 ML: 755 INJECTION, SOLUTION INTRAVENOUS at 12:11

## 2024-10-28 RX ADMIN — ROSUVASTATIN CALCIUM 40 MG: 20 TABLET, FILM COATED ORAL at 22:42

## 2024-10-28 RX ADMIN — IOPAMIDOL 75 ML: 755 INJECTION, SOLUTION INTRAVENOUS at 12:10

## 2024-10-28 RX ADMIN — RANOLAZINE 1000 MG: 500 TABLET, EXTENDED RELEASE ORAL at 22:42

## 2024-10-28 RX ADMIN — SODIUM CHLORIDE, PRESERVATIVE FREE 10 ML: 5 INJECTION INTRAVENOUS at 22:00

## 2024-10-28 RX ADMIN — LORAZEPAM 1 MG: 2 INJECTION INTRAMUSCULAR; INTRAVENOUS at 14:16

## 2024-10-28 RX ADMIN — NITROGLYCERIN 0.4 MG: 0.4 TABLET SUBLINGUAL at 17:05

## 2024-10-28 RX ADMIN — SODIUM CHLORIDE: 9 INJECTION, SOLUTION INTRAVENOUS at 22:56

## 2024-10-28 ASSESSMENT — PAIN DESCRIPTION - LOCATION
LOCATION: CHEST
LOCATION: CHEST;ARM

## 2024-10-28 ASSESSMENT — PAIN SCALES - GENERAL
PAINLEVEL_OUTOF10: 7
PAINLEVEL_OUTOF10: 8
PAINLEVEL_OUTOF10: 6

## 2024-10-28 ASSESSMENT — PAIN - FUNCTIONAL ASSESSMENT: PAIN_FUNCTIONAL_ASSESSMENT: 0-10

## 2024-10-28 ASSESSMENT — PAIN DESCRIPTION - ORIENTATION
ORIENTATION: LEFT
ORIENTATION: LEFT

## 2024-10-28 ASSESSMENT — PAIN DESCRIPTION - DESCRIPTORS: DESCRIPTORS: HEAVINESS

## 2024-10-28 NOTE — ED PROVIDER NOTES
[facility]    Emergency Department Encounter      Patient: Waleska Fernández  MRN: [id number]  : 1965  Date of Evaluation: 10/28/2024  ED Provider:  Berta Larry DO    Triage Chief Complaint:   Chest Pain (Started approx 0930)    [place]:  Waleska Fernández is a 59 y.o. female who  has a past medical history of Acid reflux, CAD (coronary artery disease), CAD (coronary artery disease), Chronic back pain, Chronic obstructive pulmonary disease (HCC), COPD (chronic obstructive pulmonary disease) ([facility]), Depression, Diabetes (HCC), Diabetes mellitus (HCC), Eczema of hand, Hx of migraines, HX OTHER MEDICAL, Hyperlipidemia, Hypertension, Kidney stones, PVD (peripheral vascular disease) (Formerly [facility]), and Wears glasses. and presents with chest pain, dizziness and presyncope.  She  Got up and was dizzy and almost fell.  Made it to the couch and the room was spinning.  She feels like somebody is sitting on her chest under the left rib.  + shortness of breath.      She is crying and her lower lip is trembling which she said is secondary to pain.    Quit smoking when she had a heart attack two years ago.    No issues since then.  Previously, had no chest pain, just couldn't catch her breath.  Today, she couldn't even sit due to dizziness.  Had Sil call her  who came home and called the squad.     Pain is 7-8/10.    It is intermittent, stops and starts about 5 minutes at a time.     She was shakey and having trouble walking.  She was wobbly so they called the squad.    Last known normal walking to bathroom at 9am.    She has a hx of dizziness regularly but not like this.  This is the room spinning and it is starting to spin now.  She told her  she was having a panic attack.    ROS - see HPI, below listed is current ROS at time of my eval:   systems reviewed and negative except as above.     Past Medical History:   Diagnosis Date    Acid reflux     CAD (coronary artery disease)     CAD (coronary

## 2024-10-28 NOTE — H&P
V2.0  History and Physical      Name:  Waleska Fernández /Age/Sex: 1965  (59 y.o. female)   MRN & CSN:  [id number] & [id number] Encounter Date/Time: 10/28/2024 5:43 PM EDT   Location:  ED15/ED-15 PCP: Nathan Lambert DO       Hospital Day: 1    Assessment and Plan:   Waleska Fernández is a 59 y.o. female who presents with NSTEMI (non-ST elevated myocardial infarction) ([facility])    Hospital Problems             Last Modified POA    * (Principal) NSTEMI (non-ST elevated myocardial infarction) ([facility]) 10/28/2024 Yes       Plan:    NSTEMI.  History of CAD status post CABG and PCI  -Nonspecific EKG changes.  Troponin rising.  Will trend x 2.  -Cardiology consulted.  Continue home Brilinta.  Continue Ranexa.  -Hold off on anticoagulation at this time as per cardiology's recommendations.  -She got hypotensive after getting nitroglycerin.  Will avoid nitroglycerin for now.  Give IV fluid bolus.  Reassess.  If blood pressures are improving then transfer to the ICU.  -Discussed with cardiology plan for cardiac cath urgently at this time.  Patient being transferred to the Cath Lab.  -Admit to stepdown unit for now.    New onset dizziness  -NIH stroke scale 0.  Last known well was 9:30 AM today.  Stroke alert was called.  CT head Wo contrast negative.  CTA without any large vessel occlusion.  -we will obtain MRI to evaluate for posterior circulation infarct.  -Do SLP eval prior to restarting diet.  PT OT when able.    Type 2 diabetes mellitus  -Hold home Jardiance.  Sliding scale insulin ordered.  Blood glucose checks before every meal and at bedtime.  -Hypoglycemia protocol.    MDD/anxiety  -Continue Lexapro.    Hyperlipidemia  -Continue statin.  Disposition:   Current Living situation: Home with   Expected Disposition: Home with   Estimated D/C: 2 to 3 days    Diet Diet NPO   DVT Prophylaxis [x] Lovenox, []  Heparin, [] SCDs, [] Ambulation,  [] Eliquis, [] Xarelto, [] Coumadin   Code Status Full code   Surrogate

## 2024-10-28 NOTE — ED TRIAGE NOTES
Pt arrived to ED at this time c/o chest pain starting at approx 0930 this morning. Pt has a cardiac hx including 11 stents. Pt received 324mg aspirin pta. Pt is diabetic  pta.

## 2024-10-28 NOTE — ED NOTES
ED TO INPATIENT SBAR HANDOFF    Patient Name: Waleska Fernández   :  1965  59 y.o.   Preferred Name  Waleska   Family/Caregiver Present yes   Restraints no   C-SSRS: Risk of Suicide: No Risk  Sitter no   Sepsis Risk Score        Situation  Chief Complaint   Patient presents with    Chest Pain     Started approx 0930     Brief Description of Patient's Condition: pt presented to the ED with complaints of chest pain, dizziness, and shakiness that began at 0900. Pt has a hx of MI in . States that she had 11 stents placed. On arrival to the ED a stroke alert was called on the pt for the dizziness and shakiness per Dr. Larry. NIH was 2 at the time. Repeat was done with improvement to 0 around 1300. Pt's 2nd troponin increased by 20 pts to 37. Repeat EKG was performed with some differences noted per Dr. Larry. Consult to cardiology was placed.   Mental Status: oriented and alert  Arrived from: home    Imaging:   CTA CHEST ABDOMEN PELVIS W CONTRAST   Final Result      No aortic dissection.         Electronically signed by [employer]      CTA HEAD NECK W CONTRAST   Final Result      No flow-limiting stenosis, aneurysm, or dissection.      Electronically signed by [employer]      CT HEAD WO CONTRAST   Final Result      No acute intracranial findings. Consider MRI if there is high clinical concern.      Electronically signed by [employer]      MRI BRAIN WO CONTRAST    (Results Pending)     Abnormal labs:   Abnormal Labs Reviewed   CBC WITH AUTO DIFFERENTIAL - Abnormal; Notable for the following components:       Result Value    RBC 4.18 (*)     Neutrophils % 79 (*)     Lymphocytes % 13 (*)     Monocytes % 6 (*)     All other components within normal limits   COMPREHENSIVE METABOLIC PANEL W/ REFLEX TO MG FOR LOW K - Abnormal; Notable for the following components:    Glucose 133 (*)     Est, Glom Filt Rate 46 (*)     Calcium 10.7 (*)     All other components within normal limits   TROPONIN - Abnormal; Notable for the following

## 2024-10-29 ENCOUNTER — APPOINTMENT (OUTPATIENT)
Dept: NON INVASIVE DIAGNOSTICS | Age: 59
DRG: 321 | End: 2024-10-29
Attending: INTERNAL MEDICINE
Payer: COMMERCIAL

## 2024-10-29 ENCOUNTER — APPOINTMENT (OUTPATIENT)
Dept: MRI IMAGING | Age: 59
DRG: 321 | End: 2024-10-29
Payer: COMMERCIAL

## 2024-10-29 VITALS
RESPIRATION RATE: 16 BRPM | HEIGHT: 66 IN | TEMPERATURE: 98 F | BODY MASS INDEX: 27.64 KG/M2 | OXYGEN SATURATION: 95 % | DIASTOLIC BLOOD PRESSURE: 62 MMHG | WEIGHT: 172 LBS | SYSTOLIC BLOOD PRESSURE: 94 MMHG | HEART RATE: 73 BPM

## 2024-10-29 LAB
ALBUMIN SERPL-MCNC: 3.8 G/DL (ref 3.4–5)
ALBUMIN SERPL-MCNC: 4.2 G/DL (ref 3.4–5)
ALBUMIN/GLOB SERPL: 1.4 {RATIO} (ref 1.1–2.2)
ALBUMIN/GLOB SERPL: 1.8 {RATIO} (ref 1.1–2.2)
ALP SERPL-CCNC: 48 U/L (ref 40–129)
ALP SERPL-CCNC: 55 U/L (ref 40–129)
ALT SERPL-CCNC: 35 U/L (ref 10–40)
ALT SERPL-CCNC: 41 U/L (ref 10–40)
ANION GAP SERPL CALCULATED.3IONS-SCNC: 12 MMOL/L (ref 9–17)
ANION GAP SERPL CALCULATED.3IONS-SCNC: 9 MMOL/L (ref 9–17)
AST SERPL-CCNC: 52 U/L (ref 15–37)
AST SERPL-CCNC: 54 U/L (ref 15–37)
BASOPHILS # BLD: 0.03 K/UL
BASOPHILS # BLD: 0.04 K/UL
BASOPHILS NFR BLD: 0 % (ref 0–1)
BASOPHILS NFR BLD: 1 % (ref 0–1)
BILIRUB SERPL-MCNC: 0.4 MG/DL (ref 0–1)
BILIRUB SERPL-MCNC: 0.4 MG/DL (ref 0–1)
BUN SERPL-MCNC: 14 MG/DL (ref 7–20)
BUN SERPL-MCNC: 15 MG/DL (ref 7–20)
CALCIUM SERPL-MCNC: 8.8 MG/DL (ref 8.3–10.6)
CALCIUM SERPL-MCNC: 9.4 MG/DL (ref 8.3–10.6)
CHLORIDE SERPL-SCNC: 103 MMOL/L (ref 99–110)
CHLORIDE SERPL-SCNC: 106 MMOL/L (ref 99–110)
CHOLEST SERPL-MCNC: 124 MG/DL (ref 125–199)
CO2 SERPL-SCNC: 21 MMOL/L (ref 21–32)
CO2 SERPL-SCNC: 23 MMOL/L (ref 21–32)
CREAT SERPL-MCNC: 1.1 MG/DL (ref 0.6–1.2)
CREAT SERPL-MCNC: 1.2 MG/DL (ref 0.6–1.2)
ECHO AO ROOT DIAM: 3.1 CM
ECHO AO ROOT INDEX: 1.65 CM/M2
ECHO AV AREA PEAK VELOCITY: 1.9 CM2
ECHO AV AREA VTI: 2.3 CM2
ECHO AV AREA/BSA PEAK VELOCITY: 1 CM2/M2
ECHO AV AREA/BSA VTI: 1.2 CM2/M2
ECHO AV MEAN GRADIENT: 6 MMHG
ECHO AV MEAN VELOCITY: 1.2 M/S
ECHO AV PEAK GRADIENT: 11 MMHG
ECHO AV PEAK VELOCITY: 1.6 M/S
ECHO AV VELOCITY RATIO: 0.56
ECHO AV VTI: 30 CM
ECHO BSA: 1.91 M2
ECHO IVC PROX: 1.2 CM
ECHO LA AREA 4C: 16.4 CM2
ECHO LA DIAMETER INDEX: 1.76 CM/M2
ECHO LA DIAMETER: 3.3 CM
ECHO LA MAJOR AXIS: 5.4 CM
ECHO LA TO AORTIC ROOT RATIO: 1.06
ECHO LA VOL MOD A4C: 42 ML (ref 22–52)
ECHO LA VOLUME INDEX MOD A4C: 22 ML/M2 (ref 16–34)
ECHO LV E' LATERAL VELOCITY: 10.2 CM/S
ECHO LV E' SEPTAL VELOCITY: 6.6 CM/S
ECHO LV EDV A4C: 85 ML
ECHO LV EDV INDEX A4C: 45 ML/M2
ECHO LV EF PHYSICIAN: 55 %
ECHO LV EJECTION FRACTION A4C: 60 %
ECHO LV ESV A4C: 34 ML
ECHO LV ESV INDEX A4C: 18 ML/M2
ECHO LV FRACTIONAL SHORTENING: 40 % (ref 28–44)
ECHO LV INTERNAL DIMENSION DIASTOLE INDEX: 2.29 CM/M2
ECHO LV INTERNAL DIMENSION DIASTOLIC: 4.3 CM (ref 3.9–5.3)
ECHO LV INTERNAL DIMENSION SYSTOLIC INDEX: 1.38 CM/M2
ECHO LV INTERNAL DIMENSION SYSTOLIC: 2.6 CM
ECHO LV IVSD: 1.2 CM (ref 0.6–0.9)
ECHO LV MASS 2D: 184.7 G (ref 67–162)
ECHO LV MASS INDEX 2D: 98.2 G/M2 (ref 43–95)
ECHO LV POSTERIOR WALL DIASTOLIC: 1.2 CM (ref 0.6–0.9)
ECHO LV RELATIVE WALL THICKNESS RATIO: 0.56
ECHO LVOT AREA: 3.5 CM2
ECHO LVOT AV VTI INDEX: 0.65
ECHO LVOT DIAM: 2.1 CM
ECHO LVOT MEAN GRADIENT: 2 MMHG
ECHO LVOT PEAK GRADIENT: 3 MMHG
ECHO LVOT PEAK VELOCITY: 0.9 M/S
ECHO LVOT STROKE VOLUME INDEX: 36.1 ML/M2
ECHO LVOT SV: 67.9 ML
ECHO LVOT VTI: 19.6 CM
ECHO MV A VELOCITY: 1.04 M/S
ECHO MV E VELOCITY: 1 M/S
ECHO MV E/A RATIO: 0.96
ECHO MV E/E' LATERAL: 9.8
ECHO MV E/E' RATIO (AVERAGED): 12.48
ECHO MV E/E' SEPTAL: 15.15
ECHO RV MID DIMENSION: 3 CM
EKG ATRIAL RATE: 79 BPM
EKG ATRIAL RATE: 88 BPM
EKG DIAGNOSIS: NORMAL
EKG DIAGNOSIS: NORMAL
EKG P AXIS: 50 DEGREES
EKG P AXIS: 54 DEGREES
EKG P-R INTERVAL: 164 MS
EKG P-R INTERVAL: 166 MS
EKG Q-T INTERVAL: 360 MS
EKG Q-T INTERVAL: 400 MS
EKG QRS DURATION: 82 MS
EKG QRS DURATION: 92 MS
EKG QTC CALCULATION (BAZETT): 435 MS
EKG QTC CALCULATION (BAZETT): 458 MS
EKG R AXIS: 18 DEGREES
EKG R AXIS: 35 DEGREES
EKG T AXIS: 180 DEGREES
EKG T AXIS: 208 DEGREES
EKG VENTRICULAR RATE: 79 BPM
EKG VENTRICULAR RATE: 88 BPM
EOSINOPHIL # BLD: 0.07 K/UL
EOSINOPHIL # BLD: 0.09 K/UL
EOSINOPHILS RELATIVE PERCENT: 1 % (ref 0–3)
EOSINOPHILS RELATIVE PERCENT: 2 % (ref 0–3)
ERYTHROCYTE [DISTWIDTH] IN BLOOD BY AUTOMATED COUNT: 14.3 % (ref 11.7–14.9)
ERYTHROCYTE [DISTWIDTH] IN BLOOD BY AUTOMATED COUNT: 14.4 % (ref 11.7–14.9)
GFR, ESTIMATED: 47 ML/MIN/1.73M2
GFR, ESTIMATED: 51 ML/MIN/1.73M2
GLUCOSE BLD-MCNC: 130 MG/DL (ref 74–99)
GLUCOSE BLD-MCNC: 73 MG/DL (ref 74–99)
GLUCOSE SERPL-MCNC: 71 MG/DL (ref 74–99)
GLUCOSE SERPL-MCNC: 87 MG/DL (ref 74–99)
HCT VFR BLD AUTO: 34.2 % (ref 37–47)
HCT VFR BLD AUTO: 36 % (ref 37–47)
HDLC SERPL-MCNC: 50 MG/DL
HGB BLD-MCNC: 10.8 G/DL (ref 12.5–16)
HGB BLD-MCNC: 11.4 G/DL (ref 12.5–16)
IMM GRANULOCYTES # BLD AUTO: 0.02 K/UL
IMM GRANULOCYTES # BLD AUTO: 0.02 K/UL
IMM GRANULOCYTES NFR BLD: 0 %
IMM GRANULOCYTES NFR BLD: 0 %
LDLC SERPL CALC-MCNC: 43 MG/DL
LYMPHOCYTES NFR BLD: 1.51 K/UL
LYMPHOCYTES NFR BLD: 1.69 K/UL
LYMPHOCYTES RELATIVE PERCENT: 19 % (ref 24–44)
LYMPHOCYTES RELATIVE PERCENT: 26 % (ref 24–44)
MCH RBC QN AUTO: 30 PG (ref 27–31)
MCH RBC QN AUTO: 30.6 PG (ref 27–31)
MCHC RBC AUTO-ENTMCNC: 31.6 G/DL (ref 32–36)
MCHC RBC AUTO-ENTMCNC: 31.7 G/DL (ref 32–36)
MCV RBC AUTO: 94.7 FL (ref 78–100)
MCV RBC AUTO: 96.9 FL (ref 78–100)
MONOCYTES NFR BLD: 0.5 K/UL
MONOCYTES NFR BLD: 0.68 K/UL
MONOCYTES NFR BLD: 8 % (ref 0–4)
MONOCYTES NFR BLD: 9 % (ref 0–4)
NEUTROPHILS NFR BLD: 63 % (ref 36–66)
NEUTROPHILS NFR BLD: 72 % (ref 36–66)
NEUTS SEG NFR BLD: 3.64 K/UL
NEUTS SEG NFR BLD: 6.42 K/UL
PLATELET # BLD AUTO: 202 K/UL (ref 140–440)
PLATELET, FLUORESCENCE: 258 K/UL (ref 140–440)
PMV BLD AUTO: 10.3 FL (ref 7.5–11.1)
PMV BLD AUTO: 10.5 FL (ref 7.5–11.1)
POTASSIUM SERPL-SCNC: 4.1 MMOL/L (ref 3.5–5.1)
POTASSIUM SERPL-SCNC: 4.4 MMOL/L (ref 3.5–5.1)
PROT SERPL-MCNC: 6.4 G/DL (ref 6.4–8.2)
PROT SERPL-MCNC: 6.6 G/DL (ref 6.4–8.2)
RBC # BLD AUTO: 3.53 M/UL (ref 4.2–5.4)
RBC # BLD AUTO: 3.8 M/UL (ref 4.2–5.4)
SODIUM SERPL-SCNC: 136 MMOL/L (ref 136–145)
SODIUM SERPL-SCNC: 137 MMOL/L (ref 136–145)
TRIGL SERPL-MCNC: 158 MG/DL
TROPONIN I SERPL HS-MCNC: 39 NG/L (ref 0–14)
TROPONIN I SERPL HS-MCNC: 43 NG/L (ref 0–14)
WBC OTHER # BLD: 5.8 K/UL (ref 4–10.5)
WBC OTHER # BLD: 8.9 K/UL (ref 4–10.5)

## 2024-10-29 PROCEDURE — 6370000000 HC RX 637 (ALT 250 FOR IP): Performed by: PREVENTIVE MEDICINE

## 2024-10-29 PROCEDURE — 6370000000 HC RX 637 (ALT 250 FOR IP): Performed by: STUDENT IN AN ORGANIZED HEALTH CARE EDUCATION/TRAINING PROGRAM

## 2024-10-29 PROCEDURE — 80053 COMPREHEN METABOLIC PANEL: CPT

## 2024-10-29 PROCEDURE — 84484 ASSAY OF TROPONIN QUANT: CPT

## 2024-10-29 PROCEDURE — 70551 MRI BRAIN STEM W/O DYE: CPT

## 2024-10-29 PROCEDURE — 85025 COMPLETE CBC W/AUTO DIFF WBC: CPT

## 2024-10-29 PROCEDURE — 6370000000 HC RX 637 (ALT 250 FOR IP): Performed by: INTERNAL MEDICINE

## 2024-10-29 PROCEDURE — 99223 1ST HOSP IP/OBS HIGH 75: CPT | Performed by: PSYCHIATRY & NEUROLOGY

## 2024-10-29 PROCEDURE — 2580000003 HC RX 258: Performed by: INTERNAL MEDICINE

## 2024-10-29 PROCEDURE — 2580000003 HC RX 258: Performed by: STUDENT IN AN ORGANIZED HEALTH CARE EDUCATION/TRAINING PROGRAM

## 2024-10-29 PROCEDURE — 93306 TTE W/DOPPLER COMPLETE: CPT

## 2024-10-29 PROCEDURE — 6360000002 HC RX W HCPCS: Performed by: STUDENT IN AN ORGANIZED HEALTH CARE EDUCATION/TRAINING PROGRAM

## 2024-10-29 PROCEDURE — 80061 LIPID PANEL: CPT

## 2024-10-29 PROCEDURE — 82962 GLUCOSE BLOOD TEST: CPT

## 2024-10-29 PROCEDURE — 36415 COLL VENOUS BLD VENIPUNCTURE: CPT

## 2024-10-29 PROCEDURE — 93005 ELECTROCARDIOGRAM TRACING: CPT | Performed by: INTERNAL MEDICINE

## 2024-10-29 PROCEDURE — 94761 N-INVAS EAR/PLS OXIMETRY MLT: CPT

## 2024-10-29 RX ORDER — PANTOPRAZOLE SODIUM 40 MG/1
40 TABLET, DELAYED RELEASE ORAL DAILY
Qty: 30 TABLET | Refills: 1 | Status: SHIPPED | OUTPATIENT
Start: 2024-10-29

## 2024-10-29 RX ORDER — ASPIRIN 81 MG/1
81 TABLET, CHEWABLE ORAL DAILY
Qty: 30 TABLET | Refills: 1 | Status: SHIPPED | OUTPATIENT
Start: 2024-10-30

## 2024-10-29 RX ORDER — LANOLIN ALCOHOL/MO/W.PET/CERES
10 CREAM (GRAM) TOPICAL NIGHTLY PRN
COMMUNITY

## 2024-10-29 RX ADMIN — ASPIRIN 81 MG: 81 TABLET, CHEWABLE ORAL at 07:40

## 2024-10-29 RX ADMIN — Medication 10 MG: at 01:37

## 2024-10-29 RX ADMIN — TICAGRELOR 90 MG: 90 TABLET ORAL at 07:40

## 2024-10-29 RX ADMIN — OXYCODONE HYDROCHLORIDE AND ACETAMINOPHEN 2 TABLET: 5; 325 TABLET ORAL at 07:43

## 2024-10-29 RX ADMIN — PANTOPRAZOLE SODIUM 40 MG: 40 TABLET, DELAYED RELEASE ORAL at 07:43

## 2024-10-29 RX ADMIN — SODIUM CHLORIDE, PRESERVATIVE FREE 10 ML: 5 INJECTION INTRAVENOUS at 00:08

## 2024-10-29 RX ADMIN — OXYCODONE HYDROCHLORIDE AND ACETAMINOPHEN 2 TABLET: 5; 325 TABLET ORAL at 11:48

## 2024-10-29 RX ADMIN — ENOXAPARIN SODIUM 40 MG: 100 INJECTION SUBCUTANEOUS at 07:40

## 2024-10-29 RX ADMIN — RANOLAZINE 1000 MG: 500 TABLET, EXTENDED RELEASE ORAL at 07:40

## 2024-10-29 RX ADMIN — OXYCODONE HYDROCHLORIDE AND ACETAMINOPHEN 2 TABLET: 5; 325 TABLET ORAL at 00:05

## 2024-10-29 RX ADMIN — SODIUM CHLORIDE, PRESERVATIVE FREE 10 ML: 5 INJECTION INTRAVENOUS at 07:41

## 2024-10-29 ASSESSMENT — PAIN SCALES - GENERAL
PAINLEVEL_OUTOF10: 7
PAINLEVEL_OUTOF10: 8
PAINLEVEL_OUTOF10: 1
PAINLEVEL_OUTOF10: 7
PAINLEVEL_OUTOF10: 2

## 2024-10-29 ASSESSMENT — PAIN DESCRIPTION - LOCATION
LOCATION: CHEST;ABDOMEN
LOCATION: LEG;BACK
LOCATION: LEG;BACK

## 2024-10-29 ASSESSMENT — PAIN DESCRIPTION - DESCRIPTORS
DESCRIPTORS: ACHING
DESCRIPTORS: ACHING
DESCRIPTORS: HEAVINESS

## 2024-10-29 ASSESSMENT — PAIN - FUNCTIONAL ASSESSMENT: PAIN_FUNCTIONAL_ASSESSMENT: ACTIVITIES ARE NOT PREVENTED

## 2024-10-29 ASSESSMENT — PAIN DESCRIPTION - ORIENTATION
ORIENTATION: LEFT

## 2024-10-29 NOTE — PROCEDURES
PROCEDURE NOTE       Indication: Non-ST elevation myocardial infarction, ongoing severe retrosternal angina pectoris        Left main: Patent  LAD: , patent KEANE LAD, left-to-right collaterals to right PDA  RCA:      Circumflex system:   of circumflex artery  SVG-OM/circumflex graft, which extends as PDA  Patent stent noted in mid segment of the graft  Ostial stent has severe in-stent restenosis 90%  Pre : Status post PTCA 2.0, 3.0 mm with the help of guide liner.  Status post PCI 3.0/15 mm stent  Post: Status post PTCA 3.5/8 mm   Final result DONNY-3 flow, 0% residual stenosis     LV ejection fraction 50%  LVEDP 6 mmHg  Access via left common femoral artery via ultrasound guidance, Angio-Seal at the end of procedure for hemostasis        Recommendation:     Start patient on DAPT: Aspirin plus Brilinta 1 year  Continue with high intensity statins  Echocardiogram in the morning  Continue beta-blocker  Risk factor modification  Outpatient cardiac rehabilitation      MOOK

## 2024-10-29 NOTE — CONSULTS
lidocaine (LIDODERM) 5 % as needed      Multiple Vitamins-Minerals (ALIVE HAIR, SKIN & NAILS PO) Take 1 tablet by mouth daily      midodrine (PROAMATINE) 5 MG tablet Take 1 tablet by mouth 2 times daily as needed (take for sbp < 95 top number) 90 tablet 0    escitalopram (LEXAPRO) 20 MG tablet Take 1 tablet by mouth daily      busPIRone (BUSPAR) 30 MG tablet Take 30 mg by mouth 2 times daily      albuterol sulfate HFA (PROAIR HFA) 108 (90 Base) MCG/ACT inhaler Inhale 2 puffs into the lungs every 6 hours as needed for Wheezing 1 each 1    rosuvastatin (CRESTOR) 40 MG tablet Take 1 tablet by mouth at bedtime      empagliflozin (JARDIANCE) 25 MG tablet Take 1 tablet by mouth daily      oxyCODONE-acetaminophen (PERCOCET)  MG per tablet Take 1 tablet by mouth every 6 hours as needed for Pain.      ranolazine (RANEXA) 1000 MG extended release tablet Take 1 tablet by mouth 2 times daily      nitroGLYCERIN (NITROSTAT) 0.4 MG SL tablet up to max of 3 total doses. If no relief after 1 dose, call 911. 25 tablet 3    ticagrelor (BRILINTA) 90 MG TABS tablet Take 1 tablet by mouth 2 times daily 60 tablet 0     Review of Systems:   Constitutional: No Fever or Weight Loss   Eyes: No Decreased Vision  ENT: No Headaches, Hearing Loss or Vertigo  Cardiovascular: As per HPI  Respiratory: As per HPI  Gastrointestinal: No abdominal pain, appetite loss, blood in stools, constipation, diarrhea or heartburn  Genitourinary: No dysuria, trouble voiding, or hematuria  Musculoskeletal:  No gait disturbance, weakness or joint complaints  Integumentary: No rash or pruritis  Neurological: No TIA or stroke symptoms  Psychiatric: No anxiety or depression  Endocrine: No malaise, fatigue or temperature intolerance  Hematologic/Lymphatic: No bleeding problems, blood clots or swollen lymph nodes  Allergic/Immunologic: No nasal congestion or hives  All systems negative except as marked.        Physical Examination:    Vitals:    10/28/24 1330 
Denies bleeding disorders    Physical Exam:       Wt Readings from Last 3 Encounters:   10/29/24 78.2 kg (172 lb 6.4 oz)   04/17/24 79.2 kg (174 lb 9.6 oz)   04/06/24 78.5 kg (173 lb)     Temp Readings from Last 3 Encounters:   10/29/24 97.8 °F (36.6 °C) (Oral)   04/06/24 97.7 °F (36.5 °C) (Oral)   11/16/22 98.1 °F (36.7 °C) (Oral)     BP Readings from Last 3 Encounters:   10/29/24 (!) 105/58   04/17/24 108/62   04/06/24 (!) 107/48     Pulse Readings from Last 3 Encounters:   10/29/24 70   04/17/24 83   04/06/24 85        Gen: A&O x 4, NAD, cooperative  HEENT: NC/AT, EOMI, PERRL, mmm,  neck supple, no meningeal signs  Heart: NSR  Lungs: Respirations even and unlabored  Ext: no edema, no calf tenderness b/l  Psych: normal mood and affect  Skin: no rashes or lesions    NEUROLOGIC EXAM:    Mental Status: A&O to self, location, month and year, NAD, speech clear, language fluent, repetition and naming intact, follows commands appropriately    Cranial Nerve Exam:   CN II-XII:PERRL, VFF, no nystagmus, no gaze paresis, sensation V1-V3 intact b/l, muscles of facial expression symmetric; hearing intact to conversational tone, palate elevates symmetrically, shoulder elevation symmetric and tongue protrudes midline with movement side to side.    Motor Exam:       Strength 5/5 UE's/LE's b/l  Tone and bulk normal   No pronator drift    Deep Tendon Reflexes: 2/4 biceps, triceps, brachioradialis, patellar, and achilles b/l; flexor plantar responses b/l    Sensation: Intact light touch/pinprick/vibration UE's/LE's b/l    Coordination/Cerebellum:       Tremors--none      Rapidly alternating movements: no dysdiadochokinesia b/l                Heel-to-Shin: no dysmetria b/l      Finger-to-Nose: no dysmetria b/l    Gait and stance:      Gait: deferred      LABS:     Recent Labs     10/28/24  1125 10/29/24  0243 10/29/24  0747   WBC 9.8 8.9  --     137 136   K 4.4 4.4 4.1    103 106   CO2 24 23 21   BUN 16 15 14   CREATININE

## 2024-10-29 NOTE — PROGRESS NOTES
4 Eyes Skin Assessment     NAME:  Waleska Fernández  YOB: 1965  MEDICAL RECORD NUMBER:  [id number]    The patient is being assessed for  Cath Lab Post-Op    I agree that at least one RN has performed a thorough Head to Toe Skin Assessment on the patient. ALL assessment sites listed below have been assessed.      Areas assessed by both nurses:    Head, Face, Ears, Shoulders, Back, Chest, Arms, Elbows, Hands, Sacrum. Buttock, Coccyx, Ischium, Legs. Feet and Heels, and Under Medical Devices         Does the Patient have a Wound? No noted wound(s)       Adrian Prevention initiated by RN: No  Wound Care Orders initiated by RN: No    Pressure Injury (Stage 3,4, Unstageable, DTI, NWPT, and Complex wounds) if present, place Wound referral order by RN under : No    New Ostomies, if present place, Ostomy referral order under : No     Nurse 1 eSignature: Electronically signed by Sherrie Bullock RN on 10/29/24 at 12:56 AM EDT    **SHARE this note so that the co-signing nurse can place an eSignature**    Nurse 2 eSignature: Electronically signed by Maria Teresa Sawyer RN on 10/29/24 at 1:05 AM EDT    
Discharge education completed with pt, pt demonstrated appropriate teach back, IV removed, pt has prescription delivered from pharmacy, medication's and side effect education completed, pt has discharge paperwork, pt denies any needs or questions at this time.    
Dr Schofield stated need to wait on 2D echo results before discharge  
Outpatient Pharmacy Progress Note for Meds-to-Beds    Total number of Prescriptions Filled: {Disp Count:[id number]}    Additional Documentation:  {Blank single:17099::\"Medication(s) were delivered to the patient's room prior to discharge\",\"Patient picked-up the medication(s) in the OP Pharmacy\",\"Patient's family member picked-up the medication(s) in the OP Pharmacy\",\"Medications given to nurse *** to provide to patient\",\"Medications picked-up in the OP Pharmacy by nurse *** to provide to patient\",\"Medication(s) delivered to the patient's room prior to discharge by a volunteer\"}  The following prescription(s) were refilled to soon per insurance (patient has some at home): Pantoprazole         Thank you for letting us serve your patients.  [facility] [place] [id number]    Phone: [phone]    Fax: [phone]        
Pt identified as a candidate for COPD/GOLD assessment. PFTs are required for confirmation of diagnosis and GOLD grading used for pharmacological and nonpharmacological therapy recommendations.  Patient would benefit if a pft were to be ordered, after discharged and able to complete PFT testing as an out-patient.  
Seen by cardiac rehab status post PTCA.  Patient  awake, alert and sitting up in a chair. I introduced myself as the cardiac rehab nurse as well as introducing her to the [facility]. I explained to her that this program is a customized out patient program of exercise and education. I explained to the patient that Cardiac Rehab is designed to help improve your hearts future.  Cardiac rehab is a medically supervised program designed to improve your cardiovascular health through educating about nutrition, exercise, and stress release.       Stressed to her the importance of compliance with antiplatelet therapy.  Discussed risk factor identification and modification. Teaching done on cardiac diet.  Explained the benefits of regular exercise program and stressed the need for a long term commitment to heart healthy practices in controlling this chronic disease. Patient stated that she has been through Cardiac Rehab before following her open heart surgery. Patient was advised that cardiac rehab will call her once she is released from restrictions.  Patient voiced understanding.    
Spiritual Health History and Assessment/Progress Note  [facility]    Loneliness/Social Isolation,  ,  ,      Name: Waleska Fernández MRN: [id number]    Age: 59 y.o.     Sex: female   Language: English   Restorationism: Bahai   NSTEMI (non-ST elevated myocardial infarction) (HCC)     Date: 10/29/2024            Total Time Calculated: 31 min              Spiritual Assessment began in [facility] ICU STEPDOWN        Referral/Consult From: Rounding   Encounter Overview/Reason: Loneliness/Social Isolation  Service Provided For: Patient    Aurelia, Belief, Meaning:   Patient identifies as spiritual, is connected with a aurelia tradition or spiritual practice, and has beliefs or practices that help with coping during difficult times  Family/Friends No family/friends present      Importance and Influence:  Patient has spiritual/personal beliefs that influence decisions regarding their health  Family/Friends No family/friends present    Community:  Patient is connected with a spiritual community and feels well-supported. Support system includes: Spouse/Partner, Parent/s, Children, Aurelia Community, Friends, and Extended family  Family/Friends No family/friends present    Assessment and Plan of Care:     Patient Interventions include: Facilitated expression of thoughts and feelings, Explored spiritual coping/struggle/distress, and Affirmed coping skills/support systems  Family/Friends Interventions include: No family/friends present    Patient Plan of Care: Spiritual Care available upon further referral  Family/Friends Plan of Care: No family/friends present    Electronically signed by Chaplain Lazara on 10/29/2024 at 10:31 AM   
10/29/24  0747    137 136   K 4.4 4.4 4.1    103 106   CO2 24 23 21   BUN 16 15 14   CREATININE 1.2 1.2 1.1     PT/INR:   Recent Labs     10/28/24  1125   PROTIME 12.3   INR 0.9     BNP:  No results for input(s): \"PROBNP\" in the last 72 hours.  TROPONIN: No results for input(s): \"TROPONINT\" in the last 72 hours.     ECHO : (interpreted by myself)  echocardiogram     Assessment:  59 y.o.year old who is admitted for  Chief Complaint   Patient presents with    Chest Pain     Started approx 0930    , active issues as noted below:  Impression:  Principal Problem:    NSTEMI (non-ST elevated myocardial infarction) (HCC)  Resolved Problems:    * No resolved hospital problems. *        Medical Decision Making:  The following items were considered in medical decision making:  Discussion of patient care with other providers  Reviewed clinical lab tests if any  Reviewed radiology tests if any  Reviewed other diagnostic tests/interventions  Independent review of radiologic images if any       Estimated time spent for medical decision-making encompassing complexity of the case, history taking, medication review, physical examination, communication with family, RN, , discussion with primary team , and ancillary staff members to provide accurate care for the patient      All labs, medications and tests reviewed by myself , continue all other medications of all above medical condition listed as is except for changes mentioned above.    Thank you very much for consult , please call with questions.    Sayed Toni Ariza MD, MD 10/29/2024 11:57 AM     The above note is prepared with the intention to serve as communication with trained medical care practitioners only. Some of the information is provided by secondary sources as well as from our patient and/or family member(s) recollection, thus inaccuracies may occur. In addition, other professionals integrate data into the electronic medical record, and the Heart

## 2024-10-29 NOTE — CARE COORDINATION
10/29/24 1214   Service Assessment   Patient Orientation Alert and Oriented   Cognition Alert   History Provided By Medical Record   Primary Caregiver Self   Support Systems Spouse/Significant Other   Patient's Healthcare Decision Maker is: Legal Next of Kin   PCP Verified by CM Yes   Prior Functional Level Independent in ADLs/IADLs   Current Functional Level Assistance with the following:;Toileting;Mobility  (Hospital Policy)   Can patient return to prior living arrangement Yes   Ability to make needs known: Good   Family able to assist with home care needs: Yes   Would you like for me to discuss the discharge plan with any other family members/significant others, and if so, who? Yes   Financial Resources None   Social/Functional History   Lives With Spouse   Type of Home House     Chart reviewed. From home w/spouse. Has PCP and insurance. No needs identified. CM will remain available should needs arise. Maddy Zaragoza RN

## 2024-10-29 NOTE — CARE COORDINATION
Cath today - PTCA today; angio seal used. Plan discharge when medically stable. Maddy Zaragoza, RN

## 2024-10-29 NOTE — PLAN OF CARE
Problem: Chronic Conditions and Co-morbidities  Goal: Patient's chronic conditions and co-morbidity symptoms are monitored and maintained or improved  10/29/2024 1423 by Brock Corcoran RN  Outcome: Completed  10/29/2024 0332 by Sherrie Bullock RN  Outcome: Progressing     Problem: Discharge Planning  Goal: Discharge to home or other facility with appropriate resources  10/29/2024 1423 by Brock Corcoran RN  Outcome: Completed  10/29/2024 0332 by Sherrie Bullock RN  Outcome: Progressing     Problem: Pain  Goal: Verbalizes/displays adequate comfort level or baseline comfort level  10/29/2024 1423 by Brock Corcoran RN  Outcome: Completed  10/29/2024 0332 by Sherrie Bullock RN  Outcome: Progressing     Problem: Safety - Adult  Goal: Free from fall injury  10/29/2024 1423 by Brock Corcoran RN  Outcome: Completed  10/29/2024 0332 by Sherrie Bullock RN  Outcome: Progressing     Problem: ABCDS Injury Assessment  Goal: Absence of physical injury  10/29/2024 1423 by Brock Corcoran RN  Outcome: Completed  10/29/2024 0332 by Sherrie Bullock RN  Outcome: Progressing

## 2024-10-29 NOTE — DISCHARGE SUMMARY
clinical concern. Electronically signed by Gato Edwards      CBC:   Recent Labs     10/28/24  1125 10/29/24  0243 10/29/24  0747   WBC 9.8 8.9 5.8   HGB 12.9 11.4* 10.8*    202  --      BMP:    Recent Labs     10/28/24  1125 10/29/24  0243 10/29/24  0747    137 136   K 4.4 4.4 4.1    103 106   CO2 24 23 21   BUN 16 15 14   CREATININE 1.2 1.2 1.1   GLUCOSE 133* 87 71*     Hepatic:   Recent Labs     10/28/24  1125 10/29/24  0243 10/29/24  0747   AST 35 52* 54*   ALT 31 41* 35   BILITOT 0.6 0.4 0.4   ALKPHOS 64 55 48     Lipids:   Lab Results   Component Value Date/Time    CHOL 124 10/29/2024 07:47 AM    HDL 50 10/29/2024 07:47 AM    TRIG 158 10/29/2024 07:47 AM     Hemoglobin A1C:   Lab Results   Component Value Date/Time    LABA1C 5.1 04/13/2022 05:54 AM     TSH:   Lab Results   Component Value Date/Time    TSH 2.13 10/28/2024 11:25 AM     Troponin:   Lab Results   Component Value Date/Time    TROPONINT <0.010 11/16/2022 06:34 PM    TROPONINT <0.010 11/16/2022 02:35 PM    TROPONINT 2.420 04/13/2022 04:15 PM       UA:  Lab Results   Component Value Date/Time    NITRU Positive 04/06/2024 12:00 PM    NITRU POSITIVE 11/07/2013 11:35 PM    COLORU YELLOW 04/06/2024 12:00 PM    PHUR 5.0 04/06/2024 12:00 PM    PHUR 5.0 07/28/2015 12:00 AM    PHUR 6.0 03/26/2015 03:35 PM    WBCUA 0 to 3 04/06/2024 12:00 PM    RBCUA 1 04/12/2022 11:02 AM    MUCUS RARE 04/06/2022 04:34 PM    TRICHOMONAS NONE SEEN 04/12/2022 11:02 AM    YEAST OCCASIONAL 06/01/2013 12:00 AM    BACTERIA NEGATIVE 04/12/2022 11:02 AM    CLARITYU CLEAR 04/12/2022 11:02 AM    SPECGRAV 1.00 07/28/2015 12:00 AM    LEUKOCYTESUR Negative 04/06/2024 12:00 PM    UROBILINOGEN 1.0 04/06/2024 12:00 PM    BILIRUBINUR Negative 04/06/2024 12:00 PM    BILIRUBINUR neg 07/28/2015 12:00 AM    BLOODU Negative 04/06/2024 12:00 PM    GLUCOSEU >1000 04/06/2024 12:00 PM    KETUA Negative 04/06/2024 12:00 PM       Time Spent Discharging patient 45 minutes    Electronically

## 2024-10-29 NOTE — DISCHARGE INSTRUCTIONS
As we discussed please follow-up with the outpatient cardiologist.  If you are unable to get a hold of them have attached the cardiologist information that did your cardiac cath here.  As we discussed please start taking aspirin along with your Brilinta now.  Do not miss any doses.    As we discussed, improve your protein intake.  If you are unable to tolerate p.o. then talk to your primary care doctor about decreasing the dose of Mounjaro.Post Cardiac Catheterization Home Instructions:    1. Avoid climbing steps for 48 hours.  If necessary, climb slowly, bringing both feet to same step before advancing to next step.    2. Avoid ALL STRENUOUS EXERCISE such as deep knee bends, push-ups, lifting,  pushing, or pulling of heavy objects for FIVE (5) DAYS. No lifting more than 10 lbs.    3.  For the first FIVE (5) days, shower only NO TUB BATHS.    4.  Remove dressing in 3 days yourself. Wash procedure site with mild soap, rinse and      pat dry.  DO NOT RUB OVER PROCEDURE SITE FOR TWO (2) DAYS.    5.  Site observations:  Observe the area for bleeding or hematoma (lump/knot under the skin caused by bleeding).           A. Painless bruising is normal.          B. If firmness/swelling seems to be increasing, hold pressure over the procedure         site and call 911 IMMEDIATELY.     6.  Redness, fever, warmth, drainage, or increased pain at the procedure site may be signs of infection. If you experience any of these, CONTACT YOUR DOCTOR IMMEDIATELY.          7. No driving for 5 days. Riding in car is okay.    8.  You will go home on bloodthinners as prescribed and willl continue these until told otherwise by your doctor.

## 2024-11-11 ENCOUNTER — TELEPHONE (OUTPATIENT)
Dept: CARDIOLOGY CLINIC | Age: 59
End: 2024-11-11

## 2024-11-11 NOTE — TELEPHONE ENCOUNTER
Spoke w/pt after recent hospitalization/ procedure. Pt states she is doing well and is following up with previous cardiologist.

## 2024-12-04 ENCOUNTER — HOSPITAL ENCOUNTER (OUTPATIENT)
Dept: CARDIAC REHAB | Age: 59
Setting detail: THERAPIES SERIES
Discharge: HOME | End: 2024-12-04
Payer: COMMERCIAL

## 2024-12-04 PROCEDURE — G0422 INTENS CARDIAC REHAB W/EXERC: HCPCS

## 2024-12-04 PROCEDURE — G0423 INTENS CARDIAC REHAB NO EXER: HCPCS

## 2024-12-17 ENCOUNTER — HOSPITAL ENCOUNTER (OUTPATIENT)
Dept: CARDIAC REHAB | Age: 59
Setting detail: THERAPIES SERIES
Discharge: HOME | End: 2024-12-17
Payer: COMMERCIAL

## 2024-12-17 LAB
GLUCOSE BLD-MCNC: 109 MG/DL (ref 74–99)
GLUCOSE BLD-MCNC: 93 MG/DL (ref 74–99)

## 2024-12-17 PROCEDURE — 82962 GLUCOSE BLOOD TEST: CPT

## 2024-12-17 PROCEDURE — G0422 INTENS CARDIAC REHAB W/EXERC: HCPCS

## 2024-12-19 ENCOUNTER — HOSPITAL ENCOUNTER (OUTPATIENT)
Dept: CARDIAC REHAB | Age: 59
Setting detail: THERAPIES SERIES
Discharge: HOME | End: 2024-12-19
Payer: COMMERCIAL

## 2024-12-19 LAB
GLUCOSE BLD-MCNC: 104 MG/DL (ref 74–99)
GLUCOSE BLD-MCNC: 114 MG/DL (ref 74–99)

## 2024-12-19 PROCEDURE — 82962 GLUCOSE BLOOD TEST: CPT

## 2024-12-19 PROCEDURE — G0422 INTENS CARDIAC REHAB W/EXERC: HCPCS

## 2025-01-07 ENCOUNTER — HOSPITAL ENCOUNTER (OUTPATIENT)
Dept: CARDIAC REHAB | Age: 60
Setting detail: THERAPIES SERIES
Discharge: HOME | End: 2025-01-07
Payer: COMMERCIAL

## 2025-01-07 LAB
GLUCOSE BLD-MCNC: 117 MG/DL (ref 74–99)
GLUCOSE BLD-MCNC: 174 MG/DL (ref 74–99)

## 2025-01-07 PROCEDURE — 82962 GLUCOSE BLOOD TEST: CPT

## 2025-01-07 PROCEDURE — G0422 INTENS CARDIAC REHAB W/EXERC: HCPCS

## 2025-01-14 ENCOUNTER — HOSPITAL ENCOUNTER (OUTPATIENT)
Dept: CARDIAC REHAB | Age: 60
Setting detail: THERAPIES SERIES
Discharge: HOME | End: 2025-01-14
Payer: COMMERCIAL

## 2025-01-14 PROCEDURE — 82962 GLUCOSE BLOOD TEST: CPT

## 2025-01-14 PROCEDURE — G0422 INTENS CARDIAC REHAB W/EXERC: HCPCS

## 2025-01-15 LAB
GLUCOSE BLD-MCNC: 63 MG/DL (ref 74–99)
GLUCOSE BLD-MCNC: 66 MG/DL (ref 74–99)
GLUCOSE BLD-MCNC: 83 MG/DL (ref 74–99)
GLUCOSE BLD-MCNC: 95 MG/DL (ref 74–99)

## 2025-01-16 ENCOUNTER — HOSPITAL ENCOUNTER (OUTPATIENT)
Dept: CARDIAC REHAB | Age: 60
Setting detail: THERAPIES SERIES
Discharge: HOME | End: 2025-01-16
Payer: COMMERCIAL

## 2025-01-16 LAB — GLUCOSE BLD-MCNC: 78 MG/DL (ref 74–99)

## 2025-01-16 PROCEDURE — G0422 INTENS CARDIAC REHAB W/EXERC: HCPCS

## 2025-01-16 PROCEDURE — 82962 GLUCOSE BLOOD TEST: CPT

## 2025-01-23 ENCOUNTER — HOSPITAL ENCOUNTER (OUTPATIENT)
Dept: CARDIAC REHAB | Age: 60
Setting detail: THERAPIES SERIES
Discharge: HOME | End: 2025-01-23
Payer: COMMERCIAL

## 2025-01-23 LAB
GLUCOSE BLD-MCNC: 120 MG/DL (ref 74–99)
GLUCOSE BLD-MCNC: 90 MG/DL (ref 74–99)

## 2025-01-23 PROCEDURE — 82962 GLUCOSE BLOOD TEST: CPT

## 2025-01-23 PROCEDURE — G0422 INTENS CARDIAC REHAB W/EXERC: HCPCS

## 2025-01-28 ENCOUNTER — HOSPITAL ENCOUNTER (OUTPATIENT)
Dept: CARDIAC REHAB | Age: 60
Setting detail: THERAPIES SERIES
Discharge: HOME | End: 2025-01-28
Payer: COMMERCIAL

## 2025-01-28 PROCEDURE — G0422 INTENS CARDIAC REHAB W/EXERC: HCPCS

## 2025-01-30 ENCOUNTER — APPOINTMENT (OUTPATIENT)
Dept: CARDIAC REHAB | Age: 60
End: 2025-01-30
Payer: COMMERCIAL

## 2025-02-14 LAB — MAMMOGRAPHY, EXTERNAL: NORMAL

## 2025-03-13 ENCOUNTER — HOSPITAL ENCOUNTER (OUTPATIENT)
Age: 60
Setting detail: OBSERVATION
Discharge: HOME | End: 2025-03-15
Attending: EMERGENCY MEDICINE | Admitting: STUDENT IN AN ORGANIZED HEALTH CARE EDUCATION/TRAINING PROGRAM
Payer: COMMERCIAL

## 2025-03-13 ENCOUNTER — APPOINTMENT (OUTPATIENT)
Dept: GENERAL RADIOLOGY | Age: 60
End: 2025-03-13
Payer: COMMERCIAL

## 2025-03-13 ENCOUNTER — APPOINTMENT (OUTPATIENT)
Dept: CT IMAGING | Age: 60
End: 2025-03-13
Payer: COMMERCIAL

## 2025-03-13 DIAGNOSIS — E11.65 UNCONTROLLED TYPE 2 DIABETES MELLITUS WITH HYPERGLYCEMIA (HCC): ICD-10-CM

## 2025-03-13 DIAGNOSIS — R07.9 ACUTE CHEST PAIN: Primary | ICD-10-CM

## 2025-03-13 DIAGNOSIS — R07.2 PRECORDIAL PAIN: ICD-10-CM

## 2025-03-13 DIAGNOSIS — R07.9 CHEST PAIN: ICD-10-CM

## 2025-03-13 DIAGNOSIS — N17.9 AKI (ACUTE KIDNEY INJURY): ICD-10-CM

## 2025-03-13 PROBLEM — R07.89 ATYPICAL CHEST PAIN: Status: ACTIVE | Noted: 2025-03-13

## 2025-03-13 LAB
ALBUMIN SERPL-MCNC: 4.1 G/DL (ref 3.4–5)
ALBUMIN/GLOB SERPL: 1.4 {RATIO} (ref 1.1–2.2)
ALP SERPL-CCNC: 66 U/L (ref 40–129)
ALT SERPL-CCNC: 44 U/L (ref 10–40)
ANION GAP SERPL CALCULATED.3IONS-SCNC: 15 MMOL/L (ref 9–17)
AST SERPL-CCNC: 63 U/L (ref 15–37)
BASOPHILS # BLD: 0.06 K/UL
BASOPHILS NFR BLD: 1 % (ref 0–1)
BILIRUB SERPL-MCNC: 0.2 MG/DL (ref 0–1)
BUN SERPL-MCNC: 15 MG/DL (ref 7–20)
CALCIUM SERPL-MCNC: 8.9 MG/DL (ref 8.3–10.6)
CHLORIDE SERPL-SCNC: 104 MMOL/L (ref 99–110)
CO2 SERPL-SCNC: 16 MMOL/L (ref 21–32)
CREAT SERPL-MCNC: 1.4 MG/DL (ref 0.6–1.2)
EKG ATRIAL RATE: 104 BPM
EKG DIAGNOSIS: NORMAL
EKG P AXIS: 69 DEGREES
EKG P-R INTERVAL: 164 MS
EKG Q-T INTERVAL: 366 MS
EKG QRS DURATION: 90 MS
EKG QTC CALCULATION (BAZETT): 481 MS
EKG R AXIS: 39 DEGREES
EKG T AXIS: 140 DEGREES
EKG VENTRICULAR RATE: 104 BPM
EOSINOPHIL # BLD: 0.22 K/UL
EOSINOPHILS RELATIVE PERCENT: 4 % (ref 0–3)
ERYTHROCYTE [DISTWIDTH] IN BLOOD BY AUTOMATED COUNT: 13.3 % (ref 11.7–14.9)
GFR, ESTIMATED: 39 ML/MIN/1.73M2
GLUCOSE BLD-MCNC: 182 MG/DL (ref 74–99)
GLUCOSE BLD-MCNC: 241 MG/DL (ref 74–99)
GLUCOSE SERPL-MCNC: 462 MG/DL (ref 74–99)
HCT VFR BLD AUTO: 33.9 % (ref 37–47)
HGB BLD-MCNC: 10.4 G/DL (ref 12.5–16)
IMM GRANULOCYTES # BLD AUTO: 0.02 K/UL
IMM GRANULOCYTES NFR BLD: 0 %
LYMPHOCYTES NFR BLD: 1.59 K/UL
LYMPHOCYTES RELATIVE PERCENT: 28 % (ref 24–44)
MAGNESIUM SERPL-MCNC: 1.9 MG/DL (ref 1.8–2.4)
MCH RBC QN AUTO: 28.2 PG (ref 27–31)
MCHC RBC AUTO-ENTMCNC: 30.7 G/DL (ref 32–36)
MCV RBC AUTO: 91.9 FL (ref 78–100)
MONOCYTES NFR BLD: 0.63 K/UL
MONOCYTES NFR BLD: 11 % (ref 0–4)
NEUTROPHILS NFR BLD: 56 % (ref 36–66)
NEUTS SEG NFR BLD: 3.18 K/UL
PLATELET # BLD AUTO: 184 K/UL (ref 140–440)
PMV BLD AUTO: 10.9 FL (ref 7.5–11.1)
POTASSIUM SERPL-SCNC: 4.2 MMOL/L (ref 3.5–5.1)
PROT SERPL-MCNC: 7.1 G/DL (ref 6.4–8.2)
RBC # BLD AUTO: 3.69 M/UL (ref 4.2–5.4)
SODIUM SERPL-SCNC: 135 MMOL/L (ref 136–145)
TROPONIN I SERPL HS-MCNC: 16 NG/L (ref 0–14)
TROPONIN I SERPL HS-MCNC: 27 NG/L (ref 0–14)
TROPONIN I SERPL HS-MCNC: 32 NG/L (ref 0–14)
TROPONIN I SERPL HS-MCNC: 42 NG/L (ref 0–14)
WBC OTHER # BLD: 5.7 K/UL (ref 4–10.5)

## 2025-03-13 PROCEDURE — 96374 THER/PROPH/DIAG INJ IV PUSH: CPT

## 2025-03-13 PROCEDURE — 85025 COMPLETE CBC W/AUTO DIFF WBC: CPT

## 2025-03-13 PROCEDURE — 93005 ELECTROCARDIOGRAM TRACING: CPT | Performed by: EMERGENCY MEDICINE

## 2025-03-13 PROCEDURE — 6360000004 HC RX CONTRAST MEDICATION: Performed by: EMERGENCY MEDICINE

## 2025-03-13 PROCEDURE — 99285 EMERGENCY DEPT VISIT HI MDM: CPT

## 2025-03-13 PROCEDURE — 6360000002 HC RX W HCPCS: Performed by: STUDENT IN AN ORGANIZED HEALTH CARE EDUCATION/TRAINING PROGRAM

## 2025-03-13 PROCEDURE — 36415 COLL VENOUS BLD VENIPUNCTURE: CPT

## 2025-03-13 PROCEDURE — G0378 HOSPITAL OBSERVATION PER HR: HCPCS

## 2025-03-13 PROCEDURE — 71275 CT ANGIOGRAPHY CHEST: CPT

## 2025-03-13 PROCEDURE — 6360000002 HC RX W HCPCS: Performed by: EMERGENCY MEDICINE

## 2025-03-13 PROCEDURE — 82962 GLUCOSE BLOOD TEST: CPT

## 2025-03-13 PROCEDURE — 2580000003 HC RX 258: Performed by: EMERGENCY MEDICINE

## 2025-03-13 PROCEDURE — 96361 HYDRATE IV INFUSION ADD-ON: CPT

## 2025-03-13 PROCEDURE — 71045 X-RAY EXAM CHEST 1 VIEW: CPT

## 2025-03-13 PROCEDURE — 6370000000 HC RX 637 (ALT 250 FOR IP): Performed by: EMERGENCY MEDICINE

## 2025-03-13 PROCEDURE — 83735 ASSAY OF MAGNESIUM: CPT

## 2025-03-13 PROCEDURE — 96375 TX/PRO/DX INJ NEW DRUG ADDON: CPT

## 2025-03-13 PROCEDURE — 80053 COMPREHEN METABOLIC PANEL: CPT

## 2025-03-13 PROCEDURE — 96372 THER/PROPH/DIAG INJ SC/IM: CPT

## 2025-03-13 PROCEDURE — 84484 ASSAY OF TROPONIN QUANT: CPT

## 2025-03-13 PROCEDURE — 2500000003 HC RX 250 WO HCPCS: Performed by: STUDENT IN AN ORGANIZED HEALTH CARE EDUCATION/TRAINING PROGRAM

## 2025-03-13 PROCEDURE — 6370000000 HC RX 637 (ALT 250 FOR IP): Performed by: STUDENT IN AN ORGANIZED HEALTH CARE EDUCATION/TRAINING PROGRAM

## 2025-03-13 RX ORDER — IOPAMIDOL 755 MG/ML
75 INJECTION, SOLUTION INTRAVASCULAR
Status: COMPLETED | OUTPATIENT
Start: 2025-03-13 | End: 2025-03-13

## 2025-03-13 RX ORDER — POLYETHYLENE GLYCOL 3350 17 G/17G
17 POWDER, FOR SOLUTION ORAL DAILY PRN
Status: DISCONTINUED | OUTPATIENT
Start: 2025-03-13 | End: 2025-03-15 | Stop reason: HOSPADM

## 2025-03-13 RX ORDER — 0.9 % SODIUM CHLORIDE 0.9 %
500 INTRAVENOUS SOLUTION INTRAVENOUS ONCE
Status: COMPLETED | OUTPATIENT
Start: 2025-03-13 | End: 2025-03-13

## 2025-03-13 RX ORDER — POTASSIUM CHLORIDE 1500 MG/1
40 TABLET, EXTENDED RELEASE ORAL PRN
Status: DISCONTINUED | OUTPATIENT
Start: 2025-03-13 | End: 2025-03-15 | Stop reason: HOSPADM

## 2025-03-13 RX ORDER — ESCITALOPRAM OXALATE 10 MG/1
20 TABLET ORAL DAILY
Status: DISCONTINUED | OUTPATIENT
Start: 2025-03-14 | End: 2025-03-15 | Stop reason: HOSPADM

## 2025-03-13 RX ORDER — ACETAMINOPHEN 650 MG/1
650 SUPPOSITORY RECTAL EVERY 6 HOURS PRN
Status: DISCONTINUED | OUTPATIENT
Start: 2025-03-13 | End: 2025-03-15 | Stop reason: HOSPADM

## 2025-03-13 RX ORDER — ASPIRIN 81 MG/1
81 TABLET, CHEWABLE ORAL DAILY
Status: DISCONTINUED | OUTPATIENT
Start: 2025-03-14 | End: 2025-03-15 | Stop reason: HOSPADM

## 2025-03-13 RX ORDER — ENOXAPARIN SODIUM 100 MG/ML
40 INJECTION SUBCUTANEOUS DAILY
Status: DISCONTINUED | OUTPATIENT
Start: 2025-03-13 | End: 2025-03-15 | Stop reason: HOSPADM

## 2025-03-13 RX ORDER — INSULIN GLARGINE 100 [IU]/ML
12 INJECTION, SOLUTION SUBCUTANEOUS NIGHTLY
Status: DISCONTINUED | OUTPATIENT
Start: 2025-03-13 | End: 2025-03-15 | Stop reason: HOSPADM

## 2025-03-13 RX ORDER — SODIUM CHLORIDE 0.9 % (FLUSH) 0.9 %
5-40 SYRINGE (ML) INJECTION PRN
Status: DISCONTINUED | OUTPATIENT
Start: 2025-03-13 | End: 2025-03-15 | Stop reason: HOSPADM

## 2025-03-13 RX ORDER — SODIUM CHLORIDE 9 MG/ML
INJECTION, SOLUTION INTRAVENOUS PRN
Status: DISCONTINUED | OUTPATIENT
Start: 2025-03-13 | End: 2025-03-15 | Stop reason: HOSPADM

## 2025-03-13 RX ORDER — MIDODRINE HYDROCHLORIDE 5 MG/1
5 TABLET ORAL 2 TIMES DAILY PRN
Status: DISCONTINUED | OUTPATIENT
Start: 2025-03-13 | End: 2025-03-15 | Stop reason: HOSPADM

## 2025-03-13 RX ORDER — GLUCAGON 1 MG/ML
1 KIT INJECTION PRN
Status: DISCONTINUED | OUTPATIENT
Start: 2025-03-13 | End: 2025-03-15 | Stop reason: HOSPADM

## 2025-03-13 RX ORDER — MORPHINE SULFATE 4 MG/ML
4 INJECTION, SOLUTION INTRAMUSCULAR; INTRAVENOUS ONCE
Status: COMPLETED | OUTPATIENT
Start: 2025-03-13 | End: 2025-03-13

## 2025-03-13 RX ORDER — ONDANSETRON 4 MG/1
4 TABLET, ORALLY DISINTEGRATING ORAL EVERY 8 HOURS PRN
Status: DISCONTINUED | OUTPATIENT
Start: 2025-03-13 | End: 2025-03-15 | Stop reason: HOSPADM

## 2025-03-13 RX ORDER — OXYCODONE HYDROCHLORIDE 10 MG/1
10 TABLET ORAL EVERY 6 HOURS PRN
Refills: 0 | Status: DISCONTINUED | OUTPATIENT
Start: 2025-03-13 | End: 2025-03-15 | Stop reason: HOSPADM

## 2025-03-13 RX ORDER — ONDANSETRON 4 MG/1
4 TABLET, ORALLY DISINTEGRATING ORAL ONCE
Status: COMPLETED | OUTPATIENT
Start: 2025-03-13 | End: 2025-03-13

## 2025-03-13 RX ORDER — ACETAMINOPHEN 325 MG/1
650 TABLET ORAL EVERY 6 HOURS PRN
Status: DISCONTINUED | OUTPATIENT
Start: 2025-03-13 | End: 2025-03-14 | Stop reason: SDUPTHER

## 2025-03-13 RX ORDER — DEXTROSE MONOHYDRATE 100 MG/ML
INJECTION, SOLUTION INTRAVENOUS CONTINUOUS PRN
Status: DISCONTINUED | OUTPATIENT
Start: 2025-03-13 | End: 2025-03-15 | Stop reason: HOSPADM

## 2025-03-13 RX ORDER — ASPIRIN 81 MG/1
324 TABLET, CHEWABLE ORAL ONCE
Status: DISCONTINUED | OUTPATIENT
Start: 2025-03-13 | End: 2025-03-13

## 2025-03-13 RX ORDER — OXYCODONE AND ACETAMINOPHEN 10; 325 MG/1; MG/1
1 TABLET ORAL EVERY 6 HOURS PRN
Status: DISCONTINUED | OUTPATIENT
Start: 2025-03-13 | End: 2025-03-13 | Stop reason: CLARIF

## 2025-03-13 RX ORDER — MAGNESIUM SULFATE IN WATER 40 MG/ML
2000 INJECTION, SOLUTION INTRAVENOUS PRN
Status: DISCONTINUED | OUTPATIENT
Start: 2025-03-13 | End: 2025-03-15 | Stop reason: HOSPADM

## 2025-03-13 RX ORDER — ASPIRIN 81 MG/1
81 TABLET, CHEWABLE ORAL ONCE
Status: COMPLETED | OUTPATIENT
Start: 2025-03-13 | End: 2025-03-13

## 2025-03-13 RX ORDER — BUSPIRONE HYDROCHLORIDE 15 MG/1
30 TABLET ORAL 2 TIMES DAILY
Status: DISCONTINUED | OUTPATIENT
Start: 2025-03-13 | End: 2025-03-15 | Stop reason: HOSPADM

## 2025-03-13 RX ORDER — RANOLAZINE 500 MG/1
1000 TABLET, EXTENDED RELEASE ORAL 2 TIMES DAILY
Status: DISCONTINUED | OUTPATIENT
Start: 2025-03-13 | End: 2025-03-15 | Stop reason: HOSPADM

## 2025-03-13 RX ORDER — NITROGLYCERIN 0.4 MG/1
0.4 TABLET SUBLINGUAL EVERY 5 MIN PRN
Status: DISCONTINUED | OUTPATIENT
Start: 2025-03-13 | End: 2025-03-15 | Stop reason: HOSPADM

## 2025-03-13 RX ORDER — PANTOPRAZOLE SODIUM 40 MG/1
40 TABLET, DELAYED RELEASE ORAL DAILY
Status: DISCONTINUED | OUTPATIENT
Start: 2025-03-13 | End: 2025-03-15 | Stop reason: HOSPADM

## 2025-03-13 RX ORDER — HYDROXYZINE HYDROCHLORIDE 50 MG/1
25 TABLET, FILM COATED ORAL ONCE
Status: COMPLETED | OUTPATIENT
Start: 2025-03-13 | End: 2025-03-13

## 2025-03-13 RX ORDER — SODIUM CHLORIDE 0.9 % (FLUSH) 0.9 %
5-40 SYRINGE (ML) INJECTION EVERY 12 HOURS SCHEDULED
Status: DISCONTINUED | OUTPATIENT
Start: 2025-03-13 | End: 2025-03-15 | Stop reason: HOSPADM

## 2025-03-13 RX ORDER — INSULIN LISPRO 100 [IU]/ML
5 INJECTION, SOLUTION INTRAVENOUS; SUBCUTANEOUS
Status: DISCONTINUED | OUTPATIENT
Start: 2025-03-13 | End: 2025-03-15 | Stop reason: HOSPADM

## 2025-03-13 RX ORDER — ONDANSETRON 2 MG/ML
4 INJECTION INTRAMUSCULAR; INTRAVENOUS EVERY 6 HOURS PRN
Status: DISCONTINUED | OUTPATIENT
Start: 2025-03-13 | End: 2025-03-15 | Stop reason: HOSPADM

## 2025-03-13 RX ORDER — LORAZEPAM 2 MG/ML
1 INJECTION INTRAMUSCULAR ONCE
Status: COMPLETED | OUTPATIENT
Start: 2025-03-13 | End: 2025-03-13

## 2025-03-13 RX ORDER — ROSUVASTATIN CALCIUM 20 MG/1
40 TABLET, COATED ORAL NIGHTLY
Status: DISCONTINUED | OUTPATIENT
Start: 2025-03-13 | End: 2025-03-15 | Stop reason: HOSPADM

## 2025-03-13 RX ORDER — POTASSIUM CHLORIDE 7.45 MG/ML
10 INJECTION INTRAVENOUS PRN
Status: DISCONTINUED | OUTPATIENT
Start: 2025-03-13 | End: 2025-03-15 | Stop reason: HOSPADM

## 2025-03-13 RX ORDER — ACETAMINOPHEN 325 MG/1
325 TABLET ORAL EVERY 6 HOURS PRN
Status: DISCONTINUED | OUTPATIENT
Start: 2025-03-13 | End: 2025-03-15 | Stop reason: HOSPADM

## 2025-03-13 RX ADMIN — SODIUM CHLORIDE 500 ML: 0.9 INJECTION, SOLUTION INTRAVENOUS at 17:06

## 2025-03-13 RX ADMIN — OXYCODONE HYDROCHLORIDE 10 MG: 10 TABLET ORAL at 19:50

## 2025-03-13 RX ADMIN — ONDANSETRON 4 MG: 4 TABLET, ORALLY DISINTEGRATING ORAL at 17:07

## 2025-03-13 RX ADMIN — PANTOPRAZOLE SODIUM 40 MG: 40 TABLET, DELAYED RELEASE ORAL at 19:57

## 2025-03-13 RX ADMIN — MORPHINE SULFATE 4 MG: 4 INJECTION INTRAVENOUS at 17:08

## 2025-03-13 RX ADMIN — NITROGLYCERIN 0.4 MG: 0.4 TABLET SUBLINGUAL at 15:57

## 2025-03-13 RX ADMIN — INSULIN GLARGINE 12 UNITS: 100 INJECTION, SOLUTION SUBCUTANEOUS at 19:51

## 2025-03-13 RX ADMIN — ENOXAPARIN SODIUM 40 MG: 100 INJECTION SUBCUTANEOUS at 19:57

## 2025-03-13 RX ADMIN — RANOLAZINE 1000 MG: 500 TABLET, EXTENDED RELEASE ORAL at 19:49

## 2025-03-13 RX ADMIN — TICAGRELOR 90 MG: 90 TABLET ORAL at 19:49

## 2025-03-13 RX ADMIN — ASPIRIN 81 MG: 81 TABLET, CHEWABLE ORAL at 15:57

## 2025-03-13 RX ADMIN — SODIUM CHLORIDE, PRESERVATIVE FREE 10 ML: 5 INJECTION INTRAVENOUS at 19:49

## 2025-03-13 RX ADMIN — BUSPIRONE HYDROCHLORIDE 30 MG: 15 TABLET ORAL at 19:49

## 2025-03-13 RX ADMIN — LORAZEPAM 1 MG: 2 INJECTION INTRAMUSCULAR; INTRAVENOUS at 19:15

## 2025-03-13 RX ADMIN — IOPAMIDOL 75 ML: 755 INJECTION, SOLUTION INTRAVENOUS at 18:34

## 2025-03-13 RX ADMIN — ROSUVASTATIN CALCIUM 40 MG: 20 TABLET, FILM COATED ORAL at 19:49

## 2025-03-13 RX ADMIN — HYDROXYZINE HYDROCHLORIDE 25 MG: 50 TABLET, FILM COATED ORAL at 17:56

## 2025-03-13 RX ADMIN — ACETAMINOPHEN 325 MG: 325 TABLET ORAL at 19:50

## 2025-03-13 ASSESSMENT — PAIN SCALES - GENERAL
PAINLEVEL_OUTOF10: 9
PAINLEVEL_OUTOF10: 7
PAINLEVEL_OUTOF10: 9
PAINLEVEL_OUTOF10: 8

## 2025-03-13 ASSESSMENT — PAIN DESCRIPTION - DESCRIPTORS
DESCRIPTORS: HEAVINESS
DESCRIPTORS: HEAVINESS

## 2025-03-13 ASSESSMENT — PAIN DESCRIPTION - ONSET
ONSET: ON-GOING
ONSET: ON-GOING

## 2025-03-13 ASSESSMENT — PAIN - FUNCTIONAL ASSESSMENT: PAIN_FUNCTIONAL_ASSESSMENT: 0-10

## 2025-03-13 ASSESSMENT — PAIN DESCRIPTION - LOCATION
LOCATION: CHEST

## 2025-03-13 ASSESSMENT — PAIN DESCRIPTION - DIRECTION: RADIATING_TOWARDS: LEFT SHOULDER AND ARM

## 2025-03-13 ASSESSMENT — PAIN DESCRIPTION - PAIN TYPE
TYPE: ACUTE PAIN
TYPE: ACUTE PAIN

## 2025-03-13 ASSESSMENT — PAIN DESCRIPTION - ORIENTATION: ORIENTATION: MID;LEFT

## 2025-03-13 ASSESSMENT — PAIN DESCRIPTION - FREQUENCY: FREQUENCY: CONTINUOUS

## 2025-03-13 NOTE — ED PROVIDER NOTES
Emergency Department Encounter  Location: [facility] EMERGENCY DEPARTMENT    Patient: Waleska Fernández  MRN: [id number]  : 1965  Date of evaluation: 3/13/2025  ED Provider: Gabriela Urbina DO    Chief Complaint:    Chest Pain and Shortness of Breath (Anxious and tearful during triage )    [place]:  Waleska Fernández is a 59 y.o. female that presents to the emergency department with concern for chest pain and shortness of breath.  Patient states that she has been having intermittent chest pain for couple of weeks that seemed to resolve if she rubbed her chest.  Today, pain started again and did not resolve.  She took nitro with some temporary improvement.  Then took 3 baby aspirin.  Continues to have pain in her chest that radiates into her left arm.  Endorses nausea as well as associated dyspnea.  No recent fever, chills, cough or congestion.      Past Medical History:   Diagnosis Date    Acid reflux     CAD (coronary artery disease)     CAD (coronary artery disease)     Sees Dr. NEDRA Lambert    Chronic back pain     Chronic obstructive pulmonary disease (HCC)     COPD (chronic obstructive pulmonary disease) (HCC)     \"have mild COPD- no pulmonologist\"    Depression     Diabetes (HCC)     Diabetes mellitus (HCC) Dx     Eczema of hand     Hx of migraines     HX OTHER MEDICAL     \"difficulty IV stick\"    Hyperlipidemia     Hypertension     follows with Dr Wood    Kidney stones 's    \"Passed One Kidney Stone\"    PVD (peripheral vascular disease)     Wears glasses      Past Surgical History:   Procedure Laterality Date    APPENDECTOMY      Done During [facility]    BACK SURGERY  Last Done -2013    \"4 Lower Back Surgeries, 2 Rods, 4 Pins At L5, S1 Put In During Last Surgery\"    BONE RESECTION, RIB      \"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- surgery - ok since then\"    BREAST BIOPSY Bilateral 's-    Benign    CARDIAC CATHETERIZATION      per pt \"had heart cath 5/10/2019\"     are mis-transcribed.)    Gabriela Urbina, Gabriela Serrano,   03/16/25 6224

## 2025-03-13 NOTE — ED NOTES
ED TO INPATIENT SBAR HANDOFF    Patient Name: Waleska Fernández   :  1965  59 y.o.   Preferred Name  Waleska  Family/Caregiver Present no   Restraints no   C-SSRS: Risk of Suicide: No Risk  Sitter no   Sepsis Risk Score        Situation  Chief Complaint   Patient presents with    Chest Pain    Shortness of Breath     Anxious and tearful during triage      Brief Description of Patient's Condition: patient to the ED with complaints of chest pain- patient is already on anginal medications, plan is to trend troponin's. Patient with EKG showing ST depression but similar to previous. Patient is A&O, able to ambulate per self.   Mental Status: oriented, alert, coherent, logical, thought processes intact, and able to concentrate and follow conversation  Arrived from: home    Imaging:   XR CHEST PORTABLE   Final Result      CTA PULMONARY W CONTRAST    (Results Pending)     Abnormal labs:   Abnormal Labs Reviewed   COMPREHENSIVE METABOLIC PANEL - Abnormal; Notable for the following components:       Result Value    Sodium 135 (*)     CO2 16 (*)     Glucose 462 (*)     Creatinine 1.4 (*)     Est, Glom Filt Rate 39 (*)     ALT 44 (*)     AST 63 (*)     All other components within normal limits   TROPONIN - Abnormal; Notable for the following components:    Troponin, High Sensitivity 16 (*)     All other components within normal limits   TROPONIN - Abnormal; Notable for the following components:    Troponin, High Sensitivity 27 (*)     All other components within normal limits   CBC WITH AUTO DIFFERENTIAL - Abnormal; Notable for the following components:    RBC 3.69 (*)     Hemoglobin 10.4 (*)     Hematocrit 33.9 (*)     MCHC 30.7 (*)     Monocytes % 11 (*)     Eosinophils % 4 (*)     All other components within normal limits        Background  History:   Past Medical History:   Diagnosis Date    Acid reflux     CAD (coronary artery disease)     CAD (coronary artery disease)     Sees Dr. NEDRA Lambert    Chronic back pain      Chronic obstructive pulmonary disease (HCC)     COPD (chronic obstructive pulmonary disease) (HCC)     \"have mild COPD- no pulmonologist\"    Depression     Diabetes (HCC)     Diabetes mellitus (HCC) Dx 2001    Eczema of hand     Hx of migraines     HX OTHER MEDICAL     \"difficulty IV stick\"    Hyperlipidemia     Hypertension     follows with Dr Wood    Kidney stones 2000's    \"Passed One Kidney Stone\"    PVD (peripheral vascular disease)     Wears glasses        Assessment    Vitals:        Vitals:    03/13/25 1522 03/13/25 1620 03/13/25 1628   BP: (!) 172/98 126/73    Pulse: (!) 124 (!) 108 (!) 108   Resp: 24 15 17   Temp: 97.7 °F (36.5 °C)     SpO2: 95% 96% 97%   Weight: 81.6 kg (180 lb)     Height: 1.676 m (5' 6\")         PO Status: Regular    O2 Flow Rate:        Cardiac Rhythm: NSR    Last documented pain medication administered:     NIH Score: NIH       Active LDA's:   Peripheral IV 03/13/25 Distal;Left Cephalic (Active)       Pertinent or High Risk Medications/Drips: no   If Yes, please provide details:       Blood Product Administration: no  If Yes, please provide details:     Recommendation    Incomplete orders   Additional Comments:    If any further questions, please call Sending RN at [phone]      Electronically signed by: Electronically signed by Sridevi Garza RN on 3/13/2025 at 5:56 PM

## 2025-03-13 NOTE — H&P
V2.0  History and Physical      Name:  Waleska Fernández /Age/Sex: 1965  (59 y.o. female)   MRN & CSN:  [id number] & [id number] Encounter Date/Time: 3/13/2025 5:34 PM EDT   Location:  ED19/ED-19 PCP: Nathan Lambert DO       Hospital Day: 1    Assessment and Plan:     Patient is a 59 y.o. female who presented with chest pain     Chest pain r/o ACS  - chest pain on the left side pressure like, can be anginal vs anxiety/panic attack related patient  -will trial dose of ativan if the symptoms are secondary to panic attackalready on anti anginal therapy, trend troponin, EKG w ST depressions in inferior leads which are simillar to prior has been repeated thrice in ED  -Cardiology consulted NPO at midnight    History of CAD status post CABG and PCI  -continue aspirin and brillinta, ranexa, crestor     Type 2 diabetes mellitus  -Continue jardiance, start lispro lantus and SSI and hypoglycemia treatment orders     MDD/anxiety  -Continue Lexapro.     Hyperlipidemia  -Continue statin    Checklist:  Advanced directive: full  Diet: cardiac  DVT ppx: Lovenox  Sugar: BG goal of 140-180 while inpatient    Disposition: place in observation.  Estimated discharge: 1-2 day(s).  Current living situation: home.  Expected disposition: home.    Spoke with ED provider who recommended admission for the patient and I agree with that plan.  Personally reviewed lab studies and imaging.  EKG interpreted personally and results as stated above.  Imaging that was interpreted personally and results as stated above.      Comment: Please note this report has been produced using speech recognition software and may contain errors related to that system including errors in grammar, punctuation, and spelling, as well as words and phrases that may be inappropriate. If there are any questions or concerns please feel free to contact the dictating provider for clarification.         History of Present Illness:     Chief Complaint: Atypical chest

## 2025-03-13 NOTE — PROGRESS NOTES
4 Eyes Skin Assessment     NAME:  Waleska Fernández  YOB: 1965  MEDICAL RECORD NUMBER:  [id number]    The patient is being assessed for  Admission    I agree that at least one RN has performed a thorough Head to Toe Skin Assessment on the patient. ALL assessment sites listed below have been assessed.      Areas assessed by both nurses:    Head, Face, Ears, Shoulders, Back, Chest, Arms, Elbows, Hands, Sacrum. Buttock, Coccyx, Ischium, Legs. Feet and Heels, and Under Medical Devices         Does the Patient have a Wound? No noted wound(s)       Adrian Prevention initiated by RN: No  Wound Care Orders initiated by RN: No    Pressure Injury (Stage 3,4, Unstageable, DTI, NWPT, and Complex wounds) if present, place Wound referral order by RN under : No    New Ostomies, if present place, Ostomy referral order under : No     Nurse 1 eSignature: Electronically signed by Modesta Alvarado RN on 3/13/25 at 6:26 PM EDT    **SHARE this note so that the co-signing nurse can place an eSignature**    Nurse 2 eSignature: Electronically signed by Loraine London RN on 3/13/25 at 8:05 PM EDT

## 2025-03-14 LAB
ANION GAP SERPL CALCULATED.3IONS-SCNC: 10 MMOL/L (ref 9–17)
BASOPHILS # BLD: 0.04 K/UL
BASOPHILS NFR BLD: 1 % (ref 0–1)
BUN SERPL-MCNC: 13 MG/DL (ref 7–20)
CALCIUM SERPL-MCNC: 8.7 MG/DL (ref 8.3–10.6)
CHLORIDE SERPL-SCNC: 110 MMOL/L (ref 99–110)
CO2 SERPL-SCNC: 22 MMOL/L (ref 21–32)
CREAT SERPL-MCNC: 1.2 MG/DL (ref 0.6–1.2)
ECHO BSA: 1.95 M2
EKG ATRIAL RATE: 104 BPM
EKG ATRIAL RATE: 116 BPM
EKG ATRIAL RATE: 123 BPM
EKG DIAGNOSIS: NORMAL
EKG P AXIS: 64 DEGREES
EKG P AXIS: 66 DEGREES
EKG P AXIS: 69 DEGREES
EKG P-R INTERVAL: 150 MS
EKG P-R INTERVAL: 158 MS
EKG P-R INTERVAL: 164 MS
EKG Q-T INTERVAL: 324 MS
EKG Q-T INTERVAL: 346 MS
EKG Q-T INTERVAL: 366 MS
EKG QRS DURATION: 76 MS
EKG QRS DURATION: 82 MS
EKG QRS DURATION: 90 MS
EKG QTC CALCULATION (BAZETT): 463 MS
EKG QTC CALCULATION (BAZETT): 480 MS
EKG QTC CALCULATION (BAZETT): 481 MS
EKG R AXIS: 37 DEGREES
EKG R AXIS: 39 DEGREES
EKG R AXIS: 40 DEGREES
EKG T AXIS: 140 DEGREES
EKG T AXIS: 181 DEGREES
EKG T AXIS: 192 DEGREES
EKG VENTRICULAR RATE: 104 BPM
EKG VENTRICULAR RATE: 116 BPM
EKG VENTRICULAR RATE: 123 BPM
EOSINOPHIL # BLD: 0.21 K/UL
EOSINOPHILS RELATIVE PERCENT: 4 % (ref 0–3)
ERYTHROCYTE [DISTWIDTH] IN BLOOD BY AUTOMATED COUNT: 13.5 % (ref 11.7–14.9)
GFR, ESTIMATED: 48 ML/MIN/1.73M2
GLUCOSE BLD-MCNC: 107 MG/DL (ref 74–99)
GLUCOSE BLD-MCNC: 130 MG/DL (ref 74–99)
GLUCOSE BLD-MCNC: 206 MG/DL (ref 74–99)
GLUCOSE BLD-MCNC: 335 MG/DL (ref 74–99)
GLUCOSE SERPL-MCNC: 164 MG/DL (ref 74–99)
HCT VFR BLD AUTO: 33.4 % (ref 37–47)
HGB BLD-MCNC: 10.3 G/DL (ref 12.5–16)
IMM GRANULOCYTES # BLD AUTO: 0.01 K/UL
IMM GRANULOCYTES NFR BLD: 0 %
LYMPHOCYTES NFR BLD: 1.61 K/UL
LYMPHOCYTES RELATIVE PERCENT: 33 % (ref 24–44)
MCH RBC QN AUTO: 28.7 PG (ref 27–31)
MCHC RBC AUTO-ENTMCNC: 30.8 G/DL (ref 32–36)
MCV RBC AUTO: 93 FL (ref 78–100)
MONOCYTES NFR BLD: 0.35 K/UL
MONOCYTES NFR BLD: 7 % (ref 0–4)
NEUTROPHILS NFR BLD: 54 % (ref 36–66)
NEUTS SEG NFR BLD: 2.63 K/UL
PLATELET # BLD AUTO: 165 K/UL (ref 140–440)
PMV BLD AUTO: 10.8 FL (ref 7.5–11.1)
POTASSIUM SERPL-SCNC: 4 MMOL/L (ref 3.5–5.1)
RBC # BLD AUTO: 3.59 M/UL (ref 4.2–5.4)
SODIUM SERPL-SCNC: 142 MMOL/L (ref 136–145)
WBC OTHER # BLD: 4.9 K/UL (ref 4–10.5)

## 2025-03-14 PROCEDURE — 99291 CRITICAL CARE FIRST HOUR: CPT | Performed by: INTERNAL MEDICINE

## 2025-03-14 PROCEDURE — 85025 COMPLETE CBC W/AUTO DIFF WBC: CPT

## 2025-03-14 PROCEDURE — 2709999900 HC NON-CHARGEABLE SUPPLY: Performed by: INTERNAL MEDICINE

## 2025-03-14 PROCEDURE — G0378 HOSPITAL OBSERVATION PER HR: HCPCS

## 2025-03-14 PROCEDURE — 99152 MOD SED SAME PHYS/QHP 5/>YRS: CPT | Performed by: INTERNAL MEDICINE

## 2025-03-14 PROCEDURE — 93459 L HRT ART/GRFT ANGIO: CPT | Performed by: INTERNAL MEDICINE

## 2025-03-14 PROCEDURE — 6360000002 HC RX W HCPCS: Performed by: INTERNAL MEDICINE

## 2025-03-14 PROCEDURE — 96375 TX/PRO/DX INJ NEW DRUG ADDON: CPT

## 2025-03-14 PROCEDURE — 6370000000 HC RX 637 (ALT 250 FOR IP): Performed by: STUDENT IN AN ORGANIZED HEALTH CARE EDUCATION/TRAINING PROGRAM

## 2025-03-14 PROCEDURE — 2060000000 HC ICU INTERMEDIATE R&B

## 2025-03-14 PROCEDURE — 99153 MOD SED SAME PHYS/QHP EA: CPT | Performed by: INTERNAL MEDICINE

## 2025-03-14 PROCEDURE — 96376 TX/PRO/DX INJ SAME DRUG ADON: CPT

## 2025-03-14 PROCEDURE — 2580000003 HC RX 258: Performed by: INTERNAL MEDICINE

## 2025-03-14 PROCEDURE — 7100000010 HC PHASE II RECOVERY - FIRST 15 MIN: Performed by: INTERNAL MEDICINE

## 2025-03-14 PROCEDURE — 36415 COLL VENOUS BLD VENIPUNCTURE: CPT

## 2025-03-14 PROCEDURE — 6370000000 HC RX 637 (ALT 250 FOR IP)

## 2025-03-14 PROCEDURE — 6360000004 HC RX CONTRAST MEDICATION: Performed by: INTERNAL MEDICINE

## 2025-03-14 PROCEDURE — 80048 BASIC METABOLIC PNL TOTAL CA: CPT

## 2025-03-14 PROCEDURE — 6370000000 HC RX 637 (ALT 250 FOR IP): Performed by: NURSE PRACTITIONER

## 2025-03-14 PROCEDURE — 94761 N-INVAS EAR/PLS OXIMETRY MLT: CPT

## 2025-03-14 PROCEDURE — 93010 ELECTROCARDIOGRAM REPORT: CPT | Performed by: INTERNAL MEDICINE

## 2025-03-14 PROCEDURE — 6360000002 HC RX W HCPCS: Performed by: NURSE PRACTITIONER

## 2025-03-14 PROCEDURE — 96372 THER/PROPH/DIAG INJ SC/IM: CPT

## 2025-03-14 PROCEDURE — C1894 INTRO/SHEATH, NON-LASER: HCPCS | Performed by: INTERNAL MEDICINE

## 2025-03-14 PROCEDURE — 96361 HYDRATE IV INFUSION ADD-ON: CPT

## 2025-03-14 PROCEDURE — 6370000000 HC RX 637 (ALT 250 FOR IP): Performed by: INTERNAL MEDICINE

## 2025-03-14 PROCEDURE — 6360000002 HC RX W HCPCS

## 2025-03-14 PROCEDURE — 7100000011 HC PHASE II RECOVERY - ADDTL 15 MIN: Performed by: INTERNAL MEDICINE

## 2025-03-14 PROCEDURE — C1769 GUIDE WIRE: HCPCS | Performed by: INTERNAL MEDICINE

## 2025-03-14 PROCEDURE — 82962 GLUCOSE BLOOD TEST: CPT

## 2025-03-14 PROCEDURE — 93458 L HRT ARTERY/VENTRICLE ANGIO: CPT | Performed by: INTERNAL MEDICINE

## 2025-03-14 RX ORDER — LIDOCAINE 4 G/G
1 PATCH TOPICAL DAILY
Status: DISCONTINUED | OUTPATIENT
Start: 2025-03-14 | End: 2025-03-15 | Stop reason: HOSPADM

## 2025-03-14 RX ORDER — 0.9 % SODIUM CHLORIDE 0.9 %
500 INTRAVENOUS SOLUTION INTRAVENOUS ONCE
Status: COMPLETED | OUTPATIENT
Start: 2025-03-14 | End: 2025-03-14

## 2025-03-14 RX ORDER — MIDAZOLAM HYDROCHLORIDE 1 MG/ML
INJECTION, SOLUTION INTRAMUSCULAR; INTRAVENOUS PRN
Status: DISCONTINUED | OUTPATIENT
Start: 2025-03-14 | End: 2025-03-14 | Stop reason: HOSPADM

## 2025-03-14 RX ORDER — SODIUM CHLORIDE 0.9 % (FLUSH) 0.9 %
5-40 SYRINGE (ML) INJECTION EVERY 12 HOURS SCHEDULED
Status: DISCONTINUED | OUTPATIENT
Start: 2025-03-14 | End: 2025-03-15 | Stop reason: HOSPADM

## 2025-03-14 RX ORDER — IOPAMIDOL 755 MG/ML
INJECTION, SOLUTION INTRAVASCULAR PRN
Status: DISCONTINUED | OUTPATIENT
Start: 2025-03-14 | End: 2025-03-14 | Stop reason: HOSPADM

## 2025-03-14 RX ORDER — MORPHINE SULFATE 2 MG/ML
1 INJECTION, SOLUTION INTRAMUSCULAR; INTRAVENOUS ONCE
Status: COMPLETED | OUTPATIENT
Start: 2025-03-14 | End: 2025-03-14

## 2025-03-14 RX ORDER — MORPHINE SULFATE 2 MG/ML
2 INJECTION, SOLUTION INTRAMUSCULAR; INTRAVENOUS ONCE
Status: COMPLETED | OUTPATIENT
Start: 2025-03-14 | End: 2025-03-14

## 2025-03-14 RX ORDER — FENTANYL CITRATE 50 UG/ML
INJECTION, SOLUTION INTRAMUSCULAR; INTRAVENOUS PRN
Status: DISCONTINUED | OUTPATIENT
Start: 2025-03-14 | End: 2025-03-14 | Stop reason: HOSPADM

## 2025-03-14 RX ORDER — SODIUM CHLORIDE 0.9 % (FLUSH) 0.9 %
5-40 SYRINGE (ML) INJECTION PRN
Status: DISCONTINUED | OUTPATIENT
Start: 2025-03-14 | End: 2025-03-15 | Stop reason: HOSPADM

## 2025-03-14 RX ORDER — DEXAMETHASONE SODIUM PHOSPHATE 10 MG/ML
6 INJECTION, SOLUTION INTRAMUSCULAR; INTRAVENOUS ONCE
Status: COMPLETED | OUTPATIENT
Start: 2025-03-14 | End: 2025-03-14

## 2025-03-14 RX ORDER — INSULIN LISPRO 100 [IU]/ML
8 INJECTION, SOLUTION INTRAVENOUS; SUBCUTANEOUS ONCE
Status: COMPLETED | OUTPATIENT
Start: 2025-03-14 | End: 2025-03-14

## 2025-03-14 RX ORDER — ISOSORBIDE MONONITRATE 30 MG/1
30 TABLET, EXTENDED RELEASE ORAL DAILY
Status: DISCONTINUED | OUTPATIENT
Start: 2025-03-14 | End: 2025-03-15 | Stop reason: HOSPADM

## 2025-03-14 RX ORDER — ACETAMINOPHEN 325 MG/1
650 TABLET ORAL EVERY 4 HOURS PRN
Status: DISCONTINUED | OUTPATIENT
Start: 2025-03-14 | End: 2025-03-15 | Stop reason: HOSPADM

## 2025-03-14 RX ORDER — MORPHINE SULFATE 2 MG/ML
2 INJECTION, SOLUTION INTRAMUSCULAR; INTRAVENOUS EVERY 4 HOURS PRN
Status: DISCONTINUED | OUTPATIENT
Start: 2025-03-14 | End: 2025-03-15 | Stop reason: HOSPADM

## 2025-03-14 RX ORDER — SODIUM CHLORIDE 9 MG/ML
INJECTION, SOLUTION INTRAVENOUS PRN
Status: DISCONTINUED | OUTPATIENT
Start: 2025-03-14 | End: 2025-03-15 | Stop reason: HOSPADM

## 2025-03-14 RX ADMIN — BUSPIRONE HYDROCHLORIDE 30 MG: 15 TABLET ORAL at 11:35

## 2025-03-14 RX ADMIN — MORPHINE SULFATE 2 MG: 2 INJECTION, SOLUTION INTRAMUSCULAR; INTRAVENOUS at 21:34

## 2025-03-14 RX ADMIN — MORPHINE SULFATE 1 MG: 2 INJECTION, SOLUTION INTRAMUSCULAR; INTRAVENOUS at 05:51

## 2025-03-14 RX ADMIN — MORPHINE SULFATE 2 MG: 2 INJECTION, SOLUTION INTRAMUSCULAR; INTRAVENOUS at 17:04

## 2025-03-14 RX ADMIN — ENOXAPARIN SODIUM 40 MG: 100 INJECTION SUBCUTANEOUS at 18:16

## 2025-03-14 RX ADMIN — ASPIRIN 81 MG: 81 TABLET, CHEWABLE ORAL at 11:35

## 2025-03-14 RX ADMIN — BUSPIRONE HYDROCHLORIDE 30 MG: 15 TABLET ORAL at 21:31

## 2025-03-14 RX ADMIN — TICAGRELOR 90 MG: 90 TABLET ORAL at 21:31

## 2025-03-14 RX ADMIN — ROSUVASTATIN CALCIUM 40 MG: 20 TABLET, FILM COATED ORAL at 21:31

## 2025-03-14 RX ADMIN — Medication 10 MG: at 00:21

## 2025-03-14 RX ADMIN — INSULIN GLARGINE 12 UNITS: 100 INJECTION, SOLUTION SUBCUTANEOUS at 21:33

## 2025-03-14 RX ADMIN — OXYCODONE HYDROCHLORIDE 10 MG: 10 TABLET ORAL at 02:38

## 2025-03-14 RX ADMIN — ACETAMINOPHEN 325 MG: 325 TABLET ORAL at 13:30

## 2025-03-14 RX ADMIN — ACETAMINOPHEN 325 MG: 325 TABLET ORAL at 02:38

## 2025-03-14 RX ADMIN — ISOSORBIDE MONONITRATE 30 MG: 30 TABLET, EXTENDED RELEASE ORAL at 15:53

## 2025-03-14 RX ADMIN — INSULIN LISPRO 5 UNITS: 100 INJECTION, SOLUTION INTRAVENOUS; SUBCUTANEOUS at 13:05

## 2025-03-14 RX ADMIN — EMPAGLIFLOZIN 25 MG: 10 TABLET, FILM COATED ORAL at 11:35

## 2025-03-14 RX ADMIN — MIDODRINE HYDROCHLORIDE 5 MG: 5 TABLET ORAL at 15:53

## 2025-03-14 RX ADMIN — INSULIN LISPRO 5 UNITS: 100 INJECTION, SOLUTION INTRAVENOUS; SUBCUTANEOUS at 18:15

## 2025-03-14 RX ADMIN — OXYCODONE HYDROCHLORIDE 10 MG: 10 TABLET ORAL at 13:29

## 2025-03-14 RX ADMIN — INSULIN LISPRO 8 UNITS: 100 INJECTION, SOLUTION INTRAVENOUS; SUBCUTANEOUS at 21:32

## 2025-03-14 RX ADMIN — Medication 10 MG: at 22:51

## 2025-03-14 RX ADMIN — ESCITALOPRAM OXALATE 20 MG: 10 TABLET ORAL at 21:31

## 2025-03-14 RX ADMIN — PANTOPRAZOLE SODIUM 40 MG: 40 TABLET, DELAYED RELEASE ORAL at 11:35

## 2025-03-14 RX ADMIN — DEXAMETHASONE SODIUM PHOSPHATE 6 MG: 10 INJECTION, SOLUTION INTRAMUSCULAR; INTRAVENOUS at 15:53

## 2025-03-14 RX ADMIN — MORPHINE SULFATE 2 MG: 2 INJECTION, SOLUTION INTRAMUSCULAR; INTRAVENOUS at 00:17

## 2025-03-14 RX ADMIN — RANOLAZINE 1000 MG: 500 TABLET, EXTENDED RELEASE ORAL at 21:31

## 2025-03-14 RX ADMIN — RANOLAZINE 1000 MG: 500 TABLET, EXTENDED RELEASE ORAL at 11:35

## 2025-03-14 RX ADMIN — SODIUM CHLORIDE 500 ML: 0.9 INJECTION, SOLUTION INTRAVENOUS at 11:35

## 2025-03-14 ASSESSMENT — PAIN DESCRIPTION - ONSET
ONSET: ON-GOING
ONSET: AWAKENED FROM SLEEP
ONSET: ON-GOING
ONSET: ON-GOING

## 2025-03-14 ASSESSMENT — PAIN DESCRIPTION - ORIENTATION
ORIENTATION: LEFT
ORIENTATION: RIGHT

## 2025-03-14 ASSESSMENT — PAIN DESCRIPTION - PAIN TYPE
TYPE: ACUTE PAIN

## 2025-03-14 ASSESSMENT — PAIN SCALES - GENERAL
PAINLEVEL_OUTOF10: 6
PAINLEVEL_OUTOF10: 7
PAINLEVEL_OUTOF10: 8
PAINLEVEL_OUTOF10: 7
PAINLEVEL_OUTOF10: 8
PAINLEVEL_OUTOF10: 8
PAINLEVEL_OUTOF10: 6
PAINLEVEL_OUTOF10: 8

## 2025-03-14 ASSESSMENT — PAIN DESCRIPTION - DESCRIPTORS
DESCRIPTORS: HEAVINESS
DESCRIPTORS: ACHING;PRESSURE
DESCRIPTORS: HEAVINESS
DESCRIPTORS: HEAVINESS;TIGHTNESS
DESCRIPTORS: HEAVINESS;DISCOMFORT

## 2025-03-14 ASSESSMENT — PAIN DESCRIPTION - LOCATION
LOCATION: CHEST

## 2025-03-14 ASSESSMENT — PAIN DESCRIPTION - FREQUENCY
FREQUENCY: CONTINUOUS

## 2025-03-14 NOTE — CONSULTS
Name:  Waleska Fernández /Age/Sex: 1965  (59 y.o. female)   MRN & CSN:  [id number] & [id number] Admission Date/Time: 3/13/2025  3:32 PM   Location:  OBS /APZ98-56 PCP: Nathan Lambert DO       Hospital Day: 2          Referring physician:  Lucía Wilkerson MD         Reason for consultation: Acute coronary syndrome            Thanks for referral.    Information source: Patient    CC; chest pain      HPI:   Thank you for involving me in taking  care of Waleska Fernández who  is a 59 y.o.year  Old female  Presents with history of CAD, history of CABG, history of PCI hypertension hyperlipidemia now presents with complaints of chest pain has nonspecific ST and T wave changes along with mildly elevated troponin hence cardiology has been consulted   Patient emergently seen for acute coronary syndrome    Patient an option at having chest pain along with EKG changes and elevated troponin hence we will proceed with cardiac catheterization for further management and assessment                   Past medical history:    has a past medical history of Acid reflux, CAD (coronary artery disease), CAD (coronary artery disease), Chronic back pain, Chronic obstructive pulmonary disease (HCC), COPD (chronic obstructive pulmonary disease) (HCC), Depression, Diabetes (HCC), Diabetes mellitus (HCC), Eczema of hand, Hx of migraines, HX OTHER MEDICAL, Hyperlipidemia, Hypertension, Kidney stones, PVD (peripheral vascular disease), and Wears glasses.  Past surgical history:   has a past surgical history that includes pr unlisted procedure cardiac surgery; gyn; orthopedic surgery; Dental surgery (); Breast biopsy (Bilateral, -); Tonsillectomy and adenoidectomy (); Hysterectomy, total abdominal (); Appendectomy (); other surgical history (Left, 2013); Bone Resection, Rib; Coronary angioplasty with stent; back surgery (Last Done ); Cholecystectomy (); Colonoscopy (last one );  mg Oral Q6H PRN Lucía Wilkerson MD        Or    acetaminophen (TYLENOL) suppository 650 mg  650 mg Rectal Q6H PRN Lucía Wilkerson MD        oxyCODONE (ROXICODONE) immediate release tablet 10 mg  10 mg Oral Q6H PRN Lucía Wilkerson MD   10 mg at 03/14/25 0238    And    acetaminophen (TYLENOL) tablet 325 mg  325 mg Oral Q6H PRN Lucía Wilkerson MD   325 mg at 03/14/25 0238     Review of Systems:  All 14 systems reviewed, all negative except for chest pain    Physical Examination:    /60   Pulse 76   Temp 97.7 °F (36.5 °C) (Oral)   Resp 15   Ht 1.676 m (5' 6\")   Wt 86.8 kg (191 lb 6 oz)   SpO2 96%   BMI 30.89 kg/m²      Wt Readings from Last 3 Encounters:   03/14/25 86.8 kg (191 lb 6 oz)   10/29/24 78 kg (172 lb)   04/17/24 79.2 kg (174 lb 9.6 oz)     Body mass index is 30.89 kg/m².      General Appearance: Patient  Head: normocephalic     Eyes: normal, noninjected conjunctiva    ENT: normal mucosa, noninjected throat, normal     NECK: No JVP  No thyromegaly        Cardiovascular: No thrills palpated   Auscultation: Normal S1 and S2, no murmur   carotid bruit no   Abdominal Aorta no bruit    Respiratory:    Breath sounds clear    Extremities: No edema clubbing ,   no cyanosis    SKIN: Warm and well perfused, no pallor or cyanosis    Vascular exam:  Pedal Pulses: Palp bilaterally        Abdomen:  No masses or tenderness. No organomegaly noted.    Neurological:  Oriented to time, place, and person   No focal neurological deficit noted.  Psychiatric:normal mood, no anxiety    Lab Review   Recent Results (from the past 24 hours)   EKG 12 Lead    Collection Time: 03/13/25  3:19 PM   Result Value Ref Range    Ventricular Rate 123 BPM    Atrial Rate 123 BPM    P-R Interval 150 ms    QRS Duration 82 ms    Q-T Interval 324 ms    QTc Calculation (Bazett) 463 ms    P Axis 66 degrees    R Axis 37 degrees    T Axis 192 degrees    Diagnosis       Sinus tachycardia  Possible Left atrial enlargement  Marked ST abnormality, possible

## 2025-03-14 NOTE — PLAN OF CARE
Problem: Chronic Conditions and Co-morbidities  Goal: Patient's chronic conditions and co-morbidity symptoms are monitored and maintained or improved  3/13/2025 2005 by Loraine London RN  Outcome: Progressing  3/13/2025 1805 by Modesta Alvarado, RN  Outcome: Progressing     Problem: Discharge Planning  Goal: Discharge to home or other facility with appropriate resources  3/13/2025 2005 by Loraine London RN  Outcome: Progressing  3/13/2025 1805 by Modesta Alvarado, RN  Outcome: Progressing     Problem: Pain  Goal: Verbalizes/displays adequate comfort level or baseline comfort level  3/13/2025 2005 by Loraine London, RN  Outcome: Progressing  3/13/2025 1805 by Modesta Alvarado, RN  Outcome: Progressing

## 2025-03-14 NOTE — PROGRESS NOTES
4 Eyes Skin Assessment     NAME:  Waleska Fernández  YOB: 1965  MEDICAL RECORD NUMBER:  [id number]    The patient is being assessed for  Admission    I agree that at least one RN has performed a thorough Head to Toe Skin Assessment on the patient. ALL assessment sites listed below have been assessed.      Areas assessed by both nurses:    Head, Face, Ears, Shoulders, Back, Chest, Arms, Elbows, Hands, Sacrum. Buttock, Coccyx, Ischium, Legs. Feet and Heels, and Under Medical Devices         Does the Patient have a Wound? Yes wound(s) were present on assessment. LDA wound assessment was Initiated and completed by RN     Pt with abrasion to R ring finger   Adrian Prevention initiated by RN: No  Wound Care Orders initiated by RN: No    Pressure Injury (Stage 3,4, Unstageable, DTI, NWPT, and Complex wounds) if present, place Wound referral order by RN under : No    New Ostomies, if present place, Ostomy referral order under : No     Nurse 1 eSignature: Electronically signed by Jasmine Gonzalez RN on 3/14/25 at 11:19 AM EDT    **SHARE this note so that the co-signing nurse can place an eSignature**    Nurse 2 eSignature: Electronically signed by Toyin Lowe RN on 3/14/25 at 12:23 PM EDT

## 2025-03-14 NOTE — PROGRESS NOTES
V2.0  [facility] Hospitalist Progress Note      Name:  Waleska Fernández /Age/Sex: 1965  (59 y.o. female)   MRN & CSN:  [id number] & [id number] Encounter Date/Time: 3/14/2025 1:44 PM EDT    Location:  -A PCP: Nathan Lambert DO       Hospital Day: 2    Assessment and Plan:   Waleska Fernández is a 59 y.o. female with pmh of type 2 diabetes, hyperlipidemia, CAD SP CABG and PCI who presents with Atypical chest pain    Plan:  Chest pain   -Patient's EKG has ST depression in inferior leads which similar to prior EKG  -Troponin was slightly elevated but stable 16, 27, 32, 42  -Appreciate cardiology input: Cardiac cath showed patent left main,  LAD with patent KEANE LAD and left to right collaterals.  SVG to OM graft, left dominant, with widely patent proximal stent which was placed during last coronary angiogram in 2024.  Medical management, outpatient follow-up  -Patient does have reproductive tender on chest wall  -Will try 1 dose of Decadron, lidocaine patch, cardiology started Imdur to as well  -Keep monitor overnight     History of CAD status post CABG and PCI  -continue aspirin and brillinta, ranexa, crestor     Type 2 diabetes mellitus  -Continue jardiance, start lispro lantus and SSI and hypoglycemia treatment orders     MDD/anxiety  -Continue Lexapro.     Hyperlipidemia  -Continue statin       Diet ADULT DIET; Regular; 4 carb choices (60 gm/meal); Low Fat/Low Chol/High Fiber/2 gm Na   DVT Prophylaxis [x] Lovenox, []  Heparin, [] SCDs, [] Ambulation,  [] Eliquis, [] Xarelto  [] Coumadin   Code Status Full Code   Disposition From: Home  Expected Disposition: Home  Estimated Date of Discharge: 1 to 2 days  Patient requires continued admission due to medical condition   Surrogate Decision Maker/ POA      Subjective:     Chief Complaint: Chest Pain and Shortness of Breath (Anxious and tearful during triage )       Waleska Fernández is a 59 y.o. female is evaluated in the room.  Patient still  recognition software.  While attempts have  been made to review the dictation as it is transcribed, on occasion the spoken word can be misinterpreted by the technology leading to omissions or inappropriate words, phrases or sentences.  Dictated and Electronically Signed By: Marcial Reyez MD 3/13/2025 17:12          Electronically signed by SARI Kim CNP on 3/14/2025 at 6:48 PM

## 2025-03-15 VITALS
SYSTOLIC BLOOD PRESSURE: 127 MMHG | DIASTOLIC BLOOD PRESSURE: 58 MMHG | RESPIRATION RATE: 15 BRPM | HEART RATE: 80 BPM | TEMPERATURE: 98.5 F | OXYGEN SATURATION: 100 % | WEIGHT: 191.38 LBS | BODY MASS INDEX: 30.76 KG/M2 | HEIGHT: 66 IN

## 2025-03-15 LAB — GLUCOSE BLD-MCNC: 178 MG/DL (ref 74–99)

## 2025-03-15 PROCEDURE — 96376 TX/PRO/DX INJ SAME DRUG ADON: CPT

## 2025-03-15 PROCEDURE — 6360000002 HC RX W HCPCS: Performed by: NURSE PRACTITIONER

## 2025-03-15 PROCEDURE — G0378 HOSPITAL OBSERVATION PER HR: HCPCS

## 2025-03-15 PROCEDURE — 2500000003 HC RX 250 WO HCPCS: Performed by: INTERNAL MEDICINE

## 2025-03-15 PROCEDURE — 96372 THER/PROPH/DIAG INJ SC/IM: CPT

## 2025-03-15 PROCEDURE — 6360000002 HC RX W HCPCS: Performed by: INTERNAL MEDICINE

## 2025-03-15 PROCEDURE — 82962 GLUCOSE BLOOD TEST: CPT

## 2025-03-15 PROCEDURE — 6370000000 HC RX 637 (ALT 250 FOR IP): Performed by: NURSE PRACTITIONER

## 2025-03-15 PROCEDURE — 6370000000 HC RX 637 (ALT 250 FOR IP): Performed by: INTERNAL MEDICINE

## 2025-03-15 PROCEDURE — 6370000000 HC RX 637 (ALT 250 FOR IP)

## 2025-03-15 RX ORDER — PANTOPRAZOLE SODIUM 40 MG/1
40 TABLET, DELAYED RELEASE ORAL DAILY
Qty: 30 TABLET | Refills: 1 | Status: SHIPPED | OUTPATIENT
Start: 2025-03-15

## 2025-03-15 RX ORDER — LIDOCAINE 4 G/G
1 PATCH TOPICAL DAILY
Qty: 30 PATCH | Refills: 1 | Status: SHIPPED | OUTPATIENT
Start: 2025-03-16

## 2025-03-15 RX ORDER — ISOSORBIDE MONONITRATE 30 MG/1
30 TABLET, EXTENDED RELEASE ORAL DAILY
Qty: 30 TABLET | Refills: 3 | Status: SHIPPED | OUTPATIENT
Start: 2025-03-16

## 2025-03-15 RX ADMIN — BUSPIRONE HYDROCHLORIDE 30 MG: 15 TABLET ORAL at 08:10

## 2025-03-15 RX ADMIN — MORPHINE SULFATE 2 MG: 2 INJECTION, SOLUTION INTRAMUSCULAR; INTRAVENOUS at 08:11

## 2025-03-15 RX ADMIN — INSULIN LISPRO 5 UNITS: 100 INJECTION, SOLUTION INTRAVENOUS; SUBCUTANEOUS at 08:09

## 2025-03-15 RX ADMIN — SODIUM CHLORIDE, PRESERVATIVE FREE 10 ML: 5 INJECTION INTRAVENOUS at 04:03

## 2025-03-15 RX ADMIN — ENOXAPARIN SODIUM 40 MG: 100 INJECTION SUBCUTANEOUS at 08:11

## 2025-03-15 RX ADMIN — MIDODRINE HYDROCHLORIDE 5 MG: 5 TABLET ORAL at 08:10

## 2025-03-15 RX ADMIN — PANTOPRAZOLE SODIUM 40 MG: 40 TABLET, DELAYED RELEASE ORAL at 08:10

## 2025-03-15 RX ADMIN — MORPHINE SULFATE 2 MG: 2 INJECTION, SOLUTION INTRAMUSCULAR; INTRAVENOUS at 04:02

## 2025-03-15 RX ADMIN — RANOLAZINE 1000 MG: 500 TABLET, EXTENDED RELEASE ORAL at 08:10

## 2025-03-15 RX ADMIN — SODIUM CHLORIDE, PRESERVATIVE FREE 10 ML: 5 INJECTION INTRAVENOUS at 04:02

## 2025-03-15 RX ADMIN — TICAGRELOR 90 MG: 90 TABLET ORAL at 08:10

## 2025-03-15 RX ADMIN — EMPAGLIFLOZIN 25 MG: 10 TABLET, FILM COATED ORAL at 08:10

## 2025-03-15 RX ADMIN — SODIUM CHLORIDE, PRESERVATIVE FREE 10 ML: 5 INJECTION INTRAVENOUS at 08:12

## 2025-03-15 RX ADMIN — SODIUM CHLORIDE, PRESERVATIVE FREE 10 ML: 5 INJECTION INTRAVENOUS at 08:11

## 2025-03-15 RX ADMIN — ASPIRIN 81 MG: 81 TABLET, CHEWABLE ORAL at 08:11

## 2025-03-15 RX ADMIN — ISOSORBIDE MONONITRATE 30 MG: 30 TABLET, EXTENDED RELEASE ORAL at 08:10

## 2025-03-15 ASSESSMENT — PAIN DESCRIPTION - ORIENTATION: ORIENTATION: LEFT

## 2025-03-15 ASSESSMENT — PAIN SCALES - GENERAL
PAINLEVEL_OUTOF10: 7
PAINLEVEL_OUTOF10: 6

## 2025-03-15 ASSESSMENT — PAIN DESCRIPTION - LOCATION: LOCATION: CHEST

## 2025-03-15 ASSESSMENT — PAIN DESCRIPTION - DESCRIPTORS: DESCRIPTORS: PRESSURE

## 2025-03-15 NOTE — DISCHARGE SUMMARY
V2.0  Discharge Summary    Name:  Waleska Fernández /Age/Sex: 1965 (59 y.o. female)   Admit Date: 3/13/2025  Discharge Date: 3/15/25    MRN & CSN:  [id number] & [id number] Encounter Date and Time 3/15/25 10:30 AM EDT    Attending:  No att. providers found Discharging Provider: SARI Kim - CNP       Hospital Course:     Brief HPI: Waleska Fernández is a 59 y.o. female with pmh of type 2 diabetes, hyperlipidemia, CAD SP CABG and PCI who presents with Atypical chest pain. Her EKG has  ST depression in inferior leads which similar to prior EKG. Troponin was slightly elevated but stable 16, 27, 32, 42 cardiology performed cardiac cath on  which is negative for obstructive change. Pt then received one dose of decadron, lidocaine patch and imdur. Pt reported improved pain today. Pt will be discharged home. Prescription of imdur, lidocaine patch and protonix sent to pharmacy.     Brief Problem Based Course:   Chest pain   -Patient's EKG has ST depression in inferior leads which similar to prior EKG  -Troponin was slightly elevated but stable 16, 27, 32, 42  -Appreciate cardiology input: Cardiac cath showed patent left main,  LAD with patent KEANE LAD and left to right collaterals.  SVG to OM graft, left dominant, with widely patent proximal stent which was placed during last coronary angiogram in 2024.  Medical management, outpatient follow-up  -Patient does have tender on palpation on chest wall  -Will try 1 dose of Decadron, lidocaine patch, cardiology started Imdur to as well  -Keep monitor overnight     History of CAD status post CABG and PCI  -continue aspirin and brillinta, ranexa, crestor     Type 2 diabetes mellitus  -Continue jardiance, start lispro lantus and SSI and hypoglycemia treatment orders     MDD/anxiety  -Continue Lexapro.     Hyperlipidemia  -Continue statin         The patient expressed appropriate understanding of, and agreement with the discharge recommendations, medications,

## 2025-03-17 ENCOUNTER — TELEPHONE (OUTPATIENT)
Dept: CARDIOLOGY CLINIC | Age: 60
End: 2025-03-17

## 2025-03-25 ENCOUNTER — HOSPITAL ENCOUNTER (INPATIENT)
Age: 60
LOS: 2 days | Discharge: HOME | DRG: 065 | End: 2025-03-27
Attending: STUDENT IN AN ORGANIZED HEALTH CARE EDUCATION/TRAINING PROGRAM | Admitting: STUDENT IN AN ORGANIZED HEALTH CARE EDUCATION/TRAINING PROGRAM
Payer: COMMERCIAL

## 2025-03-25 ENCOUNTER — APPOINTMENT (OUTPATIENT)
Dept: CT IMAGING | Age: 60
DRG: 065 | End: 2025-03-25
Attending: STUDENT IN AN ORGANIZED HEALTH CARE EDUCATION/TRAINING PROGRAM
Payer: COMMERCIAL

## 2025-03-25 DIAGNOSIS — G43.901 STATUS MIGRAINOSUS: ICD-10-CM

## 2025-03-25 DIAGNOSIS — I16.1 HYPERTENSIVE EMERGENCY: Primary | ICD-10-CM

## 2025-03-25 PROBLEM — I60.9 SUBARACHNOID HEMORRHAGE (HCC): Status: ACTIVE | Noted: 2025-03-25

## 2025-03-25 LAB
ALBUMIN SERPL-MCNC: 4.1 G/DL (ref 3.4–5)
ALBUMIN/GLOB SERPL: 1.4 {RATIO} (ref 1.1–2.2)
ALP SERPL-CCNC: 90 U/L (ref 40–129)
ALT SERPL-CCNC: 29 U/L (ref 10–40)
ANION GAP SERPL CALCULATED.3IONS-SCNC: 16 MMOL/L (ref 9–17)
AST SERPL-CCNC: 30 U/L (ref 15–37)
BASOPHILS # BLD: 0.05 K/UL
BASOPHILS NFR BLD: 1 % (ref 0–1)
BILIRUB DIRECT SERPL-MCNC: <0.2 MG/DL (ref 0–0.3)
BILIRUB INDIRECT SERPL-MCNC: NORMAL MG/DL (ref 0–0.7)
BILIRUB SERPL-MCNC: 0.3 MG/DL (ref 0–1)
BUN SERPL-MCNC: 20 MG/DL (ref 7–20)
CA-I BLD-SCNC: 1.24 MMOL/L (ref 1.15–1.33)
CALCIUM SERPL-MCNC: 9.4 MG/DL (ref 8.3–10.6)
CHLORIDE SERPL-SCNC: 102 MMOL/L (ref 99–110)
CHOLEST SERPL-MCNC: 139 MG/DL (ref 125–199)
CO2 SERPL-SCNC: 18 MMOL/L (ref 21–32)
CREAT SERPL-MCNC: 1.4 MG/DL (ref 0.6–1.2)
D DIMER PPP FEU-MCNC: 0.99 UG/ML FEU (ref 0–0.46)
EOSINOPHIL # BLD: 0.02 K/UL
EOSINOPHILS RELATIVE PERCENT: 0 % (ref 0–3)
ERYTHROCYTE [DISTWIDTH] IN BLOOD BY AUTOMATED COUNT: 13.1 % (ref 11.7–14.9)
FIBRINOGEN PPP-MCNC: 432 MG/DL (ref 170–540)
GFR, ESTIMATED: 40 ML/MIN/1.73M2
GLUCOSE BLD-MCNC: 315 MG/DL (ref 74–99)
GLUCOSE SERPL-MCNC: 310 MG/DL (ref 74–99)
HCT VFR BLD AUTO: 36 % (ref 37–47)
HDLC SERPL-MCNC: 50 MG/DL
HGB BLD-MCNC: 11 G/DL (ref 12.5–16)
IMM GRANULOCYTES # BLD AUTO: 0.06 K/UL
IMM GRANULOCYTES NFR BLD: 1 %
INR PPP: 0.9
LACTATE BLDV-SCNC: 3.5 MMOL/L (ref 0.4–2)
LDLC SERPL CALC-MCNC: 53 MG/DL
LYMPHOCYTES NFR BLD: 0.76 K/UL
LYMPHOCYTES RELATIVE PERCENT: 8 % (ref 24–44)
MAGNESIUM SERPL-MCNC: 1.5 MG/DL (ref 1.8–2.4)
MCH RBC QN AUTO: 28.1 PG (ref 27–31)
MCHC RBC AUTO-ENTMCNC: 30.6 G/DL (ref 32–36)
MCV RBC AUTO: 92.1 FL (ref 78–100)
MONOCYTES NFR BLD: 0.15 K/UL
MONOCYTES NFR BLD: 2 % (ref 0–4)
NEUTROPHILS NFR BLD: 89 % (ref 36–66)
NEUTS SEG NFR BLD: 8.72 K/UL
PARTIAL THROMBOPLASTIN TIME: 29.8 SEC (ref 25.1–37.1)
PHOSPHATE SERPL-MCNC: 4.7 MG/DL (ref 2.5–4.9)
PLATELET # BLD AUTO: 221 K/UL (ref 140–440)
PMV BLD AUTO: 10.6 FL (ref 7.5–11.1)
POTASSIUM SERPL-SCNC: 4.2 MMOL/L (ref 3.5–5.1)
PROT SERPL-MCNC: 7.1 G/DL (ref 6.4–8.2)
PROTHROMBIN TIME: 12.8 SEC (ref 11.7–14.5)
RBC # BLD AUTO: 3.91 M/UL (ref 4.2–5.4)
SODIUM SERPL-SCNC: 136 MMOL/L (ref 136–145)
T4 SERPL-MCNC: 4.7 UG/DL (ref 4.5–10.9)
TRIGL SERPL-MCNC: 183 MG/DL
TSH SERPL DL<=0.05 MIU/L-ACNC: 0.74 UIU/ML (ref 0.27–4.2)
WBC OTHER # BLD: 9.8 K/UL (ref 4–10.5)

## 2025-03-25 PROCEDURE — 85384 FIBRINOGEN ACTIVITY: CPT

## 2025-03-25 PROCEDURE — 2500000003 HC RX 250 WO HCPCS: Performed by: STUDENT IN AN ORGANIZED HEALTH CARE EDUCATION/TRAINING PROGRAM

## 2025-03-25 PROCEDURE — 82330 ASSAY OF CALCIUM: CPT

## 2025-03-25 PROCEDURE — 83605 ASSAY OF LACTIC ACID: CPT

## 2025-03-25 PROCEDURE — 70450 CT HEAD/BRAIN W/O DYE: CPT

## 2025-03-25 PROCEDURE — 6370000000 HC RX 637 (ALT 250 FOR IP): Performed by: STUDENT IN AN ORGANIZED HEALTH CARE EDUCATION/TRAINING PROGRAM

## 2025-03-25 PROCEDURE — 6370000000 HC RX 637 (ALT 250 FOR IP): Performed by: INTERNAL MEDICINE

## 2025-03-25 PROCEDURE — 85025 COMPLETE CBC W/AUTO DIFF WBC: CPT

## 2025-03-25 PROCEDURE — 6360000002 HC RX W HCPCS: Performed by: STUDENT IN AN ORGANIZED HEALTH CARE EDUCATION/TRAINING PROGRAM

## 2025-03-25 PROCEDURE — 2580000003 HC RX 258: Performed by: STUDENT IN AN ORGANIZED HEALTH CARE EDUCATION/TRAINING PROGRAM

## 2025-03-25 PROCEDURE — 84443 ASSAY THYROID STIM HORMONE: CPT

## 2025-03-25 PROCEDURE — 80061 LIPID PANEL: CPT

## 2025-03-25 PROCEDURE — 85730 THROMBOPLASTIN TIME PARTIAL: CPT

## 2025-03-25 PROCEDURE — 84436 ASSAY OF TOTAL THYROXINE: CPT

## 2025-03-25 PROCEDURE — 82248 BILIRUBIN DIRECT: CPT

## 2025-03-25 PROCEDURE — 80053 COMPREHEN METABOLIC PANEL: CPT

## 2025-03-25 PROCEDURE — 85610 PROTHROMBIN TIME: CPT

## 2025-03-25 PROCEDURE — 85379 FIBRIN DEGRADATION QUANT: CPT

## 2025-03-25 PROCEDURE — 2000000000 HC ICU R&B

## 2025-03-25 PROCEDURE — 84100 ASSAY OF PHOSPHORUS: CPT

## 2025-03-25 PROCEDURE — 82962 GLUCOSE BLOOD TEST: CPT

## 2025-03-25 PROCEDURE — 83735 ASSAY OF MAGNESIUM: CPT

## 2025-03-25 PROCEDURE — 94761 N-INVAS EAR/PLS OXIMETRY MLT: CPT

## 2025-03-25 RX ORDER — SODIUM CHLORIDE 9 MG/ML
INJECTION, SOLUTION INTRAVENOUS PRN
Status: DISCONTINUED | OUTPATIENT
Start: 2025-03-25 | End: 2025-03-27 | Stop reason: HOSPADM

## 2025-03-25 RX ORDER — OXYCODONE AND ACETAMINOPHEN 10; 325 MG/1; MG/1
1 TABLET ORAL EVERY 6 HOURS PRN
Refills: 0 | Status: DISCONTINUED | OUTPATIENT
Start: 2025-03-25 | End: 2025-03-25

## 2025-03-25 RX ORDER — SODIUM CHLORIDE 0.9 % (FLUSH) 0.9 %
5-40 SYRINGE (ML) INJECTION EVERY 12 HOURS SCHEDULED
Status: DISCONTINUED | OUTPATIENT
Start: 2025-03-25 | End: 2025-03-27 | Stop reason: HOSPADM

## 2025-03-25 RX ORDER — GLUCAGON 1 MG/ML
1 KIT INJECTION PRN
Status: DISCONTINUED | OUTPATIENT
Start: 2025-03-25 | End: 2025-03-27 | Stop reason: HOSPADM

## 2025-03-25 RX ORDER — MAGNESIUM SULFATE 1 G/100ML
1000 INJECTION INTRAVENOUS PRN
Status: DISCONTINUED | OUTPATIENT
Start: 2025-03-25 | End: 2025-03-27 | Stop reason: HOSPADM

## 2025-03-25 RX ORDER — POTASSIUM CHLORIDE 7.45 MG/ML
10 INJECTION INTRAVENOUS PRN
Status: DISCONTINUED | OUTPATIENT
Start: 2025-03-25 | End: 2025-03-27 | Stop reason: HOSPADM

## 2025-03-25 RX ORDER — DIPHENHYDRAMINE HCL 25 MG
25 TABLET ORAL EVERY 6 HOURS PRN
Status: DISCONTINUED | OUTPATIENT
Start: 2025-03-25 | End: 2025-03-27 | Stop reason: HOSPADM

## 2025-03-25 RX ORDER — GLIPIZIDE 5 MG/1
5 TABLET ORAL
COMMUNITY

## 2025-03-25 RX ORDER — ONDANSETRON 4 MG/1
4 TABLET, ORALLY DISINTEGRATING ORAL EVERY 8 HOURS PRN
Status: DISCONTINUED | OUTPATIENT
Start: 2025-03-25 | End: 2025-03-27 | Stop reason: HOSPADM

## 2025-03-25 RX ORDER — SODIUM CHLORIDE 0.9 % (FLUSH) 0.9 %
5-40 SYRINGE (ML) INJECTION PRN
Status: DISCONTINUED | OUTPATIENT
Start: 2025-03-25 | End: 2025-03-27 | Stop reason: HOSPADM

## 2025-03-25 RX ORDER — MAGNESIUM SULFATE IN WATER 40 MG/ML
2000 INJECTION, SOLUTION INTRAVENOUS PRN
Status: DISCONTINUED | OUTPATIENT
Start: 2025-03-25 | End: 2025-03-27 | Stop reason: HOSPADM

## 2025-03-25 RX ORDER — DEXTROSE MONOHYDRATE 100 MG/ML
INJECTION, SOLUTION INTRAVENOUS CONTINUOUS PRN
Status: DISCONTINUED | OUTPATIENT
Start: 2025-03-25 | End: 2025-03-27 | Stop reason: HOSPADM

## 2025-03-25 RX ORDER — OXYCODONE HYDROCHLORIDE 10 MG/1
10 TABLET ORAL EVERY 6 HOURS PRN
Refills: 0 | Status: DISCONTINUED | OUTPATIENT
Start: 2025-03-25 | End: 2025-03-27 | Stop reason: HOSPADM

## 2025-03-25 RX ORDER — POTASSIUM CHLORIDE 29.8 MG/ML
20 INJECTION INTRAVENOUS PRN
Status: DISCONTINUED | OUTPATIENT
Start: 2025-03-25 | End: 2025-03-27 | Stop reason: HOSPADM

## 2025-03-25 RX ORDER — BUSPIRONE HYDROCHLORIDE 15 MG/1
30 TABLET ORAL 2 TIMES DAILY
Status: DISCONTINUED | OUTPATIENT
Start: 2025-03-25 | End: 2025-03-27 | Stop reason: HOSPADM

## 2025-03-25 RX ORDER — ROSUVASTATIN CALCIUM 20 MG/1
40 TABLET, COATED ORAL NIGHTLY
Status: DISCONTINUED | OUTPATIENT
Start: 2025-03-25 | End: 2025-03-27 | Stop reason: HOSPADM

## 2025-03-25 RX ORDER — ACETAMINOPHEN 325 MG/1
650 TABLET ORAL EVERY 4 HOURS PRN
Status: DISCONTINUED | OUTPATIENT
Start: 2025-03-25 | End: 2025-03-26

## 2025-03-25 RX ORDER — HYDROMORPHONE HYDROCHLORIDE 1 MG/ML
0.5 INJECTION, SOLUTION INTRAMUSCULAR; INTRAVENOUS; SUBCUTANEOUS EVERY 4 HOURS PRN
Status: DISCONTINUED | OUTPATIENT
Start: 2025-03-25 | End: 2025-03-26

## 2025-03-25 RX ORDER — LEVETIRACETAM 500 MG/5ML
500 INJECTION, SOLUTION, CONCENTRATE INTRAVENOUS EVERY 12 HOURS
Status: DISCONTINUED | OUTPATIENT
Start: 2025-03-26 | End: 2025-03-27 | Stop reason: HOSPADM

## 2025-03-25 RX ORDER — ACETAMINOPHEN 325 MG/1
325 TABLET ORAL EVERY 6 HOURS PRN
Status: DISCONTINUED | OUTPATIENT
Start: 2025-03-25 | End: 2025-03-27 | Stop reason: HOSPADM

## 2025-03-25 RX ORDER — ESCITALOPRAM OXALATE 10 MG/1
20 TABLET ORAL DAILY
Status: DISCONTINUED | OUTPATIENT
Start: 2025-03-25 | End: 2025-03-27 | Stop reason: HOSPADM

## 2025-03-25 RX ORDER — INSULIN LISPRO 100 [IU]/ML
0-8 INJECTION, SOLUTION INTRAVENOUS; SUBCUTANEOUS
Status: DISCONTINUED | OUTPATIENT
Start: 2025-03-25 | End: 2025-03-27 | Stop reason: HOSPADM

## 2025-03-25 RX ORDER — PANTOPRAZOLE SODIUM 40 MG/1
40 TABLET, DELAYED RELEASE ORAL DAILY
Status: DISCONTINUED | OUTPATIENT
Start: 2025-03-25 | End: 2025-03-27 | Stop reason: HOSPADM

## 2025-03-25 RX ORDER — ONDANSETRON 2 MG/ML
4 INJECTION INTRAMUSCULAR; INTRAVENOUS EVERY 6 HOURS PRN
Status: DISCONTINUED | OUTPATIENT
Start: 2025-03-25 | End: 2025-03-27 | Stop reason: HOSPADM

## 2025-03-25 RX ORDER — ASPIRIN 81 MG/1
81 TABLET, CHEWABLE ORAL DAILY
Status: DISCONTINUED | OUTPATIENT
Start: 2025-03-25 | End: 2025-03-25

## 2025-03-25 RX ADMIN — ACETAMINOPHEN 325 MG: 325 TABLET ORAL at 20:28

## 2025-03-25 RX ADMIN — HYDROMORPHONE HYDROCHLORIDE 0.5 MG: 1 INJECTION, SOLUTION INTRAMUSCULAR; INTRAVENOUS; SUBCUTANEOUS at 18:42

## 2025-03-25 RX ADMIN — OXYCODONE HYDROCHLORIDE 10 MG: 10 TABLET ORAL at 20:28

## 2025-03-25 RX ADMIN — INSULIN LISPRO 6 UNITS: 100 INJECTION, SOLUTION INTRAVENOUS; SUBCUTANEOUS at 20:29

## 2025-03-25 RX ADMIN — MAGNESIUM SULFATE HEPTAHYDRATE 2000 MG: 40 INJECTION, SOLUTION INTRAVENOUS at 20:32

## 2025-03-25 RX ADMIN — ESCITALOPRAM OXALATE 20 MG: 10 TABLET ORAL at 20:28

## 2025-03-25 RX ADMIN — HYDROMORPHONE HYDROCHLORIDE 0.5 MG: 1 INJECTION, SOLUTION INTRAMUSCULAR; INTRAVENOUS; SUBCUTANEOUS at 22:43

## 2025-03-25 RX ADMIN — DIPHENHYDRAMINE HYDROCHLORIDE 25 MG: 25 TABLET ORAL at 22:47

## 2025-03-25 RX ADMIN — SODIUM CHLORIDE 25 ML: 0.9 INJECTION, SOLUTION INTRAVENOUS at 20:31

## 2025-03-25 RX ADMIN — BUSPIRONE HYDROCHLORIDE 30 MG: 15 TABLET ORAL at 20:28

## 2025-03-25 RX ADMIN — SODIUM CHLORIDE, PRESERVATIVE FREE 10 ML: 5 INJECTION INTRAVENOUS at 20:30

## 2025-03-25 RX ADMIN — ROSUVASTATIN CALCIUM 40 MG: 20 TABLET, FILM COATED ORAL at 20:28

## 2025-03-25 ASSESSMENT — ENCOUNTER SYMPTOMS
SHORTNESS OF BREATH: 1
GASTROINTESTINAL NEGATIVE: 1

## 2025-03-25 ASSESSMENT — PAIN DESCRIPTION - LOCATION
LOCATION: HEAD
LOCATION: HEAD

## 2025-03-25 ASSESSMENT — PAIN DESCRIPTION - ONSET: ONSET: ON-GOING

## 2025-03-25 ASSESSMENT — PAIN SCALES - GENERAL
PAINLEVEL_OUTOF10: 10
PAINLEVEL_OUTOF10: 7
PAINLEVEL_OUTOF10: 8
PAINLEVEL_OUTOF10: 8
PAINLEVEL_OUTOF10: 9

## 2025-03-25 ASSESSMENT — PAIN DESCRIPTION - PAIN TYPE: TYPE: ACUTE PAIN

## 2025-03-25 ASSESSMENT — PAIN DESCRIPTION - DESCRIPTORS
DESCRIPTORS: THROBBING;SQUEEZING
DESCRIPTORS: SQUEEZING

## 2025-03-25 ASSESSMENT — PAIN DESCRIPTION - FREQUENCY: FREQUENCY: CONTINUOUS

## 2025-03-25 ASSESSMENT — PAIN DESCRIPTION - ORIENTATION
ORIENTATION: UPPER
ORIENTATION: UPPER

## 2025-03-25 NOTE — PROGRESS NOTES
Attending attestation note    Patient critical care due to:  [x] Neurological monitoring and treatment  [] Respiratory failure -assessment of ventilator support, adjustment, ventilator weaning  [x] Hemodynamic and volume assessment with volume resuscitation  [x] Mechanical and/or chemical support of the circulation,  [] Frequent vasoactive agent adjustment,  [] Renal replacement therapy,  [] For rapid decompensation,  [] Electrolyte instability  [] Suctioning every 2 hours  [x] Every hour neuro checks  [] Every hour neurovascular checks  [x] Frequent evaluation of patient's response to treatment and titration of therapies,  [x] Interpretation of laboratory and radiological data,  [x] Application and interpretation of advanced monitoring technologies,  [x] Extensive interpretation of multiple databases,  [x] Development of treatment plan with patient, surrogate, or consultants.  [] Others:    Attending note and attestation:   [x] I personally saw and evaluated the patient.  I discussed the case with the [facility] provider, reviewed the medical record, and agree with the findings and plan as documented in their portion of this note and take responsibility for the patient management.    [] I was present with the [facility] provider during the interview and exam.  I discussed the case with the [facility] provider, reviewed the medical record and agree with the findings and plan as documented in the provider portion of this note.    [x]  I personally interviewed and examined the patient and reviewed the medical record including the radiographs, laboratory data, and independently provided 48 minutes of non-concurrent critical care out of the total shared critical care time provided. I agree with the findings and plan as documented in the [facility] provider's portion of this note and take responsibility for the patients management.    I personally spent 48 minutes in the care of this patient including but not limited to reviewing history, examining

## 2025-03-25 NOTE — H&P
V2.0  History and Physical      Name:  Waleska Fernández /Age/Sex: 1965  (59 y.o. female)   MRN & CSN:  [id number] & [id number] Encounter Date/Time: 3/25/2025 5:41 PM EDT   Location:  -A PCP: Nathan Lambert DO       Hospital Day: 1    Assessment and Plan:   Waleska Fernández is a 59 y.o. female with a pmh of CAD, PVD, HTN, hyperlipidemia who presents with Subarachnoid hemorrhage (HCC). She was found to have HTN emergency and SAH. She was transferred to [facility] for ongoing evaluation and care.    Hospital Problems           Last Modified POA    * (Principal) Subarachnoid hemorrhage (HCC) 3/25/2025 Yes       Problems:  SAH  HTN emergency   Moderate KENDALL of HUYEN  Moderate Left subclavian artery stenosis   Headache  CAD/PAD  DMII    Neuro-Presented with worsening HA with dyspnea and L pain. Head CT at OSH with mild L parasaggital SAH, no mass effect, midline shift. CTA H/N with mod stenosis of HUYEN and L subclavian artery. Images to be pushed. No focal deficits on exam except for photophobia. MRI brain and MRA ordered. Neurosurgery consulted. HA management.   Cardiac-Systolic >200 at OSH ED. Started nicardipine infusion at 1400. Goal SBP < 160 for now. Arrives to unit with cardene off, SBP ~120s. Hold infusion, start if needed. Hold PO for now pending surgical evaluation. Recent admit 3/15 for chest pain. [facility] with non obstructive CAD, no intervention. Hold home DAPT pending NSGY eval. Continue statin. Recently started on ranexa, can continue when OK to take PO   Respiratory-On RA. Endorsed dyspnea on exertion earlier today, resolved on my assessment.   GI-No abdominal symptoms. Can have diet tonight, NPO at [facility] pending Neurosurgery evaluation  -No urinary symptoms   Heme-CBC pending. No history of anemia or bleeding   ID-Labs pending. No recent infectious complaints   Endo-A1c pending. SSI +/- basal, BG goal 140-180  MKS-DTI prevention.     Disposition:   Current Living situation:  Transportation (Medical): No     Lack of Transportation (Non-Medical): No   Housing Stability: Low Risk  (3/15/2025)    Housing Stability Vital Sign     Unable to Pay for Housing in the Last Year: No     Number of Times Moved in the Last Year: 1     Homeless in the Last Year: No       Medications:   Medications:    sodium chloride flush  5-40 mL IntraVENous 2 times per day    [START ON 3/26/2025] levETIRAcetam  500 mg IntraVENous Q12H    busPIRone  30 mg Oral BID    escitalopram  20 mg Oral Daily    pantoprazole  40 mg Oral Daily    rosuvastatin  40 mg Oral Nightly      Infusions:    sodium chloride      niCARdipine       PRN Meds: sodium chloride flush, 5-40 mL, PRN  sodium chloride, , PRN  potassium chloride, 20 mEq, PRN   Or  potassium chloride, 10 mEq, PRN  magnesium sulfate, 2,000 mg, PRN  acetaminophen, 650 mg, Q4H PRN  ondansetron, 4 mg, Q8H PRN   Or  ondansetron, 4 mg, Q6H PRN  magnesium sulfate, 1,000 mg, PRN  oxyCODONE-acetaminophen, 1 tablet, Q6H PRN  diphenhydrAMINE, 25 mg, Q6H PRN  HYDROmorphone, 0.5 mg, Q4H PRN        Labs      CBC: No results for input(s): \"WBC\", \"HGB\", \"PLT\" in the last 72 hours.  BMP:  No results for input(s): \"NA\", \"K\", \"CL\", \"CO2\", \"BUN\", \"CREATININE\", \"GLUCOSE\" in the last 72 hours.  Hepatic: No results for input(s): \"AST\", \"ALT\", \"BILITOT\", \"ALKPHOS\" in the last 72 hours.    Invalid input(s): \"ALB\"  Lipids:   Lab Results   Component Value Date/Time    CHOL 124 10/29/2024 07:47 AM    HDL 50 10/29/2024 07:47 AM    TRIG 158 10/29/2024 07:47 AM     Hemoglobin A1C:   Lab Results   Component Value Date/Time    LABA1C 5.1 04/13/2022 05:54 AM     TSH:   Lab Results   Component Value Date/Time    TSH 2.13 10/28/2024 11:25 AM     Troponin:   Lab Results   Component Value Date/Time    TROPONINT <0.010 11/16/2022 06:34 PM    TROPONINT <0.010 11/16/2022 02:35 PM    TROPONINT 2.420 04/13/2022 04:15 PM     Lactic Acid: No results for input(s): \"LACTA\" in the last 72 hours.  BNP: No results for

## 2025-03-25 NOTE — PROGRESS NOTES
Dr. Barrera notified of Lactic 3.5, notified of Mg level 1.5, asked if wanted any other Mg replacement given besides 2g that PRN calls for. Also notified that patient is a diabetic and takes glipizide and jardiance at home. Asked about humalog coverage.    Electronically signed by Nuha Murdock RN on 3/25/2025 at 7:58 PM

## 2025-03-25 NOTE — PROGRESS NOTES
4 Eyes Skin Assessment     NAME:  Waleska Fernández  YOB: 1965  MEDICAL RECORD NUMBER:  [id number]    The patient is being assessed for  Admission    I agree that at least one RN has performed a thorough Head to Toe Skin Assessment on the patient. ALL assessment sites listed below have been assessed.      Areas assessed by both nurses:    Head, Face, Ears, Shoulders, Back, Chest, Arms, Elbows, Hands, Sacrum. Buttock, Coccyx, Ischium, Legs. Feet and Heels, and Under Medical Devices         Does the Patient have a Wound? No noted wound(s)       Adrian Prevention initiated by RN: No  Wound Care Orders initiated by RN: No    Pressure Injury (Stage 3,4, Unstageable, DTI, NWPT, and Complex wounds) if present, place Wound referral order by RN under : No    New Ostomies, if present place, Ostomy referral order under : No     Nurse 1 eSignature: Electronically signed by Nuha Murdock RN on 3/25/25 at 6:12 PM EDT    **SHARE this note so that the co-signing nurse can place an eSignature**    Nurse 2 eSignature: {Esignature:[id number]}

## 2025-03-26 ENCOUNTER — APPOINTMENT (OUTPATIENT)
Dept: MRI IMAGING | Age: 60
DRG: 065 | End: 2025-03-26
Attending: STUDENT IN AN ORGANIZED HEALTH CARE EDUCATION/TRAINING PROGRAM
Payer: COMMERCIAL

## 2025-03-26 ENCOUNTER — APPOINTMENT (OUTPATIENT)
Dept: CT IMAGING | Age: 60
DRG: 065 | End: 2025-03-26
Attending: STUDENT IN AN ORGANIZED HEALTH CARE EDUCATION/TRAINING PROGRAM
Payer: COMMERCIAL

## 2025-03-26 PROBLEM — I16.1 HYPERTENSIVE EMERGENCY: Status: ACTIVE | Noted: 2025-03-26

## 2025-03-26 PROBLEM — G43.901 STATUS MIGRAINOSUS: Status: ACTIVE | Noted: 2025-03-26

## 2025-03-26 PROBLEM — Z86.73 HISTORY OF STROKE: Status: ACTIVE | Noted: 2025-03-26

## 2025-03-26 LAB
ALBUMIN SERPL-MCNC: 4.2 G/DL (ref 3.4–5)
ALBUMIN/GLOB SERPL: 1.3 {RATIO} (ref 1.1–2.2)
ALP SERPL-CCNC: 88 U/L (ref 40–129)
ALT SERPL-CCNC: 35 U/L (ref 10–40)
ANION GAP SERPL CALCULATED.3IONS-SCNC: 14 MMOL/L (ref 9–17)
AST SERPL-CCNC: 40 U/L (ref 15–37)
BASOPHILS # BLD: 0.04 K/UL
BASOPHILS NFR BLD: 0 % (ref 0–1)
BILIRUB DIRECT SERPL-MCNC: <0.2 MG/DL (ref 0–0.3)
BILIRUB INDIRECT SERPL-MCNC: ABNORMAL MG/DL (ref 0–0.7)
BILIRUB SERPL-MCNC: 0.3 MG/DL (ref 0–1)
BUN SERPL-MCNC: 25 MG/DL (ref 7–20)
CA-I BLD-SCNC: 1.2 MMOL/L (ref 1.15–1.33)
CALCIUM SERPL-MCNC: 9.3 MG/DL (ref 8.3–10.6)
CHLORIDE SERPL-SCNC: 102 MMOL/L (ref 99–110)
CO2 SERPL-SCNC: 18 MMOL/L (ref 21–32)
CREAT SERPL-MCNC: 1.3 MG/DL (ref 0.6–1.2)
EOSINOPHIL # BLD: 0.01 K/UL
EOSINOPHILS RELATIVE PERCENT: 0 % (ref 0–3)
ERYTHROCYTE [DISTWIDTH] IN BLOOD BY AUTOMATED COUNT: 13.1 % (ref 11.7–14.9)
GFR, ESTIMATED: 41 ML/MIN/1.73M2
GLUCOSE BLD-MCNC: 194 MG/DL (ref 74–99)
GLUCOSE BLD-MCNC: 201 MG/DL (ref 74–99)
GLUCOSE BLD-MCNC: 271 MG/DL (ref 74–99)
GLUCOSE BLD-MCNC: 305 MG/DL (ref 74–99)
GLUCOSE SERPL-MCNC: 234 MG/DL (ref 74–99)
HCT VFR BLD AUTO: 35.5 % (ref 37–47)
HGB BLD-MCNC: 11.6 G/DL (ref 12.5–16)
IMM GRANULOCYTES # BLD AUTO: 0.06 K/UL
IMM GRANULOCYTES NFR BLD: 0 %
INR PPP: 1
LYMPHOCYTES NFR BLD: 1.03 K/UL
LYMPHOCYTES RELATIVE PERCENT: 7 % (ref 24–44)
MAGNESIUM SERPL-MCNC: 2.4 MG/DL (ref 1.8–2.4)
MCH RBC QN AUTO: 29.4 PG (ref 27–31)
MCHC RBC AUTO-ENTMCNC: 32.7 G/DL (ref 32–36)
MCV RBC AUTO: 90.1 FL (ref 78–100)
MONOCYTES NFR BLD: 0.52 K/UL
MONOCYTES NFR BLD: 4 % (ref 0–4)
NEUTROPHILS NFR BLD: 89 % (ref 36–66)
NEUTS SEG NFR BLD: 13.11 K/UL
PHOSPHATE SERPL-MCNC: 4.4 MG/DL (ref 2.5–4.9)
PLATELET # BLD AUTO: 282 K/UL (ref 140–440)
PMV BLD AUTO: 10.8 FL (ref 7.5–11.1)
POTASSIUM SERPL-SCNC: 4.7 MMOL/L (ref 3.5–5.1)
PROT SERPL-MCNC: 7.4 G/DL (ref 6.4–8.2)
PROTHROMBIN TIME: 13.7 SEC (ref 11.7–14.5)
RBC # BLD AUTO: 3.94 M/UL (ref 4.2–5.4)
SODIUM SERPL-SCNC: 135 MMOL/L (ref 136–145)
WBC OTHER # BLD: 14.8 K/UL (ref 4–10.5)

## 2025-03-26 PROCEDURE — 1200000000 HC SEMI PRIVATE

## 2025-03-26 PROCEDURE — 6360000002 HC RX W HCPCS: Performed by: INTERNAL MEDICINE

## 2025-03-26 PROCEDURE — 82962 GLUCOSE BLOOD TEST: CPT

## 2025-03-26 PROCEDURE — 85610 PROTHROMBIN TIME: CPT

## 2025-03-26 PROCEDURE — 80053 COMPREHEN METABOLIC PANEL: CPT

## 2025-03-26 PROCEDURE — 6370000000 HC RX 637 (ALT 250 FOR IP): Performed by: INTERNAL MEDICINE

## 2025-03-26 PROCEDURE — 6370000000 HC RX 637 (ALT 250 FOR IP)

## 2025-03-26 PROCEDURE — 99223 1ST HOSP IP/OBS HIGH 75: CPT | Performed by: STUDENT IN AN ORGANIZED HEALTH CARE EDUCATION/TRAINING PROGRAM

## 2025-03-26 PROCEDURE — 6370000000 HC RX 637 (ALT 250 FOR IP): Performed by: STUDENT IN AN ORGANIZED HEALTH CARE EDUCATION/TRAINING PROGRAM

## 2025-03-26 PROCEDURE — 99222 1ST HOSP IP/OBS MODERATE 55: CPT | Performed by: NEUROLOGICAL SURGERY

## 2025-03-26 PROCEDURE — 83036 HEMOGLOBIN GLYCOSYLATED A1C: CPT

## 2025-03-26 PROCEDURE — 70551 MRI BRAIN STEM W/O DYE: CPT

## 2025-03-26 PROCEDURE — 82248 BILIRUBIN DIRECT: CPT

## 2025-03-26 PROCEDURE — 85025 COMPLETE CBC W/AUTO DIFF WBC: CPT

## 2025-03-26 PROCEDURE — 70544 MR ANGIOGRAPHY HEAD W/O DYE: CPT

## 2025-03-26 PROCEDURE — 2580000003 HC RX 258: Performed by: NURSE PRACTITIONER

## 2025-03-26 PROCEDURE — 6360000002 HC RX W HCPCS: Performed by: STUDENT IN AN ORGANIZED HEALTH CARE EDUCATION/TRAINING PROGRAM

## 2025-03-26 PROCEDURE — 84100 ASSAY OF PHOSPHORUS: CPT

## 2025-03-26 PROCEDURE — 82330 ASSAY OF CALCIUM: CPT

## 2025-03-26 PROCEDURE — 94761 N-INVAS EAR/PLS OXIMETRY MLT: CPT

## 2025-03-26 PROCEDURE — 83735 ASSAY OF MAGNESIUM: CPT

## 2025-03-26 PROCEDURE — 92610 EVALUATE SWALLOWING FUNCTION: CPT

## 2025-03-26 PROCEDURE — 2500000003 HC RX 250 WO HCPCS: Performed by: NURSE PRACTITIONER

## 2025-03-26 PROCEDURE — 70450 CT HEAD/BRAIN W/O DYE: CPT

## 2025-03-26 PROCEDURE — 2500000003 HC RX 250 WO HCPCS: Performed by: STUDENT IN AN ORGANIZED HEALTH CARE EDUCATION/TRAINING PROGRAM

## 2025-03-26 RX ORDER — HYDROMORPHONE HYDROCHLORIDE 1 MG/ML
0.5 INJECTION, SOLUTION INTRAMUSCULAR; INTRAVENOUS; SUBCUTANEOUS
Status: DISCONTINUED | OUTPATIENT
Start: 2025-03-26 | End: 2025-03-27 | Stop reason: HOSPADM

## 2025-03-26 RX ORDER — BUTALBITAL, ACETAMINOPHEN AND CAFFEINE 50; 325; 40 MG/1; MG/1; MG/1
1 TABLET ORAL EVERY 4 HOURS PRN
Status: DISCONTINUED | OUTPATIENT
Start: 2025-03-26 | End: 2025-03-26

## 2025-03-26 RX ORDER — INSULIN GLARGINE 100 [IU]/ML
10 INJECTION, SOLUTION SUBCUTANEOUS DAILY
Status: DISCONTINUED | OUTPATIENT
Start: 2025-03-26 | End: 2025-03-27 | Stop reason: HOSPADM

## 2025-03-26 RX ORDER — PROMETHAZINE HYDROCHLORIDE 25 MG/1
25 TABLET ORAL EVERY 6 HOURS PRN
Status: DISCONTINUED | OUTPATIENT
Start: 2025-03-26 | End: 2025-03-27 | Stop reason: HOSPADM

## 2025-03-26 RX ORDER — MIDAZOLAM HYDROCHLORIDE 2 MG/2ML
0.5 INJECTION, SOLUTION INTRAMUSCULAR; INTRAVENOUS ONCE
Status: COMPLETED | OUTPATIENT
Start: 2025-03-26 | End: 2025-03-26

## 2025-03-26 RX ADMIN — BUSPIRONE HYDROCHLORIDE 30 MG: 15 TABLET ORAL at 09:15

## 2025-03-26 RX ADMIN — ESCITALOPRAM OXALATE 20 MG: 10 TABLET ORAL at 20:37

## 2025-03-26 RX ADMIN — INSULIN LISPRO 6 UNITS: 100 INJECTION, SOLUTION INTRAVENOUS; SUBCUTANEOUS at 20:38

## 2025-03-26 RX ADMIN — MELATONIN TAB 3 MG 3 MG: 3 TAB at 22:09

## 2025-03-26 RX ADMIN — BUSPIRONE HYDROCHLORIDE 30 MG: 15 TABLET ORAL at 20:37

## 2025-03-26 RX ADMIN — MIDAZOLAM 0.5 MG: 1 INJECTION INTRAMUSCULAR; INTRAVENOUS at 11:07

## 2025-03-26 RX ADMIN — ACETAMINOPHEN 325 MG: 325 TABLET ORAL at 11:56

## 2025-03-26 RX ADMIN — VALPROATE SODIUM 500 MG: 100 INJECTION, SOLUTION INTRAVENOUS at 10:36

## 2025-03-26 RX ADMIN — INSULIN LISPRO 2 UNITS: 100 INJECTION, SOLUTION INTRAVENOUS; SUBCUTANEOUS at 13:17

## 2025-03-26 RX ADMIN — INSULIN LISPRO 4 UNITS: 100 INJECTION, SOLUTION INTRAVENOUS; SUBCUTANEOUS at 17:39

## 2025-03-26 RX ADMIN — SODIUM CHLORIDE, PRESERVATIVE FREE 10 ML: 5 INJECTION INTRAVENOUS at 20:39

## 2025-03-26 RX ADMIN — ACETAMINOPHEN 325 MG: 325 TABLET ORAL at 18:47

## 2025-03-26 RX ADMIN — OXYCODONE HYDROCHLORIDE 10 MG: 10 TABLET ORAL at 11:56

## 2025-03-26 RX ADMIN — OXYCODONE HYDROCHLORIDE 10 MG: 10 TABLET ORAL at 18:48

## 2025-03-26 RX ADMIN — SODIUM CHLORIDE, PRESERVATIVE FREE 10 ML: 5 INJECTION INTRAVENOUS at 09:20

## 2025-03-26 RX ADMIN — HYDROMORPHONE HYDROCHLORIDE 0.5 MG: 1 INJECTION, SOLUTION INTRAMUSCULAR; INTRAVENOUS; SUBCUTANEOUS at 17:06

## 2025-03-26 RX ADMIN — LEVETIRACETAM 500 MG: 500 INJECTION INTRAVENOUS at 05:35

## 2025-03-26 RX ADMIN — ACETAMINOPHEN 325 MG: 325 TABLET ORAL at 05:33

## 2025-03-26 RX ADMIN — OXYCODONE HYDROCHLORIDE 10 MG: 10 TABLET ORAL at 05:33

## 2025-03-26 RX ADMIN — PANTOPRAZOLE SODIUM 40 MG: 40 TABLET, DELAYED RELEASE ORAL at 09:15

## 2025-03-26 RX ADMIN — ROSUVASTATIN CALCIUM 40 MG: 20 TABLET, FILM COATED ORAL at 20:38

## 2025-03-26 RX ADMIN — HYDROMORPHONE HYDROCHLORIDE 0.5 MG: 1 INJECTION, SOLUTION INTRAMUSCULAR; INTRAVENOUS; SUBCUTANEOUS at 13:12

## 2025-03-26 RX ADMIN — VALPROATE SODIUM 500 MG: 100 INJECTION, SOLUTION INTRAVENOUS at 21:17

## 2025-03-26 RX ADMIN — ONDANSETRON 4 MG: 2 INJECTION INTRAMUSCULAR; INTRAVENOUS at 06:13

## 2025-03-26 RX ADMIN — LEVETIRACETAM 500 MG: 500 INJECTION INTRAVENOUS at 17:39

## 2025-03-26 RX ADMIN — HYDROMORPHONE HYDROCHLORIDE 0.5 MG: 1 INJECTION, SOLUTION INTRAMUSCULAR; INTRAVENOUS; SUBCUTANEOUS at 03:32

## 2025-03-26 RX ADMIN — HYDROMORPHONE HYDROCHLORIDE 0.5 MG: 1 INJECTION, SOLUTION INTRAMUSCULAR; INTRAVENOUS; SUBCUTANEOUS at 10:03

## 2025-03-26 RX ADMIN — HYDROMORPHONE HYDROCHLORIDE 0.5 MG: 1 INJECTION, SOLUTION INTRAMUSCULAR; INTRAVENOUS; SUBCUTANEOUS at 21:18

## 2025-03-26 RX ADMIN — HYDROMORPHONE HYDROCHLORIDE 0.5 MG: 1 INJECTION, SOLUTION INTRAMUSCULAR; INTRAVENOUS; SUBCUTANEOUS at 06:47

## 2025-03-26 ASSESSMENT — PAIN SCALES - GENERAL
PAINLEVEL_OUTOF10: 10
PAINLEVEL_OUTOF10: 7
PAINLEVEL_OUTOF10: 6
PAINLEVEL_OUTOF10: 8
PAINLEVEL_OUTOF10: 6
PAINLEVEL_OUTOF10: 8
PAINLEVEL_OUTOF10: 10
PAINLEVEL_OUTOF10: 7
PAINLEVEL_OUTOF10: 8

## 2025-03-26 ASSESSMENT — PAIN DESCRIPTION - DESCRIPTORS
DESCRIPTORS: ACHING
DESCRIPTORS: THROBBING

## 2025-03-26 ASSESSMENT — PAIN DESCRIPTION - LOCATION
LOCATION: HEAD
LOCATION: GENERALIZED

## 2025-03-26 ASSESSMENT — PAIN SCALES - WONG BAKER: WONGBAKER_NUMERICALRESPONSE: NO HURT

## 2025-03-26 ASSESSMENT — PAIN DESCRIPTION - ORIENTATION
ORIENTATION: UPPER
ORIENTATION: UPPER

## 2025-03-26 NOTE — CONSULTS
Neurology Service Consult Note  [facility]   Patient Name: Waleska Fernández  : 1965        Subjective:   Reason for consult: Headache  59 y.o.  female with history of CAD, chronic back pain, COPD, depression, DM, migraine, HLD, HTN, PVD presenting to [facility] with subarachnoid hemorrhage and hypertensive emergency.  She was transferred from OSH.  On arrival patient also complaining of headache over the last several days.  Per chart review on day of presentation, 3/25, patient was running errands with her daughter when she had difficulty catching her breath and developed leg pain.  She went to the  [facility] ED because of the symptoms and patient was noted to have HTN emergency and SAH and was transferred to [facility] for further management. Patient overall is doing well but headache has persisted over the last 2 days despite treatment with pain medication. Patient does have history of migraine, reports that her symptoms are consistent with usual migraine. At home she uses Imitrex for migraine headache.     Chart was reviewed in detail, patient was seen and examined. She is resting in dark room, TV is off. She is alert and oriented x 4. She describes headache that is on the top of her head. Pain is throbbing, pulsing in nature. She does have associated photophobia and nausea. She denies vision change, sensory change or weakness associated with this  headache.         Past Medical History:   Diagnosis Date    Acid reflux     CAD (coronary artery disease)     CAD (coronary artery disease)     Sees Dr. NEDRA Lambert    Chronic back pain     Chronic obstructive pulmonary disease (HCC)     COPD (chronic obstructive pulmonary disease) (HCC)     \"have mild COPD- no pulmonologist\"    Depression     Diabetes (HCC)     Diabetes mellitus (HCC) Dx     Eczema of hand     Hx of migraines     HX OTHER MEDICAL     \"difficulty IV stick\"    Hyperlipidemia     Hypertension     follows with

## 2025-03-26 NOTE — PROGRESS NOTES
03/26/25 1538   Encounter Summary   Encounter Overview/Reason Volunteer Encounter   Encounter Code  Assessment by  services   Service Provided For Patient   Referral/Consult From Volunteer   Support System Family members   Last Encounter  03/26/25  (Visit by Eucharist stefanie Randhawa, charted by  Mark)   Complexity of Encounter Low   Spiritual/Emotional needs   Type Spiritual Support   Rituals, Rites and Sacraments   Type Episcopal Communion;Blessings   Assessment/Intervention/Outcome   Assessment Calm   Intervention Active listening   Outcome Coping   Plan and Referrals   Plan/Referrals Continue Support (comment)

## 2025-03-26 NOTE — CONSULTS
HOSPITAL CONSULT: 3/26/2025 [facility]  PATIENT: Waleska Fernández   MRN: [id number]   : 1965   NEUROSURGEON:  Sameer De Souza DO  ATTENDING: Marcella Avila MD  PCP: Nathan Lambert DO     HISTORY  Waleska Fernández is a 59 y.o. right handed female who presents with a can subarachnoid hemorrhage and an outside facility..   She is  ambulatory and complains of pain in her head  The patient in the community and is  functional.    Neurosurgery consult was requested by the ER doctor for help with management and possible intervention.    PAST MEDICAL HISTORY:   Past Medical History:   Diagnosis Date    Acid reflux     CAD (coronary artery disease)     CAD (coronary artery disease)     Sees Dr. NEDRA Lambert    Chronic back pain     Chronic obstructive pulmonary disease (HCC)     COPD (chronic obstructive pulmonary disease) (HCC)     \"have mild COPD- no pulmonologist\"    Depression     Diabetes (HCC)     Diabetes mellitus (HCC) Dx     Eczema of hand     Hx of migraines     HX OTHER MEDICAL     \"difficulty IV stick\"    Hyperlipidemia     Hypertension     follows with Dr Wood    Kidney stones 's    \"Passed One Kidney Stone\"    PVD (peripheral vascular disease)     Wears glasses        CURRENT OUTPATIENT MEDICATIONS:   No current facility-administered medications on file prior to encounter.     Current Outpatient Medications on File Prior to Encounter   Medication Sig Dispense Refill    pantoprazole (PROTONIX) 40 MG tablet Take 1 tablet by mouth daily 30 tablet 1    lidocaine 4 % external patch Place 1 patch onto the skin daily 30 patch 1    melatonin 3 MG TABS tablet Take 10 mg by mouth nightly as needed (insomnia)      aspirin 81 MG chewable tablet Take 1 tablet by mouth daily 30 tablet 1    ticagrelor (BRILINTA) 90 MG TABS tablet Take 1 tablet by mouth 2 times daily 60 tablet 1    tiZANidine (ZANAFLEX) 4 MG tablet Take 1 tablet by mouth every 8 hours as needed      escitalopram (LEXAPRO)

## 2025-03-26 NOTE — PROGRESS NOTES
Inpatient Progress Note 3/26/2025        Waleska Fernández  1965  [id number]      Assessment/Plan:    Waleska Fernández is a 59 y.o. female with a pmh of CAD, PVD, HTN, hyperlipidemia who presents with Subarachnoid hemorrhage (HCC). She was found to have HTN emergency and SAH. She was transferred to [facility] for ongoing evaluation and care.     Problem list  Subarachnoid hemorrhage  type 2 diabetes   hyperlipidemia,   CAD SP CABG and PCI       Neuro: Non-retractable headache with dyspnea.  CT head small subarachnoid hemorrhage over left parietal convexity, no mass effect or midline shift.  Small remote infarct left basal ganglia. No focal deficits on exam except for photophobia.  History migraines.  MRI brain and MRA pending.  Neurosurgery following.  HA management.  Neurology consulted.  Depacon.  Seizure precautions, Keppra.  Cardio: Hemodynamically stable.  No longer on Cardene drip.  Maintain SBP <140.  Cardiac telemetry.  Resp: No acute concerns.  Tolerating room air  GI: ADAT, GI PPx  : Stable.  Optimize electrolytes. Replete and recheck as needed per protocol. Mag goal >2, K+ goal >4. Daily BMP/Mag/Phos  Heme: Stable.  No S/S bleeding.  Daily CBC  ID: Leukocytosis, likely reactive.  Follow trend.  Continue to monitor off of antibiotics  Endo: Maintain euglycemia.  Sliding scale insulin as needed  MSK: DTI prevention    Tubes/Lines/Drains:    PIV  Code Status:   Full code    Subjective:    Patient seen and examined at bedside.  Awake alert oriented x 3.  Complained of persistent headache with photosensitivity.  MRI/MRA pending.  Continue serial neurological exams.      Review of Systems   Neurological:  Positive for headaches.        Physical Exam:            BP (!) 94/50   Pulse 86   Temp 97.8 °F (36.6 °C) (Oral)   Resp 13   Ht 1.676 m (5' 6\")   Wt 83.3 kg (183 lb 10.3 oz)   SpO2 95%   BMI 29.64 kg/m²     General: NAD  Eyes: EOMI  ENT: neck supple  Cardiovascular: Regular  Ionized    Collection Time: 03/25/25  6:57 PM   Result Value Ref Range    Calcium, Ionized 1.24 1.15 - 1.33 mmol/L   Basic Metabolic Panel    Collection Time: 03/25/25  6:57 PM   Result Value Ref Range    Sodium 136 136 - 145 mmol/L    Potassium 4.2 3.5 - 5.1 mmol/L    Chloride 102 99 - 110 mmol/L    CO2 18 (L) 21 - 32 mmol/L    Anion Gap 16 9 - 17 mmol/L    Glucose 310 (H) 74 - 99 mg/dL    BUN 20 7 - 20 mg/dL    Creatinine 1.4 (H) 0.6 - 1.2 mg/dL    Est, Glom Filt Rate 40 (L) >60 mL/min/1.73m2    Calcium 9.4 8.3 - 10.6 mg/dL   CBC with Auto Differential    Collection Time: 03/25/25  6:57 PM   Result Value Ref Range    WBC 9.8 4.0 - 10.5 k/uL    RBC 3.91 (L) 4.20 - 5.40 m/uL    Hemoglobin 11.0 (L) 12.5 - 16.0 g/dL    Hematocrit 36.0 (L) 37.0 - 47.0 %    MCV 92.1 78.0 - 100.0 fL    MCH 28.1 27.0 - 31.0 pg    MCHC 30.6 (L) 32.0 - 36.0 g/dL    RDW 13.1 11.7 - 14.9 %    Platelets 221 140 - 440 k/uL    MPV 10.6 7.5 - 11.1 fL    Neutrophils % 89 (H) 36 - 66 %    Lymphocytes % 8 (L) 24 - 44 %    Monocytes % 2 0 - 4 %    Eosinophils % 0 0 - 3 %    Basophils % 1 0 - 1 %    Immature Granulocytes % 1 (H) 0 %    Neutrophils Absolute 8.72 k/uL    Lymphocytes Absolute 0.76 k/uL    Monocytes Absolute 0.15 k/uL    Eosinophils Absolute 0.02 k/uL    Basophils Absolute 0.05 k/uL    Immature Granulocytes Absolute 0.06 k/uL   D-Dimer, Quantitative    Collection Time: 03/25/25  6:57 PM   Result Value Ref Range    D-Dimer, Quant 0.99 (H) 0.00 - 0.46 ug/mL FEU   Fibrinogen    Collection Time: 03/25/25  6:57 PM   Result Value Ref Range    Fibrinogen 432 170 - 540 mg/dL   Hepatic Function Panel    Collection Time: 03/25/25  6:57 PM   Result Value Ref Range    Albumin 4.1 3.4 - 5.0 g/dL    Alkaline Phosphatase 90 40 - 129 U/L    ALT 29 10 - 40 U/L    AST 30 15 - 37 U/L    Total Bilirubin 0.3 0.0 - 1.0 mg/dL    Bilirubin, Direct <0.2 0.0 - 0.3 mg/dL    Bilirubin, Indirect Can not be calculated 0.0 - 0.7 mg/dL    Total Protein 7.1 6.4 - 8.2

## 2025-03-26 NOTE — CARE COORDINATION
CM reviewed pt’s medical record and discussed pt in IDR. CM introduced self and explained the role of case management. CM in to see pt to initiate D/C planning. Pt is alert, oriented, and kind. Pt's  is at pt's bedside and CM received permission to speak freely in front of spouse. Pt is from home with her spouse, dogs, and cats. PTA pt was independent with ADLs and mobility. Pt has insurance and a PCP. Pt was cooperative with CM assessment. Pt denies needing HHC at discharge. Plan for discharge is home with spouse. Spouse will provide transportation home. Please contact Case Management with any new discharge needs.    03/26/25 3442   Service Assessment   Patient Orientation Alert and Oriented   Cognition Alert   History Provided By Patient;Spouse;Medical Record   Primary Caregiver Self   Accompanied By/Relationship spouse, Alan   Support Systems Spouse/Significant Other;Children;Family Members   Patient's Healthcare Decision Maker is: Legal Next of Kin   PCP Verified by CM Yes   Last Visit to PCP Within last 3 months   Prior Functional Level Independent in ADLs/IADLs   Current Functional Level Assistance with the following:;Mobility   Can patient return to prior living arrangement Yes   Ability to make needs known: Good   Family able to assist with home care needs: Yes   Would you like for me to discuss the discharge plan with any other family members/significant others, and if so, who? Yes  (pt's  Alan Fernández as needed)   Financial Resources Other (Comment)  (commercial)   Community Resources None   CM/ Referral Other (see comment)  (Case management discharge assessment)   Social/Functional History   Lives With Spouse   Type of Home House   Home Layout One level   Home Access Stairs to enter without rails   Entrance Stairs - Number of Steps 1/2-SABRINA   Bathroom Shower/Tub Tub/Shower unit   Bathroom Toilet Standard   Bathroom Equipment None   Home Equipment Cane   Receives Help From Family   Prior

## 2025-03-26 NOTE — PROGRESS NOTES
No further critical care needs. Transfer to med surg under hospitalitis service. Transfer communicated. Disposition at the discretion of the hospitalist.

## 2025-03-27 VITALS
BODY MASS INDEX: 29.51 KG/M2 | RESPIRATION RATE: 16 BRPM | SYSTOLIC BLOOD PRESSURE: 113 MMHG | OXYGEN SATURATION: 96 % | HEART RATE: 69 BPM | WEIGHT: 183.64 LBS | HEIGHT: 66 IN | TEMPERATURE: 98.1 F | DIASTOLIC BLOOD PRESSURE: 52 MMHG

## 2025-03-27 DIAGNOSIS — I60.9 SAH (SUBARACHNOID HEMORRHAGE) (HCC): Primary | ICD-10-CM

## 2025-03-27 LAB
ALBUMIN SERPL-MCNC: 4.2 G/DL (ref 3.4–5)
ALBUMIN/GLOB SERPL: 1.4 {RATIO} (ref 1.1–2.2)
ALP SERPL-CCNC: 81 U/L (ref 40–129)
ALT SERPL-CCNC: 30 U/L (ref 10–40)
ANION GAP SERPL CALCULATED.3IONS-SCNC: 12 MMOL/L (ref 9–17)
AST SERPL-CCNC: 26 U/L (ref 15–37)
BASOPHILS # BLD: 0.07 K/UL
BASOPHILS NFR BLD: 1 % (ref 0–1)
BILIRUB DIRECT SERPL-MCNC: <0.2 MG/DL (ref 0–0.3)
BILIRUB INDIRECT SERPL-MCNC: NORMAL MG/DL (ref 0–0.7)
BILIRUB SERPL-MCNC: <0.2 MG/DL (ref 0–1)
BUN SERPL-MCNC: 22 MG/DL (ref 7–20)
CA-I BLD-SCNC: 1.23 MMOL/L (ref 1.15–1.33)
CALCIUM SERPL-MCNC: 9 MG/DL (ref 8.3–10.6)
CHLORIDE SERPL-SCNC: 108 MMOL/L (ref 99–110)
CO2 SERPL-SCNC: 22 MMOL/L (ref 21–32)
CREAT SERPL-MCNC: 1 MG/DL (ref 0.6–1.2)
EOSINOPHIL # BLD: 0.14 K/UL
EOSINOPHILS RELATIVE PERCENT: 2 % (ref 0–3)
ERYTHROCYTE [DISTWIDTH] IN BLOOD BY AUTOMATED COUNT: 13.5 % (ref 11.7–14.9)
EST. AVERAGE GLUCOSE BLD GHB EST-MCNC: 200 MG/DL
GFR, ESTIMATED: 54 ML/MIN/1.73M2
GLUCOSE BLD-MCNC: 132 MG/DL (ref 74–99)
GLUCOSE SERPL-MCNC: 142 MG/DL (ref 74–99)
HBA1C MFR BLD: 8.6 % (ref 4.2–6.3)
HCT VFR BLD AUTO: 37.4 % (ref 37–47)
HGB BLD-MCNC: 11.6 G/DL (ref 12.5–16)
IMM GRANULOCYTES # BLD AUTO: 0.05 K/UL
IMM GRANULOCYTES NFR BLD: 1 %
INR PPP: 0.9
LYMPHOCYTES NFR BLD: 2.73 K/UL
LYMPHOCYTES RELATIVE PERCENT: 29 % (ref 24–44)
MAGNESIUM SERPL-MCNC: 2.4 MG/DL (ref 1.8–2.4)
MCH RBC QN AUTO: 28.4 PG (ref 27–31)
MCHC RBC AUTO-ENTMCNC: 31 G/DL (ref 32–36)
MCV RBC AUTO: 91.4 FL (ref 78–100)
MONOCYTES NFR BLD: 0.44 K/UL
MONOCYTES NFR BLD: 5 % (ref 0–4)
NEUTROPHILS NFR BLD: 64 % (ref 36–66)
NEUTS SEG NFR BLD: 5.98 K/UL
PHOSPHATE SERPL-MCNC: 2.4 MG/DL (ref 2.5–4.9)
PLATELET # BLD AUTO: 264 K/UL (ref 140–440)
PMV BLD AUTO: 10.2 FL (ref 7.5–11.1)
POTASSIUM SERPL-SCNC: 4.5 MMOL/L (ref 3.5–5.1)
PROT SERPL-MCNC: 7.1 G/DL (ref 6.4–8.2)
PROTHROMBIN TIME: 12.3 SEC (ref 11.7–14.5)
RBC # BLD AUTO: 4.09 M/UL (ref 4.2–5.4)
SODIUM SERPL-SCNC: 142 MMOL/L (ref 136–145)
WBC OTHER # BLD: 9.4 K/UL (ref 4–10.5)

## 2025-03-27 PROCEDURE — 99233 SBSQ HOSP IP/OBS HIGH 50: CPT | Performed by: NURSE PRACTITIONER

## 2025-03-27 PROCEDURE — 85025 COMPLETE CBC W/AUTO DIFF WBC: CPT

## 2025-03-27 PROCEDURE — 6370000000 HC RX 637 (ALT 250 FOR IP): Performed by: NURSE PRACTITIONER

## 2025-03-27 PROCEDURE — 84100 ASSAY OF PHOSPHORUS: CPT

## 2025-03-27 PROCEDURE — 2500000003 HC RX 250 WO HCPCS: Performed by: STUDENT IN AN ORGANIZED HEALTH CARE EDUCATION/TRAINING PROGRAM

## 2025-03-27 PROCEDURE — 82962 GLUCOSE BLOOD TEST: CPT

## 2025-03-27 PROCEDURE — 97162 PT EVAL MOD COMPLEX 30 MIN: CPT

## 2025-03-27 PROCEDURE — 94761 N-INVAS EAR/PLS OXIMETRY MLT: CPT

## 2025-03-27 PROCEDURE — 83735 ASSAY OF MAGNESIUM: CPT

## 2025-03-27 PROCEDURE — 82330 ASSAY OF CALCIUM: CPT

## 2025-03-27 PROCEDURE — 6360000002 HC RX W HCPCS: Performed by: INTERNAL MEDICINE

## 2025-03-27 PROCEDURE — 99232 SBSQ HOSP IP/OBS MODERATE 35: CPT | Performed by: PHYSICIAN ASSISTANT

## 2025-03-27 PROCEDURE — 6370000000 HC RX 637 (ALT 250 FOR IP): Performed by: STUDENT IN AN ORGANIZED HEALTH CARE EDUCATION/TRAINING PROGRAM

## 2025-03-27 PROCEDURE — 85610 PROTHROMBIN TIME: CPT

## 2025-03-27 PROCEDURE — 80053 COMPREHEN METABOLIC PANEL: CPT

## 2025-03-27 PROCEDURE — 82248 BILIRUBIN DIRECT: CPT

## 2025-03-27 PROCEDURE — 97166 OT EVAL MOD COMPLEX 45 MIN: CPT

## 2025-03-27 PROCEDURE — 36415 COLL VENOUS BLD VENIPUNCTURE: CPT

## 2025-03-27 PROCEDURE — 6360000002 HC RX W HCPCS: Performed by: STUDENT IN AN ORGANIZED HEALTH CARE EDUCATION/TRAINING PROGRAM

## 2025-03-27 RX ORDER — ASPIRIN 81 MG/1
81 TABLET, CHEWABLE ORAL DAILY
Qty: 30 TABLET | Refills: 0
Start: 2025-03-30

## 2025-03-27 RX ORDER — LEVETIRACETAM 500 MG/1
500 TABLET ORAL 2 TIMES DAILY
Qty: 11 TABLET | Refills: 0 | Status: ON HOLD | OUTPATIENT
Start: 2025-03-27 | End: 2025-04-02 | Stop reason: HOSPADM

## 2025-03-27 RX ORDER — BLOOD PRESSURE TEST KIT
1 KIT MISCELLANEOUS 2 TIMES DAILY
Qty: 1 KIT | Refills: 0 | Status: SHIPPED | OUTPATIENT
Start: 2025-03-27

## 2025-03-27 RX ADMIN — LEVETIRACETAM 500 MG: 500 INJECTION INTRAVENOUS at 06:23

## 2025-03-27 RX ADMIN — PANTOPRAZOLE SODIUM 40 MG: 40 TABLET, DELAYED RELEASE ORAL at 08:49

## 2025-03-27 RX ADMIN — HYDROMORPHONE HYDROCHLORIDE 0.5 MG: 1 INJECTION, SOLUTION INTRAMUSCULAR; INTRAVENOUS; SUBCUTANEOUS at 12:12

## 2025-03-27 RX ADMIN — OXYCODONE HYDROCHLORIDE 10 MG: 10 TABLET ORAL at 01:15

## 2025-03-27 RX ADMIN — HYDROMORPHONE HYDROCHLORIDE 0.5 MG: 1 INJECTION, SOLUTION INTRAMUSCULAR; INTRAVENOUS; SUBCUTANEOUS at 02:46

## 2025-03-27 RX ADMIN — OXYCODONE HYDROCHLORIDE 10 MG: 10 TABLET ORAL at 08:48

## 2025-03-27 RX ADMIN — INSULIN GLARGINE 10 UNITS: 100 INJECTION, SOLUTION SUBCUTANEOUS at 08:49

## 2025-03-27 RX ADMIN — BUSPIRONE HYDROCHLORIDE 30 MG: 15 TABLET ORAL at 08:48

## 2025-03-27 RX ADMIN — SODIUM CHLORIDE, PRESERVATIVE FREE 10 ML: 5 INJECTION INTRAVENOUS at 08:51

## 2025-03-27 ASSESSMENT — PAIN SCALES - GENERAL
PAINLEVEL_OUTOF10: 0
PAINLEVEL_OUTOF10: 6
PAINLEVEL_OUTOF10: 6
PAINLEVEL_OUTOF10: 7
PAINLEVEL_OUTOF10: 0

## 2025-03-27 ASSESSMENT — PAIN DESCRIPTION - DESCRIPTORS
DESCRIPTORS: ACHING

## 2025-03-27 ASSESSMENT — PAIN DESCRIPTION - LOCATION
LOCATION: GENERALIZED
LOCATION: GENERALIZED
LOCATION: ABDOMEN

## 2025-03-27 ASSESSMENT — PAIN SCALES - WONG BAKER: WONGBAKER_NUMERICALRESPONSE: NO HURT

## 2025-03-27 NOTE — PLAN OF CARE
Problem: Chronic Conditions and Co-morbidities  Goal: Patient's chronic conditions and co-morbidity symptoms are monitored and maintained or improved  Outcome: Adequate for Discharge     Problem: Discharge Planning  Goal: Discharge to home or other facility with appropriate resources  Outcome: Adequate for Discharge     Problem: Pain  Goal: Verbalizes/displays adequate comfort level or baseline comfort level  Outcome: Adequate for Discharge     Problem: Safety - Adult  Goal: Free from fall injury  Outcome: Adequate for Discharge     Problem: ABCDS Injury Assessment  Goal: Absence of physical injury  Outcome: Adequate for Discharge

## 2025-03-27 NOTE — CONSULTS
[facility] ACUTE CARE PHYSICAL THERAPY EVALUATION  Waleska Fernández, 1965, 2128/2128-A, 3/27/2025    History  [place]:  The primary encounter diagnosis was Hypertensive emergency. A diagnosis of Status migrainosus was also pertinent to this visit.  Patient  has a past medical history of Acid reflux, CAD (coronary artery disease), CAD (coronary artery disease), Chronic back pain, Chronic obstructive pulmonary disease (HCC), COPD (chronic obstructive pulmonary disease) (HCC), Depression, Diabetes (HCC), Diabetes mellitus (HCC), Eczema of hand, Hx of migraines, HX OTHER MEDICAL, Hyperlipidemia, Hypertension, Kidney stones, PVD (peripheral vascular disease), and Wears glasses.  Patient  has a past surgical history that includes pr unlisted procedure cardiac surgery; gyn; orthopedic surgery; Dental surgery (2000's); Breast biopsy (Bilateral, 1990's-2000's); Tonsillectomy and adenoidectomy (1971); Hysterectomy, total abdominal (1988); Appendectomy (1988); other surgical history (Left, 4/4/2013); Bone Resection, Rib; Coronary angioplasty with stent; back surgery (Last Done 9-11/2013); Cholecystectomy (1988); Colonoscopy (last one 2014); Cardiac surgery (5-4-12); Cardiac catheterization; other surgical history; vascular surgery; FEMORAL ENDARTERECTOMY (Right, 6/11/2019); Upper gastrointestinal endoscopy (N/A, 7/24/2021); Upper gastrointestinal endoscopy (N/A, 4/7/2022); Cardiac procedure (N/A, 10/28/2024); and Cardiac procedure (N/A, 3/14/2025).    Recommendation: Evaluation/discharge. Recommend home with PRN assist    Subjective:  Patient states: \"I'm ready to go home!\".    Pain:  endorses mild headache at rest, does not provide numerical rating.    Communication with other providers:  RN approval for therapy session, co-eval with OT Bairon and OT brian mccullough  Restrictions: general precautions, falls, seizure precautions    Home Setup/Prior level of function  Social/Functional History  Lives With:

## 2025-03-27 NOTE — PROGRESS NOTES
Outpatient Pharmacy Progress Note for Meds-to-Beds    Total number of Prescriptions Filled: 1    Additional Documentation:  Patient picked-up the medication(s) in the OP Pharmacy      Thank you for letting us serve your patients.  [address] [address]    Phone: [phone]    Fax: [phone]

## 2025-03-27 NOTE — DISCHARGE INSTRUCTIONS
Return to the ER if you begin to experience worsening headaches, nausea/vomiting, vision changes, or strokelike symptoms such as weakness and/or numbness/tingling in your arms or legs, difficulty with speech, facial droop. Stop taking aspirin and brilinta and any other blood thinners for now. Restart aspirin on 3/30 unless you are having any symptoms described above. Follow-up appointment in 2 weeks with neurosurgery with repeat head CT.  Do not take migraine medications containing aspirin. Do not take imitrex. Follow up with neurology and your primary care provider.    You will need to notify your heart doctor asap that your aspirin and brilinta were discontinued. Please call them and make an appointment for follow up.    Repeat your blood tests in 1 week.

## 2025-03-27 NOTE — PROGRESS NOTES
Neurosurgery Progress Note  3/27/2025 11:37 AM      Assessment and Plan:   Left parietal SAH- noted on MRI. Likely due to hypertensive urgency. Patient with better BP control this admission. No surgical intervention indicated. She can d/c from our standpoint. Would hold asa 81mg until follow-up with me in 2 weeks.   HA- patient with migrainous features. Neurology following for medication recommendations. They recommend keppra for 7 days.   DM type 2    Supervising physician: Sameer De Souza DO.  Dr. De Souza was readily and continuously available by phone for direct consultation regarding the care of this patient.    Opioid prescription may exceed day and/or maximum morphine equivalent daily dose (MEDD) limits for specific reason(s): Hospitalization care, Acute pain, possible presence of trauma,  possible need or presence of major orthopedic surgery. Patient has been educated about the risks and concerns for dependence with narcotic use. Narcotics will be tapered as their condition allows.    Dragon dictation may have been used in the writing of this note. Spelling or word errors may have occurred. Please call for clarification if there are concerns.      Subjective:   Admit Date: 3/25/2025  PCP: Nathan Lambert DO    Patient sitting up in bed. Is doing well. Has no new complaints. Would like to go home today.     Data:   Scheduled Meds:   insulin glargine  10 Units SubCUTAneous Daily    sodium chloride flush  5-40 mL IntraVENous 2 times per day    levETIRAcetam  500 mg IntraVENous Q12H    busPIRone  30 mg Oral BID    escitalopram  20 mg Oral Daily    pantoprazole  40 mg Oral Daily    rosuvastatin  40 mg Oral Nightly    insulin lispro  0-8 Units SubCUTAneous 4x Daily AC & HS         I/O last 3 completed shifts:  In: 1599.6 [P.O.:1520; I.V.:26.9; IV Piggyback:52.7]  Out: 3400 [Urine:3400]  No intake/output data recorded.    Intake/Output Summary (Last 24 hours) at 3/27/2025 1137  Last data filed at 3/26/2025

## 2025-03-27 NOTE — PROGRESS NOTES
Occupational Therapy  [facility] ACUTE CARE OCCUPATIONAL THERAPY EVALUATION    Waleska Fernández, 1965, 2128/2128-A, 3/27/2025    Discharge Recommendation: Home with PRN assist for IADLs    History:  [place]:  The primary encounter diagnosis was Hypertensive emergency. A diagnosis of Status migrainosus was also pertinent to this visit.    Subjective:  Patient states: \"I have 4 dogs and 4 cats at home!\"  Pain: Pt reported mild headache but did not quantify   Communication with other providers: PT FADUMO Narayanan   Restrictions: General Precautions, Fall Risk, Seizure Precautions, Telemetry    Home Setup/Prior level of function:  Social/Functional History  Lives With: Spouse, Other (Comment) (+grandson)  Type of Home: House  Home Layout: One level  Home Access: Stairs to enter without rails  Entrance Stairs - Number of Steps: 1/2-SABRINA  Bathroom Shower/Tub: Tub/Shower unit  Bathroom Toilet: Standard  Bathroom Equipment: Shower chair, Grab bars in shower (does not typically use shower chair)  Home Equipment: Cane, Walker - Rolling  Receives Help From: Family  Prior Level of Assist for ADLs: Independent  Prior Level of Assist for Homemaking: Independent  Homemaking Responsibilities: Yes (shares with family)  Prior Level of Assist for Ambulation: Independent community ambulator, with or without device  Prior Level of Assist for Transfers: Independent  Active : Yes    Examination:  Observation: Seated EOB upon arrival. Agreeable to evaluation   Vision: WFL  Hearing: WFL  Vitals: Stable vitals throughout session on room air     Body Systems and functions:  ROM: WFL all BL UE joints   Strength: 4+/5 MMT all UE major muscle groups   Sensation: WFL (denies numbness/tingling)   Tone: Normal  Coordination: WFL  Perception: WNL    Activities of Daily Living (ADLs):  Feeding: Independent   Grooming: SBA   UB bathing: SBA  LB bathing: SBA  UB dressing: SBA  LB dressing: SBA  Toileting: SBA    Cognitive and  intervention during admission. Pt lives at home with her family and at baseline is independent with ADLs, IADLs, and mobility. Pt performed well this date and is safe to return home at discharge with assist PRN.     Complexity: Moderate  Prognosis: Good  Plan: Eval and d/c       Time:   Time in: 1408  Time out: 1416  Timed treatment minutes: 0  Total time: 8    Electronically signed by:    ARVIN Escobar/OT     CHIKA De Los Santos/L, BE, [id number]

## 2025-03-27 NOTE — PROGRESS NOTES
Neurology Service Progress Note  [facility]   Patient Name: Waleska Fernández  : 1965        Subjective:   Reason for consult: Headache  Chart was reviewed in detail, patient was seen and evaluated.  She is feeling much better today.  Headache had decreased to 3-4/10 this a.m. but is slowly starting to creep back up.  Will reorder 3 more doses of Depacon however patient does not need to remain hospitalized to receive all 3 of them if she gets relief with next dose.  Recommended follow-up with our office for further headache management.  MRI brain, MRA head were reviewed.  No unexpected acute findings.  From neurology standpoint patient is stable to discharge.      Past Medical History:   Diagnosis Date    Acid reflux     CAD (coronary artery disease)     CAD (coronary artery disease)     Sees Dr. NEDRA Lambert    Chronic back pain     Chronic obstructive pulmonary disease (HCC)     COPD (chronic obstructive pulmonary disease) (HCC)     \"have mild COPD- no pulmonologist\"    Depression     Diabetes (HCC)     Diabetes mellitus (HCC) Dx     Eczema of hand     Hx of migraines     HX OTHER MEDICAL     \"difficulty IV stick\"    Hyperlipidemia     Hypertension     follows with Dr Wood    Kidney stones 's    \"Passed One Kidney Stone\"    PVD (peripheral vascular disease)     Wears glasses     :   Past Surgical History:   Procedure Laterality Date    APPENDECTOMY      Done During [facility]    BACK SURGERY  Last Done -2013    \"4 Lower Back Surgeries, 2 Rods, 4 Pins At L5, S1 Put In During Last Surgery\"    BONE RESECTION, RIB      \"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- surgery 2013- ok since then\"    BREAST BIOPSY Bilateral 's-    Benign    CARDIAC CATHETERIZATION      per pt \"had heart cath 5/10/2019\"    CARDIAC PROCEDURE N/A 10/28/2024    Left heart cath / coronary angiography performed by Bill Lazo MD at [facility] CARDIAC CATH LAB    CARDIAC PROCEDURE N/A 3/14/2025    Left     Years of education: Not on file    Highest education level: Not on file   Occupational History    Not on file   Tobacco Use    Smoking status: Former     Current packs/day: 0.00     Average packs/day: 0.5 packs/day for 35.0 years (17.5 ttl pk-yrs)     Types: Cigarettes     Start date: 1984     Quit date: 2019     Years since quittin.7    Smokeless tobacco: Never   Vaping Use    Vaping status: Never Used   Substance and Sexual Activity    Alcohol use: Not Currently     Comment: occ    Drug use: No    Sexual activity: Yes     Partners: Male   Other Topics Concern    Not on file   Social History Narrative    ** Merged History Encounter **          Social Drivers of Health     Financial Resource Strain: Not on file   Food Insecurity: No Food Insecurity (3/25/2025)    Hunger Vital Sign     Worried About Running Out of Food in the Last Year: Never true     Ran Out of Food in the Last Year: Never true   Transportation Needs: No Transportation Needs (3/25/2025)    PRAPARE - Transportation     Lack of Transportation (Medical): No     Lack of Transportation (Non-Medical): No   Physical Activity: Not on file   Stress: Not on file   Social Connections: Not on file   Intimate Partner Violence: Not on file   Housing Stability: Low Risk  (3/25/2025)    Housing Stability Vital Sign     Unable to Pay for Housing in the Last Year: No     Number of Times Moved in the Last Year: 1     Homeless in the Last Year: No      Family History   Problem Relation Age of Onset    Diabetes Mother     High Blood Pressure Mother     Asthma Mother     High Cholesterol Mother     High Blood Pressure Father     High Cholesterol Father     Asthma Father     Heart Disease Father         mvp    Asthma Brother     Mental Illness Son     Early Death Son 24        \"Suicide\"    Mental Illness Daughter         \"Bipolar\"         ROS (10 systems)  In addition to that documented in the HPI above, the additional ROS was obtained:  Constitutional:

## 2025-03-28 NOTE — PROGRESS NOTES
Physician Progress Note      PATIENT:               WENDY STRICKLAND  [facility] #:                  [id number]  :                       1965  ADMIT DATE:       3/25/2025 6:07 PM  DISCH DATE:        3/27/2025 3:22 PM  RESPONDING  PROVIDER #:        Claudia Tapia MD          QUERY TEXT:    Patient admitted with SAH. Noted documentation of HTN emergency in CC H+P on   3/25. In order to support the diagnosis of HTN emergency, please include   additional clinical indicators in your documentation.  Or please document if   the diagnosis of HTN emergency has been ruled out after further study.    The medical record reflects the following:  Risk Factors: 59 y.o female with pMHx of HLD.  Clinical Indicators: 3/25 - 243/64 > 88/54. 3/25 - CC H+P - HTN emergency:   Cardiac-Systolic >200 at OSH ED. Started nicardipine infusion at 1400. Goal   SBP < 160 for now. Arrives to unit with cardene off, SBP   120s. Hold infusion, start if needed. Hold PO for now pending surgical   evaluation.  Treatment: Cardene gtt, serial BPs  Options provided:  -- After study hypertensive emergency ruled out.  -- This patient has hypertensive emergency is as evidenced by, Please document   evidence.  -- Other - I will add my own diagnosis  -- Disagree - Not applicable / Not valid  -- Disagree - Clinically unable to determine / Unknown  -- Refer to Clinical Documentation Reviewer    PROVIDER RESPONSE TEXT:    After study hypertensive emergency ruled out    Query created by: Manny Cochran on 3/27/2025 8:03 AM      Electronically signed by:  Claudia Tapia MD 3/28/2025 12:51 PM

## 2025-03-31 ENCOUNTER — TELEPHONE (OUTPATIENT)
Dept: NEUROSURGERY | Age: 60
End: 2025-03-31

## 2025-04-01 ENCOUNTER — APPOINTMENT (OUTPATIENT)
Dept: CT IMAGING | Age: 60
DRG: 103 | End: 2025-04-01
Payer: COMMERCIAL

## 2025-04-01 ENCOUNTER — HOSPITAL ENCOUNTER (INPATIENT)
Age: 60
LOS: 1 days | Discharge: HOME | DRG: 103 | End: 2025-04-02
Admitting: STUDENT IN AN ORGANIZED HEALTH CARE EDUCATION/TRAINING PROGRAM
Payer: COMMERCIAL

## 2025-04-01 ENCOUNTER — TELEPHONE (OUTPATIENT)
Dept: NEUROSURGERY | Age: 60
End: 2025-04-01

## 2025-04-01 DIAGNOSIS — R51.9 ACUTE INTRACTABLE HEADACHE, UNSPECIFIED HEADACHE TYPE: Primary | ICD-10-CM

## 2025-04-01 LAB
ALBUMIN SERPL-MCNC: 4.2 G/DL (ref 3.4–5)
ALBUMIN/GLOB SERPL: 1.8 {RATIO} (ref 1.1–2.2)
ALP SERPL-CCNC: 87 U/L (ref 40–129)
ALT SERPL-CCNC: 42 U/L (ref 10–40)
ANION GAP SERPL CALCULATED.3IONS-SCNC: 10 MMOL/L (ref 9–17)
AST SERPL-CCNC: 28 U/L (ref 15–37)
BASOPHILS # BLD: 0.07 K/UL
BASOPHILS NFR BLD: 1 % (ref 0–1)
BILIRUB SERPL-MCNC: 0.3 MG/DL (ref 0–1)
BUN SERPL-MCNC: 17 MG/DL (ref 7–20)
CALCIUM SERPL-MCNC: 10.9 MG/DL (ref 8.3–10.6)
CHLORIDE SERPL-SCNC: 104 MMOL/L (ref 99–110)
CO2 SERPL-SCNC: 26 MMOL/L (ref 21–32)
CREAT SERPL-MCNC: 1.2 MG/DL (ref 0.6–1.2)
EOSINOPHIL # BLD: 0.22 K/UL
EOSINOPHILS RELATIVE PERCENT: 2 % (ref 0–3)
ERYTHROCYTE [DISTWIDTH] IN BLOOD BY AUTOMATED COUNT: 13.1 % (ref 11.7–14.9)
GFR, ESTIMATED: 46 ML/MIN/1.73M2
GLUCOSE BLD-MCNC: 270 MG/DL (ref 74–99)
GLUCOSE BLD-MCNC: 386 MG/DL (ref 74–99)
GLUCOSE SERPL-MCNC: 295 MG/DL (ref 74–99)
HCT VFR BLD AUTO: 39.1 % (ref 37–47)
HGB BLD-MCNC: 12.2 G/DL (ref 12.5–16)
IMM GRANULOCYTES # BLD AUTO: 0.04 K/UL
IMM GRANULOCYTES NFR BLD: 0 %
LYMPHOCYTES NFR BLD: 1.82 K/UL
LYMPHOCYTES RELATIVE PERCENT: 17 % (ref 24–44)
MCH RBC QN AUTO: 28.5 PG (ref 27–31)
MCHC RBC AUTO-ENTMCNC: 31.2 G/DL (ref 32–36)
MCV RBC AUTO: 91.4 FL (ref 78–100)
MONOCYTES NFR BLD: 0.64 K/UL
MONOCYTES NFR BLD: 6 % (ref 0–4)
NEUTROPHILS NFR BLD: 74 % (ref 36–66)
NEUTS SEG NFR BLD: 7.9 K/UL
PLATELET # BLD AUTO: 217 K/UL (ref 140–440)
PMV BLD AUTO: 10.5 FL (ref 7.5–11.1)
POTASSIUM SERPL-SCNC: 4.6 MMOL/L (ref 3.5–5.1)
PROT SERPL-MCNC: 6.6 G/DL (ref 6.4–8.2)
RBC # BLD AUTO: 4.28 M/UL (ref 4.2–5.4)
SODIUM SERPL-SCNC: 140 MMOL/L (ref 136–145)
WBC OTHER # BLD: 10.7 K/UL (ref 4–10.5)

## 2025-04-01 PROCEDURE — 70498 CT ANGIOGRAPHY NECK: CPT

## 2025-04-01 PROCEDURE — 96375 TX/PRO/DX INJ NEW DRUG ADDON: CPT

## 2025-04-01 PROCEDURE — 94761 N-INVAS EAR/PLS OXIMETRY MLT: CPT

## 2025-04-01 PROCEDURE — 6360000004 HC RX CONTRAST MEDICATION: Performed by: NURSE PRACTITIONER

## 2025-04-01 PROCEDURE — 82962 GLUCOSE BLOOD TEST: CPT

## 2025-04-01 PROCEDURE — 2500000003 HC RX 250 WO HCPCS: Performed by: STUDENT IN AN ORGANIZED HEALTH CARE EDUCATION/TRAINING PROGRAM

## 2025-04-01 PROCEDURE — 1200000000 HC SEMI PRIVATE

## 2025-04-01 PROCEDURE — 85025 COMPLETE CBC W/AUTO DIFF WBC: CPT

## 2025-04-01 PROCEDURE — 6370000000 HC RX 637 (ALT 250 FOR IP): Performed by: STUDENT IN AN ORGANIZED HEALTH CARE EDUCATION/TRAINING PROGRAM

## 2025-04-01 PROCEDURE — 96374 THER/PROPH/DIAG INJ IV PUSH: CPT

## 2025-04-01 PROCEDURE — 6360000002 HC RX W HCPCS: Performed by: NURSE PRACTITIONER

## 2025-04-01 PROCEDURE — 99285 EMERGENCY DEPT VISIT HI MDM: CPT

## 2025-04-01 PROCEDURE — 70450 CT HEAD/BRAIN W/O DYE: CPT

## 2025-04-01 PROCEDURE — 6360000002 HC RX W HCPCS: Performed by: STUDENT IN AN ORGANIZED HEALTH CARE EDUCATION/TRAINING PROGRAM

## 2025-04-01 PROCEDURE — 2580000003 HC RX 258: Performed by: STUDENT IN AN ORGANIZED HEALTH CARE EDUCATION/TRAINING PROGRAM

## 2025-04-01 PROCEDURE — 80053 COMPREHEN METABOLIC PANEL: CPT

## 2025-04-01 RX ORDER — POLYETHYLENE GLYCOL 3350 17 G/17G
17 POWDER, FOR SOLUTION ORAL DAILY PRN
Status: DISCONTINUED | OUTPATIENT
Start: 2025-04-01 | End: 2025-04-02 | Stop reason: HOSPADM

## 2025-04-01 RX ORDER — OXYCODONE HYDROCHLORIDE 5 MG/1
5 TABLET ORAL EVERY 6 HOURS PRN
Refills: 0 | Status: DISCONTINUED | OUTPATIENT
Start: 2025-04-01 | End: 2025-04-02 | Stop reason: HOSPADM

## 2025-04-01 RX ORDER — OXYCODONE AND ACETAMINOPHEN 10; 325 MG/1; MG/1
1 TABLET ORAL EVERY 6 HOURS PRN
Status: DISCONTINUED | OUTPATIENT
Start: 2025-04-01 | End: 2025-04-01 | Stop reason: SDUPTHER

## 2025-04-01 RX ORDER — DIPHENHYDRAMINE HYDROCHLORIDE 50 MG/ML
50 INJECTION, SOLUTION INTRAMUSCULAR; INTRAVENOUS ONCE
Status: COMPLETED | OUTPATIENT
Start: 2025-04-01 | End: 2025-04-01

## 2025-04-01 RX ORDER — IOPAMIDOL 755 MG/ML
75 INJECTION, SOLUTION INTRAVASCULAR
Status: COMPLETED | OUTPATIENT
Start: 2025-04-01 | End: 2025-04-01

## 2025-04-01 RX ORDER — POTASSIUM CHLORIDE 1500 MG/1
40 TABLET, EXTENDED RELEASE ORAL PRN
Status: DISCONTINUED | OUTPATIENT
Start: 2025-04-01 | End: 2025-04-02 | Stop reason: HOSPADM

## 2025-04-01 RX ORDER — SODIUM CHLORIDE 0.9 % (FLUSH) 0.9 %
5-40 SYRINGE (ML) INJECTION EVERY 12 HOURS SCHEDULED
Status: DISCONTINUED | OUTPATIENT
Start: 2025-04-01 | End: 2025-04-02 | Stop reason: HOSPADM

## 2025-04-01 RX ORDER — SODIUM CHLORIDE 9 MG/ML
INJECTION, SOLUTION INTRAVENOUS PRN
Status: DISCONTINUED | OUTPATIENT
Start: 2025-04-01 | End: 2025-04-02 | Stop reason: HOSPADM

## 2025-04-01 RX ORDER — BUSPIRONE HYDROCHLORIDE 15 MG/1
30 TABLET ORAL 2 TIMES DAILY
Status: DISCONTINUED | OUTPATIENT
Start: 2025-04-01 | End: 2025-04-02 | Stop reason: HOSPADM

## 2025-04-01 RX ORDER — OXYCODONE AND ACETAMINOPHEN 5; 325 MG/1; MG/1
1 TABLET ORAL EVERY 6 HOURS PRN
Refills: 0 | Status: DISCONTINUED | OUTPATIENT
Start: 2025-04-01 | End: 2025-04-02 | Stop reason: HOSPADM

## 2025-04-01 RX ORDER — ASPIRIN 81 MG/1
81 TABLET, CHEWABLE ORAL DAILY
Status: DISCONTINUED | OUTPATIENT
Start: 2025-04-02 | End: 2025-04-02 | Stop reason: HOSPADM

## 2025-04-01 RX ORDER — MAGNESIUM SULFATE IN WATER 40 MG/ML
2000 INJECTION, SOLUTION INTRAVENOUS PRN
Status: DISCONTINUED | OUTPATIENT
Start: 2025-04-01 | End: 2025-04-02 | Stop reason: HOSPADM

## 2025-04-01 RX ORDER — GLUCAGON 1 MG/ML
1 KIT INJECTION PRN
Status: DISCONTINUED | OUTPATIENT
Start: 2025-04-01 | End: 2025-04-02 | Stop reason: HOSPADM

## 2025-04-01 RX ORDER — INSULIN LISPRO 100 [IU]/ML
0-4 INJECTION, SOLUTION INTRAVENOUS; SUBCUTANEOUS
Status: DISCONTINUED | OUTPATIENT
Start: 2025-04-01 | End: 2025-04-02 | Stop reason: HOSPADM

## 2025-04-01 RX ORDER — ESCITALOPRAM OXALATE 10 MG/1
20 TABLET ORAL EVERY MORNING
Status: DISCONTINUED | OUTPATIENT
Start: 2025-04-02 | End: 2025-04-02 | Stop reason: HOSPADM

## 2025-04-01 RX ORDER — METOCLOPRAMIDE HYDROCHLORIDE 5 MG/ML
10 INJECTION INTRAMUSCULAR; INTRAVENOUS ONCE
Status: COMPLETED | OUTPATIENT
Start: 2025-04-01 | End: 2025-04-01

## 2025-04-01 RX ORDER — ONDANSETRON 2 MG/ML
4 INJECTION INTRAMUSCULAR; INTRAVENOUS EVERY 6 HOURS PRN
Status: DISCONTINUED | OUTPATIENT
Start: 2025-04-01 | End: 2025-04-02

## 2025-04-01 RX ORDER — SODIUM CHLORIDE 0.9 % (FLUSH) 0.9 %
5-40 SYRINGE (ML) INJECTION PRN
Status: DISCONTINUED | OUTPATIENT
Start: 2025-04-01 | End: 2025-04-02 | Stop reason: HOSPADM

## 2025-04-01 RX ORDER — ACETAMINOPHEN 650 MG/1
650 SUPPOSITORY RECTAL EVERY 6 HOURS PRN
Status: DISCONTINUED | OUTPATIENT
Start: 2025-04-01 | End: 2025-04-02 | Stop reason: HOSPADM

## 2025-04-01 RX ORDER — ONDANSETRON 4 MG/1
4 TABLET, ORALLY DISINTEGRATING ORAL EVERY 8 HOURS PRN
Status: DISCONTINUED | OUTPATIENT
Start: 2025-04-01 | End: 2025-04-02

## 2025-04-01 RX ORDER — PANTOPRAZOLE SODIUM 40 MG/1
40 TABLET, DELAYED RELEASE ORAL
Status: DISCONTINUED | OUTPATIENT
Start: 2025-04-02 | End: 2025-04-02 | Stop reason: HOSPADM

## 2025-04-01 RX ORDER — POTASSIUM CHLORIDE 7.45 MG/ML
10 INJECTION INTRAVENOUS PRN
Status: DISCONTINUED | OUTPATIENT
Start: 2025-04-01 | End: 2025-04-02 | Stop reason: HOSPADM

## 2025-04-01 RX ORDER — DEXAMETHASONE SODIUM PHOSPHATE 10 MG/ML
8 INJECTION, SOLUTION INTRAMUSCULAR; INTRAVENOUS ONCE
Status: COMPLETED | OUTPATIENT
Start: 2025-04-01 | End: 2025-04-01

## 2025-04-01 RX ORDER — ENOXAPARIN SODIUM 100 MG/ML
40 INJECTION SUBCUTANEOUS DAILY
Status: DISCONTINUED | OUTPATIENT
Start: 2025-04-01 | End: 2025-04-02 | Stop reason: HOSPADM

## 2025-04-01 RX ORDER — ROSUVASTATIN CALCIUM 20 MG/1
40 TABLET, COATED ORAL NIGHTLY
Status: DISCONTINUED | OUTPATIENT
Start: 2025-04-02 | End: 2025-04-02 | Stop reason: HOSPADM

## 2025-04-01 RX ORDER — DEXTROSE MONOHYDRATE 100 MG/ML
INJECTION, SOLUTION INTRAVENOUS CONTINUOUS PRN
Status: DISCONTINUED | OUTPATIENT
Start: 2025-04-01 | End: 2025-04-02 | Stop reason: HOSPADM

## 2025-04-01 RX ORDER — ACETAMINOPHEN 325 MG/1
650 TABLET ORAL EVERY 6 HOURS PRN
Status: DISCONTINUED | OUTPATIENT
Start: 2025-04-01 | End: 2025-04-02 | Stop reason: HOSPADM

## 2025-04-01 RX ORDER — RANOLAZINE 500 MG/1
1000 TABLET, EXTENDED RELEASE ORAL 2 TIMES DAILY
Status: DISCONTINUED | OUTPATIENT
Start: 2025-04-01 | End: 2025-04-02 | Stop reason: HOSPADM

## 2025-04-01 RX ORDER — LEVETIRACETAM 500 MG/1
500 TABLET ORAL 2 TIMES DAILY
Status: DISCONTINUED | OUTPATIENT
Start: 2025-04-01 | End: 2025-04-02 | Stop reason: HOSPADM

## 2025-04-01 RX ORDER — LIDOCAINE 4 G/G
1 PATCH TOPICAL DAILY
Status: DISCONTINUED | OUTPATIENT
Start: 2025-04-02 | End: 2025-04-02 | Stop reason: HOSPADM

## 2025-04-01 RX ADMIN — DIPHENHYDRAMINE HYDROCHLORIDE 50 MG: 50 INJECTION, SOLUTION INTRAMUSCULAR; INTRAVENOUS at 13:07

## 2025-04-01 RX ADMIN — SODIUM CHLORIDE 500 MG: 9 INJECTION, SOLUTION INTRAVENOUS at 17:21

## 2025-04-01 RX ADMIN — METOCLOPRAMIDE HYDROCHLORIDE 10 MG: 5 INJECTION, SOLUTION INTRAMUSCULAR; INTRAVENOUS at 13:07

## 2025-04-01 RX ADMIN — LEVETIRACETAM 500 MG: 500 TABLET, FILM COATED ORAL at 22:01

## 2025-04-01 RX ADMIN — BUSPIRONE HYDROCHLORIDE 30 MG: 15 TABLET ORAL at 22:01

## 2025-04-01 RX ADMIN — OXYCODONE HYDROCHLORIDE 5 MG: 5 TABLET ORAL at 22:01

## 2025-04-01 RX ADMIN — INSULIN LISPRO 4 UNITS: 100 INJECTION, SOLUTION INTRAVENOUS; SUBCUTANEOUS at 22:10

## 2025-04-01 RX ADMIN — Medication 10 ML: at 22:11

## 2025-04-01 RX ADMIN — RANOLAZINE 1000 MG: 500 TABLET, EXTENDED RELEASE ORAL at 22:01

## 2025-04-01 RX ADMIN — DEXAMETHASONE SODIUM PHOSPHATE 8 MG: 10 INJECTION, SOLUTION INTRAMUSCULAR; INTRAVENOUS at 13:07

## 2025-04-01 RX ADMIN — ENOXAPARIN SODIUM 40 MG: 100 INJECTION SUBCUTANEOUS at 22:01

## 2025-04-01 RX ADMIN — IOPAMIDOL 75 ML: 755 INJECTION, SOLUTION INTRAVENOUS at 13:09

## 2025-04-01 ASSESSMENT — PAIN DESCRIPTION - DESCRIPTORS
DESCRIPTORS: ACHING

## 2025-04-01 ASSESSMENT — PAIN SCALES - GENERAL
PAINLEVEL_OUTOF10: 8
PAINLEVEL_OUTOF10: 10
PAINLEVEL_OUTOF10: 10

## 2025-04-01 ASSESSMENT — PAIN DESCRIPTION - ORIENTATION: ORIENTATION: UPPER

## 2025-04-01 ASSESSMENT — PAIN SCALES - WONG BAKER: WONGBAKER_NUMERICALRESPONSE: NO HURT

## 2025-04-01 ASSESSMENT — PAIN - FUNCTIONAL ASSESSMENT: PAIN_FUNCTIONAL_ASSESSMENT: 0-10

## 2025-04-01 ASSESSMENT — ENCOUNTER SYMPTOMS
PHOTOPHOBIA: 1
CHEST TIGHTNESS: 0
NAUSEA: 0
DIARRHEA: 0
ABDOMINAL PAIN: 0
SHORTNESS OF BREATH: 0
VOMITING: 0

## 2025-04-01 ASSESSMENT — PAIN DESCRIPTION - LOCATION
LOCATION: HEAD

## 2025-04-01 NOTE — ED PROVIDER NOTES
I personally saw Waleska Fernández and made/approved the management plan and take responsibility for the patient management.    In brief, patient is a 59-year-old female who comes to the emergency department today with a headache and right-sided numbness/weakness.  Patient recently diagnosed with subarachnoid hemorrhage, was admitted here and evaluated by neurosurgery.  Today called neurosurgery office and they were referred here for evaluation.  Patient initially seen by staff NP, see her note for further details.    Focused exam revealed   General- no acute distress, alert, cooperative, uncomfortable with towel over her eyes.  Skin -intact, no rashes  Respiratory -no respiratory distress, speaking full sentences  MSK: No acute deformities, strength 5 out of 5 throughout all extremities.  Neuro: Normal speech, cranial nerves II through XII intact, report of paresthesia to right upper extremity, lower extremity, face.  Abdomen: Nondistended  Psych: Mood and affect appropriate      ED course:   Patient seen in collaboration with Sridevi OLIVAS, agree with her management of patient.  See Sridevi NP management of patient in the emergency department.    Pt not TPA candidate 2/2  recent SAH.   Did have initial concern for worsening subarachnoid hemorrhage.  Was called a stroke alert.    CT of head does not show expanding or read bleed.  Discussed with neurosurgery who agrees with assessment.  They state patient was high risk for CVA and neurology was insistent that patient should be started on aspirin on Sunday after discharge.    Discussed with neurology, recommends patient be treated inpatient for MRI and evaluation.  Patient did have improvement of her reported weakness and numbness after improvement of her headache.  Do question if this was complex migraine.        ED Course as of 04/01/25 2034 Tue Apr 01, 2025   1420 WBC(!): 10.7 [SH]   1420 Hemoglobin Quant(!): 12.2 [SH]   1420 Hematocrit: 39.1 [SH]   1420 Sodium: 140

## 2025-04-01 NOTE — ED NOTES
ED TO INPATIENT SBAR HANDOFF    Patient Name: Waleska Fernández   :  1965  59 y.o.   Preferred Name  Waleska  Family/Caregiver Present yes   Restraints no   C-SSRS:    Sitter no   Sepsis Risk Score        Situation  Chief Complaint   Patient presents with    Headache     Brief Description of Patient's Condition: Patient is c/o having one of the worst headaches she has ever had. No visual disturbances but noted weakness to right sided extremities that started at 0730 this morning. Headache has progressively gotten worse over the last 24 hours. Hx of subarachnoid hemorrhage on 3/25.  Mental Status: oriented and alert  Arrived from: home    Imaging:   CTA HEAD NECK W CONTRAST   Final Result      CT HEAD WO CONTRAST   Final Result        Abnormal labs:   Abnormal Labs Reviewed   CBC WITH AUTO DIFFERENTIAL - Abnormal; Notable for the following components:       Result Value    WBC 10.7 (*)     Hemoglobin 12.2 (*)     MCHC 31.2 (*)     Neutrophils % 74 (*)     Lymphocytes % 17 (*)     Monocytes % 6 (*)     All other components within normal limits   COMPREHENSIVE METABOLIC PANEL - Abnormal; Notable for the following components:    Glucose 295 (*)     Est, Glom Filt Rate 46 (*)     Calcium 10.9 (*)     ALT 42 (*)     All other components within normal limits   POCT GLUCOSE - Abnormal; Notable for the following components:    POC Glucose 270 (*)     All other components within normal limits        Background  History:   Past Medical History:   Diagnosis Date    Acid reflux     CAD (coronary artery disease)     CAD (coronary artery disease)     Sees Dr. NEDRA Lambert    Chronic back pain     Chronic obstructive pulmonary disease (HCC)     COPD (chronic obstructive pulmonary disease) (HCC)     \"have mild COPD- no pulmonologist\"    Depression     Diabetes (HCC)     Diabetes mellitus (HCC) Dx     Eczema of hand     Hx of migraines     HX OTHER MEDICAL     \"difficulty IV stick\"    Hyperlipidemia     Hypertension     follows

## 2025-04-01 NOTE — TELEPHONE ENCOUNTER
Called patient back per Kanika Denny she would like her to go to the Hospital to re affirm that everything is ok. Patient stated understanding.

## 2025-04-01 NOTE — H&P
Aortic Arch: Normal caliber aortic arch. No dissection. Post-CABG changes are noted. Normal branch pattern of the brachiocephalic arteries. There is a normal variant origination of the left common carotid artery from the base of the brachiocephalic trunk. There is less than 50% stenosis of the proximal left subclavian artery. Right Cervical Carotid: Atherosclerotic disease at the carotid bifurcation and proximal ICA. Less than 50% proximal ICA stenosis, approximately 30% narrowed. No dissection or occlusion. Left Cervical Carotid: Atherosclerotic disease at the carotid bifurcation and proximal ICA. Much less than 50% ICA stenosis. No dissection or occlusion. Vertebrals: Patent vertebral arteries, left dominant. No evidence of significant  stenosis or dissection. Intracranial Vasculature:  No evidence of aneurysm, vascular malformation, or intracranial major branch arterial occlusion. Right posterior communicating artery is either absent or hypoplastic. Patent left posterior communicating and anterior communicating arteries present. No fracture evident. IMPRESSION: 1.  No evidence of large vessel intracranial arterial occlusion. No intracranial  aneurysm. 2.  Less than 50% bilateral proximal ICA stenosis secondary to atherosclerotic disease. 3.  No active bleeding in the region of known recent left parietal subarachnoid hemorrhage.  Dictated and Electronically Signed By: Sallie Mcdaniels MD 4/1/2025 13:35        CT HEAD WO CONTRAST  Result Date: 4/1/2025  PROCEDURE: CT HEAD WO CONTRAST DATE OF EXAM:  4/1/2025 13:08 DEMOGRAPHICS: 59 years old Female INDICATION: [place] /recent bleed/ recent fall COMPARISON: 3/26/2025, CT and MRI TECHNIQUE: Contiguous axial slices of the head were submitted without IV contrast.   DOSE OPTIMIZATION: CT radiation dose optimization techniques (automated exposure  control, and use of iterative reconstruction techniques, or adjustment of the mA and/or kV according to patient size) were used to

## 2025-04-01 NOTE — TELEPHONE ENCOUNTER
Patient  called stated Waleska woke up today with a severe headache 9/10 pain scale she has had it for about two hours they took her blood pressure 136/79 pulse 102 I suggested they go to the ER and that I would contact Kanika Denny and see what she would like you to do. Patient stated understanding.

## 2025-04-02 VITALS
HEART RATE: 79 BPM | WEIGHT: 182.32 LBS | TEMPERATURE: 97.9 F | RESPIRATION RATE: 12 BRPM | HEIGHT: 66 IN | OXYGEN SATURATION: 98 % | SYSTOLIC BLOOD PRESSURE: 109 MMHG | BODY MASS INDEX: 29.3 KG/M2 | DIASTOLIC BLOOD PRESSURE: 50 MMHG

## 2025-04-02 LAB
ALBUMIN SERPL-MCNC: 4.2 G/DL (ref 3.4–5)
ALBUMIN/GLOB SERPL: 1.3 {RATIO} (ref 1.1–2.2)
ALP SERPL-CCNC: 89 U/L (ref 40–129)
ALT SERPL-CCNC: 39 U/L (ref 10–40)
ANION GAP SERPL CALCULATED.3IONS-SCNC: 16 MMOL/L (ref 9–17)
AST SERPL-CCNC: 28 U/L (ref 15–37)
BASOPHILS # BLD: 0.03 K/UL
BASOPHILS NFR BLD: 0 % (ref 0–1)
BILIRUB SERPL-MCNC: 0.3 MG/DL (ref 0–1)
BUN SERPL-MCNC: 24 MG/DL (ref 7–20)
CALCIUM SERPL-MCNC: 10.2 MG/DL (ref 8.3–10.6)
CHLORIDE SERPL-SCNC: 103 MMOL/L (ref 99–110)
CO2 SERPL-SCNC: 20 MMOL/L (ref 21–32)
CREAT SERPL-MCNC: 1.5 MG/DL (ref 0.6–1.2)
EOSINOPHIL # BLD: 0 K/UL
EOSINOPHILS RELATIVE PERCENT: 0 % (ref 0–3)
ERYTHROCYTE [DISTWIDTH] IN BLOOD BY AUTOMATED COUNT: 13 % (ref 11.7–14.9)
GFR, ESTIMATED: 36 ML/MIN/1.73M2
GLUCOSE BLD-MCNC: 304 MG/DL (ref 74–99)
GLUCOSE BLD-MCNC: 357 MG/DL (ref 74–99)
GLUCOSE BLD-MCNC: 414 MG/DL (ref 74–99)
GLUCOSE SERPL-MCNC: 318 MG/DL (ref 74–99)
HCT VFR BLD AUTO: 37.6 % (ref 37–47)
HGB BLD-MCNC: 11.6 G/DL (ref 12.5–16)
IMM GRANULOCYTES # BLD AUTO: 0.07 K/UL
IMM GRANULOCYTES NFR BLD: 1 %
LYMPHOCYTES NFR BLD: 1.05 K/UL
LYMPHOCYTES RELATIVE PERCENT: 7 % (ref 24–44)
MCH RBC QN AUTO: 28 PG (ref 27–31)
MCHC RBC AUTO-ENTMCNC: 30.9 G/DL (ref 32–36)
MCV RBC AUTO: 90.8 FL (ref 78–100)
MONOCYTES NFR BLD: 0.24 K/UL
MONOCYTES NFR BLD: 2 % (ref 0–4)
NEUTROPHILS NFR BLD: 90 % (ref 36–66)
NEUTS SEG NFR BLD: 13.09 K/UL
PLATELET # BLD AUTO: 231 K/UL (ref 140–440)
PMV BLD AUTO: 10.9 FL (ref 7.5–11.1)
POTASSIUM SERPL-SCNC: 4.5 MMOL/L (ref 3.5–5.1)
PROT SERPL-MCNC: 7.4 G/DL (ref 6.4–8.2)
RBC # BLD AUTO: 4.14 M/UL (ref 4.2–5.4)
SODIUM SERPL-SCNC: 140 MMOL/L (ref 136–145)
WBC OTHER # BLD: 14.5 K/UL (ref 4–10.5)

## 2025-04-02 PROCEDURE — 99223 1ST HOSP IP/OBS HIGH 75: CPT | Performed by: PSYCHIATRY & NEUROLOGY

## 2025-04-02 PROCEDURE — 85025 COMPLETE CBC W/AUTO DIFF WBC: CPT

## 2025-04-02 PROCEDURE — 2500000003 HC RX 250 WO HCPCS: Performed by: STUDENT IN AN ORGANIZED HEALTH CARE EDUCATION/TRAINING PROGRAM

## 2025-04-02 PROCEDURE — 94761 N-INVAS EAR/PLS OXIMETRY MLT: CPT

## 2025-04-02 PROCEDURE — 36415 COLL VENOUS BLD VENIPUNCTURE: CPT

## 2025-04-02 PROCEDURE — 6370000000 HC RX 637 (ALT 250 FOR IP): Performed by: STUDENT IN AN ORGANIZED HEALTH CARE EDUCATION/TRAINING PROGRAM

## 2025-04-02 PROCEDURE — 2500000003 HC RX 250 WO HCPCS: Performed by: NURSE PRACTITIONER

## 2025-04-02 PROCEDURE — 80053 COMPREHEN METABOLIC PANEL: CPT

## 2025-04-02 PROCEDURE — 6360000002 HC RX W HCPCS: Performed by: STUDENT IN AN ORGANIZED HEALTH CARE EDUCATION/TRAINING PROGRAM

## 2025-04-02 PROCEDURE — 82962 GLUCOSE BLOOD TEST: CPT

## 2025-04-02 PROCEDURE — 2580000003 HC RX 258: Performed by: NURSE PRACTITIONER

## 2025-04-02 RX ORDER — PROMETHAZINE HYDROCHLORIDE 25 MG/1
25 TABLET ORAL EVERY 6 HOURS PRN
Status: DISCONTINUED | OUTPATIENT
Start: 2025-04-02 | End: 2025-04-02 | Stop reason: HOSPADM

## 2025-04-02 RX ADMIN — PANTOPRAZOLE SODIUM 40 MG: 40 TABLET, DELAYED RELEASE ORAL at 05:23

## 2025-04-02 RX ADMIN — SODIUM CHLORIDE 500 MG: 9 INJECTION, SOLUTION INTRAVENOUS at 13:14

## 2025-04-02 RX ADMIN — ESCITALOPRAM OXALATE 20 MG: 10 TABLET ORAL at 10:06

## 2025-04-02 RX ADMIN — BUSPIRONE HYDROCHLORIDE 30 MG: 15 TABLET ORAL at 10:07

## 2025-04-02 RX ADMIN — INSULIN LISPRO 4 UNITS: 100 INJECTION, SOLUTION INTRAVENOUS; SUBCUTANEOUS at 12:52

## 2025-04-02 RX ADMIN — INSULIN HUMAN 5 UNITS: 100 INJECTION, SOLUTION PARENTERAL at 01:25

## 2025-04-02 RX ADMIN — ACETAMINOPHEN 650 MG: 325 TABLET ORAL at 10:07

## 2025-04-02 RX ADMIN — ENOXAPARIN SODIUM 40 MG: 100 INJECTION SUBCUTANEOUS at 10:07

## 2025-04-02 RX ADMIN — RANOLAZINE 1000 MG: 500 TABLET, EXTENDED RELEASE ORAL at 10:07

## 2025-04-02 RX ADMIN — Medication 10 ML: at 10:14

## 2025-04-02 RX ADMIN — ASPIRIN 81 MG: 81 TABLET, CHEWABLE ORAL at 10:07

## 2025-04-02 RX ADMIN — INSULIN LISPRO 3 UNITS: 100 INJECTION, SOLUTION INTRAVENOUS; SUBCUTANEOUS at 10:13

## 2025-04-02 RX ADMIN — LEVETIRACETAM 500 MG: 500 TABLET, FILM COATED ORAL at 10:07

## 2025-04-02 RX ADMIN — TIZANIDINE 4 MG: 4 TABLET ORAL at 02:58

## 2025-04-02 RX ADMIN — OXYCODONE AND ACETAMINOPHEN 1 TABLET: 325; 5 TABLET ORAL at 05:23

## 2025-04-02 RX ADMIN — EMPAGLIFLOZIN 25 MG: 10 TABLET, FILM COATED ORAL at 10:07

## 2025-04-02 ASSESSMENT — PAIN SCALES - GENERAL
PAINLEVEL_OUTOF10: 7
PAINLEVEL_OUTOF10: 6
PAINLEVEL_OUTOF10: 8

## 2025-04-02 ASSESSMENT — PAIN DESCRIPTION - LOCATION
LOCATION: HEAD
LOCATION: HEAD

## 2025-04-02 ASSESSMENT — PAIN DESCRIPTION - DESCRIPTORS: DESCRIPTORS: ACHING;CRAMPING;JABBING

## 2025-04-02 ASSESSMENT — PAIN SCALES - WONG BAKER: WONGBAKER_NUMERICALRESPONSE: NO HURT

## 2025-04-02 ASSESSMENT — PAIN DESCRIPTION - ORIENTATION: ORIENTATION: LEFT;RIGHT

## 2025-04-02 NOTE — CONSULTS
Neurology Service Consult Note  [facility]   Patient Name: Waleska Fernández  : 1965        Subjective:   Reason for consult: Headache  59 y.o.  female with history of CAD, COPD, depression, DM, migraine, HLD, HTN, NSTEMI, SAH presenting to [facility] with complaints of headache. Patent was recently evaluated 3/26/25 for headache following fall at home with SAH. During that hospitalization, patient reported that her headache was consistent with her usual migraine headache but was more intense. She responded well to Depacon IV and was discharged home 3/27/25. Patient tells me that her headache returned Monday evening. Pain is located at the top of  her head, describes it as a sharp and throbbing pain that is associated with photophobia, phonophobia and nausea. At time of exam  her lights are on, TV is on and she appears comfortable. Patient tells me that she tried Percocet and Imitrex at home without resolution. She also shares that she has not been drinking caffeine (drinks Mountain Dew regularly) at home, for the last two days. Her  did bring her a Pepsi and after drinking it has had some relief of her headache.       Past Medical History:   Diagnosis Date    Acid reflux     CAD (coronary artery disease)     CAD (coronary artery disease)     Sees Dr. NEDRA Lambert    Chronic back pain     Chronic obstructive pulmonary disease (HCC)     COPD (chronic obstructive pulmonary disease) ([facility])     \"have mild COPD- no pulmonologist\"    Depression     Diabetes (HCC)     Diabetes mellitus (HCC) Dx     Eczema of hand     Hx of migraines     HX OTHER MEDICAL     \"difficulty IV stick\"    Hyperlipidemia     Hypertension     follows with Dr Wood    Kidney stones     \"Passed One Kidney Stone\"    PVD (peripheral vascular disease)     Wears glasses     :   Past Surgical History:   Procedure Laterality Date    APPENDECTOMY      Done During [facility]    BACK SURGERY  Last Done

## 2025-04-02 NOTE — PROGRESS NOTES
4 Eyes Skin Assessment     NAME:  Waleska Fernández  YOB: 1965  MEDICAL RECORD NUMBER:  [id number]    The patient is being assessed for  Admission    I agree that at least one RN has performed a thorough Head to Toe Skin Assessment on the patient. ALL assessment sites listed below have been assessed.      Areas assessed by both nurses:    Head, Face, Ears, Shoulders, Back, Chest, Arms, Elbows, Hands, Sacrum. Buttock, Coccyx, Ischium, Legs. Feet and Heels, and Under Medical Devices         Does the Patient have a Wound? No noted wound(s)       Adrian Prevention initiated by RN: No  Wound Care Orders initiated by RN: No    Pressure Injury (Stage 3,4, Unstageable, DTI, NWPT, and Complex wounds) if present, place Wound referral order by RN under : Yes    New Ostomies, if present place, Ostomy referral order under : No     Nurse 1 eSignature: Electronically signed by Mumtaz Cornejo RN on 4/1/25 at 10:41 PM EDT    **SHARE this note so that the co-signing nurse can place an eSignature**    Nurse 2 eSignature: {Esignature:[id number]}

## 2025-04-02 NOTE — DISCHARGE SUMMARY
V2.0  Discharge Summary    Name:  Waleska Fernández /Age/Sex: 1965 (59 y.o. female)   Admit Date: 2025  Discharge Date: 25    MRN & CSN:  [id number] & [id number] Encounter Date and Time 25 3:14 PM EDT    Attending:  Yumiko Ceballos MD Discharging Provider: Yumiko Ceballos MD       Hospital Course:     Brief HPI: Waleska Fernández is a 59 y.o. female who presented with ***    Brief Problem Based Course:   ***      The patient expressed appropriate understanding of, and agreement with the discharge recommendations, medications, and plan.     Consults this admission:  IP CONSULT TO NEUROLOGY  IP CONSULT TO NEUROLOGY    Discharge Diagnosis:   Acute intractable headache, unspecified headache type    ***    Discharge Instruction:   Follow up appointments: ***  Primary care physician: Nathan Lambert DO within 1 week  Diet: {diet:09429}   Activity: {discharge activity:02302}  Disposition: Discharged to:   []Home, [][facility], []SNF, []Acute Rehab, []Hospice ***  Condition on discharge: Stable  Labs and Tests to be Followed up as an outpatient by PCP or Specialist: ***    Discharge Medications:        Medication List        PAUSE taking these medications      aspirin 81 MG chewable tablet  Wait to take this until: 2025  Take 1 tablet by mouth daily            START taking these medications      glipiZIDE 5 MG tablet  Commonly known as: GLUCOTROL            CHANGE how you take these medications      tiZANidine 4 MG tablet  Commonly known as: ZANAFLEX  Take 1 tablet by mouth every 8 hours as needed (headache)  What changed: reasons to take this            CONTINUE taking these medications      ALIVE HAIR, SKIN & NAILS PO     Blood Pressure Kit  1 each by Does not apply route in the morning and at bedtime     busPIRone 30 MG tablet  Commonly known as: BUSPAR     empagliflozin 25 MG tablet  Commonly known as: JARDIANCE     escitalopram 20 MG tablet  Commonly known as: LEXAPRO     lidocaine 4 % external patch  Place 1

## 2025-04-02 NOTE — CARE COORDINATION
04/02/25 0912   Service Assessment   Patient Orientation Alert and Oriented   Cognition Alert   History Provided By Medical Record   Primary Caregiver Self   Support Systems Spouse/Significant Other;Children   Patient's Healthcare Decision Maker is: Legal Next of Kin   PCP Verified by CM Yes   Prior Functional Level Independent in ADLs/IADLs   Current Functional Level Independent in ADLs/IADLs   Can patient return to prior living arrangement Unknown at present   Ability to make needs known: Good   Family able to assist with home care needs: Yes   Would you like for me to discuss the discharge plan with any other family members/significant others, and if so, who? No   Financial Resources Other (Comment)  (commercial)   Community Resources None     Chart reviewed, screened for discharge planning.  Pt is from home with spouse.  No discharge needs identified at this time.  CM following

## 2025-04-10 ENCOUNTER — OFFICE VISIT (OUTPATIENT)
Dept: NEUROSURGERY | Age: 60
End: 2025-04-10
Payer: COMMERCIAL

## 2025-04-10 ENCOUNTER — OFFICE VISIT (OUTPATIENT)
Dept: FAMILY MEDICINE CLINIC | Age: 60
End: 2025-04-10
Payer: COMMERCIAL

## 2025-04-10 VITALS
HEIGHT: 66 IN | DIASTOLIC BLOOD PRESSURE: 72 MMHG | SYSTOLIC BLOOD PRESSURE: 116 MMHG | BODY MASS INDEX: 29.09 KG/M2 | OXYGEN SATURATION: 97 % | HEART RATE: 62 BPM | WEIGHT: 181 LBS

## 2025-04-10 VITALS — WEIGHT: 171 LBS | BODY MASS INDEX: 27.6 KG/M2 | DIASTOLIC BLOOD PRESSURE: 80 MMHG | SYSTOLIC BLOOD PRESSURE: 128 MMHG

## 2025-04-10 DIAGNOSIS — D64.9 ANEMIA, UNSPECIFIED TYPE: ICD-10-CM

## 2025-04-10 DIAGNOSIS — I60.9 SUBARACHNOID HEMORRHAGE (HCC): Primary | ICD-10-CM

## 2025-04-10 DIAGNOSIS — K21.9 GASTROESOPHAGEAL REFLUX DISEASE, UNSPECIFIED WHETHER ESOPHAGITIS PRESENT: ICD-10-CM

## 2025-04-10 DIAGNOSIS — I25.10 CORONARY ARTERY DISEASE INVOLVING NATIVE CORONARY ARTERY OF NATIVE HEART WITHOUT ANGINA PECTORIS: Chronic | ICD-10-CM

## 2025-04-10 DIAGNOSIS — E11.69 TYPE 2 DIABETES MELLITUS WITH OTHER SPECIFIED COMPLICATION, WITHOUT LONG-TERM CURRENT USE OF INSULIN: Primary | ICD-10-CM

## 2025-04-10 DIAGNOSIS — I10 ESSENTIAL HYPERTENSION: Chronic | ICD-10-CM

## 2025-04-10 DIAGNOSIS — I60.9 SUBARACHNOID HEMORRHAGE (HCC): ICD-10-CM

## 2025-04-10 PROBLEM — E87.6 HYPOKALEMIA: Status: ACTIVE | Noted: 2023-02-01

## 2025-04-10 PROBLEM — F41.1 GAD (GENERALIZED ANXIETY DISORDER): Chronic | Status: ACTIVE | Noted: 2020-12-06

## 2025-04-10 PROBLEM — G47.34 NOCTURNAL HYPOXIA: Status: ACTIVE | Noted: 2022-09-29

## 2025-04-10 PROBLEM — I25.2 HISTORY OF MYOCARDIAL INFARCTION: Chronic | Status: ACTIVE | Noted: 2022-04-12

## 2025-04-10 PROBLEM — Z87.891 FORMER CIGARETTE SMOKER: Status: ACTIVE | Noted: 2024-12-05

## 2025-04-10 PROBLEM — M48.061 SPINAL STENOSIS OF LUMBAR REGION: Status: ACTIVE | Noted: 2017-09-21

## 2025-04-10 PROBLEM — R25.1 OCCASIONAL TREMORS: Status: ACTIVE | Noted: 2023-04-29

## 2025-04-10 PROBLEM — E83.51 HYPOCALCEMIA: Status: ACTIVE | Noted: 2023-02-01

## 2025-04-10 PROCEDURE — 3078F DIAST BP <80 MM HG: CPT

## 2025-04-10 PROCEDURE — 99214 OFFICE O/P EST MOD 30 MIN: CPT

## 2025-04-10 PROCEDURE — 3074F SYST BP LT 130 MM HG: CPT

## 2025-04-10 PROCEDURE — 99213 OFFICE O/P EST LOW 20 MIN: CPT | Performed by: PHYSICIAN ASSISTANT

## 2025-04-10 PROCEDURE — 3079F DIAST BP 80-89 MM HG: CPT | Performed by: PHYSICIAN ASSISTANT

## 2025-04-10 PROCEDURE — 3052F HG A1C>EQUAL 8.0%<EQUAL 9.0%: CPT

## 2025-04-10 PROCEDURE — 3074F SYST BP LT 130 MM HG: CPT | Performed by: PHYSICIAN ASSISTANT

## 2025-04-10 RX ORDER — FENOFIBRATE 54 MG/1
54 TABLET ORAL DAILY
COMMUNITY
Start: 2025-04-01 | End: 2025-04-10 | Stop reason: SDUPTHER

## 2025-04-10 RX ORDER — DULOXETIN HYDROCHLORIDE 60 MG/1
60 CAPSULE, DELAYED RELEASE ORAL DAILY
Qty: 90 CAPSULE | Refills: 1 | Status: CANCELLED | OUTPATIENT
Start: 2025-04-10

## 2025-04-10 RX ORDER — OXYCODONE AND ACETAMINOPHEN 10; 325 MG/1; MG/1
1 TABLET ORAL EVERY 6 HOURS PRN
Refills: 0 | Status: CANCELLED | OUTPATIENT
Start: 2025-04-10 | End: 2025-05-10

## 2025-04-10 RX ORDER — FENOFIBRATE 54 MG/1
54 TABLET ORAL DAILY
Qty: 90 TABLET | Refills: 1 | Status: SHIPPED | OUTPATIENT
Start: 2025-04-10

## 2025-04-10 RX ORDER — ESCITALOPRAM OXALATE 20 MG/1
20 TABLET ORAL DAILY
Qty: 90 TABLET | Refills: 1 | Status: CANCELLED | OUTPATIENT
Start: 2025-04-10

## 2025-04-10 RX ORDER — OXYCODONE AND ACETAMINOPHEN 10; 325 MG/1; MG/1
1 TABLET ORAL EVERY 6 HOURS PRN
COMMUNITY

## 2025-04-10 RX ORDER — METOPROLOL TARTRATE 25 MG/1
12.5 TABLET, FILM COATED ORAL 2 TIMES DAILY
COMMUNITY

## 2025-04-10 RX ORDER — PREGABALIN 50 MG/1
50 CAPSULE ORAL 2 TIMES DAILY
Qty: 180 CAPSULE | Refills: 0 | Status: CANCELLED | OUTPATIENT
Start: 2025-04-10 | End: 2025-07-09

## 2025-04-10 RX ORDER — METOPROLOL SUCCINATE 25 MG/1
25 TABLET, EXTENDED RELEASE ORAL DAILY
Qty: 90 TABLET | Refills: 1 | Status: SHIPPED | OUTPATIENT
Start: 2025-04-10

## 2025-04-10 RX ORDER — ASPIRIN 81 MG/1
81 TABLET, CHEWABLE ORAL DAILY
Qty: 90 TABLET | Refills: 1 | Status: SHIPPED | OUTPATIENT
Start: 2025-04-10

## 2025-04-10 RX ORDER — METOPROLOL TARTRATE 25 MG/1
12.5 TABLET, FILM COATED ORAL 2 TIMES DAILY
Qty: 90 TABLET | Refills: 1 | Status: CANCELLED | OUTPATIENT
Start: 2025-04-10

## 2025-04-10 RX ORDER — GLIPIZIDE 10 MG/1
10 TABLET ORAL
Qty: 180 TABLET | Refills: 1 | Status: SHIPPED | OUTPATIENT
Start: 2025-04-10

## 2025-04-10 RX ORDER — PREGABALIN 50 MG/1
50 CAPSULE ORAL 2 TIMES DAILY
COMMUNITY
Start: 2024-09-03

## 2025-04-10 RX ORDER — DULOXETIN HYDROCHLORIDE 60 MG/1
60 CAPSULE, DELAYED RELEASE ORAL DAILY
COMMUNITY

## 2025-04-10 RX ORDER — PANTOPRAZOLE SODIUM 40 MG/1
40 TABLET, DELAYED RELEASE ORAL DAILY
Qty: 90 TABLET | Refills: 1 | Status: SHIPPED | OUTPATIENT
Start: 2025-04-10

## 2025-04-10 RX ORDER — RANOLAZINE 1000 MG/1
1000 TABLET, EXTENDED RELEASE ORAL 2 TIMES DAILY
Qty: 180 TABLET | Refills: 1 | Status: CANCELLED | OUTPATIENT
Start: 2025-04-10

## 2025-04-10 RX ORDER — ROSUVASTATIN CALCIUM 40 MG/1
40 TABLET, COATED ORAL NIGHTLY
Qty: 90 TABLET | Refills: 1 | Status: SHIPPED | OUTPATIENT
Start: 2025-04-10

## 2025-04-10 RX ORDER — BUSPIRONE HYDROCHLORIDE 30 MG/1
15 TABLET ORAL 3 TIMES DAILY
Qty: 135 TABLET | Refills: 1 | Status: CANCELLED | OUTPATIENT
Start: 2025-04-10

## 2025-04-10 SDOH — HEALTH STABILITY: PHYSICAL HEALTH: ON AVERAGE, HOW MANY DAYS PER WEEK DO YOU ENGAGE IN MODERATE TO STRENUOUS EXERCISE (LIKE A BRISK WALK)?: 0 DAYS

## 2025-04-10 ASSESSMENT — PATIENT HEALTH QUESTIONNAIRE - PHQ9
5. POOR APPETITE OR OVEREATING: SEVERAL DAYS
7. TROUBLE CONCENTRATING ON THINGS, SUCH AS READING THE NEWSPAPER OR WATCHING TELEVISION: NEARLY EVERY DAY
SUM OF ALL RESPONSES TO PHQ QUESTIONS 1-9: 19
3. TROUBLE FALLING OR STAYING ASLEEP: NEARLY EVERY DAY
SUM OF ALL RESPONSES TO PHQ QUESTIONS 1-9: 19
9. THOUGHTS THAT YOU WOULD BE BETTER OFF DEAD, OR OF HURTING YOURSELF: NOT AT ALL
1. LITTLE INTEREST OR PLEASURE IN DOING THINGS: MORE THAN HALF THE DAYS
4. FEELING TIRED OR HAVING LITTLE ENERGY: NEARLY EVERY DAY
6. FEELING BAD ABOUT YOURSELF - OR THAT YOU ARE A FAILURE OR HAVE LET YOURSELF OR YOUR FAMILY DOWN: NEARLY EVERY DAY
10. IF YOU CHECKED OFF ANY PROBLEMS, HOW DIFFICULT HAVE THESE PROBLEMS MADE IT FOR YOU TO DO YOUR WORK, TAKE CARE OF THINGS AT HOME, OR GET ALONG WITH OTHER PEOPLE: EXTREMELY DIFFICULT
8. MOVING OR SPEAKING SO SLOWLY THAT OTHER PEOPLE COULD HAVE NOTICED. OR THE OPPOSITE, BEING SO FIGETY OR RESTLESS THAT YOU HAVE BEEN MOVING AROUND A LOT MORE THAN USUAL: MORE THAN HALF THE DAYS
2. FEELING DOWN, DEPRESSED OR HOPELESS: MORE THAN HALF THE DAYS

## 2025-04-10 NOTE — PROGRESS NOTES
performed by Bill Lazo MD at [facility] CARDIAC CATH LAB    CARDIAC SURGERY  05/04/2012    CABG ([address])    CHOLECYSTECTOMY  1988    \"took with appendix and hyster    COLONOSCOPY  last one 2014    Dr. Collado    CORONARY ANGIOPLASTY WITH STENT PLACEMENT      6/28/2018 total of 9    CORONARY ARTERY BYPASS GRAFT      DENTAL SURGERY  2000's    \"Dental Implants Upper And Lower\"    FEMORAL ENDARTERECTOMY Right 06/11/2019    RIGHT FEMORAL ENDARTERECTOMY WITH CORMATRIX PATCH performed by Steve Flores MD at [facility] OR    GYN      hysterectomy    HYSTERECTOMY, TOTAL ABDOMINAL (CERVIX REMOVED)  1988    Appendectomy Also Done    ORTHOPEDIC SURGERY      back    OTHER SURGICAL HISTORY Left 04/04/2013    Left transaxillary 1st rib resection    OTHER SURGICAL HISTORY      peripheral angiography 6/6/2019    CA UNLISTED PROCEDURE CARDIAC SURGERY      3 way CABG    SPINE SURGERY      TONSILLECTOMY AND ADENOIDECTOMY  1971    UPPER GASTROINTESTINAL ENDOSCOPY N/A 07/24/2021    EGD BIOPSY performed by Omar Grace MD at [facility] ENDOSCOPY    UPPER GASTROINTESTINAL ENDOSCOPY N/A 04/07/2022    EGD DIAGNOSTIC ONLY performed by Omar Grace MD at [facility] ENDOSCOPY    VASCULAR SURGERY      \"5/22/2019\"they went in and unblocked the femoral artery on right side and then in again 6/6/2019\"put dye in -  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 Her smoking use included cigarettes. She started smoking about 40 years ago. She has a 17.5 pack-year smoking history. She has never used smokeless tobacco.  ETOH:   reports that she does not currently use alcohol.    Family History:       Problem Relation Age of Onset    Diabetes Mother     High Blood Pressure Mother     Asthma Mother     High Cholesterol Mother     High Blood Pressure Father     High Cholesterol Father     Asthma Father     Heart Disease Father         mvp    Asthma Brother     Mental Illness Son     Early

## 2025-04-10 NOTE — PATIENT INSTRUCTIONS
Imaging or labs has been ordered for you.   [facility]  [address] [place] [address]  X-rays do not need an appointment.  To Schedule Bone Scan, CT or MRI  Call at Central [phone]    Begin taking your Aspirin     A referral has been placed to neurology. They will call you to schedule an appointment.   [facility]  [address]  [place], [place] [address]  P: [phone]  F: [phone]     LILIAN will call with CT results

## 2025-04-10 NOTE — PROGRESS NOTES
Parish Mckeon PA-C  [phone]    Waleska Fernández  [id number]  59 y.o.  1965    Chief Complaint:  Chief Complaint   Patient presents with    New Patient     Hospital twice in last 2 weeks.  Had subarachnoid bleed.  Seeing neuro (Kanika Charlton) at 11 today. Having some cognitive issues and dizziness.     Medication Check     Wants to discuss getting restarted on one of the glp1's.        HPI:  Waleska is a 59 y.o. female, patient of Parish Mckeon PA-C, who presents today for evaluation and management of subarachnoid hemorrhage, HTN, T2DM.    Severe HA leading to ER presentation 3/25/2025.  Diagnosed with subarachnoid hemorrhage, suspected 2/2 HTN. HA returned - returned to ER and CT showed improvement in subarachnoid hemorrhage.    T2DM - mounjaro caused diarrhea, wants to try ozempic.     Pain - lyrica, controlled pain meds, tizanidine    Mental health - buspar, cymbalta    Mammogram - normal [facility]    Assessment and Medical Decision Makin. Type 2 diabetes mellitus with other specified complication, without long-term current use of insulin  -     rosuvastatin (CRESTOR) 40 MG tablet; Take 1 tablet by mouth at bedtime, Disp-90 tablet, R-1Normal  -     glipiZIDE (GLUCOTROL) 10 MG tablet; Take 1 tablet by mouth 2 times daily (before meals), Disp-180 tablet, R-1Normal  -     empagliflozin (JARDIANCE) 25 MG tablet; Take 1 tablet by mouth daily, Disp-90 tablet, R-1Normal  -     Semaglutide,0.25 or 0.5MG/DOS, 2 MG/3ML SOPN; Inject 0.25 mg into the skin every 7 days, Disp-3 mL, R-2Normal  -     Hemoglobin A1C; Future  -     Comprehensive Metabolic Panel; Future  -     Albumin/Creatinine Ratio, Urine; Future  2. Subarachnoid hemorrhage (HCC)  3. Coronary artery disease involving native coronary artery of native heart without angina pectoris  -     aspirin 81 MG chewable tablet; Take 1 tablet by mouth daily, Disp-90 tablet, R-1Normal  -     rosuvastatin (CRESTOR) 40 MG tablet; Take 1 tablet by mouth at

## 2025-04-16 ENCOUNTER — TELEPHONE (OUTPATIENT)
Dept: NEUROSURGERY | Age: 60
End: 2025-04-16

## 2025-04-16 DIAGNOSIS — I60.9 SUBARACHNOID HEMORRHAGE (HCC): Primary | ICD-10-CM

## 2025-04-16 ASSESSMENT — ENCOUNTER SYMPTOMS
ABDOMINAL PAIN: 0
SHORTNESS OF BREATH: 0

## 2025-04-16 NOTE — TELEPHONE ENCOUNTER
Ms. Fernández called because she needed a repeat CT and she said the order wasn't put in. She was going to complete it at the imaging center but when she called,  they said they didn't have the order. I told Tara Harper and she saw an order put in, but she sent another one anyway.

## 2025-04-24 ENCOUNTER — HOSPITAL ENCOUNTER (OUTPATIENT)
Dept: CT IMAGING | Age: 60
Discharge: HOME | End: 2025-04-24
Payer: COMMERCIAL

## 2025-04-24 DIAGNOSIS — I60.9 SUBARACHNOID HEMORRHAGE (HCC): ICD-10-CM

## 2025-04-24 PROCEDURE — 70450 CT HEAD/BRAIN W/O DYE: CPT

## 2025-05-05 ENCOUNTER — TELEPHONE (OUTPATIENT)
Dept: NEUROSURGERY | Age: 60
End: 2025-05-05

## 2025-05-05 NOTE — TELEPHONE ENCOUNTER
Called patient and updated her on Ct head results from 4/24/25. She is taking 81mg ASA. She does not need to f/u with neurosurgery but may do so on an as needed basis.

## 2025-05-15 ENCOUNTER — PATIENT MESSAGE (OUTPATIENT)
Dept: FAMILY MEDICINE CLINIC | Age: 60
End: 2025-05-15

## 2025-05-15 DIAGNOSIS — G43.819 OTHER MIGRAINE WITHOUT STATUS MIGRAINOSUS, INTRACTABLE: ICD-10-CM

## 2025-05-15 DIAGNOSIS — I60.9 SUBARACHNOID HEMORRHAGE (HCC): Primary | ICD-10-CM

## 2025-05-17 ENCOUNTER — APPOINTMENT (OUTPATIENT)
Dept: CT IMAGING | Age: 60
End: 2025-05-17
Payer: COMMERCIAL

## 2025-05-17 ENCOUNTER — HOSPITAL ENCOUNTER (EMERGENCY)
Age: 60
Discharge: HOME | End: 2025-05-17
Attending: EMERGENCY MEDICINE
Payer: COMMERCIAL

## 2025-05-17 ENCOUNTER — APPOINTMENT (OUTPATIENT)
Dept: GENERAL RADIOLOGY | Age: 60
End: 2025-05-17
Payer: COMMERCIAL

## 2025-05-17 VITALS
SYSTOLIC BLOOD PRESSURE: 160 MMHG | OXYGEN SATURATION: 98 % | TEMPERATURE: 98.9 F | HEART RATE: 73 BPM | DIASTOLIC BLOOD PRESSURE: 90 MMHG | RESPIRATION RATE: 18 BRPM

## 2025-05-17 DIAGNOSIS — S09.90XA INJURY OF HEAD, INITIAL ENCOUNTER: Primary | ICD-10-CM

## 2025-05-17 DIAGNOSIS — R20.2 PARESTHESIAS: ICD-10-CM

## 2025-05-17 DIAGNOSIS — R11.0 NAUSEA: ICD-10-CM

## 2025-05-17 DIAGNOSIS — R29.90 STROKE-LIKE SYMPTOM: ICD-10-CM
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
## 2025-05-17 DIAGNOSIS — Z86.79 HISTORY OF SUBARACHNOID HEMORRHAGE: ICD-10-CM

## 2025-05-17 LAB
ALBUMIN SERPL-MCNC: 3.7 G/DL (ref 3.4–5)
ALBUMIN/GLOB SERPL: 1.9 {RATIO} (ref 1.1–2.2)
ALP SERPL-CCNC: 69 U/L (ref 40–129)
ALT SERPL-CCNC: 17 U/L (ref 10–40)
ANION GAP SERPL CALCULATED.3IONS-SCNC: 10 MMOL/L (ref 9–17)
AST SERPL-CCNC: 16 U/L (ref 15–37)
BASOPHILS # BLD: 0.03 K/UL
BASOPHILS NFR BLD: 1 % (ref 0–1)
BILIRUB SERPL-MCNC: <0.2 MG/DL (ref 0–1)
BNP SERPL-MCNC: 859 PG/ML (ref 0–125)
BUN SERPL-MCNC: 18 MG/DL (ref 7–20)
CALCIUM SERPL-MCNC: 9 MG/DL (ref 8.3–10.6)
CHLORIDE SERPL-SCNC: 100 MMOL/L (ref 99–110)
CHP ED QC CHECK: YES
CO2 SERPL-SCNC: 24 MMOL/L (ref 21–32)
CREAT SERPL-MCNC: 1.4 MG/DL (ref 0.6–1.2)
EOSINOPHIL # BLD: 0.11 K/UL
EOSINOPHILS RELATIVE PERCENT: 2 % (ref 0–3)
ERYTHROCYTE [DISTWIDTH] IN BLOOD BY AUTOMATED COUNT: 13.1 % (ref 11.7–14.9)
GFR, ESTIMATED: 38 ML/MIN/1.73M2
GLUCOSE BLD-MCNC: 247 MG/DL
GLUCOSE BLD-MCNC: 247 MG/DL (ref 74–99)
GLUCOSE SERPL-MCNC: 265 MG/DL (ref 74–99)
HCT VFR BLD AUTO: 33.1 % (ref 37–47)
HGB BLD-MCNC: 10.3 G/DL (ref 12.5–16)
IMM GRANULOCYTES # BLD AUTO: 0.03 K/UL
IMM GRANULOCYTES NFR BLD: 1 %
INR PPP: 1
LYMPHOCYTES NFR BLD: 1.22 K/UL
LYMPHOCYTES RELATIVE PERCENT: 21 % (ref 24–44)
MAGNESIUM SERPL-MCNC: 1.9 MG/DL (ref 1.8–2.4)
MCH RBC QN AUTO: 27.9 PG (ref 27–31)
MCHC RBC AUTO-ENTMCNC: 31.1 G/DL (ref 32–36)
MCV RBC AUTO: 89.7 FL (ref 78–100)
MONOCYTES NFR BLD: 0.45 K/UL
MONOCYTES NFR BLD: 8 % (ref 0–5)
NEUTROPHILS NFR BLD: 68 % (ref 36–66)
NEUTS SEG NFR BLD: 3.88 K/UL
PARTIAL THROMBOPLASTIN TIME: 25.1 SEC (ref 25.1–37.1)
PLATELET # BLD AUTO: 211 K/UL (ref 140–440)
PMV BLD AUTO: 10.6 FL (ref 7.5–11.1)
POTASSIUM SERPL-SCNC: 4.4 MMOL/L (ref 3.5–5.1)
PROT SERPL-MCNC: 5.6 G/DL (ref 6.4–8.2)
PROTHROMBIN TIME: 13.3 SEC (ref 11.7–14.5)
RBC # BLD AUTO: 3.69 M/UL (ref 4.2–5.4)
SODIUM SERPL-SCNC: 134 MMOL/L (ref 136–145)
TROPONIN I SERPL HS-MCNC: 14 NG/L (ref 0–14)
TSH SERPL DL<=0.05 MIU/L-ACNC: 0.59 UIU/ML (ref 0.27–4.2)
WBC OTHER # BLD: 5.7 K/UL (ref 4–10.5)

## 2025-05-17 PROCEDURE — 84443 ASSAY THYROID STIM HORMONE: CPT

## 2025-05-17 PROCEDURE — 80053 COMPREHEN METABOLIC PANEL: CPT

## 2025-05-17 PROCEDURE — 70486 CT MAXILLOFACIAL W/O DYE: CPT

## 2025-05-17 PROCEDURE — 85730 THROMBOPLASTIN TIME PARTIAL: CPT

## 2025-05-17 PROCEDURE — 83880 ASSAY OF NATRIURETIC PEPTIDE: CPT

## 2025-05-17 PROCEDURE — 99285 EMERGENCY DEPT VISIT HI MDM: CPT

## 2025-05-17 PROCEDURE — 71045 X-RAY EXAM CHEST 1 VIEW: CPT

## 2025-05-17 PROCEDURE — 70498 CT ANGIOGRAPHY NECK: CPT

## 2025-05-17 PROCEDURE — 70450 CT HEAD/BRAIN W/O DYE: CPT

## 2025-05-17 PROCEDURE — 82962 GLUCOSE BLOOD TEST: CPT

## 2025-05-17 PROCEDURE — 85610 PROTHROMBIN TIME: CPT

## 2025-05-17 PROCEDURE — 85025 COMPLETE CBC W/AUTO DIFF WBC: CPT

## 2025-05-17 PROCEDURE — 93005 ELECTROCARDIOGRAM TRACING: CPT | Performed by: EMERGENCY MEDICINE

## 2025-05-17 PROCEDURE — 96375 TX/PRO/DX INJ NEW DRUG ADDON: CPT

## 2025-05-17 PROCEDURE — 6360000004 HC RX CONTRAST MEDICATION: Performed by: EMERGENCY MEDICINE

## 2025-05-17 PROCEDURE — 6360000002 HC RX W HCPCS: Performed by: EMERGENCY MEDICINE

## 2025-05-17 PROCEDURE — 96374 THER/PROPH/DIAG INJ IV PUSH: CPT

## 2025-05-17 PROCEDURE — 83735 ASSAY OF MAGNESIUM: CPT

## 2025-05-17 PROCEDURE — 84484 ASSAY OF TROPONIN QUANT: CPT

## 2025-05-17 RX ORDER — METOCLOPRAMIDE HYDROCHLORIDE 5 MG/ML
10 INJECTION INTRAMUSCULAR; INTRAVENOUS ONCE
Status: COMPLETED | OUTPATIENT
Start: 2025-05-17 | End: 2025-05-17

## 2025-05-17 RX ORDER — ONDANSETRON 4 MG/1
4 TABLET, ORALLY DISINTEGRATING ORAL 3 TIMES DAILY PRN
Qty: 21 TABLET | Refills: 0 | Status: SHIPPED | OUTPATIENT
Start: 2025-05-17

## 2025-05-17 RX ORDER — BUTALBITAL, ACETAMINOPHEN AND CAFFEINE 50; 325; 40 MG/1; MG/1; MG/1
1 TABLET ORAL EVERY 4 HOURS PRN
Qty: 10 TABLET | Refills: 0 | Status: SHIPPED | OUTPATIENT
Start: 2025-05-17

## 2025-05-17 RX ORDER — IOPAMIDOL 755 MG/ML
75 INJECTION, SOLUTION INTRAVASCULAR
Status: COMPLETED | OUTPATIENT
Start: 2025-05-17 | End: 2025-05-17

## 2025-05-17 RX ORDER — DIPHENHYDRAMINE HYDROCHLORIDE 50 MG/ML
50 INJECTION, SOLUTION INTRAMUSCULAR; INTRAVENOUS ONCE
Status: COMPLETED | OUTPATIENT
Start: 2025-05-17 | End: 2025-05-17

## 2025-05-17 RX ADMIN — IOPAMIDOL 75 ML: 755 INJECTION, SOLUTION INTRAVENOUS at 15:52

## 2025-05-17 RX ADMIN — METOCLOPRAMIDE 10 MG: 5 INJECTION, SOLUTION INTRAMUSCULAR; INTRAVENOUS at 17:30

## 2025-05-17 RX ADMIN — DIPHENHYDRAMINE HYDROCHLORIDE 50 MG: 50 INJECTION INTRAMUSCULAR; INTRAVENOUS at 17:33

## 2025-05-17 ASSESSMENT — PAIN SCALES - GENERAL
PAINLEVEL_OUTOF10: 10
PAINLEVEL_OUTOF10: 5

## 2025-05-17 ASSESSMENT — PAIN DESCRIPTION - LOCATION
LOCATION: HEAD
LOCATION: HEAD

## 2025-05-17 ASSESSMENT — PAIN DESCRIPTION - DESCRIPTORS
DESCRIPTORS: ACHING
DESCRIPTORS: ACHING;DISCOMFORT

## 2025-05-17 ASSESSMENT — ENCOUNTER SYMPTOMS
RESPIRATORY NEGATIVE: 1
NAUSEA: 1
ALLERGIC/IMMUNOLOGIC NEGATIVE: 1

## 2025-05-17 NOTE — ED PROVIDER NOTES
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\"     Family History   Problem Relation Age of Onset    Diabetes Mother     High Blood Pressure Mother     Asthma Mother     High Cholesterol Mother     High Blood Pressure Father     High Cholesterol Father     Asthma Father     Heart Disease Father         mvp    Asthma Brother     Mental Illness Son     Early Death Son 24        \"Suicide\"    Mental Illness Daughter         \"Bipolar\"     Social History     Socioeconomic History    Marital status:      Spouse name: Not on file    Number of children: Not on file    Years of education: Not on file    Highest education level: Not on file   Occupational History    Not on file   Tobacco Use    Smoking status: Former     Current packs/day: 0.00     Average packs/day: 0.5 packs/day for 35.0 years (17.5 ttl pk-yrs)     Types: Cigarettes     Start date: 6/23/1984     Quit date: 4/26/2022     Years since quitting: 3.0    Smokeless tobacco: Never   Vaping Use    Vaping status: Never Used   Substance and Sexual Activity    Alcohol use: Not Currently     Comment: occ    Drug use: No    Sexual activity: Yes     Partners: Male   Other Topics Concern    Not on file   Social History Narrative    ** Merged History Encounter **          Social Drivers of Health     Financial Resource Strain: Not on file   Food Insecurity: No Food Insecurity (4/1/2025)    Hunger Vital Sign     Worried About Running Out of Food in the Last Year: Never true     Ran Out of Food in the Last Year: Never true   Transportation Needs: No Transportation Needs (4/1/2025)    PRAPARE - Transportation     Lack of Transportation (Medical): No     Lack of Transportation (Non-Medical): No   Physical     tiZANidine (ZANAFLEX) 4 MG tablet Take 1 tablet by mouth every 8 hours as needed (headache) 30 tablet 0    Blood Pressure KIT 1 each by Does not apply route in the morning and at bedtime 1 kit 0    lidocaine 4 % external patch Place 1 patch onto the skin daily 30 patch 1    melatonin 3 MG TABS tablet Take 10 mg by mouth nightly as needed (insomnia)      Multiple Vitamins-Minerals (ALIVE HAIR, SKIN & NAILS PO) Take 1 tablet by mouth daily      escitalopram (LEXAPRO) 20 MG tablet Take 1 tablet by mouth daily      busPIRone (BUSPAR) 30 MG tablet Take 30 mg by mouth 2 times daily (Patient taking differently: Take 15 mg by mouth 3 times daily)      ranolazine (RANEXA) 1000 MG extended release tablet Take 1 tablet by mouth 2 times daily        Allergies   Allergen Reactions    Erythromycin Hives and Nausea And Vomiting    Lyrica [Pregabalin] Swelling    Imitrex [Sumatriptan] Anxiety     \"Makes Me Hyper\"    Ketorolac Tromethamine Other (See Comments)     \"Rapid Heart Rate\"    Metformin Diarrhea    Prochlorperazine Edisylate Nausea And Vomiting    Sulfa Antibiotics Diarrhea and Nausea And Vomiting    Tape [Adhesive Tape] Itching     Blisters    Ultram [Tramadol] Itching     No current facility-administered medications for this encounter.     Current Outpatient Medications   Medication Sig Dispense Refill    ondansetron (ZOFRAN-ODT) 4 MG disintegrating tablet Take 1 tablet by mouth 3 times daily as needed for Nausea or Vomiting 21 tablet 0    butalbital-acetaminophen-caffeine (FIORICET, ESGIC) -40 MG per tablet Take 1 tablet by mouth every 4 hours as needed for Headaches 10 tablet 0    DULoxetine (CYMBALTA) 60 MG extended release capsule Take 1 capsule by mouth daily      metoprolol tartrate (LOPRESSOR) 25 MG tablet Take 0.5 tablets by mouth 2 times daily      oxyCODONE-acetaminophen (PERCOCET)  MG per tablet Take 1 tablet by mouth every 6 hours as needed for Pain.      pregabalin (LYRICA) 50 MG capsule Take 1  carotid bulb and proximal right ICA.  Dictated and Electronically Signed By: Angel Vail MD [facility] 5/17/2025 16:41        CT HEAD WO CONTRAST  Result Date: 5/17/2025  CT HEAD WO CONTRAST 5/17/2025 15:46 DEMOGRAPHICS: 59 years year old Female REASON FOR EXAM: HEAD TRAUMA, CLOSED, MILD, GCS >= 13, NO RISK FACTORS, NEURO EXAM NORMAL, Headache L sided numbness hx of SAH COMPARISON: 4/24/2025 TECHNIQUE: Serial axial images were obtained of the head without the use of IV contrast.  CT radiation dose optimization techniques (automated exposure control, and use of iterative reconstruction techniques, or adjustment of the mA  and/or kV according to patient size) were used to limit patient radiation dose. FINDINGS: Noncontrast imaging of the brain demonstrates no acute intracranial hemorrhage. There is no evidence of mass or mass effect. Old stable left basal ganglia and left periventricular and deep white matter lacunar infarctions. Old stable cyst right subinsular lacunar infarction. The gray-white differentiation appears well  maintained. There is no acute transcortical infarct identified. The ventricular  system is normal in size and configuration. The orbits and retrobulbar structures appear unremarkable. The visualized portions of the paranasal sinuses appear clear The mastoid air cells are clear. No acute osseous abnormality seen. IMPRESSION: 1.  Stable examination. No acute abnormality.  Dictated and Electronically Signed By: Angel Vail MD [facility] 5/17/2025 16:00        CT HEAD WO CONTRAST  Result Date: 4/24/2025  PROCEDURE: CT HEAD WO CONTRAST DATE OF EXAM:  4/24/2025 8:48 DEMOGRAPHICS: 59 years old Female INDICATION: Nontraumatic subarachnoid hemorrhage, unspecified (HCC) COMPARISON: April 1, 2025 TECHNIQUE: Contiguous axial slices of the head were submitted without IV contrast.   DOSE OPTIMIZATION: CT radiation dose optimization techniques (automated exposure

## 2025-05-18 LAB
EKG ATRIAL RATE: 73 BPM
EKG DIAGNOSIS: NORMAL
EKG P AXIS: 58 DEGREES
EKG P-R INTERVAL: 172 MS
EKG Q-T INTERVAL: 386 MS
EKG QRS DURATION: 94 MS
EKG QTC CALCULATION (BAZETT): 425 MS
EKG R AXIS: 44 DEGREES
EKG T AXIS: 103 DEGREES
EKG VENTRICULAR RATE: 73 BPM

## 2025-05-18 PROCEDURE — 93010 ELECTROCARDIOGRAM REPORT: CPT | Performed by: INTERNAL MEDICINE

## 2025-06-06 NOTE — PROGRESS NOTES
LM with my call-back # concerning  surgery @ [facility] on  6/12/25.  Please call the PAT Nurse for a phone assessment and surgery instructions.

## 2025-06-09 NOTE — PROGRESS NOTES
Second attempt: LM with my call-back # concerning  surgery @ [facility] on  6/12/25.  Please call the PAT Nurse for a phone assessment and surgery instructions.

## 2025-06-10 NOTE — PROGRESS NOTES
LVM with DHARMESH Egan for Omar that patient has a significant cardiac history- requested cardiac clearance.     Surgery @ [facility] on 6/12/25 you will be called 6/11/25 with times    NOTHING TO EAT OR DRINK AFTER MIDNIGHT DAY OF SURGERY    1. Enter thru the hospital main entrance on day of surgery, check in at the Information Desk. If you arrive prior to 6:00am, enter thru the ER entrance.    2. Follow the directions as prescribed by the doctor for your procedure and medications.         Morning of surgery take: Metoprolol and Protonix with a sip of water     Jardiance- last dose 6/9/25   Hold Glipizide DOS   Ozempic- last dose 6/2/25- continue to hold until after procedure         Stop vitamins, supplements and NSAIDS:  NOW (Tylenol and Percocet are ok)     3. Check with your Doctor regarding stopping blood thinners and follow their instructions.    4. Do not smoke, vape or use chewing tobacco morning of surgery. Do not drink any alcoholic beverages 24 hours prior to surgery.       This includes NA Beer. No street drugs 7 days prior to surgery.    5. If you have dentures, contacts of glasses they will be removed before going to the OR; please bring a case.    6. Please bring picture ID, insurance card, paperwork from the doctor’s office (H & P, Consent, & card for implantable devices).    7. Take a shower with an antibacterial soap the night before surgery and the morning of surgery. Do not put anything on your skin      After your morning shower.    8. You will need a responsible adult to drive you home and check on you after surgery.

## 2025-06-11 ENCOUNTER — ANESTHESIA EVENT (OUTPATIENT)
Dept: ENDOSCOPY | Age: 60
End: 2025-06-11
Payer: COMMERCIAL

## 2025-06-11 NOTE — PROGRESS NOTES
6/11/25 - .Notified patient surgery @ [facility] on  6/12/25 @ 1100, arrival 0900 (arriving early due to Hx: difficult IV stick). NPO status and morning medications reviewed. Understanding verbalized.  The office was called and reminded we still need her cardiac clearance for tomorrow. They are going to [facility].

## 2025-06-11 NOTE — PROGRESS NOTES
Called Dr Ricardo's office and spoke with Brianna and Julisa. They are sending over a Cardiac Clearance to Annita's office STAT.

## 2025-06-11 NOTE — ANESTHESIA PRE PROCEDURE
into the skin every 7 days 4/10/25   Parish Mckeon PA-C   metoprolol succinate (TOPROL XL) 25 MG extended release tablet Take 1 tablet by mouth daily 4/10/25   Parish cMkeon PA-C   tiZANidine (ZANAFLEX) 4 MG tablet Take 1 tablet by mouth every 8 hours as needed (headache) 4/2/25   Yumiko Ceballos MD   Blood Pressure KIT 1 each by Does not apply route in the morning and at bedtime 3/27/25   Magaly Hernandez MD   lidocaine 4 % external patch Place 1 patch onto the skin daily 3/16/25   Buck Henson APRN - CNP   melatonin 3 MG TABS tablet Take 10 mg by mouth nightly as needed (insomnia)    Tejas Reilly MD   Multiple Vitamins-Minerals (ALIVE HAIR, SKIN & NAILS PO) Take 1 tablet by mouth daily    Tejas Reilly MD   escitalopram (LEXAPRO) 20 MG tablet Take 1 tablet by mouth daily 11/2/22   Tejas Reilly MD   busPIRone (BUSPAR) 30 MG tablet Take 30 mg by mouth 2 times daily  Patient taking differently: Take 15 mg by mouth 3 times daily 11/7/22   Tejas Reilly MD   ranolazine (RANEXA) 1000 MG extended release tablet Take 1 tablet by mouth 2 times daily    Tejas Reilly MD       Current medications:    No current facility-administered medications for this encounter.     Current Outpatient Medications   Medication Sig Dispense Refill    ondansetron (ZOFRAN-ODT) 4 MG disintegrating tablet Take 1 tablet by mouth 3 times daily as needed for Nausea or Vomiting 21 tablet 0    butalbital-acetaminophen-caffeine (FIORICET, ESGIC) -40 MG per tablet Take 1 tablet by mouth every 4 hours as needed for Headaches (Patient not taking: Reported on 6/10/2025) 10 tablet 0    DULoxetine (CYMBALTA) 60 MG extended release capsule Take 1 capsule by mouth daily      metoprolol tartrate (LOPRESSOR) 25 MG tablet Take 0.5 tablets by mouth 2 times daily      oxyCODONE-acetaminophen (PERCOCET)  MG per tablet Take 1 tablet by mouth every 6 hours as needed for Pain.      pregabalin (LYRICA) 50 MG capsule

## 2025-06-12 ENCOUNTER — HOSPITAL ENCOUNTER (OUTPATIENT)
Age: 60
Setting detail: OUTPATIENT SURGERY
Discharge: HOME | End: 2025-06-12
Attending: INTERNAL MEDICINE | Admitting: INTERNAL MEDICINE
Payer: COMMERCIAL

## 2025-06-12 ENCOUNTER — ANESTHESIA (OUTPATIENT)
Dept: ENDOSCOPY | Age: 60
End: 2025-06-12
Payer: COMMERCIAL

## 2025-06-12 VITALS
OXYGEN SATURATION: 97 % | DIASTOLIC BLOOD PRESSURE: 54 MMHG | RESPIRATION RATE: 16 BRPM | HEART RATE: 64 BPM | BODY MASS INDEX: 27.32 KG/M2 | SYSTOLIC BLOOD PRESSURE: 119 MMHG | TEMPERATURE: 98.1 F | WEIGHT: 170 LBS | HEIGHT: 66 IN

## 2025-06-12 DIAGNOSIS — I60.9 SUBARACHNOID HEMORRHAGE (HCC): ICD-10-CM

## 2025-06-12 DIAGNOSIS — R10.13 EPIGASTRIC PAIN: ICD-10-CM

## 2025-06-12 DIAGNOSIS — I25.9 ISCHEMIC HEART DISEASE: ICD-10-CM

## 2025-06-12 DIAGNOSIS — Z95.5 HX OF HEART ARTERY STENT: ICD-10-CM

## 2025-06-12 DIAGNOSIS — R93.89 ABNORMAL CT SCAN: ICD-10-CM

## 2025-06-12 LAB — GLUCOSE BLD-MCNC: 139 MG/DL (ref 74–99)

## 2025-06-12 PROCEDURE — 2709999900 HC NON-CHARGEABLE SUPPLY: Performed by: INTERNAL MEDICINE

## 2025-06-12 PROCEDURE — 7100000010 HC PHASE II RECOVERY - FIRST 15 MIN: Performed by: INTERNAL MEDICINE

## 2025-06-12 PROCEDURE — 3700000000 HC ANESTHESIA ATTENDED CARE: Performed by: INTERNAL MEDICINE

## 2025-06-12 PROCEDURE — 6360000002 HC RX W HCPCS

## 2025-06-12 PROCEDURE — 2580000003 HC RX 258: Performed by: ANESTHESIOLOGY

## 2025-06-12 PROCEDURE — 88305 TISSUE EXAM BY PATHOLOGIST: CPT | Performed by: PATHOLOGY

## 2025-06-12 PROCEDURE — 3700000001 HC ADD 15 MINUTES (ANESTHESIA): Performed by: INTERNAL MEDICINE

## 2025-06-12 PROCEDURE — 88342 IMHCHEM/IMCYTCHM 1ST ANTB: CPT | Performed by: PATHOLOGY

## 2025-06-12 PROCEDURE — 6360000002 HC RX W HCPCS: Performed by: ANESTHESIOLOGY

## 2025-06-12 PROCEDURE — 3609012400 HC EGD TRANSORAL BIOPSY SINGLE/MULTIPLE: Performed by: INTERNAL MEDICINE

## 2025-06-12 PROCEDURE — 7100000011 HC PHASE II RECOVERY - ADDTL 15 MIN: Performed by: INTERNAL MEDICINE

## 2025-06-12 PROCEDURE — 82962 GLUCOSE BLOOD TEST: CPT

## 2025-06-12 RX ORDER — TICAGRELOR 90 MG/1
60 TABLET, FILM COATED ORAL 2 TIMES DAILY
COMMUNITY

## 2025-06-12 RX ORDER — SODIUM CHLORIDE 9 MG/ML
INJECTION, SOLUTION INTRAVENOUS PRN
Status: DISCONTINUED | OUTPATIENT
Start: 2025-06-12 | End: 2025-06-12 | Stop reason: HOSPADM

## 2025-06-12 RX ORDER — SODIUM CHLORIDE 0.9 % (FLUSH) 0.9 %
5-40 SYRINGE (ML) INJECTION EVERY 12 HOURS SCHEDULED
Status: DISCONTINUED | OUTPATIENT
Start: 2025-06-12 | End: 2025-06-12 | Stop reason: HOSPADM

## 2025-06-12 RX ORDER — PROPOFOL 10 MG/ML
INJECTION, EMULSION INTRAVENOUS
Status: DISCONTINUED | OUTPATIENT
Start: 2025-06-12 | End: 2025-06-12 | Stop reason: SDUPTHER

## 2025-06-12 RX ORDER — SODIUM CHLORIDE 0.9 % (FLUSH) 0.9 %
5-40 SYRINGE (ML) INJECTION PRN
Status: DISCONTINUED | OUTPATIENT
Start: 2025-06-12 | End: 2025-06-12 | Stop reason: HOSPADM

## 2025-06-12 RX ORDER — SODIUM CHLORIDE, SODIUM LACTATE, POTASSIUM CHLORIDE, CALCIUM CHLORIDE 600; 310; 30; 20 MG/100ML; MG/100ML; MG/100ML; MG/100ML
INJECTION, SOLUTION INTRAVENOUS CONTINUOUS
Status: DISCONTINUED | OUTPATIENT
Start: 2025-06-12 | End: 2025-06-12 | Stop reason: HOSPADM

## 2025-06-12 RX ORDER — KETOROLAC TROMETHAMINE 30 MG/ML
15 INJECTION, SOLUTION INTRAMUSCULAR; INTRAVENOUS ONCE
Status: COMPLETED | OUTPATIENT
Start: 2025-06-12 | End: 2025-06-12

## 2025-06-12 RX ORDER — LIDOCAINE HYDROCHLORIDE 20 MG/ML
INJECTION, SOLUTION INFILTRATION; PERINEURAL
Status: DISCONTINUED | OUTPATIENT
Start: 2025-06-12 | End: 2025-06-12 | Stop reason: SDUPTHER

## 2025-06-12 RX ORDER — ACETAMINOPHEN 500 MG
1000 TABLET ORAL ONCE
Status: DISCONTINUED | OUTPATIENT
Start: 2025-06-12 | End: 2025-06-12 | Stop reason: HOSPADM

## 2025-06-12 RX ADMIN — LIDOCAINE HYDROCHLORIDE 100 MG: 20 INJECTION, SOLUTION EPIDURAL; INFILTRATION; INTRACAUDAL; PERINEURAL at 10:56

## 2025-06-12 RX ADMIN — SODIUM CHLORIDE, POTASSIUM CHLORIDE, SODIUM LACTATE AND CALCIUM CHLORIDE: 600; 310; 30; 20 INJECTION, SOLUTION INTRAVENOUS at 10:48

## 2025-06-12 RX ADMIN — PROPOFOL 130 MG: 10 INJECTION, EMULSION INTRAVENOUS at 10:56

## 2025-06-12 RX ADMIN — KETOROLAC TROMETHAMINE 15 MG: 30 INJECTION, SOLUTION INTRAMUSCULAR; INTRAVENOUS at 11:33

## 2025-06-12 ASSESSMENT — PAIN DESCRIPTION - LOCATION
LOCATION: ABDOMEN;LEG;HEAD
LOCATION: ABDOMEN;HEAD;LEG

## 2025-06-12 ASSESSMENT — PAIN - FUNCTIONAL ASSESSMENT
PAIN_FUNCTIONAL_ASSESSMENT: 0-10
PAIN_FUNCTIONAL_ASSESSMENT: ACTIVITIES ARE NOT PREVENTED
PAIN_FUNCTIONAL_ASSESSMENT: 0-10
PAIN_FUNCTIONAL_ASSESSMENT: ACTIVITIES ARE NOT PREVENTED
PAIN_FUNCTIONAL_ASSESSMENT: 0-10
PAIN_FUNCTIONAL_ASSESSMENT: ACTIVITIES ARE NOT PREVENTED
PAIN_FUNCTIONAL_ASSESSMENT: 0-10

## 2025-06-12 ASSESSMENT — PAIN DESCRIPTION - PAIN TYPE: TYPE: ACUTE PAIN

## 2025-06-12 ASSESSMENT — PAIN DESCRIPTION - DESCRIPTORS
DESCRIPTORS: ACHING

## 2025-06-12 ASSESSMENT — PAIN DESCRIPTION - FREQUENCY: FREQUENCY: CONTINUOUS

## 2025-06-12 ASSESSMENT — PAIN SCALES - GENERAL: PAINLEVEL_OUTOF10: 8

## 2025-06-12 ASSESSMENT — PAIN DESCRIPTION - ONSET: ONSET: ON-GOING

## 2025-06-12 ASSESSMENT — PAIN DESCRIPTION - ORIENTATION: ORIENTATION: LOWER

## 2025-06-12 NOTE — PROGRESS NOTES
1148 vss assessment wnl pain improving will monitor  1208 vss assessment wnl patient states abd pain is gone leg and head pain down to a 2/10,patient states she is ready to go home  1215 iv removed patient up to the side of the bed to get dressed  1217 family to go get the car  1220 patient to car per staff

## 2025-06-12 NOTE — OP NOTE
Operative Note      Patient: Waleska Fernández  YOB: 1965  MRN: [id number]    Date of Procedure: 6/12/2025    Pre-Op Diagnosis Codes:      * Epigastric pain [R10.13]     * Abnormal CT scan [R93.89]     * Ischemic heart disease [I25.9]     * Hx of heart artery stent [Z95.5]     * Subarachnoid hemorrhage (HCC) [I60.9]    [facility]      BRIEF OP REPORT:    Impression:    1) EGD showing normal esophagus normal GE junction   2) stomach showing few gastric erosions biopsies for gastritis gentlest of stomach normal biopsies    H. pylori done   3) duodenum normal        Suggest:   1) PPI can be continued   2) avoid NSAIDs   3) follow-up in 2 to 4 weeks     Full EGD/COLONOSCOPY report available by going to \"chart review\" then \"procedures\" then  \"EGD/Colonoscopy\"  then \"View Endoscopy Report\"         Electronically signed by Omar Grace MD on 6/12/2025 at 11:23 AM

## 2025-06-12 NOTE — H&P
[facility]    GASTRO HEALTH   Pre-operative History and Physical    Patient: Waleska Fernández  : 1965      History Obtained From: Patient, Nursing chart and Guardian/family members        HISTORY OF PRESENT ILLNESS:                The patient is a 59 y.o. female with significant past medical history as below who presents for C scope    Indication abdominal pain abnormal CT    Past Medical History:        Diagnosis Date    Acid reflux     CAD (coronary artery disease)     CAD (coronary artery disease)     Sees Dr. NEDRA Lambert    Cerebral artery occlusion with cerebral infarction (HCC)     Chronic back pain     Chronic obstructive pulmonary disease (HCC)     COPD (chronic obstructive pulmonary disease) (HCC)     \"have mild COPD- no pulmonologist\"    Depression     Diabetes (HCC)     Diabetes mellitus (HCC) Dx     Eczema of hand     Headache     History of blood transfusion     Hx of migraines     HX OTHER MEDICAL     \"difficulty IV stick\"    Hyperlipidemia     Hypertension     follows with Dr Wood    Kidney stones     \"Passed One Kidney Stone\"    Neuropathy     Obesity     PVD (peripheral vascular disease)     Type 2 diabetes mellitus without complication ([facility])     Wears glasses        Past Surgical History:        Procedure Laterality Date    APPENDECTOMY      Done During [facility]    BACK SURGERY  Last Done -2013    \"4 Lower Back Surgeries, 2 Rods, 4 Pins At L5, S1 Put In During Last Surgery\"    BONE RESECTION, RIB      \"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- surgery - ok since then\"    BREAST BIOPSY Bilateral -    Benign    CARDIAC CATHETERIZATION      per pt \"had heart cath 5/10/2019\"    CARDIAC PROCEDURE N/A 10/28/2024    Left heart cath / coronary angiography performed by Bill Lazo MD at [facility] CARDIAC CATH LAB    CARDIAC PROCEDURE N/A 2025    Left heart cath / coronary angiography performed by Bill Lazo MD at [facility] CARDIAC CATH     Heart:    Regular rate and rhythm, S1 and S2 normal, no murmur, rub   or gallop   Abdomen:     Soft, non-tender, bowel sounds active all four quadrants,     no masses, no organomegaly, no ascitis   Rectal:    Planned at time of C scope   Extremities:   Extremities normal, atraumatic, no cyanosis or edema   Pulses:   2+ and symmetric all extremities   Skin:   Skin color, texture, turgor normal, no rashes or lesions   Lymph nodes:   No abnormality   Neurologic:   No focal deficits, moving all four extremities      ASA Grade:  ASA 3 - Patient with moderate systemic disease with functional limitations  [address]:    Prior Anesthesia complication: NILL    ASSESSMENT AND PLAN:    1.  Patient is a 59 y.o. female here for colonoscopy with deep sedation  2.  Procedure options, risks and benefits reviewed with patient.  Patient expresses understanding.    Omar Grace MD  6/12/2025  8:52 AM    Please note that time of note may not reflect time of encounter

## 2025-06-12 NOTE — PROGRESS NOTES
Called Dr Nelson's office and spoke with Radha about patients cardiac clearance. She doesn't see anything at this time but is going to see if its been faxed. Patient is schedule for EGD this morning at 1100.

## 2025-06-12 NOTE — DISCHARGE INSTRUCTIONS
[facility]  [phone]    Do not drive, work around machines or use equipment.  Do not drink any alcoholic beverages.  Do not smoke while alone.  Avoid making important decisions.  Plan to spend a quiet, relaxed evening @ home.  Resume normal activities as you begin to feel better.  Eat lightly for your first meal, then gradually increase your diet to what is normal for you.  In case of nausea, avoid food and drink only clear liquids.  Resume food as nausea ceases.  Notify your surgeon if you experience fever, chills, large amount of bleeding, difficulty breathing, persistent nausea and vomiting or any other disturbing problem.  Call for a follow-up appointment with your surgeon.   EGD        FOLLOW UP APPOINTMENT IN 2 WEEKS OR AS NEEDED.    REPEAT PROCEDURE IN  AS  NEEDED.    TEST ORDERED:none    What to Expect at Home  Your Recovery:  The only discomfort after your EGD is generally limited to a mild soreness of the throat, which may last a day or two. Call your physician immediately if you have severe chest pain, shortness of breath or a temperature of 100 degrees or higher if taken orally.    How can you care for yourself at home?  Activity  Rest as much as you need to after you go home.  You should be able to go back to your usual activities the day after the test.  Diet  Follow your doctor's directions for eating after the test.  Drink plenty of fluids (unless your doctor has told you not to).  Medications  If you have a sore throat the day after the test, use an over-the-counter spray to numb your throat.  Your doctor will tell you if and when you can restart your medicines. He or she will also give you instructions about taking any new medicines.  If you take blood thinners, such as warfarin (Coumadin), clopidogrel (Plavix), or aspirin, be sure to talk to your doctor. He or she will tell you if and when to start taking those medicines again. Make sure that you  Health. This care instruction is for use with your licensed healthcare professional. If you have questions about a medical condition or this instruction, always ask your healthcare professional. [employer] disclaims any warranty or liability for your use of this information.  Content Version: 10.8.750530; Current as of: November 20, 2015

## 2025-06-13 LAB — SURGICAL PATHOLOGY REPORT: NORMAL

## 2025-07-15 ENCOUNTER — OFFICE VISIT (OUTPATIENT)
Dept: FAMILY MEDICINE CLINIC | Age: 60
End: 2025-07-15
Payer: COMMERCIAL

## 2025-07-15 VITALS
BODY MASS INDEX: 28.75 KG/M2 | OXYGEN SATURATION: 95 % | HEART RATE: 77 BPM | DIASTOLIC BLOOD PRESSURE: 60 MMHG | WEIGHT: 178.9 LBS | SYSTOLIC BLOOD PRESSURE: 90 MMHG | HEIGHT: 66 IN

## 2025-07-15 DIAGNOSIS — E78.2 COMBINED HYPERLIPIDEMIA ASSOCIATED WITH TYPE 2 DIABETES MELLITUS (HCC): ICD-10-CM

## 2025-07-15 DIAGNOSIS — G43.911 INTRACTABLE MIGRAINE WITH STATUS MIGRAINOSUS, UNSPECIFIED MIGRAINE TYPE: ICD-10-CM

## 2025-07-15 DIAGNOSIS — Z86.79 HISTORY OF SUBARACHNOID HEMORRHAGE: ICD-10-CM

## 2025-07-15 DIAGNOSIS — K21.9 GASTROESOPHAGEAL REFLUX DISEASE WITHOUT ESOPHAGITIS: ICD-10-CM

## 2025-07-15 DIAGNOSIS — I95.0 IDIOPATHIC HYPOTENSION: ICD-10-CM

## 2025-07-15 DIAGNOSIS — E11.69 COMBINED HYPERLIPIDEMIA ASSOCIATED WITH TYPE 2 DIABETES MELLITUS (HCC): ICD-10-CM

## 2025-07-15 DIAGNOSIS — M48.061 SPINAL STENOSIS OF LUMBAR REGION, UNSPECIFIED WHETHER NEUROGENIC CLAUDICATION PRESENT: ICD-10-CM

## 2025-07-15 DIAGNOSIS — I10 ESSENTIAL HYPERTENSION: Chronic | ICD-10-CM

## 2025-07-15 DIAGNOSIS — D64.9 ANEMIA, UNSPECIFIED TYPE: Chronic | ICD-10-CM

## 2025-07-15 DIAGNOSIS — E11.69 TYPE 2 DIABETES MELLITUS WITH OTHER SPECIFIED COMPLICATION, WITHOUT LONG-TERM CURRENT USE OF INSULIN (HCC): Primary | ICD-10-CM

## 2025-07-15 PROBLEM — R07.9 CHEST PAIN: Status: RESOLVED | Noted: 2020-08-28 | Resolved: 2025-07-15

## 2025-07-15 PROBLEM — R07.89 ATYPICAL CHEST PAIN: Status: RESOLVED | Noted: 2025-03-13 | Resolved: 2025-07-15

## 2025-07-15 PROBLEM — D22.9 CHANGE IN MOLE: Status: RESOLVED | Noted: 2018-10-31 | Resolved: 2025-07-15

## 2025-07-15 PROBLEM — E83.51 HYPOCALCEMIA: Status: RESOLVED | Noted: 2023-02-01 | Resolved: 2025-07-15

## 2025-07-15 PROBLEM — I70.209 FEMORAL ARTERY OCCLUSION: Status: ACTIVE | Noted: 2019-05-22

## 2025-07-15 PROBLEM — E87.6 HYPOKALEMIA: Status: RESOLVED | Noted: 2023-02-01 | Resolved: 2025-07-15

## 2025-07-15 PROBLEM — N30.01 ACUTE CYSTITIS WITH HEMATURIA: Status: RESOLVED | Noted: 2024-04-17 | Resolved: 2025-07-15

## 2025-07-15 PROBLEM — Z87.891 FORMER CIGARETTE SMOKER: Status: ACTIVE | Noted: 2017-02-27

## 2025-07-15 PROBLEM — I25.2 HX OF NON-ST ELEVATION MYOCARDIAL INFARCTION (NSTEMI): Status: ACTIVE | Noted: 2022-04-12

## 2025-07-15 PROCEDURE — 3074F SYST BP LT 130 MM HG: CPT

## 2025-07-15 PROCEDURE — 99214 OFFICE O/P EST MOD 30 MIN: CPT

## 2025-07-15 PROCEDURE — 3052F HG A1C>EQUAL 8.0%<EQUAL 9.0%: CPT

## 2025-07-15 PROCEDURE — 3078F DIAST BP <80 MM HG: CPT

## 2025-07-15 RX ORDER — METOPROLOL TARTRATE 25 MG/1
25 TABLET, FILM COATED ORAL 2 TIMES DAILY
Qty: 180 TABLET | Refills: 1 | Status: SHIPPED | OUTPATIENT
Start: 2025-07-15

## 2025-07-15 RX ORDER — BUTALBITAL, ACETAMINOPHEN AND CAFFEINE 50; 325; 40 MG/1; MG/1; MG/1
1 TABLET ORAL EVERY 4 HOURS PRN
Qty: 10 TABLET | Refills: 0 | Status: SHIPPED | OUTPATIENT
Start: 2025-07-15

## 2025-07-15 RX ORDER — BUSPIRONE HYDROCHLORIDE 30 MG/1
30 TABLET ORAL DAILY
Qty: 180 TABLET | Refills: 1 | Status: SHIPPED
Start: 2025-07-15

## 2025-07-15 ASSESSMENT — ENCOUNTER SYMPTOMS
BACK PAIN: 1
SHORTNESS OF BREATH: 0
ABDOMINAL PAIN: 0

## 2025-07-15 NOTE — ASSESSMENT & PLAN NOTE
Overdue for repeat A1c. Printed orders to bring to cardio appt tomorrow as she will be getting labs drawn there. If A1c remains elevated, will further increase semaglutide dose. Foot exam today showed distal neuropathy of right foot. Request DM eye exam report from [facility]. PCP follow up 3 mo.

## 2025-07-15 NOTE — PROGRESS NOTES
Parish Mckeon PA-C  [phone]    Waleska Fernández  [id number]  59 y.o.  1965    Chief Complaint:  Chief Complaint   Patient presents with    3 Month Follow-Up     3 month follow up on diabetes.   Bilateral arthritic hip pain        HPI:  Waleska is a 59 y.o. female, patient of Parish Mckeon PA-C, who presents today for evaluation and management of HTN, bilateral carotid stenosis, gastritis, .    B/l carotid artery stenosis, monitoring for now - follows w/ [facility]. Appt w/ Dr Lambert tomorrow, will get labs at his office.    GERD - Mild chronic gastritis per EGD w/ Dr Nelson 25. H pylori neg.    Saw neurosurgery s/p SAH 2/2 HTN emergency. No longer needs to follow. HA improved w/ Fioricet prn. Denies new HA, vision changes, memory changes.    DM - has lab orders to get drawn. FBG 130s. A1c 3/2025 8.6. DM eye exam w/ [facility]. On semaglutide, glipizide, jardiance. Can't take metformin 2/2 diarrhea.    Assessment and Medical Decision Makin. Type 2 diabetes mellitus with other specified complication, without long-term current use of insulin (HCC)  Assessment & Plan:  Overdue for repeat A1c. Printed orders to bring to cardio appt tomorrow as she will be getting labs drawn there. If A1c remains elevated, will further increase semaglutide dose. Foot exam today showed distal neuropathy of right foot. Request DM eye exam report from [facility]. PCP follow up 3 mo.  Orders:  -      DIABETES FOOT EXAM  2. Combined hyperlipidemia associated with type 2 diabetes mellitus (HCC)  Assessment & Plan:  Continue statin   3. Essential hypertension  Assessment & Plan:  Patient states she is taking 50mg metoprolol BID. BP low today, will decrease to 25mg BID and encourage home monitoring.   Orders:  -     metoprolol tartrate (LOPRESSOR) 25 MG tablet; Take 1 tablet by mouth 2 times daily, Disp-180 tablet, R-1Normal  4. Idiopathic hypotension  Assessment & Plan:  Patient states she is

## 2025-07-15 NOTE — ASSESSMENT & PLAN NOTE
May continue Fioricet prn    Last PDMP Josemanuel as Reviewed:  Review User Review Instant Review Result   DONN GARRISON 7/15/2025 10:21 AM Reviewed PDMP [1]

## 2025-07-15 NOTE — ASSESSMENT & PLAN NOTE
Patient states she is taking 50mg metoprolol BID. BP low today, will decrease to 25mg BID and encourage home monitoring.

## 2025-07-15 NOTE — ASSESSMENT & PLAN NOTE
Reviewed recent EGD 6/12/25 showing mild chronic gastritis per EGD w/ Dr Nelson. H pylori neg. Continue PPI.

## 2025-07-30 ENCOUNTER — OFFICE VISIT (OUTPATIENT)
Age: 60
End: 2025-07-30
Payer: COMMERCIAL

## 2025-07-30 VITALS
SYSTOLIC BLOOD PRESSURE: 134 MMHG | OXYGEN SATURATION: 98 % | DIASTOLIC BLOOD PRESSURE: 78 MMHG | WEIGHT: 180.2 LBS | HEART RATE: 77 BPM | BODY MASS INDEX: 29.09 KG/M2

## 2025-07-30 DIAGNOSIS — G43.E01 CHRONIC MIGRAINE WITH AURA AND WITH STATUS MIGRAINOSUS, NOT INTRACTABLE: ICD-10-CM

## 2025-07-30 DIAGNOSIS — R41.3 MEMORY DISTURBANCE: ICD-10-CM

## 2025-07-30 DIAGNOSIS — Z86.73 HISTORY OF STROKE: Primary | ICD-10-CM

## 2025-07-30 PROCEDURE — 3075F SYST BP GE 130 - 139MM HG: CPT | Performed by: NURSE PRACTITIONER

## 2025-07-30 PROCEDURE — 99215 OFFICE O/P EST HI 40 MIN: CPT | Performed by: NURSE PRACTITIONER

## 2025-07-30 PROCEDURE — 3078F DIAST BP <80 MM HG: CPT | Performed by: NURSE PRACTITIONER

## 2025-07-30 RX ORDER — PROCHLORPERAZINE MALEATE 10 MG
10 TABLET ORAL 3 TIMES DAILY
Qty: 9 TABLET | Refills: 0 | Status: SHIPPED | OUTPATIENT
Start: 2025-07-30

## 2025-07-30 RX ORDER — RIMEGEPANT SULFATE 75 MG/75MG
TABLET, ORALLY DISINTEGRATING ORAL
Qty: 2 TABLET | Refills: 0 | Status: SHIPPED | COMMUNITY
Start: 2025-07-30

## 2025-07-30 RX ORDER — FREMANEZUMAB-VFRM 225 MG/1.5ML
INJECTION SUBCUTANEOUS
Qty: 1 ADJUSTABLE DOSE PRE-FILLED PEN SYRINGE | Refills: 0 | Status: SHIPPED | COMMUNITY
Start: 2025-07-30

## 2025-07-30 RX ORDER — UBROGEPANT 100 MG/1
TABLET ORAL
Qty: 1 TABLET | Refills: 0 | Status: SHIPPED | OUTPATIENT
Start: 2025-07-30 | End: 2025-07-30

## 2025-07-30 RX ORDER — UBROGEPANT 100 MG/1
TABLET ORAL
Qty: 1 TABLET | Refills: 0 | Status: SHIPPED | COMMUNITY
Start: 2025-07-30

## 2025-07-30 RX ORDER — FREMANEZUMAB-VFRM 225 MG/1.5ML
225 INJECTION SUBCUTANEOUS
Qty: 1 ADJUSTABLE DOSE PRE-FILLED PEN SYRINGE | Refills: 11 | Status: SHIPPED | OUTPATIENT
Start: 2025-07-30

## 2025-07-30 NOTE — PROGRESS NOTES
7/31/25    Waleska Fernández  1965    Chief Complaint   Patient presents with    Follow-Up from Hospital     Patient here for hospital follow up on headaches, was consulted by Govind Lindo DO on 04/02/2025, per patient states Fioricet is not helping, spouse states she also takes Tylenol and Aleve with        History of Present Illness  Waleska is a 59 y.o. female presenting today for hospital follow-up evaluated by our service first on 3/25/2025 with Dr. Tavera for status migrainosus in the setting of mental hide right frontal parietal hypertensive subarachnoid hemorrhage due to hypertension.  She has a history of migraines, Imitrex was discontinued as triptans are contraindicated.  MRI brain showed known chronic ischemic stroke, was already taking a baby aspirin and statin for secondary stroke prevention. She is also on Brilinta and follows with cardiology and vasc surg for bilateral carotid stenosis.  She was started on prophylactic Keppra 500 mg twice daily for a week.  She did follow-up with neurosurgery, repeat CT head with stable and she is to be seen on an as-needed basis from their standpoint.    She was evaluated in the ER again on 4/1/2025 for status migrainosus superimposed on caffeine withdrawal headache.     She is having a migraine everyday. Throbbing pain in the frontal region, either side. She gets sensitive to light and nauseated. Always lasts longer than 4 hrs. She takes fiorcet, naproxen, tylenol, caffeine pill everyday.This reduces the pain but does not relieve the migraine.  She has tizanidine as needed.     Spouse wanted to discuss memory issues that he has noticed progressively getting worse over the last 2 years. He has noticed less ability to recall things, word finding difficulty, repeating herself. Has cut back on driving because she gets dizzy spells. She manages her medications, spouse manages the finances (always has), cooks the same, cleans less because she is tired, no

## 2025-08-08 ENCOUNTER — PATIENT MESSAGE (OUTPATIENT)
Dept: FAMILY MEDICINE CLINIC | Age: 60
End: 2025-08-08

## 2025-08-08 DIAGNOSIS — E11.69 TYPE 2 DIABETES MELLITUS WITH OTHER SPECIFIED COMPLICATION, WITHOUT LONG-TERM CURRENT USE OF INSULIN (HCC): Primary | ICD-10-CM

## 2025-08-19 DIAGNOSIS — Z86.73 HISTORY OF STROKE: ICD-10-CM

## 2025-08-19 DIAGNOSIS — G43.E01 CHRONIC MIGRAINE WITH AURA AND WITH STATUS MIGRAINOSUS, NOT INTRACTABLE: ICD-10-CM

## 2025-08-23 ENCOUNTER — APPOINTMENT (OUTPATIENT)
Dept: GENERAL RADIOLOGY | Age: 60
End: 2025-08-23
Payer: COMMERCIAL

## 2025-08-23 ENCOUNTER — HOSPITAL ENCOUNTER (EMERGENCY)
Age: 60
Discharge: HOME | End: 2025-08-23
Payer: COMMERCIAL

## 2025-08-23 VITALS
HEIGHT: 66 IN | SYSTOLIC BLOOD PRESSURE: 120 MMHG | WEIGHT: 180 LBS | OXYGEN SATURATION: 93 % | RESPIRATION RATE: 16 BRPM | BODY MASS INDEX: 28.93 KG/M2 | DIASTOLIC BLOOD PRESSURE: 72 MMHG | TEMPERATURE: 98.1 F | HEART RATE: 76 BPM

## 2025-08-23 DIAGNOSIS — S70.01XA CONTUSION OF RIGHT HIP, INITIAL ENCOUNTER: ICD-10-CM
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
## 2025-08-23 DIAGNOSIS — S50.01XA CONTUSION OF RIGHT ELBOW, INITIAL ENCOUNTER: ICD-10-CM

## 2025-08-23 LAB
ANION GAP SERPL CALCULATED.3IONS-SCNC: 16 MMOL/L (ref 9–17)
BASOPHILS # BLD: 0.06 K/UL
BASOPHILS NFR BLD: 1 % (ref 0–1)
BUN SERPL-MCNC: 24 MG/DL (ref 7–20)
CALCIUM SERPL-MCNC: 9.5 MG/DL (ref 8.3–10.6)
CHLORIDE SERPL-SCNC: 104 MMOL/L (ref 99–110)
CO2 SERPL-SCNC: 17 MMOL/L (ref 21–32)
CREAT SERPL-MCNC: 1.3 MG/DL (ref 0.6–1.2)
EOSINOPHIL # BLD: 0.18 K/UL
EOSINOPHILS RELATIVE PERCENT: 2 % (ref 0–3)
ERYTHROCYTE [DISTWIDTH] IN BLOOD BY AUTOMATED COUNT: 14.5 % (ref 11.7–14.9)
GFR, ESTIMATED: 41 ML/MIN/1.73M2
GLUCOSE SERPL-MCNC: 82 MG/DL (ref 74–99)
HCT VFR BLD AUTO: 41.3 % (ref 37–47)
HGB BLD-MCNC: 13.1 G/DL (ref 12.5–16)
IMM GRANULOCYTES # BLD AUTO: 0.03 K/UL
IMM GRANULOCYTES NFR BLD: 0 %
LYMPHOCYTES NFR BLD: 2.71 K/UL
LYMPHOCYTES RELATIVE PERCENT: 36 % (ref 24–44)
MCH RBC QN AUTO: 29.7 PG (ref 27–31)
MCHC RBC AUTO-ENTMCNC: 31.7 G/DL (ref 32–36)
MCV RBC AUTO: 93.7 FL (ref 78–100)
MONOCYTES NFR BLD: 0.67 K/UL
MONOCYTES NFR BLD: 9 % (ref 0–5)
NEUTROPHILS NFR BLD: 52 % (ref 36–66)
NEUTS SEG NFR BLD: 3.89 K/UL
PLATELET # BLD AUTO: 239 K/UL (ref 140–440)
PMV BLD AUTO: 9.9 FL (ref 7.5–11.1)
POTASSIUM SERPL-SCNC: 5.1 MMOL/L (ref 3.5–5.1)
RBC # BLD AUTO: 4.41 M/UL (ref 4.2–5.4)
SODIUM SERPL-SCNC: 137 MMOL/L (ref 136–145)
WBC OTHER # BLD: 7.5 K/UL (ref 4–10.5)

## 2025-08-23 PROCEDURE — 73030 X-RAY EXAM OF SHOULDER: CPT

## 2025-08-23 PROCEDURE — 85025 COMPLETE CBC W/AUTO DIFF WBC: CPT

## 2025-08-23 PROCEDURE — 80048 BASIC METABOLIC PNL TOTAL CA: CPT

## 2025-08-23 PROCEDURE — 99284 EMERGENCY DEPT VISIT MOD MDM: CPT

## 2025-08-23 PROCEDURE — 36415 COLL VENOUS BLD VENIPUNCTURE: CPT

## 2025-08-23 PROCEDURE — 96376 TX/PRO/DX INJ SAME DRUG ADON: CPT

## 2025-08-23 PROCEDURE — 96374 THER/PROPH/DIAG INJ IV PUSH: CPT

## 2025-08-23 PROCEDURE — 6360000002 HC RX W HCPCS: Performed by: PHYSICIAN ASSISTANT

## 2025-08-23 PROCEDURE — 96375 TX/PRO/DX INJ NEW DRUG ADDON: CPT

## 2025-08-23 PROCEDURE — 73502 X-RAY EXAM HIP UNI 2-3 VIEWS: CPT

## 2025-08-23 PROCEDURE — 71100 X-RAY EXAM RIBS UNI 2 VIEWS: CPT

## 2025-08-23 RX ORDER — ONDANSETRON 2 MG/ML
4 INJECTION INTRAMUSCULAR; INTRAVENOUS EVERY 30 MIN PRN
Status: DISCONTINUED | OUTPATIENT
Start: 2025-08-23 | End: 2025-08-24 | Stop reason: HOSPADM

## 2025-08-23 RX ORDER — MORPHINE SULFATE 4 MG/ML
4 INJECTION, SOLUTION INTRAMUSCULAR; INTRAVENOUS EVERY 30 MIN PRN
Refills: 0 | Status: DISCONTINUED | OUTPATIENT
Start: 2025-08-23 | End: 2025-08-24 | Stop reason: HOSPADM

## 2025-08-23 RX ADMIN — MORPHINE SULFATE 4 MG: 4 INJECTION, SOLUTION INTRAMUSCULAR; INTRAVENOUS at 20:19

## 2025-08-23 RX ADMIN — MORPHINE SULFATE 4 MG: 4 INJECTION, SOLUTION INTRAMUSCULAR; INTRAVENOUS at 22:23

## 2025-08-23 RX ADMIN — ONDANSETRON 4 MG: 2 INJECTION INTRAMUSCULAR; INTRAVENOUS at 20:19

## 2025-08-23 RX ADMIN — ONDANSETRON 4 MG: 2 INJECTION INTRAMUSCULAR; INTRAVENOUS at 22:23

## 2025-08-23 ASSESSMENT — PAIN - FUNCTIONAL ASSESSMENT
PAIN_FUNCTIONAL_ASSESSMENT: 0-10

## 2025-08-23 ASSESSMENT — PAIN SCALES - GENERAL
PAINLEVEL_OUTOF10: 8
PAINLEVEL_OUTOF10: 10
PAINLEVEL_OUTOF10: 3
PAINLEVEL_OUTOF10: 5
PAINLEVEL_OUTOF10: 8

## 2025-08-23 ASSESSMENT — PAIN DESCRIPTION - ORIENTATION
ORIENTATION: RIGHT

## 2025-08-23 ASSESSMENT — PAIN DESCRIPTION - LOCATION
LOCATION: HIP
LOCATION: GENERALIZED
LOCATION: SHOULDER

## 2025-08-23 ASSESSMENT — PAIN DESCRIPTION - DESCRIPTORS
DESCRIPTORS: ACHING
DESCRIPTORS: SHARP

## 2025-08-24 RX ORDER — FREMANEZUMAB-VFRM 225 MG/1.5ML
225 INJECTION SUBCUTANEOUS
Qty: 3 ADJUSTABLE DOSE PRE-FILLED PEN SYRINGE | Refills: 3 | Status: SHIPPED | OUTPATIENT
Start: 2025-08-24

## 2025-08-25 ENCOUNTER — TELEPHONE (OUTPATIENT)
Age: 60
End: 2025-08-25

## 2025-09-03 ENCOUNTER — PATIENT MESSAGE (OUTPATIENT)
Dept: FAMILY MEDICINE CLINIC | Age: 60
End: 2025-09-03

## 2025-09-03 DIAGNOSIS — L89.309 PRESSURE INJURY OF SKIN OF BUTTOCK, UNSPECIFIED INJURY STAGE, UNSPECIFIED LATERALITY: Primary | ICD-10-CM

## 2025-09-03 RX ORDER — MUPIROCIN 2 %
OINTMENT (GRAM) TOPICAL
Qty: 22 G | Refills: 0 | Status: SHIPPED | OUTPATIENT
Start: 2025-09-03 | End: 2025-09-10

## (undated) DEVICE — INTENDED FOR TISSUE SEPARATION, AND OTHER PROCEDURES THAT REQUIRE A SHARP SURGICAL BLADE TO PUNCTURE OR CUT.: Brand: BARD-PARKER ® STAINLESS STEEL BLADES

## (undated) DEVICE — GLOVE SURG SZ 65 THK91MIL LTX FREE SYN POLYISOPRENE

## (undated) DEVICE — SHEET, T, LAPAROTOMY, STERILE: Brand: MEDLINE

## (undated) DEVICE — GLOVE SURG SZ 7 FNGR THK94MIL TRNSLUC YEL LTX HYDRGEL GRP

## (undated) DEVICE — ENDOSCOPY KIT: Brand: MEDLINE INDUSTRIES, INC.

## (undated) DEVICE — 20 ML SYRINGE REGULAR TIP: Brand: MONOJECT

## (undated) DEVICE — 3M™ IOBAN™ 2 ANTIMICROBIAL INCISE DRAPE 6650EZ: Brand: IOBAN™ 2

## (undated) DEVICE — SUTURE ABSORBABLE BRAIDED 2-0 CT-1 27 IN UD VICRYL J259H

## (undated) DEVICE — SUTURE VCRL 2-0 L36IN ABSRB UD CTX L48MM 1/2 CIR TAPERPOINT J979H

## (undated) DEVICE — CATH BLLN ANGIO 3.50X8MM NC EUPHORIA RX

## (undated) DEVICE — Z INACTIVE USE 2535480 CLIP LIG M BLU TI HRT SHP WIRE HORZ 180 PER BX

## (undated) DEVICE — SUTURE PROL SZ 6-0 L24IN NONABSORBABLE BLU L13MM C-1 3/8 8726H

## (undated) DEVICE — SOLUTION IV IRRIG WATER 1000ML POUR BRL 2F7114

## (undated) DEVICE — CATHETER 4FR CORDIS IM 100CM

## (undated) DEVICE — ANGIOGRAPHY KIT CUST MANIFOLD

## (undated) DEVICE — GAUZE,SPONGE,4"X4",16PLY,XRAY,STRL,LF: Brand: MEDLINE

## (undated) DEVICE — Device

## (undated) DEVICE — Z DISCONTINUED (USE MFG CAT MVABO)  TUBING GAS SAMPLING STD 6.5 FT FEMALE CONN SMRT CAPNOLINE

## (undated) DEVICE — GLOVE SURG SZ 6 THK91MIL LTX FREE SYN POLYISOPRENE ANTI

## (undated) DEVICE — ELECTRODE ES AD CRDLSS PT RET REM POLYHESIVE

## (undated) DEVICE — LINER,SEMI-RIGID,3000CC,50EA/CS: Brand: MEDLINE

## (undated) DEVICE — DRAPE SHEET ULTRAGARD: Brand: MEDLINE

## (undated) DEVICE — FORCEPS BX L240CM JAW DIA2.8MM L CAP W/ NDL MIC MESH TOOTH

## (undated) DEVICE — SUTURE MCRYL SZ 3-0 L27IN ABSRB UD L24MM PS-1 3/8 CIR PRIM Y936H

## (undated) DEVICE — GUIDEWIRE VASC L150CM DIA0.035IN FLX TIP L7CM PTFE STR FIX

## (undated) DEVICE — ENDOSCOPIC KIT 1.1+ OP4 CA DE 2 GWN AAMI LEVEL 3

## (undated) DEVICE — KIT MICROINTRODUCER 4FR ECHOGENIC NDL L7CM 21GA STIFF COAX

## (undated) DEVICE — PENCIL ES CRD L10FT HND SWCHING ROCK SWCH W/ EDGE COAT BLDE

## (undated) DEVICE — DRESSING TRNSPAR W5XL4.5IN FLM SHT SEMIPERMEABLE WIND

## (undated) DEVICE — GUIDE CATHETER: Brand: RUNWAY™

## (undated) DEVICE — DECANTER FLD 9IN ST BG FOR ASEP TRNSF OF FLD

## (undated) DEVICE — CATHETER GUID 6FR L150CM RAP EXCHG L25CM TIP 5.1FR PUSHROD

## (undated) DEVICE — SUTURE PROL 7-0 L18IN NONABSORBABLE BLU L9.3MM BV-1 3/8 CIR M8701

## (undated) DEVICE — CATH BLLN ANGIO 2X12MM SC EUPHORA RX

## (undated) DEVICE — CATHETER DIAG AD 4FR L100CM STD NYL JUDKINS R 4 TRULUMEN

## (undated) DEVICE — DEVICE INFLATION KT 60031246] ANGIODYNAMICS INC]

## (undated) DEVICE — CATHETER ANGIO 4FR L100CM S STL NYL IM 3 SEG BRAID SFT

## (undated) DEVICE — DRAPE,UTILITY,XL,4/PK,STERILE: Brand: MEDLINE

## (undated) DEVICE — INFLATION DEVICE: Brand: ENCORE™ 26

## (undated) DEVICE — CATHETER ANGIO 4FR L100CM S STL NYL JL4 3 SEG BRAID SFT

## (undated) DEVICE — CLIP SM RED INTERN HMOCLP TITAN LIGATING

## (undated) DEVICE — ADAPTER,CATHETER/SYRINGE/LUER,STERILE: Brand: MEDLINE

## (undated) DEVICE — CLIP INT SM WIDE RED TI TRNSVRS GRV CHEVRON SHP W/ PRECIS

## (undated) DEVICE — Z INACTIVE USE 2641837 CLIP LIG M BLU TI HRT SHP WIRE HORZ 600 PER BX

## (undated) DEVICE — TELFA NON-ADHERENT ABSORBENT DRESSING: Brand: TELFA

## (undated) DEVICE — SUTURE PROL SZ 6-0 L18IN NONABSORBABLE BLU L13MM C-1 3/8 8718H

## (undated) DEVICE — PAD,NON-ADHERENT,3X8,STERILE,LF,1/PK: Brand: MEDLINE

## (undated) DEVICE — PINNACLE INTRODUCER SHEATH: Brand: PINNACLE

## (undated) DEVICE — COUNTER NDL 60 COUNT FOAM STRP SGL MAG

## (undated) DEVICE — CORD ES L15FT PT RET REUSE VALLEYLAB REM

## (undated) DEVICE — HI-TORQUE BALANCE MIDDLEWEIGHT UNIVERSAL II GUIDE WIRE STRAIGHT TIP PAK  190 CM: Brand: HI-TORQUE BALANCE MIDDLEWEIGHT UNIVERSAL II

## (undated) DEVICE — LINER SUCT CANSTR 1500CC SEMI RIG W/ POR HYDROPHOBIC SHUT

## (undated) DEVICE — CATH BLLN ANGIO 3X20MM SC EUPHORA RX

## (undated) DEVICE — TOWEL,OR,DSP,ST,WHITE,DLX,XR,4/PK,20PK/C: Brand: MEDLINE

## (undated) DEVICE — BLADE CLIPPER GEN PURP NS

## (undated) DEVICE — TUBING, SUCTION, 3/16" X 10', STRAIGHT: Brand: MEDLINE

## (undated) DEVICE — MARKER,SKIN,WI/RULER AND LABELS: Brand: MEDLINE

## (undated) DEVICE — 3M™ STERI-DRAPE™ INSTRUMENT POUCH 1018: Brand: STERI-DRAPE™

## (undated) DEVICE — LOOP,VESSEL,MINI,BLUE,2/PK,STERILE: Brand: MEDLINE

## (undated) DEVICE — JELLY,LUBE,STERILE,FLIP TOP,TUBE,2-OZ: Brand: MEDLINE

## (undated) DEVICE — ELECTRODE ES L2.75IN S STL INSUL BLDE W/ SL EDGE

## (undated) DEVICE — PINNACLE R/O II INTRODUCER SHEATH WITH RADIOPAQUE MARKER: Brand: PINNACLE

## (undated) DEVICE — SOLUTION IV 0.9% NACL IRRIGATION 3000ML BAG

## (undated) DEVICE — ANESTHESIA CIRCUIT ADULT-LF: Brand: MEDLINE INDUSTRIES, INC.

## (undated) DEVICE — TAPE MED W1/8XL30IN WHT POLY

## (undated) DEVICE — CORDIS 4F ANGLED PIG 145 MOD

## (undated) DEVICE — SUTURE VCRL SZ 2-0 L27IN ABSRB UD L26MM CT-2 1/2 CIR J269H

## (undated) DEVICE — GUIDEWIRE VASC L260CM DIA0.035IN PTFE MOD S STL COIL PLAT

## (undated) DEVICE — ANGIO-SEAL VIP VASCULAR CLOSURE DEVICE: Brand: ANGIO-SEAL

## (undated) DEVICE — SUTURE PERMAHAND SZ 2-0 L17X18IN NONABSORBABLE BLK SILK SA65H

## (undated) DEVICE — CHLORAPREP 26ML ORANGE

## (undated) DEVICE — LOOP VES W25MM THK1MM MAXI RED SIL FLD REPELLENT 100 PER

## (undated) DEVICE — CONTAINER,SPECIMEN,OR STERILE,4OZ: Brand: MEDLINE

## (undated) DEVICE — SKIN AFFIX SURG ADHESIVE 72/CS 0.55ML: Brand: MEDLINE

## (undated) DEVICE — SET CATH 20GA L1.5IN RAD ART POLYUR RADPQ NDL INTEGR SPR